# Patient Record
Sex: MALE | Race: WHITE | Employment: UNEMPLOYED | URBAN - METROPOLITAN AREA
[De-identification: names, ages, dates, MRNs, and addresses within clinical notes are randomized per-mention and may not be internally consistent; named-entity substitution may affect disease eponyms.]

---

## 2018-02-08 ENCOUNTER — OFFICE VISIT (OUTPATIENT)
Dept: INTERNAL MEDICINE CLINIC | Age: 83
End: 2018-02-08

## 2018-02-08 VITALS
WEIGHT: 144 LBS | DIASTOLIC BLOOD PRESSURE: 68 MMHG | TEMPERATURE: 97.6 F | BODY MASS INDEX: 21.33 KG/M2 | SYSTOLIC BLOOD PRESSURE: 132 MMHG | HEART RATE: 50 BPM | RESPIRATION RATE: 12 BRPM | HEIGHT: 69 IN

## 2018-02-08 DIAGNOSIS — K21.9 GASTROESOPHAGEAL REFLUX DISEASE, ESOPHAGITIS PRESENCE NOT SPECIFIED: ICD-10-CM

## 2018-02-08 DIAGNOSIS — I10 BENIGN ESSENTIAL HTN: ICD-10-CM

## 2018-02-08 DIAGNOSIS — E78.00 HYPERCHOLESTEROLEMIA: ICD-10-CM

## 2018-02-08 DIAGNOSIS — L71.9 ROSACEA: ICD-10-CM

## 2018-02-08 DIAGNOSIS — C61 PROSTATE CANCER (HCC): ICD-10-CM

## 2018-02-08 DIAGNOSIS — E11.9 CONTROLLED TYPE 2 DIABETES MELLITUS WITHOUT COMPLICATION, WITHOUT LONG-TERM CURRENT USE OF INSULIN (HCC): Primary | ICD-10-CM

## 2018-02-08 RX ORDER — METRONIDAZOLE 7.5 MG/G
CREAM TOPICAL 2 TIMES DAILY
COMMUNITY
End: 2018-02-08 | Stop reason: SDUPTHER

## 2018-02-08 RX ORDER — DORZOLAMIDE HYDROCHLORIDE AND TIMOLOL MALEATE 20; 5 MG/ML; MG/ML
SOLUTION/ DROPS OPHTHALMIC
Refills: 2 | COMMUNITY
Start: 2018-02-01

## 2018-02-08 RX ORDER — METFORMIN HYDROCHLORIDE 500 MG/1
TABLET, EXTENDED RELEASE ORAL
Refills: 1 | COMMUNITY
Start: 2018-01-26 | End: 2018-03-22 | Stop reason: SDUPTHER

## 2018-02-08 RX ORDER — MELATONIN
DAILY
COMMUNITY

## 2018-02-08 RX ORDER — LOSARTAN POTASSIUM 100 MG/1
TABLET ORAL
Refills: 0 | COMMUNITY
Start: 2018-01-19 | End: 2018-03-22 | Stop reason: SDUPTHER

## 2018-02-08 RX ORDER — LANOLIN ALCOHOL/MO/W.PET/CERES
500 CREAM (GRAM) TOPICAL DAILY
COMMUNITY

## 2018-02-08 RX ORDER — RANITIDINE 150 MG/1
150 TABLET, FILM COATED ORAL
COMMUNITY
End: 2020-02-06 | Stop reason: ALTCHOICE

## 2018-02-08 RX ORDER — ATORVASTATIN CALCIUM 40 MG/1
TABLET, FILM COATED ORAL
Refills: 0 | COMMUNITY
Start: 2018-01-10 | End: 2018-03-22 | Stop reason: SDUPTHER

## 2018-02-08 RX ORDER — ASPIRIN 81 MG/1
81 TABLET ORAL DAILY
Qty: 30 TAB | Refills: 11
Start: 2018-02-08

## 2018-02-08 RX ORDER — LATANOPROST 50 UG/ML
SOLUTION/ DROPS OPHTHALMIC
Refills: 1 | COMMUNITY
Start: 2018-01-20

## 2018-02-08 RX ORDER — METRONIDAZOLE 7.5 MG/G
CREAM TOPICAL 2 TIMES DAILY
Qty: 45 G | Refills: 5 | Status: SHIPPED | OUTPATIENT
Start: 2018-02-08 | End: 2021-11-24 | Stop reason: ALTCHOICE

## 2018-02-08 NOTE — MR AVS SNAPSHOT
Sunitha Zavaleta 
 
 
 [address] [facility] 100[address] [facility] 
[phone] Patient: Brain Cifuentes MRN: [id number] Visit Information Date & Time Provider Department Dept. Phone Encounter #  
 2/8/2018  1:45 PM Natan Gallardo MD [facility] [address] [id number] Upcoming Health Maintenance Date Due HEMOGLOBIN A1C Q6M 9/22/1929 LIPID PANEL Q1 9/22/1929 FOOT EXAM Q1 9/22/1939 MICROALBUMIN Q1 9/22/1939 DTaP/Tdap/Td series (1 - Tdap) 9/22/1950 ZOSTER VACCINE AGE 60> 7/22/1989 Pneumococcal 65+ High/Highest Risk (1 of 2 - PCV13) 9/22/1994 MEDICARE YEARLY EXAM 9/22/1994 Influenza Age 5 to Adult 8/1/2017 EYE EXAM RETINAL OR DILATED Q1 12/19/2018 GLAUCOMA SCREENING Q2Y 12/19/2019 Allergies as of 2/8/2018  Review Complete On: 2/8/2018 By: Natan Gallardo MD  
  
 Severity Noted Reaction Type Reactions Lisinopril  02/08/2018    Cough Current Immunizations  Reviewed on 2/8/2018 Name Date Influenza Vaccine 10/1/2017 Pneumococcal Vaccine (Unspecified Type) 1/1/2014 Td 1/1/2014 Reviewed by Fabby Ovalle on 2/8/2018 at  1:40 PM  
 Reviewed by Natan Gallardo MD on 2/8/2018 at  2:18 PM  
You Were Diagnosed With   
  
 Codes Comments Controlled type 2 diabetes mellitus without complication, without long-term current use of insulin ([facility] [address]    -  Primary ICD-10-CM: E11.9 ICD-9-CM: 250.00 Benign essential HTN     ICD-10-CM: I10 
ICD-9-CM: 401.1 Prostate cancer [facility])     ICD-10-CM: C76 ICD-9-CM: 893 Gastroesophageal reflux disease, esophagitis presence not specified     ICD-10-CM: K21.9 ICD-9-CM: 530.81 Hypercholesterolemia     ICD-10-CM: E78.00 ICD-9-CM: 272.0 Rosacea     ICD-10-CM: L71.9 ICD-9-CM: 695.3 Vitals BP Pulse Temp Resp Height(growth percentile) Weight(growth percentile) 132/68 (BP 1 Location: Left arm, BP Patient Position: Sitting) (!) 50 97.6 °F (36.4 °C) 12 5' 9\" (1.753 m) 144 lb (65.3 kg) BMI Smoking Status 21.27 kg/m2 Former Smoker Vitals History BMI and BSA Data Body Mass Index Body Surface Area  
 21.27 kg/m 2 1.78 m 2 Preferred Pharmacy Pharmacy Name Phone [id number]- [address], [address] [phone] Your Updated Medication List  
  
   
This list is accurate as of: 2/8/18  2:31 PM.  Always use your most recent med list.  
  
  
  
  
 aspirin delayed-release 81 mg tablet Commonly known as:  ZDXSD-29 Take 1 Tab by mouth daily. atorvastatin 40 mg tablet Commonly known as:  LIPITOR  
TAKE 1 TABLET BY MOUTH EVERY EVENING  
  
 dorzolamide-timolol 22.3-6.8 mg/mL ophthalmic solution Commonly known as:  COSOPT  
APPLY 1 DROP(S) IN BOTH EYES 2 TIMES A DAY  
  
 latanoprost 0.005 % ophthalmic solution Commonly known as:  XALATAN  
APPLY 1 DROP(S) IN LEFT EYE AT BEDTIME  
  
 losartan 100 mg tablet Commonly known as:  COZAAR  
TAKE 1 TABLET BY MOUTH DAILY  
  
 metFORMIN  mg tablet Commonly known as:  GLUCOPHAGE XR  
TAKE 1 TABLET BY MOUTH EVERY DAY  
  
 metroNIDAZOLE 0.75 % topical cream  
Commonly known as:  METROCREAM  
Apply  to affected area two (2) times a day. Use a thin layer to affected areas after washing  
  
 raNITIdine 150 mg tablet Commonly known as:  ZANTAC Take 150 mg by mouth nightly. VITAMIN B-12 500 mcg tablet Generic drug:  cyanocobalamin Take 500 mcg by mouth daily. VITAMIN D3 1,000 unit tablet Generic drug:  cholecalciferol Take  by mouth daily. Prescriptions Sent to Pharmacy Refills  
 metroNIDAZOLE (METROCREAM) 0.75 % topical cream 5 Sig: Apply  to affected area two (2) times a day. Use a thin layer to affected areas after washing  Class: Normal  
 Pharmacy: [address] [place] [address], [address] [facility] #: [phone] Route: Topical  
  
We Performed the Following CBC W/O DIFF [72342 CPT(R)] HEMOGLOBIN A1C WITH EAG [02030 CPT(R)] LIPID PANEL [20826 CPT(R)] METABOLIC PANEL, COMPREHENSIVE [55980 CPT(R)] MICROALBUMIN, UR, RAND W/ MICROALBUMIN/CREA RATIO [id number] CPT(R)] Introducing [facility]! [employer] introduces [employer] patient portal. Now you can access parts of your medical record, email your doctor's office, and request medication refills online. 1. In your internet browser, go to [email or web address] 2. Click on the First Time User? Click Here link in the Sign In box. You will see the New Member Sign Up page. 3. Enter your [employer] Access Code exactly as it appears below. You will not need to use this code after youve completed the sign-up process. If you do not sign up before the expiration date, you must request a new code. · [employer] Access Code: [email or web address] Expires: 5/9/2018  2:31 PM 
 
4. Enter the last four digits of your Social Security Number (xxxx) and Date of Birth (mm/dd/yyyy) as indicated and click Submit. You will be taken to the next sign-up page. 5. Create a [employer] ID. This will be your [employer] login ID and cannot be changed, so think of one that is secure and easy to remember. 6. Create a [employer] password. You can change your password at any time. 7. Enter your Password Reset Question and Answer. This can be used at a later time if you forget your password. 8. Enter your e-mail address. You will receive e-mail notification when new information is available in [address] 9. Click Sign Up. You can now view and download portions of your medical record. 10. Click the Download Summary menu link to download a portable copy of your medical information.  
 
If you have questions, please visit the Frequently Asked Questions section of the [employer]. Remember, "[employer]hart is NOT to be used for urgent needs. For medical emergencies, dial 911. Now available from your iPhone and Android! Please provide this summary of care documentation to your next provider. Your primary care clinician is listed as Mustapha Simpson. If you have any questions after today's visit, please call [phone].

## 2018-02-08 NOTE — PROGRESS NOTES
Just moved from [facility] in September 2017. He has glaucoma. Has a urologist due to bladder CA.

## 2018-02-08 NOTE — PROGRESS NOTES
HISTORY OF PRESENT ILLNESS    New patient to my practice, referred to me by Brooklynn jhaveri. Prior medical care has been from [facility] Dr. Flo Sims. Moved here to be with son and 5 grandkids here. .  he works as a Retired . served [employer]. Moved to [place] 9/2017. . .  his  past medical history was reviewed, discussed, and summarized in the list below. Diabetes Mellitus  Last hemoglobin a1c   Lab Results   Component Value Date/Time    Hemoglobin A1c 7.5 (H) 02/09/2018 10:41 AM   medication compliance: compliant all of the time. Diabetic diet compliance: compliant most of the time. Home glucose monitoring: fasting values range none. Hypoglycemic episodes:  None. Further diabetic ROS: no polyuria or polydipsia, no chest pain, dyspnea or TIA's, no numbness, tingling or pain in extremities. Hypertension  Hypertension ROS: taking medications as instructed, no medication side effects noted, no TIA's, no chest pain on exertion, no dyspnea on exertion, no swelling of ankles     reports that he quit smoking about 56 years ago. He has never used smokeless tobacco.    reports that he drinks about 3.0 oz of alcohol per week    BP Readings from Last 2 Encounters:   02/08/18 132/68         Review of Systems   All other systems reviewed and are negative, except as noted in HPI      Past Medical and Surgical History  Past Medical History:   Diagnosis Date    Benign essential HTN     Bladder cancer ([address] 2013?    s/p BCGx2, resection. low grade. in [facility]. now Dr. Abelino Smith    Closed left arm fracture     Diabetes type 2, controlled ([address]     GERD (gastroesophageal reflux disease)     with hiatal hernia    Glaucoma     Dr. Gabriela Chávezles    Hemorrhoids     Hypercholesterolemia     Prostate cancer [facility]) 2000    Dr. Abelino Smith. s/p prostatectomy 1/2000 in [facility].   PSA \"0.4\" since then    Rosacea         has a past surgical history that includes hx prostatectomy (01/2000). Current Outpatient Prescriptions   Medication Sig    atorvastatin (LIPITOR) 40 mg tablet TAKE 1 TABLET BY MOUTH EVERY EVENING    dorzolamide-timolol (COSOPT) 22.3-6.8 mg/mL ophthalmic solution APPLY 1 DROP(S) IN BOTH EYES 2 TIMES A DAY    latanoprost (XALATAN) 0.005 % ophthalmic solution APPLY 1 DROP(S) IN LEFT EYE AT BEDTIME    losartan (COZAAR) 100 mg tablet TAKE 1 TABLET BY MOUTH DAILY    metFORMIN ER (GLUCOPHAGE XR) 500 mg tablet TAKE 1 TABLET BY MOUTH EVERY DAY    raNITIdine (ZANTAC) 150 mg tablet Take 150 mg by mouth nightly.  cholecalciferol (VITAMIN D3) 1,000 unit tablet Take  by mouth daily.  cyanocobalamin (VITAMIN B-12) 500 mcg tablet Take 500 mcg by mouth daily.  metroNIDAZOLE (METROCREAM) 0.75 % topical cream Apply  to affected area two (2) times a day. Use a thin layer to affected areas after washing    aspirin delayed-release (ASPIR-81) 81 mg tablet Take 1 Tab by mouth daily. No current facility-administered medications for this visit. reports that he quit smoking about 56 years ago. He has never used smokeless tobacco. He reports that he drinks about 3.0 oz of alcohol per week     family history includes Cancer in his brother, father, mother, and sister. Physical Exam   Nursing note and vitals reviewed. Blood pressure 172/88, pulse (!) 50, temperature 97.6 °F (36.4 °C), resp. rate 12, height 5' 9\" (1.753 m), weight 144 lb (65.3 kg). Constitutional: oriented to person, place, and time. No distress. Eyes: Conjunctivae are normal.   HEENT:  No cervical lymphadenopathy. No thyroid nodules or goiter  Cardiovascular: Normal rate. Regular rhythm, no murmurs, rubs. No edema  Pulmonary/Chest: Effort normal. clear to auscultation  Abdominal: soft, non-tender, non-distended  Musculoskeletal:     Neurological: Alert and oriented to person, place. Cranial nerves grossly intact. Normal gait   Skin: No rash noted.    Psychiatric: Normal mood and affect. Behavior is normal.     ASSESSMENT and PLAN  Diagnoses and all orders for this visit:    1. Controlled type 2 diabetes mellitus without complication, without long-term current use of insulin ([facility])  Borderline controlled  -     aspirin delayed-release (ASPIR-81) 81 mg tablet; Take 1 Tab by mouth daily.  -     LIPID PANEL  -     MICROALBUMIN, UR, RAND W/ MICROALBUMIN/CREA RATIO  -     METABOLIC PANEL, COMPREHENSIVE  -     CBC W/O DIFF  -     HEMOGLOBIN A1C WITH EAG    2. Benign essential HTN - Controlled on current regimen. Continue current medications as written in chart. 3. Prostate cancer ([facility] [address]    4. Gastroesophageal reflux disease, esophagitis presence not specified    5. Hypercholesterolemia - Controlled on current regimen. Continue current medications as written in chart.  -     METABOLIC PANEL, COMPREHENSIVE    6. Rosacea  -     metroNIDAZOLE (METROCREAM) 0.75 % topical cream; Apply  to affected area two (2) times a day.  Use a thin layer to affected areas after washing    lab results and schedule of future lab studies reviewed with patient  reviewed medications and side effects in detail    Follow-up Disposition: Not on File

## 2018-02-10 PROBLEM — E11.21 TYPE 2 DIABETES WITH NEPHROPATHY (HCC): Status: ACTIVE | Noted: 2018-02-10

## 2018-02-10 LAB
ALBUMIN SERPL-MCNC: 4.5 G/DL (ref 3.5–4.7)
ALBUMIN/CREAT UR: 30.6 MG/G CREAT (ref 0–30)
ALBUMIN/GLOB SERPL: 2 {RATIO} (ref 1.2–2.2)
ALP SERPL-CCNC: 68 IU/L (ref 39–117)
ALT SERPL-CCNC: 29 IU/L (ref 0–44)
AST SERPL-CCNC: 30 IU/L (ref 0–40)
BILIRUB SERPL-MCNC: 0.5 MG/DL (ref 0–1.2)
BUN SERPL-MCNC: 31 MG/DL (ref 8–27)
BUN/CREAT SERPL: 27 (ref 10–24)
CALCIUM SERPL-MCNC: 9.4 MG/DL (ref 8.6–10.2)
CHLORIDE SERPL-SCNC: 102 MMOL/L (ref 96–106)
CHOLEST SERPL-MCNC: 135 MG/DL (ref 100–199)
CO2 SERPL-SCNC: 24 MMOL/L (ref 18–29)
CREAT SERPL-MCNC: 1.13 MG/DL (ref 0.76–1.27)
CREAT UR-MCNC: 131.6 MG/DL
ERYTHROCYTE [DISTWIDTH] IN BLOOD BY AUTOMATED COUNT: 13.2 % (ref 12.3–15.4)
EST. AVERAGE GLUCOSE BLD GHB EST-MCNC: 169 MG/DL
GFR SERPLBLD CREATININE-BSD FMLA CKD-EPI: 58 ML/MIN/1.73
GFR SERPLBLD CREATININE-BSD FMLA CKD-EPI: 67 ML/MIN/1.73
GLOBULIN SER CALC-MCNC: 2.3 G/DL (ref 1.5–4.5)
GLUCOSE SERPL-MCNC: 114 MG/DL (ref 65–99)
HBA1C MFR BLD: 7.5 % (ref 4.8–5.6)
HCT VFR BLD AUTO: 39.8 % (ref 37.5–51)
HDLC SERPL-MCNC: 61 MG/DL
HGB BLD-MCNC: 13.4 G/DL (ref 13–17.7)
INTERPRETATION, 910389: NORMAL
INTERPRETATION: NORMAL
LDLC SERPL CALC-MCNC: 59 MG/DL (ref 0–99)
Lab: NORMAL
MCH RBC QN AUTO: 31.9 PG (ref 26.6–33)
MCHC RBC AUTO-ENTMCNC: 33.7 G/DL (ref 31.5–35.7)
MCV RBC AUTO: 95 FL (ref 79–97)
MICROALBUMIN UR-MCNC: 40.3 UG/ML
PDF IMAGE, 910387: NORMAL
PLATELET # BLD AUTO: 174 X10E3/UL (ref 150–379)
POTASSIUM SERPL-SCNC: 4.2 MMOL/L (ref 3.5–5.2)
PROT SERPL-MCNC: 6.8 G/DL (ref 6–8.5)
RBC # BLD AUTO: 4.2 X10E6/UL (ref 4.14–5.8)
SODIUM SERPL-SCNC: 142 MMOL/L (ref 134–144)
TRIGL SERPL-MCNC: 73 MG/DL (ref 0–149)
VLDLC SERPL CALC-MCNC: 15 MG/DL (ref 5–40)
WBC # BLD AUTO: 5.9 X10E3/UL (ref 3.4–10.8)

## 2018-03-22 DIAGNOSIS — I10 BENIGN ESSENTIAL HTN: ICD-10-CM

## 2018-03-22 DIAGNOSIS — E11.9 CONTROLLED TYPE 2 DIABETES MELLITUS WITHOUT COMPLICATION, WITHOUT LONG-TERM CURRENT USE OF INSULIN (HCC): ICD-10-CM

## 2018-03-22 DIAGNOSIS — E78.00 HYPERCHOLESTEROLEMIA: ICD-10-CM

## 2018-03-22 RX ORDER — LOSARTAN POTASSIUM 100 MG/1
TABLET ORAL
Qty: 90 TAB | Refills: 1 | Status: SHIPPED | OUTPATIENT
Start: 2018-03-22 | End: 2018-09-12 | Stop reason: SDUPTHER

## 2018-03-22 RX ORDER — METFORMIN HYDROCHLORIDE 500 MG/1
TABLET, EXTENDED RELEASE ORAL
Qty: 90 TAB | Refills: 1 | Status: SHIPPED | OUTPATIENT
Start: 2018-03-22 | End: 2018-09-12 | Stop reason: SDUPTHER

## 2018-03-22 RX ORDER — ATORVASTATIN CALCIUM 40 MG/1
TABLET, FILM COATED ORAL
Qty: 90 TAB | Refills: 1 | Status: SHIPPED | OUTPATIENT
Start: 2018-03-22 | End: 2018-09-12 | Stop reason: SDUPTHER

## 2018-05-10 ENCOUNTER — OFFICE VISIT (OUTPATIENT)
Dept: INTERNAL MEDICINE CLINIC | Age: 83
End: 2018-05-10

## 2018-05-10 VITALS
OXYGEN SATURATION: 99 % | SYSTOLIC BLOOD PRESSURE: 127 MMHG | TEMPERATURE: 97.8 F | RESPIRATION RATE: 18 BRPM | WEIGHT: 143.8 LBS | BODY MASS INDEX: 21.3 KG/M2 | DIASTOLIC BLOOD PRESSURE: 73 MMHG | HEART RATE: 58 BPM | HEIGHT: 69 IN

## 2018-05-10 DIAGNOSIS — E11.29 TYPE 2 DIABETES MELLITUS WITH MICROALBUMINURIA, WITHOUT LONG-TERM CURRENT USE OF INSULIN (HCC): ICD-10-CM

## 2018-05-10 DIAGNOSIS — R42 VERTIGO: ICD-10-CM

## 2018-05-10 DIAGNOSIS — Z85.46 HISTORY OF PROSTATE CANCER: ICD-10-CM

## 2018-05-10 DIAGNOSIS — Z00.00 MEDICARE ANNUAL WELLNESS VISIT, SUBSEQUENT: Primary | ICD-10-CM

## 2018-05-10 DIAGNOSIS — R80.9 TYPE 2 DIABETES MELLITUS WITH MICROALBUMINURIA, WITHOUT LONG-TERM CURRENT USE OF INSULIN (HCC): ICD-10-CM

## 2018-05-10 DIAGNOSIS — Z85.51 HISTORY OF BLADDER CANCER: ICD-10-CM

## 2018-05-10 DIAGNOSIS — Z71.89 ACP (ADVANCE CARE PLANNING): ICD-10-CM

## 2018-05-10 DIAGNOSIS — E78.00 HYPERCHOLESTEROLEMIA: ICD-10-CM

## 2018-05-10 DIAGNOSIS — I10 BENIGN ESSENTIAL HTN: ICD-10-CM

## 2018-05-10 RX ORDER — MECLIZINE HCL 12.5 MG 12.5 MG/1
12.5 TABLET ORAL
Qty: 30 TAB | Refills: 1 | Status: SHIPPED | OUTPATIENT
Start: 2018-05-10 | End: 2018-05-20

## 2018-05-10 RX ORDER — ZOSTER VACCINE RECOMBINANT, ADJUVANTED 50 MCG/0.5
0.5 KIT INTRAMUSCULAR ONCE
Qty: 0.5 ML | Refills: 0 | Status: SHIPPED | OUTPATIENT
Start: 2018-05-10 | End: 2018-05-10

## 2018-05-10 NOTE — PROGRESS NOTES
This is the Subsequent Medicare Annual Wellness Exam, performed 12 months or more after the Initial AWV or the last Subsequent AWV    I have reviewed the patient's medical history in detail and updated the computerized patient record. Diabetes Mellitus follow-up  Last hemoglobin a1c   Lab Results   Component Value Date/Time    Hemoglobin A1c 7.5 (H) 02/09/2018 10:41 AM     No components found for: POCA1C, HBA1C POC  medication compliance: compliant all of the time. Diabetic diet compliance: compliant most of the time. Home glucose monitoring: fasting values range none. Hypoglycemic episodes:  None. Further diabetic ROS: no polyuria or polydipsia, no chest pain, dyspnea or TIA's, no numbness, tingling or pain in extremities. Hypertension  Hypertension ROS: taking medications as instructed, no medication side effects noted, no TIA's, no chest pain on exertion, no dyspnea on exertion, no swelling of ankles     reports that he quit smoking about 56 years ago. He has never used smokeless tobacco.    reports that he drinks about 3.0 oz of alcohol per week    BP Readings from Last 2 Encounters:   05/10/18 127/73   02/08/18 132/68             History     Past Medical History:   Diagnosis Date    Benign essential HTN     Bladder cancer ([facility] 2013?    s/p BCGx2, resection. low grade. in [facility]. now Dr. Lorraine Adam    Closed left arm fracture     GERD (gastroesophageal reflux disease)     with hiatal hernia    Glaucoma     Dr. Josh Cruz    Hemorrhoids     Hypercholesterolemia     Microalbuminuria due to type 2 diabetes mellitus ([facility]     Prostate cancer ([facility] 2000    Dr. Lorraine Adam. s/p prostatectomy 1/2000 in [facility].   PSA \"0.4\" since then    Rosacea     Type 2 diabetes mellitus with microalbuminuria (HCC)     Vertigo       Past Surgical History:   Procedure Laterality Date    HX PROSTATECTOMY  01/2000     Current Outpatient Prescriptions   Medication Sig Dispense Refill    SHINGRIX, PF, 50 mcg/0.5 mL susr injection 0.5 mL by IntraMUSCular route once for 1 dose. Receive 2nd dose in 6 months. For Shingles (Zoster) prevention 0.5 mL 0    meclizine (ANTIVERT) 12.5 mg tablet Take 1 Tab by mouth three (3) times daily as needed for up to 10 days. 30 Tab 1    metFORMIN ER (GLUCOPHAGE XR) 500 mg tablet TAKE 1 TABLET BY MOUTH EVERY DAY 90 Tab 1    losartan (COZAAR) 100 mg tablet TAKE 1 TABLET BY MOUTH DAILY 90 Tab 1    atorvastatin (LIPITOR) 40 mg tablet TAKE 1 TABLET BY MOUTH EVERY EVENING 90 Tab 1    dorzolamide-timolol (COSOPT) 22.3-6.8 mg/mL ophthalmic solution APPLY 1 DROP(S) IN BOTH EYES 2 TIMES A DAY  2    latanoprost (XALATAN) 0.005 % ophthalmic solution APPLY 1 DROP(S) IN LEFT EYE AT BEDTIME  1    raNITIdine (ZANTAC) 150 mg tablet Take 150 mg by mouth nightly.  cholecalciferol (VITAMIN D3) 1,000 unit tablet Take  by mouth daily.  cyanocobalamin (VITAMIN B-12) 500 mcg tablet Take 500 mcg by mouth daily.  aspirin delayed-release (ASPIR-81) 81 mg tablet Take 1 Tab by mouth daily. 30 Tab 11    metroNIDAZOLE (METROCREAM) 0.75 % topical cream Apply  to affected area two (2) times a day.  Use a thin layer to affected areas after washing 45 g 5     Allergies   Allergen Reactions    Lisinopril Cough     Family History   Problem Relation Age of Onset    Cancer Mother      breast    Cancer Father      prostate    Cancer Sister      breast    Cancer Brother      prostate     Social History   Substance Use Topics    Smoking status: Former Smoker     Quit date: 1/1/1962    Smokeless tobacco: Never Used    Alcohol use 3.0 oz/week     5 Glasses of wine per week     Patient Active Problem List   Diagnosis Code    Glaucoma H40.9    Hypercholesterolemia E78.00    Rosacea L71.9    GERD (gastroesophageal reflux disease) K21.9    Hemorrhoids K64.9    Benign essential HTN I10    Type 2 diabetes with nephropathy ([facility] [address] E11.21    Microalbuminuria due to type 2 diabetes mellitus ([facility] E11.29, R80.9    Type 2 diabetes mellitus with microalbuminuria ([facility] E11.29, R80.9    History of prostate cancer Z85.46    Vertigo R42    History of bladder cancer Z85.51       Depression Risk Factor Screening:     PHQ over the last two weeks 5/10/2018   PHQ Not Done -   Little interest or pleasure in doing things Not at all   Feeling down, depressed or hopeless Not at all   Total Score PHQ 2 0     Alcohol Risk Factor Screening: You do not drink alcohol or very rarely. Functional Ability and Level of Safety:   Hearing Loss  Hearing is good. Activities of Daily Living  The home contains: no safety equipment. Patient does total self care    Fall Risk  Fall Risk Assessment, last 12 mths 5/10/2018   Able to walk? Yes   Fall in past 12 months? No       Abuse Screen  Patient is not abused    Cognitive Screening   Evaluation of Cognitive Function:  Has your family/caregiver stated any concerns about your memory: no  Normal    Patient Care Team   Patient Care Team:  Julian Hdez MD as PCP - General (Internal Medicine)    Assessment/Plan   Education and counseling provided:  Are appropriate based on today's review and evaluation  End-of-Life planning (with patient's consent)    Diagnoses and all orders for this visit:    1. Medicare annual wellness visit, subsequent  -     SHINGRIX, PF, 50 mcg/0.5 mL susr injection; 0.5 mL by IntraMUSCular route once for 1 dose. Receive 2nd dose in 6 months. For Shingles (Zoster) prevention    2. ACP (advance care planning)    3. Type 2 diabetes mellitus with microalbuminuria, without long-term current use of insulin (HCC)  Controlled on current regimen. Continue current medications as written in chart. Repeat labs 3-4 months    4. Hypercholesterolemia  Controlled on current regimen. Continue current medications as written in chart. 5. Benign essential HTN  Controlled on current regimen. Continue current medications as written in chart    6.  Vertigo - intermittent. Prn meclizine  -     meclizine (ANTIVERT) 12.5 mg tablet; Take 1 Tab by mouth three (3) times daily as needed for up to 10 days. 7. History of prostate cancer    8.  History of bladder cancer

## 2018-05-10 NOTE — MR AVS SNAPSHOT
Gloria Streeter 
 
 
 [address] [facility] 
[phone] Patient: Harpreet Del Rio MRN: [id number] Visit Information Date & Time Provider Department Dept. Phone Encounter #  
 5/10/2018  9:00 AM Shyanne Shirley MD [facility] [address] [id number] Upcoming Health Maintenance Date Due ZOSTER VACCINE AGE 60> 8/10/2018* DTaP/Tdap/Td series (1 - Tdap) 1/1/2024* Influenza Age 5 to Adult 8/1/2018 HEMOGLOBIN A1C Q6M 8/9/2018 EYE EXAM RETINAL OR DILATED Q1 12/19/2018 Pneumococcal 65+ High/Highest Risk (2 of 2 - PPSV23) 1/1/2019 MICROALBUMIN Q1 2/9/2019 LIPID PANEL Q1 2/9/2019 FOOT EXAM Q1 5/10/2019 MEDICARE YEARLY EXAM 5/11/2019 GLAUCOMA SCREENING Q2Y 12/19/2019 *Topic was postponed. The date shown is not the original due date. Allergies as of 5/10/2018  Review Complete On: 5/10/2018 By: Shyanne Shirley MD  
  
 Severity Noted Reaction Type Reactions Lisinopril  02/08/2018    Cough Current Immunizations  Reviewed on 5/10/2018 Name Date Influenza Vaccine 10/1/2017 Pneumococcal Vaccine (Unspecified Type) 1/1/2014 Td 1/1/2014 Reviewed by Shyanne Shirley MD on 5/10/2018 at  9:52 AM  
 Reviewed by Shyanne Shirley MD on 5/10/2018 at  9:52 AM  
You Were Diagnosed With   
  
 Codes Comments Medicare annual wellness visit, subsequent    -  Primary ICD-10-CM: Z00.00 ICD-9-CM: V70.0 ACP (advance care planning)     ICD-10-CM: Z71.89 ICD-9-CM: V65.49 Type 2 diabetes mellitus with microalbuminuria, without long-term current use of insulin (HCC)     ICD-10-CM: E11.29, R80.9 ICD-9-CM: 250.40, 791.0 Hypercholesterolemia     ICD-10-CM: E78.00 ICD-9-CM: 272.0 Benign essential HTN     ICD-10-CM: I10 
ICD-9-CM: 401.1 Vertigo     ICD-10-CM: N37 ICD-9-CM: 780.4  History of prostate cancer     ICD-10-CM: Z85.46 
ICD-9-CM: V10.46   
 History of bladder cancer     ICD-10-CM: Z85.51 
ICD-9-CM: V10.51 Vitals BP Pulse Temp Resp Height(growth percentile) Weight(growth percentile) 127/73 (BP 1 Location: Right arm, BP Patient Position: Sitting) (!) 58 97.8 °F (36.6 °C) (Oral) 18 5' 9\" (1.753 m) 143 lb 12.8 oz (65.2 kg) SpO2 BMI Smoking Status 99% 21.24 kg/m2 Former Smoker Vitals History BMI and BSA Data Body Mass Index Body Surface Area  
 21.24 kg/m 2 1.78 m 2 Preferred Pharmacy Pharmacy Name Phone [id number] [address], [address] [phone] Your Updated Medication List  
  
   
This list is accurate as of 5/10/18 10:07 AM.  Always use your most recent med list.  
  
  
  
  
 aspirin delayed-release 81 mg tablet Commonly known as:  NYUMJ-24 Take 1 Tab by mouth daily. atorvastatin 40 mg tablet Commonly known as:  LIPITOR  
TAKE 1 TABLET BY MOUTH EVERY EVENING  
  
 dorzolamide-timolol 22.3-6.8 mg/mL ophthalmic solution Commonly known as:  COSOPT  
APPLY 1 DROP(S) IN BOTH EYES 2 TIMES A DAY  
  
 latanoprost 0.005 % ophthalmic solution Commonly known as:  XALATAN  
APPLY 1 DROP(S) IN LEFT EYE AT BEDTIME  
  
 losartan 100 mg tablet Commonly known as:  COZAAR  
TAKE 1 TABLET BY MOUTH DAILY  
  
 meclizine 12.5 mg tablet Commonly known as:  ANTIVERT Take 1 Tab by mouth three (3) times daily as needed for up to 10 days. metFORMIN  mg tablet Commonly known as:  GLUCOPHAGE XR  
TAKE 1 TABLET BY MOUTH EVERY DAY  
  
 metroNIDAZOLE 0.75 % topical cream  
Commonly known as:  METROCREAM  
Apply  to affected area two (2) times a day. Use a thin layer to affected areas after washing  
  
 raNITIdine 150 mg tablet Commonly known as:  ZANTAC Take 150 mg by mouth nightly. SHINGRIX (PF) 50 mcg/0.5 mL Susr injection Generic drug:  varicella-zoster recombinant (PF)  
 0.5 mL by IntraMUSCular route once for 1 dose. Receive 2nd dose in 6 months. For Shingles (Zoster) prevention VITAMIN B-12 500 mcg tablet Generic drug:  cyanocobalamin Take 500 mcg by mouth daily. VITAMIN D3 1,000 unit tablet Generic drug:  cholecalciferol Take  by mouth daily. Prescriptions Printed Refills SHINGRIX, PF, 50 mcg/0.5 mL susr injection 0 Si.5 mL by IntraMUSCular route once for 1 dose. Receive 2nd dose in 6 months. For Shingles (Zoster) prevention Class: Print Route: IntraMUSCular Prescriptions Sent to Pharmacy Refills  
 meclizine (ANTIVERT) 12.5 mg tablet 1 Sig: Take 1 Tab by mouth three (3) times daily as needed for up to 10 days. Class: Normal  
 Pharmacy: [address] [place] [address] Ph #: [phone] Route: Oral  
  
Patient Instructions Medicare Wellness Visit, Male The best way to live healthy is to have a healthy lifestyle by eating a well-balanced diet, exercising regularly, limiting alcohol and stopping smoking. Regular physical exams and screening tests are another way to keep healthy. Preventive exams provided by your health care provider can find health problems before they become diseases or illnesses. Preventive services including immunizations, screening tests, monitoring and exams can help you take care of your own health. All people over age 72 should have a pneumovax  and and a prevnar shot to prevent pneumonia. These are once in a lifetime unless you and your provider decide differently. All people over 65 should have a yearly flu shot and a tetanus vaccine every 10 years. Screening for diabetes mellitus with a blood sugar test should be done every year.  
 
Glaucoma is a disease of the eye due to increased ocular pressure that can lead to blindness and it should be done every year by an eye professional. 
 
 Cardiovascular screening tests that check for elevated lipids (fatty part of blood) which can lead to heart disease and strokes should be done every 5 years. Colorectal screening that evaluates for blood or polyps in your colon should be done yearly as a stool test or every five years as a flexible sigmoidoscope or every 10 years as a colonoscopy up to age 76. Men up to age 76 may need a screening blood test for prostate cancer at certain intervals, depending on their personal and family history. This decision is between the patient and his provider. If you have been a smoker or had family history of abdominal aortic aneurysms, you and your provider may decide to schedule an ultrasound test of your aorta. Hepatitis C screening is also recommended for anyone born between [birth year] through [address]. A shingles vaccine is also recommended once in a lifetime after age 61. Your Medicare Wellness Exam is recommended annually. Here is a list of your current Health Maintenance items with a due date: There are no preventive care reminders to display for this patient. Introducing [facility]! Dear Evelyn Velasquez: Thank you for requesting a Personalis account. Our records indicate that you already have an active Personalis account. You can access your account anytime at [email or web address] [email or web address] Did you know that you can access your hospital and ER discharge instructions at any time in Personalis? You can also review all of your test results from your hospital stay or ER visit. Additional Information If you have questions, please visit the Frequently Asked Questions section of the Personalis website at [email or web address] Remember, Personalis is NOT to be used for urgent needs. For medical emergencies, dial 911. Now available from your iPhone and Android! Please provide this summary of care documentation to your next provider. Your primary care clinician is listed as Aman Salts. If you have any questions after today's visit, please call [phone].

## 2018-05-10 NOTE — ACP (ADVANCE CARE PLANNING)
Advance Care Planning (ACP) Note    Date of ACP Conversation: 5/10/2018  Persons included in Conversation: patient  Length of ACP Conversation in minutes: <16 minutes (Non-Billable)    Conversation requested by:   Patient    Authorized Decision Maker (if patient is incapable of making informed decisions): This person is:  Healthcare Agent/Medical Power of  under Advance Directive    General ACP for ALL Patients with Decision Making Capacity: yes    Advance Directive Conversation with Patients who have not yet planned:  Importance of advance care planning, including choosing a healthcare agent to communicate patient's healthcare decisions if patient lost the ability to make decisions, such as after a sudden illness or accident  Explored patient's values, goals, and care preferences as related to these situations    Review of Existing Advance Directive: (Select questions covered)  Does this advance directive still reflect your preferences?   Yes    Interventions Provided:  Recommended review of completed ACP document annually or upon change in health status

## 2018-09-06 ENCOUNTER — OFFICE VISIT (OUTPATIENT)
Dept: INTERNAL MEDICINE CLINIC | Age: 83
End: 2018-09-06

## 2018-09-06 VITALS
HEART RATE: 45 BPM | DIASTOLIC BLOOD PRESSURE: 80 MMHG | WEIGHT: 149 LBS | OXYGEN SATURATION: 98 % | BODY MASS INDEX: 22.07 KG/M2 | SYSTOLIC BLOOD PRESSURE: 136 MMHG | HEIGHT: 69 IN | TEMPERATURE: 97.9 F | RESPIRATION RATE: 18 BRPM

## 2018-09-06 DIAGNOSIS — E11.29 MICROALBUMINURIA DUE TO TYPE 2 DIABETES MELLITUS (HCC): ICD-10-CM

## 2018-09-06 DIAGNOSIS — I10 BENIGN ESSENTIAL HTN: ICD-10-CM

## 2018-09-06 DIAGNOSIS — E78.00 HYPERCHOLESTEROLEMIA: ICD-10-CM

## 2018-09-06 DIAGNOSIS — Z23 ENCOUNTER FOR IMMUNIZATION: ICD-10-CM

## 2018-09-06 DIAGNOSIS — R80.9 MICROALBUMINURIA DUE TO TYPE 2 DIABETES MELLITUS (HCC): ICD-10-CM

## 2018-09-06 DIAGNOSIS — E11.21 TYPE 2 DIABETES WITH NEPHROPATHY (HCC): Primary | ICD-10-CM

## 2018-09-06 LAB — HBA1C MFR BLD HPLC: 7.4 %

## 2018-09-06 NOTE — MR AVS SNAPSHOT
[facility] 
 
 
 [address] 
[phone] Patient: King Xavier MRN: [id number] Visit Information Date & Time Provider Department Dept. Phone Encounter #  
 9/6/2018  8:00 AM Zoila Arora MD [facility] [address] [facility] [id number] Follow-up Instructions Return in about 5 months (around 2/6/2019). Upcoming Health Maintenance Date Due ZOSTER VACCINE AGE 60> 7/22/1989 Influenza Age 5 to Adult 8/1/2018 DTaP/Tdap/Td series (1 - Tdap) 1/1/2024* EYE EXAM RETINAL OR DILATED Q1 12/19/2018 Pneumococcal 65+ High/Highest Risk (2 of 2 - PPSV23) 1/1/2019 MICROALBUMIN Q1 2/9/2019 LIPID PANEL Q1 2/9/2019 HEMOGLOBIN A1C Q6M 3/6/2019 FOOT EXAM Q1 5/10/2019 MEDICARE YEARLY EXAM 5/11/2019 GLAUCOMA SCREENING Q2Y 12/19/2019 *Topic was postponed. The date shown is not the original due date. Allergies as of 9/6/2018  Review Complete On: 9/6/2018 By: Zoila Arora MD  
  
 Severity Noted Reaction Type Reactions Lisinopril  02/08/2018    Cough Current Immunizations  Reviewed on 9/6/2018 Name Date Influenza Vaccine 10/1/2017 Influenza Vaccine (Tri) Adjuvanted  Incomplete Pneumococcal Vaccine (Unspecified Type) 1/1/2014 Td 1/1/2014 Zoster Recombinant 5/10/2018 12:00 AM  
  
 Reviewed by Zoila Arora MD on 9/6/2018 at  8:17 AM  
You Were Diagnosed With   
  
 Codes Comments Type 2 diabetes with nephropathy (HCC)    -  Primary ICD-10-CM: E11.21 
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 
ICD-9-CM: 401.1 Hypercholesterolemia     ICD-10-CM: E78.00 ICD-9-CM: 272.0 Encounter for immunization     ICD-10-CM: R85 ICD-9-CM: V03.89 Vitals BP Pulse Temp Resp Height(growth percentile) Weight(growth percentile) 136/80 (BP 1 Location: Left arm, BP Patient Position: Sitting) (!) 45 97.9 °F (36.6 °C) (Oral) 18 5' 9\" (1.753 m) 149 lb (67.6 kg) SpO2 BMI Smoking Status 98% 22 kg/m2 Former Smoker Vitals History BMI and BSA Data Body Mass Index Body Surface Area  
 22 kg/m 2 1.81 m 2 Preferred Pharmacy Pharmacy Name Phone [id number]- [address], [address] [phone] Your Updated Medication List  
  
   
This list is accurate as of 9/6/18  8:27 AM.  Always use your most recent med list.  
  
  
  
  
 aspirin delayed-release 81 mg tablet Commonly known as:  WVHJZ-05 Take 1 Tab by mouth daily. atorvastatin 40 mg tablet Commonly known as:  LIPITOR  
TAKE 1 TABLET BY MOUTH EVERY EVENING  
  
 dorzolamide-timolol 22.3-6.8 mg/mL ophthalmic solution Commonly known as:  COSOPT  
APPLY 1 DROP(S) IN BOTH EYES 2 TIMES A DAY  
  
 latanoprost 0.005 % ophthalmic solution Commonly known as:  XALATAN  
APPLY 1 DROP(S) IN LEFT EYE AT BEDTIME  
  
 losartan 100 mg tablet Commonly known as:  COZAAR  
TAKE 1 TABLET BY MOUTH DAILY  
  
 metFORMIN  mg tablet Commonly known as:  GLUCOPHAGE XR  
TAKE 1 TABLET BY MOUTH EVERY DAY  
  
 metroNIDAZOLE 0.75 % topical cream  
Commonly known as:  METROCREAM  
Apply  to affected area two (2) times a day. Use a thin layer to affected areas after washing  
  
 raNITIdine 150 mg tablet Commonly known as:  ZANTAC Take 150 mg by mouth nightly. VITAMIN B-12 500 mcg tablet Generic drug:  cyanocobalamin Take 500 mcg by mouth daily. VITAMIN D3 1,000 unit tablet Generic drug:  cholecalciferol Take  by mouth daily. We Performed the Following ADMIN INFLUENZA VIRUS VAC [ HCP] AMB POC HEMOGLOBIN A1C [39270 CPT(R)] INFLUENZA VACCINE INACTIVATED (IIV), SUBUNIT, ADJUVANTED, IM [id number] CPT(R)] Follow-up Instructions Return in about 5 months (around 2/6/2019). Introducing hospitals & [facility] SERVICES! Dear Rob Guevara: Thank you for requesting a LookUP account. Our records indicate that you already have an active LookUP account. You can access your account anytime at [email or web address] Did you know that you can access your hospital and ER discharge instructions at any time in LookUP? You can also review all of your test results from your hospital stay or ER visit. Additional Information If you have questions, please visit the Frequently Asked Questions section of the LookUP website at [email or web address] Remember, LookUP is NOT to be used for urgent needs. For medical emergencies, dial 911. Now available from your iPhone and Android! Please provide this summary of care documentation to your next provider. Your primary care clinician is listed as Heath Barnhart. If you have any questions after today's visit, please call [phone].

## 2018-09-06 NOTE — PROGRESS NOTES
HISTORY OF PRESENT ILLNESS    Chief Complaint   Patient presents with    Diabetes    Immunization/Injection     Flu vac        Presents for follow-up    Diabetes Mellitus follow-up  Last hemoglobin a1c   Lab Results   Component Value Date/Time    Hemoglobin A1c 7.5 (H) 02/09/2018 10:41 AM    Hemoglobin A1c (POC) 7.4 09/06/2018 08:07 AM   medication compliance: compliant all of the time. Diabetic diet compliance: compliant most of the time. Home glucose monitoring: fasting values range none. Hypoglycemic episodes:  None. Further diabetic ROS: no polyuria or polydipsia, no chest pain, dyspnea or TIA's, no numbness, tingling or pain in extremities. Hypertension  Hypertension ROS: taking medications as instructed, no medication side effects noted, no TIA's, no chest pain on exertion, no dyspnea on exertion, no swelling of ankles     reports that he quit smoking about 56 years ago. He has never used smokeless tobacco.    reports that he drinks about 3.0 oz of alcohol per week    BP Readings from Last 2 Encounters:   09/06/18 136/80   05/10/18 127/73     Hyperlipidemia  ROS: taking medications as instructed, no medication side effects noted  No new myalgias, no joint pains, no weakness  No TIA's, no chest pain on exertion, no dyspnea on exertion, no swelling of ankles. 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 has a past surgical history that includes hx prostatectomy (01/2000). reports that he quit smoking about 56 years ago. He has never used smokeless tobacco. He reports that he drinks about 3.0 oz of alcohol per week   family history includes Cancer in his brother, father, mother, and sister. Physical Exam   Nursing note and vitals reviewed. Blood pressure 165/74, pulse (!) 45, temperature 97.9 °F (36.6 °C), temperature source Oral, resp. rate 18, height 5' 9\" (1.753 m), weight 149 lb (67.6 kg), SpO2 98 %. Constitutional:  No distress. Eyes: Conjunctivae are normal.   Ears:  Hearing grossly intact  Cardiovascular: Normal rate. regular rhythm, no murmurs or gallops  No edema  Pulmonary/Chest: Effort normal.   CTAB  Musculoskeletal: moves all 4 extremities   Neurological: Alert and oriented to person, place, and time. Skin: No rash noted. Psychiatric: Normal mood and affect. Behavior is normal.     ASSESSMENT and PLAN  Diagnoses and all orders for this visit:    1. Type 2 diabetes with nephropathy (HCC)  Controlled on current regimen. Continue current medications as written in chart. Advanced age. Target 7.5% is very reasonable  -     AMB POC HEMOGLOBIN A1C    2. 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 Benign essential HTN- based on my history, the overall control of this problem borderline controlled. Consider changing. 4. Hypercholesterolemia  Controlled on current regimen. Continue current medications as written in chart.     5. Encounter for immunization  -     Influenza Vaccine Inactivated (IIV)(FLUAD), Subunit, Adjuvanted, IM, (20977)  -     Administration fee () for Medicare insured patients      lab results and schedule of future lab studies reviewed with patient  reviewed medications and side effects in detail    Return to clinic for further evaluation if new symptoms develop    rtc 5 mo labs    Current Outpatient Prescriptions   Medication Sig    metFORMIN ER (GLUCOPHAGE XR) 500 mg tablet TAKE 1 TABLET BY MOUTH EVERY DAY    losartan (COZAAR) 100 mg tablet TAKE 1 TABLET BY MOUTH DAILY    atorvastatin (LIPITOR) 40 mg tablet TAKE 1 TABLET BY MOUTH EVERY EVENING    dorzolamide-timolol (COSOPT) 22.3-6.8 mg/mL ophthalmic solution APPLY 1 DROP(S) IN BOTH EYES 2 TIMES A DAY    latanoprost (XALATAN) 0.005 % ophthalmic solution APPLY 1 DROP(S) IN LEFT EYE AT BEDTIME    raNITIdine (ZANTAC) 150 mg tablet Take 150 mg by mouth nightly.  cholecalciferol (VITAMIN D3) 1,000 unit tablet Take  by mouth daily.  cyanocobalamin (VITAMIN B-12) 500 mcg tablet Take 500 mcg by mouth daily.  aspirin delayed-release (ASPIR-81) 81 mg tablet Take 1 Tab by mouth daily.  metroNIDAZOLE (METROCREAM) 0.75 % topical cream Apply  to affected area two (2) times a day. Use a thin layer to affected areas after washing     No current facility-administered medications for this visit.

## 2018-09-12 DIAGNOSIS — E78.00 HYPERCHOLESTEROLEMIA: ICD-10-CM

## 2018-09-12 DIAGNOSIS — I10 BENIGN ESSENTIAL HTN: ICD-10-CM

## 2018-09-12 DIAGNOSIS — E11.9 CONTROLLED TYPE 2 DIABETES MELLITUS WITHOUT COMPLICATION, WITHOUT LONG-TERM CURRENT USE OF INSULIN (HCC): ICD-10-CM

## 2018-09-12 RX ORDER — ATORVASTATIN CALCIUM 40 MG/1
TABLET, FILM COATED ORAL
Qty: 90 TAB | Refills: 1 | Status: SHIPPED | OUTPATIENT
Start: 2018-09-12 | End: 2019-03-07 | Stop reason: SDUPTHER

## 2018-09-12 RX ORDER — METFORMIN HYDROCHLORIDE 500 MG/1
TABLET, EXTENDED RELEASE ORAL
Qty: 90 TAB | Refills: 1 | Status: SHIPPED | OUTPATIENT
Start: 2018-09-12 | End: 2019-02-06

## 2018-09-12 RX ORDER — LOSARTAN POTASSIUM 100 MG/1
TABLET ORAL
Qty: 90 TAB | Refills: 1 | Status: SHIPPED | OUTPATIENT
Start: 2018-09-12 | End: 2019-02-06 | Stop reason: ALTCHOICE

## 2019-02-06 ENCOUNTER — OFFICE VISIT (OUTPATIENT)
Dept: INTERNAL MEDICINE CLINIC | Age: 84
End: 2019-02-06

## 2019-02-06 VITALS
WEIGHT: 145.38 LBS | RESPIRATION RATE: 18 BRPM | BODY MASS INDEX: 21.53 KG/M2 | HEART RATE: 48 BPM | OXYGEN SATURATION: 100 % | TEMPERATURE: 97.7 F | DIASTOLIC BLOOD PRESSURE: 76 MMHG | HEIGHT: 69 IN | SYSTOLIC BLOOD PRESSURE: 164 MMHG

## 2019-02-06 DIAGNOSIS — E78.00 HYPERCHOLESTEROLEMIA: ICD-10-CM

## 2019-02-06 DIAGNOSIS — I10 BENIGN ESSENTIAL HTN: ICD-10-CM

## 2019-02-06 DIAGNOSIS — K21.9 GASTROESOPHAGEAL REFLUX DISEASE WITHOUT ESOPHAGITIS: ICD-10-CM

## 2019-02-06 DIAGNOSIS — R80.9 TYPE 2 DIABETES MELLITUS WITH MICROALBUMINURIA, WITHOUT LONG-TERM CURRENT USE OF INSULIN (HCC): Primary | ICD-10-CM

## 2019-02-06 DIAGNOSIS — H61.22 CERUMINOSIS, LEFT: ICD-10-CM

## 2019-02-06 DIAGNOSIS — E11.29 TYPE 2 DIABETES MELLITUS WITH MICROALBUMINURIA, WITHOUT LONG-TERM CURRENT USE OF INSULIN (HCC): Primary | ICD-10-CM

## 2019-02-06 DIAGNOSIS — E11.29 MICROALBUMINURIA DUE TO TYPE 2 DIABETES MELLITUS (HCC): ICD-10-CM

## 2019-02-06 DIAGNOSIS — R80.9 MICROALBUMINURIA DUE TO TYPE 2 DIABETES MELLITUS (HCC): ICD-10-CM

## 2019-02-06 LAB — HBA1C MFR BLD HPLC: 7.7 %

## 2019-02-06 RX ORDER — PNEUMOCOCCAL 13-VALENT CONJUGATE VACCINE 2.2; 2.2; 2.2; 2.2; 2.2; 4.4; 2.2; 2.2; 2.2; 2.2; 2.2; 2.2; 2.2 UG/.5ML; UG/.5ML; UG/.5ML; UG/.5ML; UG/.5ML; UG/.5ML; UG/.5ML; UG/.5ML; UG/.5ML; UG/.5ML; UG/.5ML; UG/.5ML; UG/.5ML
0.5 INJECTION, SUSPENSION INTRAMUSCULAR ONCE
Qty: 1 SYRINGE | Refills: 0 | Status: SHIPPED | OUTPATIENT
Start: 2019-02-06 | End: 2019-02-06

## 2019-02-06 RX ORDER — OLMESARTAN MEDOXOMIL 20 MG/1
20 TABLET ORAL DAILY
Qty: 90 TAB | Refills: 1 | Status: SHIPPED | OUTPATIENT
Start: 2019-02-06 | End: 2019-05-07 | Stop reason: SDUPTHER

## 2019-02-06 RX ORDER — METFORMIN HYDROCHLORIDE 500 MG/1
1000 TABLET, EXTENDED RELEASE ORAL
Qty: 180 TAB | Refills: 1 | Status: SHIPPED | OUTPATIENT
Start: 2019-02-06 | End: 2019-05-07 | Stop reason: SDUPTHER

## 2019-02-06 NOTE — PROGRESS NOTES
HISTORY OF PRESENT ILLNESS Chief Complaint Patient presents with  Diabetes  
  with A1c = 7.7 today  Ear Fullness Ringing of ears Presents for follow-up Diabetes Mellitus follow-up Last hemoglobin a1c Lab Results Component Value Date/Time Hemoglobin A1c 7.5 (H) 02/09/2018 10:41 AM  
 Hemoglobin A1c (POC) 7.7 02/06/2019 08:10 AM  
medication compliance: compliant all of the time. Diabetic diet compliance: compliant most of the time. Home glucose monitoring: fasting values range none. Hypoglycemic episodes:  None. Further diabetic ROS: no polyuria or polydipsia, no chest pain, dyspnea or TIA's, no numbness, tingling or pain in extremities. Hypertension Hypertension ROS: taking medications as instructed, no medication side effects noted, no TIA's, no chest pain on exertion, no dyspnea on exertion, no swelling of ankles     reports that he quit smoking about 57 years ago. he has never used smokeless tobacco.    reports that he drinks about 3.0 oz of alcohol per week. BP Readings from Last 2 Encounters:  
02/06/19 164/76  
09/06/18 136/80 Hyperlipidemia ROS: taking medications as instructed, no medication side effects noted No new myalgias, no joint pains, no weakness No TIA's, no chest pain on exertion, no dyspnea on exertion, no swelling of ankles. Lab Results Component Value Date/Time Cholesterol, total 135 02/09/2018 10:41 AM  
 HDL Cholesterol 61 02/09/2018 10:41 AM  
 LDL, calculated 59 02/09/2018 10:41 AM  
 VLDL, calculated 15 02/09/2018 10:41 AM  
 Triglyceride 73 02/09/2018 10:41 AM  
 
GERD - reports some worsening s/s at times. Taking zantac. Review of Systems All other systems reviewed and are negative, except as noted in HPI Past Medical and Surgical History 
 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 has a past surgical history that includes hx prostatectomy (01/2000). reports that he quit smoking about 57 years ago. he has never used smokeless tobacco. He reports that he drinks about 3.0 oz of alcohol per week. family history includes Cancer in his brother, father, mother, and sister. Physical Exam  
Nursing note and vitals reviewed. Blood pressure 164/76, pulse (!) 48, temperature 97.7 °F (36.5 °C), temperature source Oral, resp. rate 18, height 5' 9\" (1.753 m), weight 145 lb 6 oz (65.9 kg), SpO2 100 %. Constitutional:  No distress. Eyes: Conjunctivae are normal.  
Ears:  Hearing grossly intact Cardiovascular: Normal rate. regular rhythm, no murmurs or gallops No edema Pulmonary/Chest: Effort normal.   CTAB Musculoskeletal: moves all 4 extremities Neurological: Alert and oriented to person, place, and time. Skin: No rash noted. Psychiatric: Normal mood and affect. Behavior is normal.  
Foot exam  - skin intact, mild dryness w no fissures, no rashes, no significant ulcers or callous formation. Sensation intact by microfilament or light touch ASSESSMENT and PLAN Diagnoses and all orders for this visit: 
 
1. Type 2 diabetes mellitus with microalbuminuria, without long-term current use of insulin (HCC) Uncontrolled, but target 7.5% a1c 
-     AMB POC HEMOGLOBIN A1C 
-     LIPID PANEL 
-     MICROALBUMIN, UR, RAND W/ MICROALB/CREAT RATIO 
-      DIABETES FOOT EXAM 
-     METABOLIC PANEL, COMPREHENSIVE 
-     metFORMIN ER (GLUCOPHAGE XR) 500 mg tablet; Take 2 Tabs by mouth daily (with dinner). increased dose 2/6/19 
-     PREVNAR 13, PF, 0.5 mL syrg injection; 0.5 mL by IntraMUSCular route once for 1 dose. PLEASE HAVE THIS AT YOUR LOCAL PHARMACY 2. 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 Hypercholesterolemia - Controlled on current regimen. Continue current medications as written in chart. 4. Benign essential HTN- uncontrolled 
-     olmesartan (BENICAR) 20 mg tablet; Take 1 Tab by mouth daily. Replaces losartan 5. Ceruminosis, left- thin cerumen on left TM-     carbamide peroxide (DEBROX) 6.5 % otic solution; Administer 5 Drops in left ear two (2) times a day. 6. Gastroesophageal reflux disease without esophagitis Uncontrolled on zantac. Try nexium 14 days Return to clinic in 3 months to repeat your blood work. There are no Patient Instructions on file for this visit. 
 
lab results and schedule of future lab studies reviewed with patient 
reviewed medications and side effects in detail Return to clinic for further evaluation if new symptoms develop Follow-up Disposition: Not on File Current Outpatient Medications Medication Sig  
 metFORMIN ER (GLUCOPHAGE XR) 500 mg tablet Take 2 Tabs by mouth daily (with dinner). increased dose 2/6/19  PREVNAR 13, PF, 0.5 mL syrg injection 0.5 mL by IntraMUSCular route once for 1 dose. PLEASE HAVE THIS AT YOUR LOCAL PHARMACY  olmesartan (BENICAR) 20 mg tablet Take 1 Tab by mouth daily. Replaces losartan  carbamide peroxide (DEBROX) 6.5 % otic solution Administer 5 Drops in left ear two (2) times a day.  atorvastatin (LIPITOR) 40 mg tablet TAKE 1 TABLET BY MOUTH EVERY EVENING  
 dorzolamide-timolol (COSOPT) 22.3-6.8 mg/mL ophthalmic solution APPLY 1 DROP(S) IN BOTH EYES 2 TIMES A DAY  latanoprost (XALATAN) 0.005 % ophthalmic solution APPLY 1 DROP(S) IN LEFT EYE AT BEDTIME  raNITIdine (ZANTAC) 150 mg tablet Take 150 mg by mouth nightly.  cholecalciferol (VITAMIN D3) 1,000 unit tablet Take  by mouth daily.  cyanocobalamin (VITAMIN B-12) 500 mcg tablet Take 500 mcg by mouth daily.  aspirin delayed-release (ASPIR-81) 81 mg tablet Take 1 Tab by mouth daily.  metroNIDAZOLE (METROCREAM) 0.75 % topical cream Apply  to affected area two (2) times a day. Use a thin layer to affected areas after washing No current facility-administered medications for this visit.

## 2019-02-07 LAB
ALBUMIN SERPL-MCNC: 4.5 G/DL (ref 3.5–4.7)
ALBUMIN/CREAT UR: 52.5 MG/G CREAT (ref 0–30)
ALBUMIN/GLOB SERPL: 2 {RATIO} (ref 1.2–2.2)
ALP SERPL-CCNC: 68 IU/L (ref 39–117)
ALT SERPL-CCNC: 23 IU/L (ref 0–44)
AST SERPL-CCNC: 20 IU/L (ref 0–40)
BILIRUB SERPL-MCNC: 0.6 MG/DL (ref 0–1.2)
BUN SERPL-MCNC: 24 MG/DL (ref 8–27)
BUN/CREAT SERPL: 21 (ref 10–24)
CALCIUM SERPL-MCNC: 9.9 MG/DL (ref 8.6–10.2)
CHLORIDE SERPL-SCNC: 108 MMOL/L (ref 96–106)
CHOLEST SERPL-MCNC: 126 MG/DL (ref 100–199)
CO2 SERPL-SCNC: 23 MMOL/L (ref 20–29)
CREAT SERPL-MCNC: 1.15 MG/DL (ref 0.76–1.27)
CREAT UR-MCNC: 97.5 MG/DL
GLOBULIN SER CALC-MCNC: 2.2 G/DL (ref 1.5–4.5)
GLUCOSE SERPL-MCNC: 158 MG/DL (ref 65–99)
HDLC SERPL-MCNC: 51 MG/DL
INTERPRETATION, 910389: NORMAL
INTERPRETATION: NORMAL
LDLC SERPL CALC-MCNC: 58 MG/DL (ref 0–99)
Lab: NORMAL
MICROALBUMIN UR-MCNC: 51.2 UG/ML
PDF IMAGE, 910387: NORMAL
POTASSIUM SERPL-SCNC: 4.5 MMOL/L (ref 3.5–5.2)
PROT SERPL-MCNC: 6.7 G/DL (ref 6–8.5)
SODIUM SERPL-SCNC: 144 MMOL/L (ref 134–144)
TRIGL SERPL-MCNC: 84 MG/DL (ref 0–149)
VLDLC SERPL CALC-MCNC: 17 MG/DL (ref 5–40)

## 2019-02-21 ENCOUNTER — DOCUMENTATION ONLY (OUTPATIENT)
Dept: FAMILY MEDICINE CLINIC | Age: 84
End: 2019-02-21

## 2019-03-07 DIAGNOSIS — E78.00 HYPERCHOLESTEROLEMIA: ICD-10-CM

## 2019-03-07 DIAGNOSIS — E11.9 CONTROLLED TYPE 2 DIABETES MELLITUS WITHOUT COMPLICATION, WITHOUT LONG-TERM CURRENT USE OF INSULIN (HCC): ICD-10-CM

## 2019-03-07 RX ORDER — ATORVASTATIN CALCIUM 40 MG/1
TABLET, FILM COATED ORAL
Qty: 90 TAB | Refills: 1 | Status: SHIPPED | OUTPATIENT
Start: 2019-03-07 | End: 2019-08-31 | Stop reason: SDUPTHER

## 2019-03-07 RX ORDER — METFORMIN HYDROCHLORIDE 500 MG/1
TABLET, EXTENDED RELEASE ORAL
Qty: 90 TAB | Refills: 1 | Status: SHIPPED | OUTPATIENT
Start: 2019-03-07 | End: 2019-05-07 | Stop reason: ALTCHOICE

## 2019-05-07 ENCOUNTER — OFFICE VISIT (OUTPATIENT)
Dept: INTERNAL MEDICINE CLINIC | Age: 84
End: 2019-05-07

## 2019-05-07 VITALS
BODY MASS INDEX: 21.33 KG/M2 | RESPIRATION RATE: 18 BRPM | TEMPERATURE: 97.7 F | HEIGHT: 69 IN | SYSTOLIC BLOOD PRESSURE: 131 MMHG | HEART RATE: 59 BPM | DIASTOLIC BLOOD PRESSURE: 73 MMHG | OXYGEN SATURATION: 99 % | WEIGHT: 144 LBS

## 2019-05-07 DIAGNOSIS — H60.391 OTHER INFECTIVE ACUTE OTITIS EXTERNA OF RIGHT EAR: ICD-10-CM

## 2019-05-07 DIAGNOSIS — Z00.00 MEDICARE ANNUAL WELLNESS VISIT, SUBSEQUENT: Primary | ICD-10-CM

## 2019-05-07 DIAGNOSIS — R80.9 TYPE 2 DIABETES MELLITUS WITH MICROALBUMINURIA, WITHOUT LONG-TERM CURRENT USE OF INSULIN (HCC): ICD-10-CM

## 2019-05-07 DIAGNOSIS — E11.29 TYPE 2 DIABETES MELLITUS WITH MICROALBUMINURIA, WITHOUT LONG-TERM CURRENT USE OF INSULIN (HCC): ICD-10-CM

## 2019-05-07 DIAGNOSIS — I10 BENIGN ESSENTIAL HTN: ICD-10-CM

## 2019-05-07 LAB — HBA1C MFR BLD HPLC: 7.3 %

## 2019-05-07 RX ORDER — CIPROFLOXACIN AND DEXAMETHASONE 3; 1 MG/ML; MG/ML
4 SUSPENSION/ DROPS AURICULAR (OTIC) 2 TIMES DAILY
Qty: 8 ML | Refills: 0 | Status: SHIPPED | OUTPATIENT
Start: 2019-05-07 | End: 2019-08-06 | Stop reason: ALTCHOICE

## 2019-05-07 RX ORDER — OLMESARTAN MEDOXOMIL 20 MG/1
10 TABLET ORAL DAILY
Qty: 45 TAB | Refills: 1
Start: 2019-05-07 | End: 2019-08-06

## 2019-05-07 RX ORDER — METFORMIN HYDROCHLORIDE 500 MG/1
1000 TABLET, EXTENDED RELEASE ORAL
Qty: 60 TAB | Refills: 5 | Status: SHIPPED | OUTPATIENT
Start: 2019-05-07 | End: 2020-04-07

## 2019-05-07 RX ORDER — NEOMYCIN SULFATE, POLYMYXIN B SULFATE AND HYDROCORTISONE 10; 3.5; 1 MG/ML; MG/ML; [USP'U]/ML
3 SUSPENSION/ DROPS AURICULAR (OTIC) 4 TIMES DAILY
Qty: 1 ML | Refills: 0 | Status: SHIPPED | OUTPATIENT
Start: 2019-05-07 | End: 2019-05-07 | Stop reason: ALTCHOICE

## 2019-05-07 NOTE — PROGRESS NOTES
This is a Subsequent Medicare Annual Wellness Visit providing Personalized Prevention Plan Services (PPPS) (Performed 12 months after initial AWV and PPPS ) I have reviewed the patient's medical history in detail and updated the computerized patient record. Reports right ear fullness and drainage for 2 weeks. It was swollen. No fever, chills, congestion Hypertension- changed to benicar 20 mg 3 month ago Hypertension ROS: taking medications as instructed, no medication side effects noted, home BP monitoring in range of 367-38'D systolic over 69-71'O diastolic, no TIA's, no chest pain on exertion, no dyspnea on exertion, no swelling of ankles     reports that he quit smoking about 57 years ago. He has never used smokeless tobacco.    reports that he drinks about 3.0 oz of alcohol per week. BP Readings from Last 2 Encounters:  
05/07/19 131/73  
02/06/19 164/76 Diabetes Mellitus follow-up Last hemoglobin a1c Lab Results Component Value Date/Time Hemoglobin A1c 7.5 (H) 02/09/2018 10:41 AM  
 Hemoglobin A1c (POC) 7.7 02/06/2019 08:10 AM  
medication compliance: compliant all of the time. Diabetic diet compliance: compliant most of the time. Home glucose monitoring: fasting values range none. Hypoglycemic episodes:  None. Further diabetic ROS: no polyuria or polydipsia, no chest pain, dyspnea or TIA's, no numbness, tingling or pain in extremities. History Past Medical History:  
Diagnosis Date  Benign essential HTN   
 Bl[facility] 2013?  
 s/p BCGx2, resection. low grade. in [facility]. now Dr. Marilin Delgado  Closed left arm fracture  GERD (gastroesophageal reflux disease)   
 with hiatal hernia  Glaucoma Dr. Jamal Shine  Hemorrhoids  Hypercholesterolemia  Microalbuminuria due to type 2 diabetes mellitus ([facility] [address]  Prostate [facility] ([facility] 2000 Dr. Marilin Delgado. s/p prostatectomy 1/2000 in [facility]. PSA \"0.4\" since then  Rosacea  Type 2 diabetes mellitus with microalbuminuria ([facility] [address]  Vertigo Past Surgical History:  
Procedure Laterality Date  HX PROSTATECTOMY  01/2000 Current Outpatient Medications Medication Sig  
 olmesartan (BENICAR) 20 mg tablet Take 0.5 Tabs by mouth daily. Decreased dose 5/7/19  
 metFORMIN ER (GLUCOPHAGE XR) 500 mg tablet Take 2 Tabs by mouth daily (with dinner). increased dose 2/6/19  
 neomycin-polymyxin-hydrocortisone, buffered, (PEDIOTIC) 3.5-10,000-1 mg/mL-unit/mL-% otic suspension Administer 3 Drops in left ear four (4) times daily.  atorvastatin (LIPITOR) 40 mg tablet TAKE 1 TABLET BY MOUTH EVERY DAY IN THE EVENING  
 carbamide peroxide (DEBROX) 6.5 % otic solution Administer 5 Drops in left ear two (2) times a day.  dorzolamide-timolol (COSOPT) 22.3-6.8 mg/mL ophthalmic solution APPLY 1 DROP(S) IN BOTH EYES 2 TIMES A DAY  latanoprost (XALATAN) 0.005 % ophthalmic solution APPLY 1 DROP(S) IN LEFT EYE AT BEDTIME  raNITIdine (ZANTAC) 150 mg tablet Take 150 mg by mouth nightly.  cholecalciferol (VITAMIN D3) 1,000 unit tablet Take  by mouth daily.  cyanocobalamin (VITAMIN B-12) 500 mcg tablet Take 500 mcg by mouth daily.  aspirin delayed-release (ASPIR-81) 81 mg tablet Take 1 Tab by mouth daily.  metroNIDAZOLE (METROCREAM) 0.75 % topical cream Apply  to affected area two (2) times a day. Use a thin layer to affected areas after washing No current facility-administered medications for this visit. Allergies Allergen Reactions  Lisinopril Cough Family History Problem Relation Age of Onset  Cancer Mother   
     breast  
 Cancer Father   
     prostate  Cancer Sister   
     breast  
 Cancer Brother   
     prostate  
 
 
 reports that he quit smoking about 57 years ago. He has never used smokeless tobacco. 
 reports that he drinks about 3.0 oz of alcohol per week. Depression Risk Factor Screening: Alcohol Risk Factor Screening: On any occasion during the past 3 months, have you had more than 3 drinks containing alcohol? No 
 
Do you average more than 14 drinks per week? No 
 
 
Functional Ability and Level of Safety:  
 
Hearing Loss  
mild Activities of Daily Living Self-care. Requires assistance with: no ADLs Fall Risk Fall Risk Assessment, last 12 mths 5/7/2019 Able to walk? Yes Fall in past 12 months? No  
 
 
 
Abuse Screen Patient is not abused Review of Systems A comprehensive review of systems was negative except for that written in the HPI. Physical Examination Evaluation of Cognitive Function: 
Mood/affect:  neutral, happy Appearance: age appropriate Family member/caregiver input: none Blood pressure 131/73, pulse (!) 59, temperature 97.7 °F (36.5 °C), temperature source Oral, resp. rate 18, height 5' 9\" (1.753 m), weight 144 lb (65.3 kg), SpO2 99 %. General appearance: alert, cooperative, no distress, appears stated age Neck: supple, symmetrical, trachea midline, no adenopathy, thyroid: not enlarged, symmetric, no tenderness/mass/nodules, no carotid bruit and no JVD Right ear canal exudates with mild erythema of the tympanic membrane. Lungs: clear to auscultation bilaterally Heart: regular rate and rhythm, S1, S2 normal, no murmur, click, rub or gallop Extremities: extremities normal, atraumatic, no cyanosis or edema Patient Care Team: 
Andria Billings MD as PCP - General (Internal Medicine) Advice/Referrals/Counseling Education and counseling provided. See below for specific orders Assessment/Plan Diagnoses and all orders for this visit: 
 
1. Medicare annual wellness visit, subsequent 2. Benign essential HTN- he is dizzy. Over-controlled 
-     olmesartan (BENICAR) 20 mg tablet; Take 0.5 Tabs by mouth daily. Decreased dose 5/7/19 
 
3. Type 2 diabetes mellitus with microalbuminuria, without long-term current use of insulin (HCC) Controlled on current regimen. Continue current medications as written in chart 
-     metFORMIN ER (GLUCOPHAGE XR) 500 mg tablet; Take 2 Tabs by mouth daily (with dinner). increased dose 2/6/19 
-     AMB POC HEMOGLOBIN A1C 
 
4. Other infective acute otitis externa of right ear Otitis externa. Cannot fully visualize tympanic membrane 
-     ciprofloxacin-dexamethasone (CIPRODEX) 0.3-0.1 % otic suspension; Administer 4 Drops in right ear two (2) times a day. Alice Dee Potential medication side effects were discussed with the patient; let me know if any occur. Return for yearly Annual Wellness

## 2019-08-06 ENCOUNTER — OFFICE VISIT (OUTPATIENT)
Dept: INTERNAL MEDICINE CLINIC | Age: 84
End: 2019-08-06

## 2019-08-06 VITALS
HEIGHT: 69 IN | WEIGHT: 147 LBS | TEMPERATURE: 98 F | OXYGEN SATURATION: 97 % | SYSTOLIC BLOOD PRESSURE: 138 MMHG | BODY MASS INDEX: 21.77 KG/M2 | HEART RATE: 54 BPM | RESPIRATION RATE: 18 BRPM | DIASTOLIC BLOOD PRESSURE: 78 MMHG

## 2019-08-06 DIAGNOSIS — I10 BENIGN ESSENTIAL HTN: Primary | ICD-10-CM

## 2019-08-06 DIAGNOSIS — E11.21 TYPE 2 DIABETES WITH NEPHROPATHY (HCC): ICD-10-CM

## 2019-08-06 LAB — HBA1C MFR BLD HPLC: 6.9 %

## 2019-08-06 RX ORDER — OLMESARTAN MEDOXOMIL 20 MG/1
10 TABLET ORAL DAILY
Qty: 45 TAB | Refills: 1
Start: 2019-08-06 | End: 2020-11-06 | Stop reason: SDUPTHER

## 2019-08-06 NOTE — ACP (ADVANCE CARE PLANNING)
Advance Care Planning (ACP) Note    Date of ACP Conversation: 8/6/2019  Persons included in Conversation: patient  Length of ACP Conversation in minutes: <16 minutes (Non-Billable)    Conversation requested by:   Patient    Authorized Decision Maker (if patient is incapable of making informed decisions): This person is:  Healthcare Agent/Medical Power of  under Advance Directive    General ACP for ALL Patients with Decision Making Capacity: yes    Advance Directive Conversation with Patients who have not yet planned:  Importance of advance care planning, including choosing a healthcare agent to communicate patient's healthcare decisions if patient lost the ability to make decisions, such as after a sudden illness or accident  Explored patient's values, goals, and care preferences as related to these situations    Review of Existing Advance Directive: (Select questions covered)  Does this advance directive still reflect your preferences?   Yes    Interventions Provided:  Recommended review of completed ACP document annually or upon change in health status

## 2019-08-06 NOTE — PROGRESS NOTES
HISTORY OF PRESENT ILLNESS    Chief Complaint   Patient presents with    Diabetes     A1c     Hypertension     working on medication for bp/ normal blood pressures 110/75       Presents for follow-up    Hypertension- on lower dose olmesartan. Home -131/60-70s. Dizziness improved. Hypertension ROS: taking medications as instructed, no medication side effects noted, no TIA's, no chest pain on exertion, no dyspnea on exertion, no swelling of ankles     reports that he quit smoking about 57 years ago. He has never used smokeless tobacco.    reports that he drinks about 5.0 standard drinks of alcohol per week. BP Readings from Last 2 Encounters:   08/06/19 138/78   05/07/19 131/73       Diabetes Mellitus follow-up  Last hemoglobin a1c   Lab Results   Component Value Date/Time    Hemoglobin A1c 7.5 (H) 02/09/2018 10:41 AM    Hemoglobin A1c (POC) 6.9 08/06/2019 08:20 AM     No components found for: POCA1C, HBA1C POC  medication compliance: compliant all of the time. Diabetic diet compliance: compliant most of the time. Home glucose monitoring: fasting values range none. Hypoglycemic episodes:  None. Further diabetic ROS: no polyuria or polydipsia, no chest pain, dyspnea or TIA's, no numbness, tingling or pain in extremities. Review of Systems   All other systems reviewed and are negative, except as noted in HPI    Past Medical and Surgical History   has a past medical history of Benign essential HTN, Bladder cancer ([address] (2013?), Closed left arm fracture, GERD (gastroesophageal reflux disease), Glaucoma, Hemorrhoids, Hypercholesterolemia, Microalbuminuria due to type 2 diabetes mellitus ([address] Prostate cancer ([address] (2000), Rosacea, Type 2 diabetes mellitus with microalbuminuria ([address] and Vertigo. has a past surgical history that includes hx prostatectomy (01/2000). reports that he quit smoking about 57 years ago.  He has never used smokeless tobacco. He reports that he drinks about 5.0 standard drinks of alcohol per week. He reports that he does not use drugs. family history includes Cancer in his brother, father, mother, and sister. Physical Exam   Nursing note and vitals reviewed. Blood pressure 138/78, pulse (!) 54, temperature 98 °F (36.7 °C), temperature source Oral, resp. rate 18, height 5' 9\" (1.753 m), weight 147 lb (66.7 kg), SpO2 97 %. Constitutional:  No distress. Eyes: Conjunctivae are normal.   Ears:  Hearing grossly intact  Cardiovascular: Normal rate. regular rhythm, no murmurs or gallops  No edema  Pulmonary/Chest: Effort normal.   CTAB  Musculoskeletal: moves all 4 extremities   Neurological: Alert and oriented to person, place, and time. Skin: No rash noted. Psychiatric: Normal mood and affect. Behavior is normal.     ASSESSMENT and PLAN  Diagnoses and all orders for this visit:    1. Benign essential HTN- controlled w some white coat effects  -     METABOLIC PANEL, BASIC  -     olmesartan (BENICAR) 20 mg tablet; Take 0.5 Tabs by mouth daily. Decreased dose 5/7/19    2. Type 2 diabetes with nephropathy ([facility] [address]- Controlled on current regimen. Continue current medications as written in chart. -     AMB POC HEMOGLOBIN A1C      lab results and schedule of future lab studies reviewed with patient  reviewed medications and side effects in detail    Return to clinic for further evaluation if new symptoms develop     Return to clinic in 6 months to repeat your blood work. Current Outpatient Medications   Medication Sig    olmesartan (BENICAR) 20 mg tablet Take 0.5 Tabs by mouth daily. Decreased dose 5/7/19    metFORMIN ER (GLUCOPHAGE XR) 500 mg tablet Take 2 Tabs by mouth daily (with dinner).  increased dose 2/6/19    atorvastatin (LIPITOR) 40 mg tablet TAKE 1 TABLET BY MOUTH EVERY DAY IN THE EVENING    dorzolamide-timolol (COSOPT) 22.3-6.8 mg/mL ophthalmic solution APPLY 1 DROP(S) IN BOTH EYES 2 TIMES A DAY    latanoprost (XALATAN) 0.005 % ophthalmic solution APPLY 1 DROP(S) IN LEFT EYE AT BEDTIME    raNITIdine (ZANTAC) 150 mg tablet Take 150 mg by mouth nightly.  cholecalciferol (VITAMIN D3) 1,000 unit tablet Take  by mouth daily.  cyanocobalamin (VITAMIN B-12) 500 mcg tablet Take 500 mcg by mouth daily.  aspirin delayed-release (ASPIR-81) 81 mg tablet Take 1 Tab by mouth daily.  metroNIDAZOLE (METROCREAM) 0.75 % topical cream Apply  to affected area two (2) times a day. Use a thin layer to affected areas after washing     No current facility-administered medications for this visit.

## 2019-08-07 LAB
BUN SERPL-MCNC: 21 MG/DL (ref 8–27)
BUN/CREAT SERPL: 21 (ref 10–24)
CALCIUM SERPL-MCNC: 9.7 MG/DL (ref 8.6–10.2)
CHLORIDE SERPL-SCNC: 106 MMOL/L (ref 96–106)
CO2 SERPL-SCNC: 23 MMOL/L (ref 20–29)
CREAT SERPL-MCNC: 0.99 MG/DL (ref 0.76–1.27)
GLUCOSE SERPL-MCNC: 125 MG/DL (ref 65–99)
POTASSIUM SERPL-SCNC: 4.4 MMOL/L (ref 3.5–5.2)
SODIUM SERPL-SCNC: 144 MMOL/L (ref 134–144)

## 2019-08-31 DIAGNOSIS — E78.00 HYPERCHOLESTEROLEMIA: ICD-10-CM

## 2019-08-31 RX ORDER — ATORVASTATIN CALCIUM 40 MG/1
TABLET, FILM COATED ORAL
Qty: 90 TAB | Refills: 1 | Status: SHIPPED | OUTPATIENT
Start: 2019-08-31 | End: 2020-02-23

## 2019-10-11 ENCOUNTER — OFFICE VISIT (OUTPATIENT)
Dept: INTERNAL MEDICINE CLINIC | Age: 84
End: 2019-10-11

## 2019-10-11 DIAGNOSIS — E11.21 TYPE 2 DIABETES WITH NEPHROPATHY (HCC): ICD-10-CM

## 2019-10-11 DIAGNOSIS — I10 BENIGN ESSENTIAL HTN: ICD-10-CM

## 2019-10-11 DIAGNOSIS — R51.9 RIGHT SIDED FACIAL PAIN: Primary | ICD-10-CM

## 2019-10-11 RX ORDER — PREDNISONE 10 MG/1
10 TABLET ORAL SEE ADMIN INSTRUCTIONS
Qty: 21 TAB | Refills: 0 | Status: SHIPPED | OUTPATIENT
Start: 2019-10-11 | End: 2020-02-06 | Stop reason: ALTCHOICE

## 2019-10-11 RX ORDER — GABAPENTIN 100 MG/1
CAPSULE ORAL
Qty: 90 CAP | Refills: 0 | Status: SHIPPED | OUTPATIENT
Start: 2019-10-11 | End: 2019-12-06 | Stop reason: SDUPTHER

## 2019-10-11 RX ORDER — HYDROCODONE BITARTRATE AND ACETAMINOPHEN 5; 325 MG/1; MG/1
1 TABLET ORAL
Qty: 25 TAB | Refills: 0 | Status: SHIPPED | OUTPATIENT
Start: 2019-10-11 | End: 2019-10-18

## 2019-10-11 NOTE — PATIENT INSTRUCTIONS
Trigeminal Neuralgia: Care Instructions Your Care Instructions Trigeminal neuralgia is a problem with the large nerve that brings feeling to your face. It causes a sudden, sharp pain on one side of your face. Just touching your cheek or talking can set off shooting pain toward the ear, eye, or nostril. Living with this pain can be very hard. Some people have long periods when they do not have pain, and then it comes back. Some people have periods of pain often. But medicine or other treatment often can make the pain go away. If you keep having pain, surgery may help. This problem is also called tic dou[place]oure[place] (say \"tik doo[place]margot[place]\"). Follow-up care is a key part of your treatment and safety. Be sure to make and go to all appointments, and call your doctor if you are having problems. It's also a good idea to know your test results and keep a list of the medicines you take. How can you care for yourself at home? · Write down when you have pain and what you were doing when it started. Try to find what causes the pain. Being in a cold wind, yawning, or shaving are examples. Avoid or limit these triggers if you can. · Be safe with medicines. Take your medicines exactly as prescribed. ? Your doctor may have prescribed medicines used to treat depression and seizures. They can reduce your pain, help you sleep better, and improve your mood. ? Call your doctor if you think you are having a problem with your medicine. You will get more details on the specific medicines your doctor prescribes. ? If you are not taking a prescription pain medicine, take an over-the-counter medicine such as acetaminophen (Tylenol), ibuprofen (Advil, Motrin), or naproxen (Aleve). Read and follow all instructions on the label. ? Do not take two or more pain medicines at the same time unless the doctor told you to. Many pain medicines have acetaminophen, which is Tylenol. Too much acetaminophen (Tylenol) can be harmful. · Reduce stress in your life. Ask your doctor about ways to relax. These may include breathing exercises and massage. · Think about joining a support group with other people who have this problem. These groups can give comfort and information about what to do to feel better. Your doctor can tell you how to find a support group. When should you call for help? Call your doctor now or seek immediate medical care if: 
  · You have severe pain that you can't control.  
 Watch closely for changes in your health, and be sure to contact your doctor if: 
  · You are not able to sleep because of the pain.  
  · You do not get better as expected. Where can you learn more? Go to http://cortez-croey.info/. Enter R378 in the search box to learn more about \"Trigeminal Neuralgia: Care Instructions. \" Current as of: June 26, 2019 Content Version: 12.2 © 8728-6703 [employer]. Care instructions adapted under license by [employer] (which disclaims liability or warranty for this information). If you have questions about a medical condition or this instruction, always ask your healthcare professional. [address] any warranty or liability for your use of this information.

## 2019-10-13 VITALS
TEMPERATURE: 97.5 F | RESPIRATION RATE: 12 BRPM | DIASTOLIC BLOOD PRESSURE: 86 MMHG | OXYGEN SATURATION: 100 % | HEIGHT: 69 IN | BODY MASS INDEX: 21.56 KG/M2 | SYSTOLIC BLOOD PRESSURE: 142 MMHG | HEART RATE: 67 BPM | WEIGHT: 145.6 LBS

## 2019-10-13 NOTE — PROGRESS NOTES
HISTORY OF PRESENT ILLNESS  Aleyda Agarwal is a 80 y.o. male. HPI  Presents with complaints of right sided facial pain that began 2 weeks ago. Primarily having pain to right cheek lateral to right nasal fold. Has been a dull ache that has become constant;  has started having sharp lightning-bolt sensation in same area especially over the past few days. Thought he was having tooth pain so he went to see his dentist who referred him to an endodontist.  Reports endodontist did xrays and there were no signs of tooth involvement or sinus disease. He was advised to see Dr Thaddeus Oshea for possible trigeminal neuralgia but has not called to make appointment. Reports pain has become more severe over the past 24 hours and he had difficulty sleeping last night. Denies fever, chills. Denies scalp or facial rash. No increase in pain with chewing. Has noted some milder discomfort into right temple and right jaw area but this has been transient.      Diabetes has been controlled on Metformin 2 tabs daily with HgbA1c 6.9 19      Patient Active Problem List   Diagnosis Code    Glaucoma H40.9    Hypercholesterolemia E78.00    Rosacea L71.9    GERD (gastroesophageal reflux disease) K21.9    Hemorrhoids K64.9    Benign essential HTN I10    Type 2 diabetes with nephropathy ([facility] [address] E11.21    Microalbuminuria due to type 2 diabetes mellitus ([facility] [address] E11.29, R80.9    History of prostate cancer Z85.46    Vertigo R42    History of bladder cancer Z85.51     Past Surgical History:   Procedure Laterality Date    HX PROSTATECTOMY  2000     Social History     Socioeconomic History    Marital status: UNKNOWN     Spouse name:     Number of children: 1    Years of education: Not on file    Highest education level: Not on file   Occupational History    Occupation: Moved to    Social Needs    Financial resource strain: Not on file    Food insecurity:     Worry: Not on file     Inability: Not on file   [facility] Transportation needs:     Medical: Not on file     Non-medical: Not on file   Tobacco Use    Smoking status: Former Smoker     Last attempt to quit: 1962     Years since quittin.8    Smokeless tobacco: Never Used   Substance and Sexual Activity    Alcohol use: Yes     Alcohol/week: 5.0 standard drinks     Types: 5 Glasses of wine per week    Drug use: Never    Sexual activity: Not on file   Lifestyle    Physical activity:     Days per week: Not on file     Minutes per session: Not on file    Stress: Not on file   Relationships    Social connections:     Talks on phone: Not on file     Gets together: Not on file     Attends Taoist service: Not on file     Active member of club or organization: Not on file     Attends meetings of clubs or organizations: Not on file     Relationship status: Not on file    Intimate partner violence:     Fear of current or ex partner: Not on file     Emotionally abused: Not on file     Physically abused: Not on file     Forced sexual activity: Not on file   Other Topics Concern    Not on file   Social History Narrative    Has 3 children, 6 grandkids     Family History   Problem Relation Age of Onset    Cancer Mother         breast    Cancer Father         prostate    Cancer Sister         breast    Cancer Brother         prostate     Current Outpatient Medications   Medication Sig    predniSONE (STERAPRED DS) 10 mg dose pack Take 1 Tab by mouth See Admin Instructions. See administration instruction per 10mg dose pack    gabapentin (NEURONTIN) 100 mg capsule Take 1-3 caps at bedtime    HYDROcodone-acetaminophen (NORCO) 5-325 mg per tablet Take 1 Tab by mouth every six (6) hours as needed for Pain for up to 7 days. Max Daily Amount: 4 Tabs.  atorvastatin (LIPITOR) 40 mg tablet TAKE 1 TABLET BY MOUTH EVERY DAY IN THE EVENING    olmesartan (BENICAR) 20 mg tablet Take 0.5 Tabs by mouth daily.  Decreased dose 19    metFORMIN ER (GLUCOPHAGE XR) 500 mg tablet Take 2 Tabs by mouth daily (with dinner). increased dose 2/6/19    dorzolamide-timolol (COSOPT) 22.3-6.8 mg/mL ophthalmic solution APPLY 1 DROP(S) IN BOTH EYES 2 TIMES A DAY    latanoprost (XALATAN) 0.005 % ophthalmic solution APPLY 1 DROP(S) IN LEFT EYE AT BEDTIME    raNITIdine (ZANTAC) 150 mg tablet Take 150 mg by mouth nightly.  cholecalciferol (VITAMIN D3) 1,000 unit tablet Take  by mouth daily.  cyanocobalamin (VITAMIN B-12) 500 mcg tablet Take 500 mcg by mouth daily.  aspirin delayed-release (ASPIR-81) 81 mg tablet Take 1 Tab by mouth daily.  metroNIDAZOLE (METROCREAM) 0.75 % topical cream Apply  to affected area two (2) times a day. Use a thin layer to affected areas after washing     No current facility-administered medications for this visit. Allergies   Allergen Reactions    Lisinopril Cough     Immunization History   Administered Date(s) Administered    Influenza Vaccine 10/01/2017    Influenza Vaccine (Tri) Adjuvanted 09/06/2018    Pneumococcal Conjugate (PCV-13) 03/07/2019    Pneumococcal Polysaccharide (PPSV-23) 01/01/2014    Td 01/01/2014    Zoster Recombinant 05/10/2018, 11/27/2018       Review of Systems   Constitutional: Negative for chills, fever and malaise/fatigue. HENT: Positive for sinus pain. Negative for congestion, ear pain and sore throat. Respiratory: Negative for cough and shortness of breath. Cardiovascular: Negative for chest pain and palpitations. Gastrointestinal: Negative for nausea and vomiting. Genitourinary: Negative for dysuria and frequency. Musculoskeletal: Negative for joint pain and myalgias. Skin: Negative for itching and rash. Neurological: Positive for tingling and sensory change. Negative for dizziness, tremors, weakness and headaches.        BP (!) 173/92 (BP 1 Location: Right arm, BP Patient Position: Sitting)   Pulse 67   Temp 97.5 °F (36.4 °C) (Oral)   Resp 12   Ht 5' 9\" (1.753 m)   Wt 145 lb 9.6 oz (66 kg) SpO2 100%   BMI 21.50 kg/m²    BP rechecked 142/86 right arm manual cuff  Physical Exam   Constitutional: He is oriented to person, place, and time. He appears well-developed and well-nourished. HENT:   Head: Normocephalic and atraumatic. Right Ear: External ear normal.   Left Ear: External ear normal.   Nose: No mucosal edema, rhinorrhea or sinus tenderness. Right sinus exhibits maxillary sinus tenderness. Right sinus exhibits no frontal sinus tenderness. Left sinus exhibits no maxillary sinus tenderness and no frontal sinus tenderness. Mouth/Throat: Oropharynx is clear and moist. No posterior oropharyngeal erythema. Tenderness right cheek; facial/scalp skin clear of blisters, rash. Eyes: Pupils are equal, round, and reactive to light. Conjunctivae and EOM are normal.   Neck: Normal range of motion. Neck supple. No thyromegaly present. Cardiovascular: Normal rate and regular rhythm. Pulmonary/Chest: Effort normal and breath sounds normal.   Lymphadenopathy:     He has no cervical adenopathy. Neurological: He is alert and oriented to person, place, and time. Skin: Skin is warm and dry. No rash noted. No erythema. Psychiatric: He has a normal mood and affect. His behavior is normal.   Nursing note and vitals reviewed. ASSESSMENT and PLAN  Diagnoses and all orders for this visit:    1. Right sided facial pain -- consulted with Dr Davis Hayden; no signs of Herpes zoster; have to consider Trigeminal Neuralgia in differential diagnosis. Trial of steroids to decrease possible nerve inflammation. Cautioned regarding possible sedation from Gabapentin and Hydrocodone. Appointment made with Dr Chris Terry to evaluate for possible TN if symptoms persist.    -     predniSONE (STERAPRED DS) 10 mg dose pack; Take 1 Tab by mouth See Admin Instructions. See administration instruction per 10mg dose pack  -     gabapentin (NEURONTIN) 100 mg capsule;  Take 1-3 caps at bedtime  -     HYDROcodone-acetaminophen (NORCO) 5-325 mg per tablet; Take 1 Tab by mouth every six (6) hours as needed for Pain for up to 7 days. Max Daily Amount: 4 Tabs. -     REFERRAL TO NEUROSURGERY    2. Benign essential HTN -- stable    3. Type 2 diabetes with nephropathy (HCC) -- stable on current regimen      lab results and schedule of future lab studies reviewed with patient  reviewed diet, exercise and weight control  reviewed medications and side effects in detail  This note will not be viewable in MyChart.

## 2019-12-06 DIAGNOSIS — R51.9 RIGHT SIDED FACIAL PAIN: ICD-10-CM

## 2019-12-06 RX ORDER — GABAPENTIN 100 MG/1
CAPSULE ORAL
Qty: 90 CAP | Refills: 1 | Status: SHIPPED | OUTPATIENT
Start: 2019-12-06 | End: 2020-02-06

## 2020-02-06 ENCOUNTER — HOSPITAL ENCOUNTER (OUTPATIENT)
Dept: LAB | Age: 85
Discharge: HOME | End: 2020-02-06

## 2020-02-06 ENCOUNTER — OFFICE VISIT (OUTPATIENT)
Dept: INTERNAL MEDICINE CLINIC | Age: 85
End: 2020-02-06

## 2020-02-06 VITALS
SYSTOLIC BLOOD PRESSURE: 142 MMHG | HEART RATE: 56 BPM | DIASTOLIC BLOOD PRESSURE: 75 MMHG | BODY MASS INDEX: 22.1 KG/M2 | HEIGHT: 69 IN | WEIGHT: 149.2 LBS | OXYGEN SATURATION: 98 % | RESPIRATION RATE: 12 BRPM | TEMPERATURE: 97.8 F

## 2020-02-06 DIAGNOSIS — G50.0 TRIGEMINAL NEURALGIA OF RIGHT SIDE OF FACE: ICD-10-CM

## 2020-02-06 DIAGNOSIS — K21.9 GASTROESOPHAGEAL REFLUX DISEASE WITHOUT ESOPHAGITIS: ICD-10-CM

## 2020-02-06 DIAGNOSIS — E11.21 TYPE 2 DIABETES WITH NEPHROPATHY (HCC): ICD-10-CM

## 2020-02-06 DIAGNOSIS — I10 BENIGN ESSENTIAL HTN: ICD-10-CM

## 2020-02-06 DIAGNOSIS — E78.00 HYPERCHOLESTEROLEMIA: ICD-10-CM

## 2020-02-06 DIAGNOSIS — R80.9 MICROALBUMINURIA DUE TO TYPE 2 DIABETES MELLITUS (HCC): ICD-10-CM

## 2020-02-06 DIAGNOSIS — E11.29 MICROALBUMINURIA DUE TO TYPE 2 DIABETES MELLITUS (HCC): ICD-10-CM

## 2020-02-06 DIAGNOSIS — E11.40 TYPE 2 DIABETES MELLITUS WITH DIABETIC NEUROPATHY, WITHOUT LONG-TERM CURRENT USE OF INSULIN (HCC): Primary | ICD-10-CM

## 2020-02-06 DIAGNOSIS — Z85.51 HISTORY OF BLADDER CANCER: ICD-10-CM

## 2020-02-06 DIAGNOSIS — R51.9 RIGHT SIDED FACIAL PAIN: ICD-10-CM

## 2020-02-06 LAB
ALBUMIN SERPL-MCNC: 4 G/DL (ref 3.5–5)
ALBUMIN/GLOB SERPL: 1.4 {RATIO} (ref 1.1–2.2)
ALP SERPL-CCNC: 74 U/L (ref 45–117)
ALT SERPL-CCNC: 26 U/L (ref 12–78)
ANION GAP SERPL CALC-SCNC: 4 MMOL/L (ref 5–15)
AST SERPL-CCNC: 17 U/L (ref 15–37)
BILIRUB SERPL-MCNC: 0.5 MG/DL (ref 0.2–1)
BUN SERPL-MCNC: 22 MG/DL (ref 6–20)
BUN/CREAT SERPL: 19 (ref 12–20)
CALCIUM SERPL-MCNC: 9.6 MG/DL (ref 8.5–10.1)
CHLORIDE SERPL-SCNC: 110 MMOL/L (ref 97–108)
CHOLEST SERPL-MCNC: 138 MG/DL
CO2 SERPL-SCNC: 28 MMOL/L (ref 21–32)
CREAT SERPL-MCNC: 1.16 MG/DL (ref 0.7–1.3)
ERYTHROCYTE [DISTWIDTH] IN BLOOD BY AUTOMATED COUNT: 11.9 % (ref 11.5–14.5)
EST. AVERAGE GLUCOSE BLD GHB EST-MCNC: 166 MG/DL
GLOBULIN SER CALC-MCNC: 2.8 G/DL (ref 2–4)
GLUCOSE SERPL-MCNC: 172 MG/DL (ref 65–100)
HBA1C MFR BLD: 7.4 % (ref 4–5.6)
HCT VFR BLD AUTO: 36.1 % (ref 36.6–50.3)
HDLC SERPL-MCNC: 72 MG/DL
HDLC SERPL: 1.9 {RATIO} (ref 0–5)
HGB BLD-MCNC: 11.8 G/DL (ref 12.1–17)
LDLC SERPL CALC-MCNC: 52.4 MG/DL (ref 0–100)
LIPID PROFILE,FLP: NORMAL
MCH RBC QN AUTO: 31.9 PG (ref 26–34)
MCHC RBC AUTO-ENTMCNC: 32.7 G/DL (ref 30–36.5)
MCV RBC AUTO: 97.6 FL (ref 80–99)
NRBC # BLD: 0 K/UL (ref 0–0.01)
NRBC BLD-RTO: 0 PER 100 WBC
PLATELET # BLD AUTO: 153 K/UL (ref 150–400)
PMV BLD AUTO: 10.2 FL (ref 8.9–12.9)
POTASSIUM SERPL-SCNC: 4 MMOL/L (ref 3.5–5.1)
PROT SERPL-MCNC: 6.8 G/DL (ref 6.4–8.2)
RBC # BLD AUTO: 3.7 M/UL (ref 4.1–5.7)
SODIUM SERPL-SCNC: 142 MMOL/L (ref 136–145)
TRIGL SERPL-MCNC: 68 MG/DL (ref ?–150)
VLDLC SERPL CALC-MCNC: 13.6 MG/DL
WBC # BLD AUTO: 3.9 K/UL (ref 4.1–11.1)

## 2020-02-06 RX ORDER — OMEPRAZOLE 20 MG/1
20 TABLET, DELAYED RELEASE ORAL DAILY
Qty: 90 TAB | Refills: 1 | Status: SHIPPED | OUTPATIENT
Start: 2020-02-06 | End: 2020-07-15

## 2020-02-06 RX ORDER — GABAPENTIN 300 MG/1
300 CAPSULE ORAL
Qty: 90 CAP | Refills: 1 | Status: SHIPPED | OUTPATIENT
Start: 2020-02-06 | End: 2020-08-05 | Stop reason: SDUPTHER

## 2020-02-06 NOTE — PROGRESS NOTES
HISTORY OF PRESENT ILLNESS    Chief Complaint   Patient presents with    Hypertension    Diabetes       Presents for follow-up    Hx bladder cancer. Nodule seen on cystoscopy 2 weeks ago, biopsy and excision done per Dr. Valencia Lee. tolerated well. Path pending. Has some blood in urine since bx. C/o polyuria. Trigeminal neuralgia on right. Saw Dr. Jose Sanchez. Pain is controlled w gabapentin 100 mg 3 pills hs. Took 2 pills for past 2 nights since ran out. Pain w brushing teeth    Hypertension - home BP is controlled at home 110-120s/70-80s  Hypertension ROS: taking medications as instructed, no medication side effects noted, no TIA's, no chest pain on exertion, no dyspnea on exertion, no swelling of ankles     reports that he quit smoking about 58 years ago. He has never used smokeless tobacco.    reports current alcohol use of about 5.0 standard drinks of alcohol per week. BP Readings from Last 2 Encounters:   02/06/20 169/79   10/11/19 142/86       Diabetes Mellitus follow-up  Last hemoglobin a1c   Lab Results   Component Value Date/Time    Hemoglobin A1c 7.5 (H) 02/09/2018 10:41 AM    Hemoglobin A1c (POC) 6.9 08/06/2019 08:20 AM   medication compliance: compliant all of the time. Diabetic diet compliance: compliant most of the time. Home glucose monitoring: fasting values range none. Hypoglycemic episodes:  None. Further diabetic ROS: no polyuria or polydipsia, no chest pain, dyspnea or TIA's, no numbness, tingling or pain in extremities. Hyperlipidemia  ROS: taking medications as instructed, no medication side effects noted  No new myalgias, no joint pains, no weakness  No TIA's, no chest pain on exertion, no dyspnea on exertion, no swelling of ankles.    Lab Results   Component Value Date/Time    Cholesterol, total 126 02/06/2019 09:02 AM    HDL Cholesterol 51 02/06/2019 09:02 AM    LDL, calculated 58 02/06/2019 09:02 AM    VLDL, calculated 17 02/06/2019 09:02 AM    Triglyceride 84 02/06/2019 09:02 AM             Review of Systems   All other systems reviewed and are negative, except as noted in HPI    Past Medical and Surgical History   has a past medical history of Benign essential HTN, Bladder cancer ([address] (2013?), Closed left arm fracture, GERD (gastroesophageal reflux disease), Glaucoma, Hemorrhoids, Hypercholesterolemia, Microalbuminuria due to type 2 diabetes mellitus ([address] Prostate cancer ([address] (2000), Rosacea, Type 2 diabetes mellitus with microalbuminuria ([address] and Vertigo. has a past surgical history that includes hx prostatectomy (01/2000) and hx other surgical (01/2020). reports that he quit smoking about 58 years ago. He has never used smokeless tobacco. He reports current alcohol use of about 5.0 standard drinks of alcohol per week. He reports that he does not use drugs. family history includes Cancer in his brother, father, mother, and sister. Physical Exam   Nursing note and vitals reviewed. Blood pressure 169/79, pulse (!) 56, temperature 97.8 °F (36.6 °C), temperature source Oral, resp. rate 12, height 5' 9\" (1.753 m), weight 149 lb 3.2 oz (67.7 kg), SpO2 98 %. Constitutional:  No distress. Eyes: Conjunctivae are normal.   Ears:  Hearing grossly intact  Cardiovascular: Normal rate. regular rhythm, no murmurs or gallops  No edema  Pulmonary/Chest: Effort normal.   CTAB  Musculoskeletal: moves all 4 extremities   Neurological: Alert and oriented to person, place, and time. Skin: No rash noted. Psychiatric: Normal mood and affect. Behavior is normal.   Foot exam  - skin intact, mild dryness w no fissures, no rashes, no significant ulcers or callous formation. Sensation intact by microfilament or light touch      ASSESSMENT and PLAN  Diagnoses and all orders for this visit:    1. Type 2 diabetes mellitus with diabetic neuropathy, without long-term current use of insulin ([address]  2. Type 2 diabetes with nephropathy (HCC)  -     CBC W/O DIFF;  Future  - METABOLIC PANEL, COMPREHENSIVE; Future  -     MICROALBUMIN, UR, RAND W/ MICROALB/CREAT RATIO; Future  -     HEMOGLOBIN A1C WITH EAG; Future    3. Microalbuminuria due to type 2 diabetes mellitus (HCC)  -     MICROALBUMIN, UR, RAND W/ MICROALB/CREAT RATIO; Future    4. Benign essential HTN  Controlled on current regimen. Continue current medications as written in chart.  -     CBC W/O DIFF; Future    5. Hypercholesterolemia  Controlled on current regimen. Continue current medications as written in chart  -     LIPID PANEL; Future    6. History of bladder cancer  Recent bx - results pending. +hematuria    7. Right sided facial pain  Trigeminal neuralgia  Controlled w gabapentin    8. GERD / Hiatal hernia   Taking OTC nexium w good results  Wants Rx. Try omeperzole    lab results and schedule of future lab studies reviewed with patient  reviewed medications and side effects in detail    Return to clinic for further evaluation if new symptoms develop        Current Outpatient Medications   Medication Sig    gabapentin (NEURONTIN) 300 mg capsule Take 1 Cap by mouth nightly. Max Daily Amount: 300 mg. Take 1-3 caps at bedtime  Indications: trigeminal neuralgia    omeprazole (PRILOSEC OTC) 20 mg tablet Take 1 Tab by mouth daily for 360 days.  atorvastatin (LIPITOR) 40 mg tablet TAKE 1 TABLET BY MOUTH EVERY DAY IN THE EVENING    olmesartan (BENICAR) 20 mg tablet Take 0.5 Tabs by mouth daily. Decreased dose 5/7/19    metFORMIN ER (GLUCOPHAGE XR) 500 mg tablet Take 2 Tabs by mouth daily (with dinner). increased dose 2/6/19    dorzolamide-timolol (COSOPT) 22.3-6.8 mg/mL ophthalmic solution APPLY 1 DROP(S) IN BOTH EYES 2 TIMES A DAY    latanoprost (XALATAN) 0.005 % ophthalmic solution APPLY 1 DROP(S) IN LEFT EYE AT BEDTIME    cholecalciferol (VITAMIN D3) 1,000 unit tablet Take  by mouth daily.  cyanocobalamin (VITAMIN B-12) 500 mcg tablet Take 500 mcg by mouth daily.     aspirin delayed-release (ASPIR-81) 81 mg tablet Take 1 Tab by mouth daily.  metroNIDAZOLE (METROCREAM) 0.75 % topical cream Apply  to affected area two (2) times a day. Use a thin layer to affected areas after washing    olmesartan (BENICAR) 20 mg tablet TAKE 1 TABLET BY MOUTH EVERY DAY. REPLACES LOSARTAN (Patient taking differently: Indications: not taking)     No current facility-administered medications for this visit.

## 2020-02-23 DIAGNOSIS — E78.00 HYPERCHOLESTEROLEMIA: ICD-10-CM

## 2020-02-23 RX ORDER — ATORVASTATIN CALCIUM 40 MG/1
TABLET, FILM COATED ORAL
Qty: 90 TAB | Refills: 1 | Status: SHIPPED | OUTPATIENT
Start: 2020-02-23 | End: 2020-08-20

## 2020-04-07 DIAGNOSIS — E11.29 TYPE 2 DIABETES MELLITUS WITH MICROALBUMINURIA, WITHOUT LONG-TERM CURRENT USE OF INSULIN (HCC): ICD-10-CM

## 2020-04-07 DIAGNOSIS — R80.9 TYPE 2 DIABETES MELLITUS WITH MICROALBUMINURIA, WITHOUT LONG-TERM CURRENT USE OF INSULIN (HCC): ICD-10-CM

## 2020-04-07 RX ORDER — METFORMIN HYDROCHLORIDE 500 MG/1
TABLET, EXTENDED RELEASE ORAL
Qty: 180 TAB | Refills: 1 | Status: SHIPPED | OUTPATIENT
Start: 2020-04-07 | End: 2020-09-27

## 2020-07-15 RX ORDER — PHENOL/SODIUM PHENOLATE
AEROSOL, SPRAY (ML) MUCOUS MEMBRANE
Qty: 84 TAB | Refills: 1 | Status: SHIPPED | OUTPATIENT
Start: 2020-07-15 | End: 2021-05-11

## 2020-07-16 ENCOUNTER — TELEPHONE (OUTPATIENT)
Dept: INTERNAL MEDICINE CLINIC | Age: 85
End: 2020-07-16

## 2020-07-16 NOTE — TELEPHONE ENCOUNTER
----- Message from Rubin Degroot sent at 7/15/2020  3:49 PM EDT -----  Regarding: Port/MyChart  Contact: [phone]  Appointment Request From: Lola Tran     With Provider: Shruthi Yepez MD [-Primary Care Physician-]     Preferred Date Range: From 7/15/2020 To 7/31/2020     Preferred Times: Any     Reason: To address the following health maintenance concerns.    Microalbumin Q1   Medicare Yearly Exam     Comments:   early morning

## 2020-07-16 NOTE — TELEPHONE ENCOUNTER
The following appointments have been successfully scheduled:   Date/time Monday, August 24, 2020 02:40 PM   Patient Jonathan Music 09/22/1929 ([birth year]XB M) #[id number]   Department [facility] OFFICE-[place]   Appointment type Complete Physical   Provider [facility]     Notified pt through Ethan

## 2020-08-05 DIAGNOSIS — R51.9 RIGHT SIDED FACIAL PAIN: ICD-10-CM

## 2020-08-05 RX ORDER — GABAPENTIN 300 MG/1
300 CAPSULE ORAL
Qty: 90 CAP | Refills: 2 | Status: SHIPPED | OUTPATIENT
Start: 2020-08-05 | End: 2020-08-24 | Stop reason: ALTCHOICE

## 2020-08-20 DIAGNOSIS — E78.00 HYPERCHOLESTEROLEMIA: ICD-10-CM

## 2020-08-20 RX ORDER — ATORVASTATIN CALCIUM 40 MG/1
TABLET, FILM COATED ORAL
Qty: 90 TAB | Refills: 1 | Status: SHIPPED | OUTPATIENT
Start: 2020-08-20 | End: 2021-03-09 | Stop reason: SDUPTHER

## 2020-08-24 ENCOUNTER — OFFICE VISIT (OUTPATIENT)
Dept: INTERNAL MEDICINE CLINIC | Age: 85
End: 2020-08-24
Payer: MEDICARE

## 2020-08-24 ENCOUNTER — HOSPITAL ENCOUNTER (OUTPATIENT)
Dept: GENERAL RADIOLOGY | Age: 85
Discharge: HOME | End: 2020-08-24
Payer: MEDICARE

## 2020-08-24 VITALS
TEMPERATURE: 98.7 F | HEIGHT: 69 IN | RESPIRATION RATE: 12 BRPM | DIASTOLIC BLOOD PRESSURE: 78 MMHG | OXYGEN SATURATION: 97 % | BODY MASS INDEX: 21.89 KG/M2 | HEART RATE: 69 BPM | WEIGHT: 147.8 LBS | SYSTOLIC BLOOD PRESSURE: 140 MMHG

## 2020-08-24 DIAGNOSIS — R80.9 MICROALBUMINURIA DUE TO TYPE 2 DIABETES MELLITUS (HCC): ICD-10-CM

## 2020-08-24 DIAGNOSIS — R07.89 CHEST PRESSURE: ICD-10-CM

## 2020-08-24 DIAGNOSIS — E11.21 TYPE 2 DIABETES WITH NEPHROPATHY (HCC): ICD-10-CM

## 2020-08-24 DIAGNOSIS — I10 BENIGN ESSENTIAL HTN: ICD-10-CM

## 2020-08-24 DIAGNOSIS — R42 DIZZINESS: ICD-10-CM

## 2020-08-24 DIAGNOSIS — E78.00 HYPERCHOLESTEROLEMIA: ICD-10-CM

## 2020-08-24 DIAGNOSIS — Z00.00 MEDICARE ANNUAL WELLNESS VISIT, SUBSEQUENT: Primary | ICD-10-CM

## 2020-08-24 DIAGNOSIS — E11.40 TYPE 2 DIABETES MELLITUS WITH DIABETIC NEUROPATHY, WITHOUT LONG-TERM CURRENT USE OF INSULIN (HCC): ICD-10-CM

## 2020-08-24 DIAGNOSIS — G50.0 TRIGEMINAL NEURALGIA OF RIGHT SIDE OF FACE: ICD-10-CM

## 2020-08-24 DIAGNOSIS — E11.29 MICROALBUMINURIA DUE TO TYPE 2 DIABETES MELLITUS (HCC): ICD-10-CM

## 2020-08-24 LAB
ALBUMIN SERPL-MCNC: 4.1 G/DL (ref 3.5–5)
ALBUMIN/GLOB SERPL: 1.5 {RATIO} (ref 1.1–2.2)
ALP SERPL-CCNC: 68 U/L (ref 45–117)
ALT SERPL-CCNC: 22 U/L (ref 12–78)
ANION GAP SERPL CALC-SCNC: 6 MMOL/L (ref 5–15)
AST SERPL-CCNC: 13 U/L (ref 15–37)
BILIRUB SERPL-MCNC: 0.5 MG/DL (ref 0.2–1)
BUN SERPL-MCNC: 18 MG/DL (ref 6–20)
BUN/CREAT SERPL: 15 (ref 12–20)
CALCIUM SERPL-MCNC: 9.6 MG/DL (ref 8.5–10.1)
CHLORIDE SERPL-SCNC: 109 MMOL/L (ref 97–108)
CHOLEST SERPL-MCNC: 143 MG/DL
CO2 SERPL-SCNC: 27 MMOL/L (ref 21–32)
CREAT SERPL-MCNC: 1.19 MG/DL (ref 0.7–1.3)
ERYTHROCYTE [DISTWIDTH] IN BLOOD BY AUTOMATED COUNT: 11.9 % (ref 11.5–14.5)
EST. AVERAGE GLUCOSE BLD GHB EST-MCNC: 166 MG/DL
GLOBULIN SER CALC-MCNC: 2.7 G/DL (ref 2–4)
GLUCOSE SERPL-MCNC: 139 MG/DL (ref 65–100)
HBA1C MFR BLD: 7.4 % (ref 4–5.6)
HCT VFR BLD AUTO: 38.1 % (ref 36.6–50.3)
HDLC SERPL-MCNC: 71 MG/DL
HDLC SERPL: 2 {RATIO} (ref 0–5)
HGB BLD-MCNC: 12.4 G/DL (ref 12.1–17)
LDLC SERPL CALC-MCNC: 54.8 MG/DL (ref 0–100)
LIPID PROFILE,FLP: NORMAL
MCH RBC QN AUTO: 31.2 PG (ref 26–34)
MCHC RBC AUTO-ENTMCNC: 32.5 G/DL (ref 30–36.5)
MCV RBC AUTO: 96 FL (ref 80–99)
NRBC # BLD: 0 K/UL (ref 0–0.01)
NRBC BLD-RTO: 0 PER 100 WBC
PLATELET # BLD AUTO: 194 K/UL (ref 150–400)
PMV BLD AUTO: 9.7 FL (ref 8.9–12.9)
POTASSIUM SERPL-SCNC: 4.3 MMOL/L (ref 3.5–5.1)
PROT SERPL-MCNC: 6.8 G/DL (ref 6.4–8.2)
RBC # BLD AUTO: 3.97 M/UL (ref 4.1–5.7)
SODIUM SERPL-SCNC: 142 MMOL/L (ref 136–145)
TRIGL SERPL-MCNC: 86 MG/DL (ref ?–150)
VLDLC SERPL CALC-MCNC: 17.2 MG/DL
WBC # BLD AUTO: 6.4 K/UL (ref 4.1–11.1)

## 2020-08-24 PROCEDURE — G8536 NO DOC ELDER MAL SCRN: HCPCS | Performed by: INTERNAL MEDICINE

## 2020-08-24 PROCEDURE — G8510 SCR DEP NEG, NO PLAN REQD: HCPCS | Performed by: INTERNAL MEDICINE

## 2020-08-24 PROCEDURE — 1101F PT FALLS ASSESS-DOCD LE1/YR: CPT | Performed by: INTERNAL MEDICINE

## 2020-08-24 PROCEDURE — G8427 DOCREV CUR MEDS BY ELIG CLIN: HCPCS | Performed by: INTERNAL MEDICINE

## 2020-08-24 PROCEDURE — 71046 X-RAY EXAM CHEST 2 VIEWS: CPT

## 2020-08-24 PROCEDURE — 99214 OFFICE O/P EST MOD 30 MIN: CPT | Performed by: INTERNAL MEDICINE

## 2020-08-24 PROCEDURE — G0439 PPPS, SUBSEQ VISIT: HCPCS | Performed by: INTERNAL MEDICINE

## 2020-08-24 PROCEDURE — G8420 CALC BMI NORM PARAMETERS: HCPCS | Performed by: INTERNAL MEDICINE

## 2020-08-24 RX ORDER — GABAPENTIN 100 MG/1
200 CAPSULE ORAL
Qty: 180 CAP | Refills: 0 | Status: SHIPPED | OUTPATIENT
Start: 2020-08-24 | End: 2021-11-24 | Stop reason: ALTCHOICE

## 2020-08-25 LAB
CREAT UR-MCNC: 76.8 MG/DL
MICROALBUMIN UR-MCNC: 5.61 MG/DL
MICROALBUMIN/CREAT UR-RTO: 73 MG/G (ref 0–30)

## 2020-08-25 RX ORDER — LEVOFLOXACIN 500 MG/1
500 TABLET, FILM COATED ORAL DAILY
Qty: 10 TAB | Refills: 0 | Status: SHIPPED | OUTPATIENT
Start: 2020-08-25 | End: 2020-09-04

## 2020-08-25 NOTE — PROGRESS NOTES
This is a Subsequent Medicare Annual Wellness Visit providing Personalized Prevention Plan Services (PPPS) (Performed 12 months after initial AWV and PPPS )    I have reviewed the patient's medical history in detail and updated the computerized patient record. Reports chest pressure with exertion. This has been occurring for the past month or 2. Symptoms occur when he starts walking and he feels a burning and pressure sensation in his mid chest extending across the entire chest.  Denies radiation to his jaw or his arms or to his back. Denies any heartburn or belching. Says feels like the burn to get when you are starting to run. He is a former runner. He has been limiting activity because of this sensation. On occasion, he has continue to walk quickly with an sensation and it sometimes will improve. He does have history of GERD and hiatal hernia. No known history of cardiovascular disease but has hypertension, diabetes, hyperlipidemia. Reports a distant stress test long time ago. Hypertension  Hypertension ROS: taking medications as instructed, no medication side effects noted, no TIA's, noting some chest pains described as above, no swelling of ankles     reports that he quit smoking about 58 years ago. He has never used smokeless tobacco.    reports current alcohol use of about 5.0 standard drinks of alcohol per week. BP Readings from Last 2 Encounters:   08/24/20 140/78   02/06/20 142/75     Diabetes Mellitus follow-up  Last hemoglobin a1c   Lab Results   Component Value Date/Time    Hemoglobin A1c 7.4 (H) 02/06/2020 10:52 AM    Hemoglobin A1c (POC) 6.9 08/06/2019 08:20 AM   medication compliance: compliant all of the time. Diabetic diet compliance: compliant most of the time. Home glucose monitoring: fasting values range none. Hypoglycemic episodes:  None. Further diabetic ROS: no polyuria or polydipsia, no chest pain, dyspnea or TIA's, no numbness, tingling or pain in extremities. Hyperlipidemia  ROS: taking medications as instructed, no medication side effects noted  No new myalgias, no joint pains, no weakness  No TIA's,no dyspnea on exertion, no swelling of ankles. Lab Results   Component Value Date/Time    Cholesterol, total 138 02/06/2020 10:52 AM    HDL Cholesterol 72 02/06/2020 10:52 AM    LDL, calculated 52.4 02/06/2020 10:52 AM    VLDL, calculated 13.6 02/06/2020 10:52 AM    Triglyceride 68 02/06/2020 10:52 AM    CHOL/HDL Ratio 1.9 02/06/2020 10:52 AM     He is to take gabapentin 300 mg nightly for trigeminal neuralgia. Pain symptoms are completely resolved. He does report some dizziness if he takes the medication, especially taking the morning. Denies any other obvious side effects. History     Past Medical History:   Diagnosis Date    Benign essential HTN     [facility]?    s/p BCGx2, resection. low grade. in [facility]. now Dr. Priscilla Swan. cystoscopy 6/2020 clear    Closed left arm fracture     GERD (gastroesophageal reflux disease)     with hiatal hernia    Glaucoma     Dr. Crystal Pcihardo    Hemorrhoids     Hypercholesterolemia     Microalbuminuria due to type 2 diabetes mellitus ([facility]     [facility]    Dr. Priscilla Swan. s/p prostatectomy 1/2000 in [facility]. PSA \"0.4\" since then    Rosacea     Trigeminal neuralgia of right side of face     Type 2 diabetes mellitus with microalbuminuria (HCC)     Vertigo        Past Surgical History:   Procedure Laterality Date    HX COLONOSCOPY  2005? reports 2 colonoscopies    HX OTHER SURGICAL  01/2020    bladder biopsy    HX PROSTATECTOMY  01/2000       Current Outpatient Medications   Medication Sig    gabapentin (NEURONTIN) 100 mg capsule Take 2 Caps by mouth nightly. Max Daily Amount: 200 mg.  Decreased dose 8/24/20    atorvastatin (LIPITOR) 40 mg tablet TAKE 1 TABLET BY MOUTH EVERY DAY IN THE EVENING    Omeprazole delayed release (PRILOSEC D/R) 20 mg tablet TAKE 1 TABLET BY MOUTH EVERY DAY    metFORMIN ER (GLUCOPHAGE XR) 500 mg tablet TAKE 2 TABLETS BY MOUTH EVERY DAY WITH DINNER    olmesartan (BENICAR) 20 mg tablet Take 0.5 Tabs by mouth daily. Decreased dose 5/7/19    dorzolamide-timolol (COSOPT) 22.3-6.8 mg/mL ophthalmic solution APPLY 1 DROP(S) IN BOTH EYES 2 TIMES A DAY    latanoprost (XALATAN) 0.005 % ophthalmic solution APPLY 1 DROP(S) IN LEFT EYE AT BEDTIME    cholecalciferol (VITAMIN D3) 1,000 unit tablet Take  by mouth daily.  cyanocobalamin (VITAMIN B-12) 500 mcg tablet Take 500 mcg by mouth daily.  aspirin delayed-release (ASPIR-81) 81 mg tablet Take 1 Tab by mouth daily.  metroNIDAZOLE (METROCREAM) 0.75 % topical cream Apply  to affected area two (2) times a day. Use a thin layer to affected areas after washing     No current facility-administered medications for this visit. Allergies   Allergen Reactions    Lisinopril Cough       Family History   Problem Relation Age of Onset    Cancer Mother         breast    Cancer Father         prostate    Cancer Sister         breast    Cancer Brother         prostate        reports that he quit smoking about 58 years ago. He has never used smokeless tobacco.   reports current alcohol use of about 5.0 standard drinks of alcohol per week. Depression Risk Factor Screening:       Alcohol Risk Factor Screening: On any occasion during the past 3 months, have you had more than 3 drinks containing alcohol? No    Do you average more than 14 drinks per week? No      Functional Ability and Level of Safety:     Hearing Loss   mild    Activities of Daily Living   Self-care. Requires assistance with: no ADLs    Fall Risk     Fall Risk Assessment, last 12 mths 8/24/2020   Able to walk? Yes   Fall in past 12 months? No         Abuse Screen   Patient is not abused    Review of Systems   A comprehensive review of systems was negative except for that written in the HPI.     Physical Examination Evaluation of Cognitive Function:  Mood/affect:  neutral, happy  Appearance: age appropriate  Family member/caregiver input: none    Blood pressure 140/78, pulse 69, temperature 98.7 °F (37.1 °C), temperature source Oral, resp. rate 12, height 5' 9\" (1.753 m), weight 147 lb 12.8 oz (67 kg), SpO2 97 %. General appearance: alert, cooperative, no distress, appears stated age  Neck: supple, symmetrical, trachea midline, no adenopathy, thyroid: not enlarged, symmetric, no tenderness/mass/nodules, no carotid bruit and no JVD  Lungs: clear to auscultation bilaterally  Heart: regular rate and rhythm, S1, S2 normal, no murmur, click, rub or gallop  Extremities: extremities normal, atraumatic, no cyanosis or edema    Patient Care Team:  Giselle Flores MD as PCP - General (Internal Medicine)  Giselle Flores MD as PCP - [facility] Empaneled Provider      Advice/Referrals/Counseling   Education and counseling provided. See below for specific orders    Assessment/Plan   Diagnoses and all orders for this visit:    1. Medicare annual wellness visit, subsequent  Completely up-to-date on health maintenance. 2. Chest pressure  Vague but concerning symptoms for angina. Multiple risk factors and diabetes may blunt his symptomatology. Limits ability to exercise. Cardiac stress test ordered. Report immediately if symptoms are worsening.  -     XR CHEST PA LAT; Future  -     EXERCISE CARDIAC STRESS TEST; Future    3. Benign essential HTN  based on my history, the overall control of this problem borderline controlled. Consider adding beta-blocker. On olmesartan  -     CBC W/O DIFF; Future  -     EXERCISE CARDIAC STRESS TEST; Future    4. Type 2 diabetes mellitus with diabetic neuropathy, without long-term current use of insulin (HCC)  -     MICROALBUMIN, UR, RAND W/ MICROALB/CREAT RATIO; Future  -     METABOLIC PANEL, COMPREHENSIVE; Future  -     HEMOGLOBIN A1C WITH EAG; Future  -     LIPID PANEL; Future  5.  Microalbuminuria due to type 2 diabetes mellitus ([facility]  6. Type 2 diabetes with nephropathy (HCC)  Controlled on current regimen. Continue current medications as written in chart. \    7. Hypercholesterolemia  Likely well controlled. Continue statin. -     LIPID PANEL; Future  -     EXERCISE CARDIAC STRESS TEST; Future    8. Trigeminal neuralgia of right side of face  Symptoms are improved are completely resolved. Will decrease gabapentin to 200 mg nightly, then 100 mg as tolerable. Suspect this is the cause of his dizziness.  -     gabapentin (NEURONTIN) 100 mg capsule; Take 2 Caps by mouth nightly. Max Daily Amount: 200 mg. Decreased dose 8/24/20    9. Dizziness  Likely secondary to gabapentin. Will wean off as above. No obvious cardiovascular symptoms. Vertigo? Monitor closely. Moses Lopez Potential medication side effects were discussed with the patient; let me know if any occur.   Return for yearly Annual Wellness Visits

## 2020-08-27 ENCOUNTER — HOSPITAL ENCOUNTER (OUTPATIENT)
Dept: LAB | Age: 85
Discharge: HOME | End: 2020-08-27
Payer: MEDICARE

## 2020-08-27 DIAGNOSIS — Z01.812 PRE-PROCEDURAL LABORATORY EXAMINATIONS: ICD-10-CM

## 2020-08-27 PROCEDURE — 87635 SARS-COV-2 COVID-19 AMP PRB: CPT

## 2020-08-28 LAB — SARS-COV-2, COV2NT: NOT DETECTED

## 2020-08-31 ENCOUNTER — TELEPHONE (OUTPATIENT)
Dept: CARDIOLOGY CLINIC | Age: 85
End: 2020-08-31

## 2020-08-31 ENCOUNTER — HOSPITAL ENCOUNTER (OUTPATIENT)
Dept: NON INVASIVE DIAGNOSTICS | Age: 85
Discharge: HOME | End: 2020-08-31
Attending: INTERNAL MEDICINE
Payer: MEDICARE

## 2020-08-31 VITALS
WEIGHT: 147.8 LBS | SYSTOLIC BLOOD PRESSURE: 160 MMHG | BODY MASS INDEX: 21.89 KG/M2 | HEIGHT: 69 IN | DIASTOLIC BLOOD PRESSURE: 78 MMHG

## 2020-08-31 DIAGNOSIS — I48.91 ATRIAL FIBRILLATION, UNSPECIFIED TYPE (HCC): ICD-10-CM

## 2020-08-31 DIAGNOSIS — R07.89 CHEST PRESSURE: ICD-10-CM

## 2020-08-31 DIAGNOSIS — R94.39 ABNORMAL STRESS TEST: ICD-10-CM

## 2020-08-31 DIAGNOSIS — R07.9 CHEST PAIN, UNSPECIFIED TYPE: Primary | ICD-10-CM

## 2020-08-31 DIAGNOSIS — E11.40 TYPE 2 DIABETES MELLITUS WITH DIABETIC NEUROPATHY, WITHOUT LONG-TERM CURRENT USE OF INSULIN (HCC): ICD-10-CM

## 2020-08-31 DIAGNOSIS — E11.21 TYPE 2 DIABETES WITH NEPHROPATHY (HCC): ICD-10-CM

## 2020-08-31 DIAGNOSIS — I10 BENIGN ESSENTIAL HTN: ICD-10-CM

## 2020-08-31 DIAGNOSIS — E78.00 HYPERCHOLESTEROLEMIA: ICD-10-CM

## 2020-08-31 PROCEDURE — 93017 CV STRESS TEST TRACING ONLY: CPT

## 2020-08-31 PROCEDURE — 99204 OFFICE O/P NEW MOD 45 MIN: CPT | Performed by: SPECIALIST

## 2020-08-31 NOTE — CONSULTS
Héctor Beard MD [facility]  Office [phone]  Mobile [phone]    Date of  Admission: 8/31/2020  8:05 AM  PCP- Cathi Khan MD    Aleyda Agarwal is a 80 y.o. male referred for abnormal stress test. Consult requested by Cathi Khan MD    Assessment  · Chronic exertional anginal sx, abnormal ETT  · Exercise induced AF  · HTN  · T2DM        Discussion/Recommendations:  Despite advanced age, his general health and functional status is quite good. I favor an aggressive approach to diagnostic/therapeutics. He agrees. Will arrange for echo and cath near future  Continue aspirin  Right radial approach  ASA 2  Airway 2  No previous dye allergy    Subjective:  No prior cardiac hx. 2-3 month hx of exertional chest tightness usually with his 2 mile walks. Sx relieved with rest. No radiation. No sx with ADLs. Some progression in recent weeks. ETT today - 2 1/2 min, + cp, + GONZALEZ, exercise induced AF, 1 mm lateral ST depression    RFs - HTN, DM, dyslipidemia - all Rx  Lab Results   Component Value Date/Time    Hemoglobin A1c 7.4 (H) 08/24/2020 10:21 PM    Hemoglobin A1c (POC) 6.9 08/06/2019 08:20 AM     Lab Results   Component Value Date/Time    Cholesterol, total 143 08/24/2020 10:21 PM    HDL Cholesterol 71 08/24/2020 10:21 PM    LDL, calculated 54.8 08/24/2020 10:21 PM    VLDL, calculated 17.2 08/24/2020 10:21 PM    Triglyceride 86 08/24/2020 10:21 PM    CHOL/HDL Ratio 2.0 08/24/2020 10:21 PM     Lives alone  nonsmoker      Past Medical History:   Diagnosis Date    Benign essential HTN     Bladder [facility] 2013?    s/p BCGx2, resection. low grade. in [facility]. now Dr. Marvin Gray.    cystoscopy 6/2020 clear    Closed left arm fracture     GERD (gastroesophageal reflux disease)     with hiatal hernia    Glaucoma     Dr. Nichol Wood    Hemorrhoids     Hypercholesterolemia     Microalbuminuria due to type 2 diabetes mellitus [facility])     Prostate cancer ([facility] 2000    Dr. Juan Carlos Raza. s/p prostatectomy 1/2000 in [facility]. PSA \"0.4\" since then    Rosacea     Trigeminal neuralgia of right side of face     Type 2 diabetes mellitus with microalbuminuria (HCC)     Vertigo       Past Surgical History:   Procedure Laterality Date    HX COLONOSCOPY  2005? reports 2 colonoscopies    HX OTHER SURGICAL  01/2020    bladder biopsy    HX PROSTATECTOMY  01/2000     Current Outpatient Medications on File Prior to Encounter   Medication Sig Dispense Refill    levoFLOXacin (LEVAQUIN) 500 mg tablet Take 1 Tab by mouth daily for 10 days. 10 Tab 0    gabapentin (NEURONTIN) 100 mg capsule Take 2 Caps by mouth nightly. Max Daily Amount: 200 mg. Decreased dose 8/24/20 180 Cap 0    atorvastatin (LIPITOR) 40 mg tablet TAKE 1 TABLET BY MOUTH EVERY DAY IN THE EVENING 90 Tab 1    Omeprazole delayed release (PRILOSEC D/R) 20 mg tablet TAKE 1 TABLET BY MOUTH EVERY DAY 84 Tab 1    metFORMIN ER (GLUCOPHAGE XR) 500 mg tablet TAKE 2 TABLETS BY MOUTH EVERY DAY WITH DINNER 180 Tab 1    olmesartan (BENICAR) 20 mg tablet Take 0.5 Tabs by mouth daily. Decreased dose 5/7/19 45 Tab 1    dorzolamide-timolol (COSOPT) 22.3-6.8 mg/mL ophthalmic solution APPLY 1 DROP(S) IN BOTH EYES 2 TIMES A DAY  2    latanoprost (XALATAN) 0.005 % ophthalmic solution APPLY 1 DROP(S) IN LEFT EYE AT BEDTIME  1    cholecalciferol (VITAMIN D3) 1,000 unit tablet Take  by mouth daily.  cyanocobalamin (VITAMIN B-12) 500 mcg tablet Take 500 mcg by mouth daily.  aspirin delayed-release (ASPIR-81) 81 mg tablet Take 1 Tab by mouth daily. 30 Tab 11    metroNIDAZOLE (METROCREAM) 0.75 % topical cream Apply  to affected area two (2) times a day. Use a thin layer to affected areas after washing 45 g 5     No current facility-administered medications on file prior to encounter.       Allergies   Allergen Reactions    Lisinopril Cough             Review of Symptoms:  Constitutional: negative for fevers, chills, anorexia and weight loss  Respiratory: negative for cough, sputum or hemoptysis  Gastrointestinal: negative for dysphagia, odynophagia, melena and abdominal pain  Musculoskeletal:negative  Neurological: negative  Other systems reviewed and negative except as above. Physical Exam    Visit Vitals  /78   Ht 5' 9\" (1.753 m)   Wt 147 lb 12.8 oz (67 kg)   BMI 21.83 kg/m²     Visit Vitals  /78   Ht 5' 9\" (1.753 m)   Wt 147 lb 12.8 oz (67 kg)   BMI 21.83 kg/m²     General Appearance:  Well developed, well nourished,alert and oriented x 3, and individual in no acute distress. Ears/Nose/Mouth/Throat:   Hearing grossly normal.         Neck: Supple. JVP nl. Carotids nl   Chest:   Lungs clear to auscultation bilaterally. Cardiovascular:  Regular rate and rhythm, S1, S2 normal, no murmur. Abdomen:   Soft, non-tender, bowel sounds are active. Extremities: No edema bilaterally. Skin: Warm and dry.    Vascular - radial pulses 2+ L=R, pedal pulses 2+               Cardiographics    Telemetry: normal sinus rhythm  ECG: normal EKG, normal sinus rhythm    Echocardiogram: Not done    Recent Results (from the past 12 hour(s))   EXERCISE CARDIAC STRESS TEST    Collection Time: 08/31/20  8:27 AM   Result Value Ref Range    Target  bpm

## 2020-08-31 NOTE — TELEPHONE ENCOUNTER
----- Message from Christophe Gaxiola MD sent at 8/31/2020  9:43 AM EDT -----  Regarding: echo and cath  Carlos best    Saw this young man (80) in the stress lab in hospital - abnormal stress test and chest pain. Zina    Please schedule echo asap - either in hospital or office    Iveth    Please schedule cath with me next week - LHC, right radial      Thanks!

## 2020-09-01 ENCOUNTER — ANCILLARY PROCEDURE (OUTPATIENT)
Dept: CARDIOLOGY CLINIC | Age: 85
End: 2020-09-01
Payer: MEDICARE

## 2020-09-01 ENCOUNTER — TELEPHONE (OUTPATIENT)
Dept: CARDIOLOGY CLINIC | Age: 85
End: 2020-09-01

## 2020-09-01 VITALS
HEIGHT: 69 IN | DIASTOLIC BLOOD PRESSURE: 70 MMHG | SYSTOLIC BLOOD PRESSURE: 146 MMHG | BODY MASS INDEX: 21.77 KG/M2 | WEIGHT: 147 LBS

## 2020-09-01 DIAGNOSIS — R94.39 ABNORMAL STRESS TEST: ICD-10-CM

## 2020-09-01 DIAGNOSIS — R07.9 CHEST PAIN, UNSPECIFIED TYPE: ICD-10-CM

## 2020-09-01 LAB
ECHO AO ASC DIAM: 3.21 CM
ECHO AO ROOT DIAM: 3.5 CM
ECHO AV AREA PEAK VELOCITY: 2.42 CM2
ECHO AV AREA VTI: 2.41 CM2
ECHO AV AREA/BSA PEAK VELOCITY: 1.3 CM2/M2
ECHO AV AREA/BSA VTI: 1.3 CM2/M2
ECHO AV MEAN GRADIENT: 2.87 MMHG
ECHO AV PEAK GRADIENT: 6 MMHG
ECHO AV PEAK VELOCITY: 122.49 CM/S
ECHO AV VTI: 26.66 CM
ECHO LA AREA 4C: 21.25 CM2
ECHO LA MAJOR AXIS: 4.24 CM
ECHO LA MINOR AXIS: 2.34 CM
ECHO LA VOL 2C: 86.24 ML (ref 18–58)
ECHO LA VOL 4C: 69.66 ML (ref 18–58)
ECHO LA VOL BP: 87.26 ML (ref 18–58)
ECHO LA VOL/BSA BIPLANE: 48.15 ML/M2 (ref 16–28)
ECHO LA VOLUME INDEX A2C: 47.59 ML/M2 (ref 16–28)
ECHO LA VOLUME INDEX A4C: 38.44 ML/M2 (ref 16–28)
ECHO LV E' LATERAL VELOCITY: 7.59 CM/S
ECHO LV E' SEPTAL VELOCITY: 4.49 CM/S
ECHO LV EDV A2C: 132.87 ML
ECHO LV EDV A4C: 101.44 ML
ECHO LV EDV BP: 116.33 ML (ref 67–155)
ECHO LV EDV INDEX A4C: 56 ML/M2
ECHO LV EDV INDEX BP: 64.2 ML/M2
ECHO LV EDV NDEX A2C: 73.3 ML/M2
ECHO LV EJECTION FRACTION A2C: 52 PERCENT
ECHO LV EJECTION FRACTION A4C: 46 PERCENT
ECHO LV EJECTION FRACTION BIPLANE: 48.6 PERCENT (ref 55–100)
ECHO LV ESV A2C: 64.39 ML
ECHO LV ESV A4C: 54.59 ML
ECHO LV ESV BP: 59.82 ML (ref 22–58)
ECHO LV ESV INDEX A2C: 35.5 ML/M2
ECHO LV ESV INDEX A4C: 30.1 ML/M2
ECHO LV ESV INDEX BP: 33 ML/M2
ECHO LV INTERNAL DIMENSION DIASTOLIC: 4.86 CM (ref 4.2–5.9)
ECHO LV INTERNAL DIMENSION SYSTOLIC: 3.62 CM
ECHO LV IVSD: 0.81 CM (ref 0.6–1)
ECHO LV MASS 2D: 129.4 G (ref 88–224)
ECHO LV MASS INDEX 2D: 71.4 G/M2 (ref 49–115)
ECHO LV POSTERIOR WALL DIASTOLIC: 0.79 CM (ref 0.6–1)
ECHO LVOT DIAM: 1.99 CM
ECHO LVOT PEAK GRADIENT: 3.66 MMHG
ECHO LVOT PEAK VELOCITY: 95.66 CM/S
ECHO LVOT SV: 64.2 ML
ECHO LVOT VTI: 20.71 CM
ECHO MV A VELOCITY: 68.51 CM/S
ECHO MV E DECELERATION TIME (DT): 0.29 S
ECHO MV E VELOCITY: 62.69 CM/S
ECHO MV E/A RATIO: 0.92
ECHO MV E/E' LATERAL: 8.26
ECHO MV E/E' RATIO (AVERAGED): 11.11
ECHO MV E/E' SEPTAL: 13.96
ECHO MV MAX VELOCITY: 65.13 CM/S
ECHO MV MEAN GRADIENT: 0.64 MMHG
ECHO MV PEAK GRADIENT: 1.7 MMHG
ECHO MV PRESSURE HALF TIME (PHT): 90 S
ECHO MV VTI: 23.74 CM
ECHO RA AREA 4C: 14.68 CM2
ECHO RV INTERNAL DIMENSION: 3.92 CM
ECHO RV TAPSE: 2.24 CM (ref 1.5–2)
ECHO TV REGURGITANT MAX VELOCITY: 279.32 CM/S
ECHO TV REGURGITANT PEAK GRADIENT: 31.21 MMHG
STRESS ANGINA INDEX: 1
STRESS BASELINE DIAS BP: 78 MMHG
STRESS BASELINE HR: 64 BPM
STRESS BASELINE SYS BP: 160 MMHG
STRESS ESTIMATED WORKLOAD: 4.6 METS
STRESS EXERCISE DUR MIN: NORMAL
STRESS PEAK DIAS BP: 80 MMHG
STRESS PEAK SYS BP: 200 MMHG
STRESS PERCENT HR ACHIEVED: 109 %
STRESS POST PEAK HR: 142 BPM
STRESS RATE PRESSURE PRODUCT: NORMAL BPM*MMHG
STRESS SR DUKE TREADMILL SCORE: -9
STRESS ST DEPRESSION: 1 MM
STRESS TARGET HR: 130 BPM

## 2020-09-01 PROCEDURE — 93306 TTE W/DOPPLER COMPLETE: CPT | Performed by: SPECIALIST

## 2020-09-01 NOTE — TELEPHONE ENCOUNTER
[facility] scheduled for 9/8 at [address] with pt concerning cardiac cath procedure. (Pt IDx2). Instructions given to pt per Tasha. Pt NPO after midnight; hold meformin and take all other meds with sip of water in AM; have someone available to drive pt to and from procedure; pack a bag in case an overnight stay is warranted. cath scheduled for 9:15am on 9/8/20. Pt advised to arrive 2hrs prior to procedure for prep. Labs in [address] [place] [address]. Pt expressed understanding. Opportunities for questions, clarifications, and concerns provided.     ECHO completed

## 2020-09-03 DIAGNOSIS — R94.39 ABNORMAL STRESS TEST: Primary | ICD-10-CM

## 2020-09-03 DIAGNOSIS — R07.9 CHEST PAIN, UNSPECIFIED TYPE: ICD-10-CM

## 2020-09-03 RX ORDER — SODIUM CHLORIDE 0.9 % (FLUSH) 0.9 %
5-40 SYRINGE (ML) INJECTION AS NEEDED
Status: CANCELLED | OUTPATIENT
Start: 2020-09-08

## 2020-09-03 RX ORDER — SODIUM CHLORIDE 9 MG/ML
50 INJECTION, SOLUTION INTRAVENOUS CONTINUOUS
Status: CANCELLED | OUTPATIENT
Start: 2020-09-08

## 2020-09-03 RX ORDER — SODIUM CHLORIDE 0.9 % (FLUSH) 0.9 %
5-40 SYRINGE (ML) INJECTION EVERY 8 HOURS
Status: CANCELLED | OUTPATIENT
Start: 2020-09-08

## 2020-09-03 RX ORDER — DIPHENHYDRAMINE HYDROCHLORIDE 50 MG/ML
25 INJECTION, SOLUTION INTRAMUSCULAR; INTRAVENOUS
Status: CANCELLED | OUTPATIENT
Start: 2020-09-08 | End: 2020-09-09

## 2020-09-04 ENCOUNTER — TELEPHONE (OUTPATIENT)
Dept: CARDIOLOGY CLINIC | Age: 85
End: 2020-09-04

## 2020-09-04 NOTE — PROGRESS NOTES
3:02 PM    Spoke with patient to verify arrival time, to remain NPO after midnight, and  for transportation home. Patient verbalized understanding.

## 2020-09-08 ENCOUNTER — TELEPHONE (OUTPATIENT)
Dept: CARDIOLOGY CLINIC | Age: 85
End: 2020-09-08

## 2020-09-08 ENCOUNTER — HOSPITAL ENCOUNTER (OUTPATIENT)
Age: 85
Setting detail: OUTPATIENT SURGERY
Discharge: HOME | End: 2020-09-08
Attending: SPECIALIST | Admitting: SPECIALIST
Payer: MEDICARE

## 2020-09-08 VITALS
WEIGHT: 145 LBS | SYSTOLIC BLOOD PRESSURE: 139 MMHG | RESPIRATION RATE: 12 BRPM | HEART RATE: 70 BPM | HEIGHT: 69 IN | OXYGEN SATURATION: 98 % | BODY MASS INDEX: 21.48 KG/M2 | DIASTOLIC BLOOD PRESSURE: 57 MMHG

## 2020-09-08 DIAGNOSIS — R94.39 ABNORMAL STRESS ECG: ICD-10-CM

## 2020-09-08 DIAGNOSIS — R94.39 ABNORMAL STRESS TEST: ICD-10-CM

## 2020-09-08 DIAGNOSIS — R07.9 CHEST PAIN, UNSPECIFIED TYPE: ICD-10-CM

## 2020-09-08 LAB
ACT BLD: 208 SECS (ref 79–138)
GLUCOSE BLD STRIP.AUTO-MCNC: 142 MG/DL (ref 65–100)
GLUCOSE BLD STRIP.AUTO-MCNC: 166 MG/DL (ref 65–100)
SERVICE CMNT-IMP: ABNORMAL
SERVICE CMNT-IMP: ABNORMAL

## 2020-09-08 PROCEDURE — C1894 INTRO/SHEATH, NON-LASER: HCPCS | Performed by: SPECIALIST

## 2020-09-08 PROCEDURE — 77030018729 HC ELECTRD DEFIB PAD CARD -B: Performed by: SPECIALIST

## 2020-09-08 PROCEDURE — 74011000250 HC RX REV CODE- 250: Performed by: SPECIALIST

## 2020-09-08 PROCEDURE — 77030008543 HC TBNG MON PRSS MRTM -A: Performed by: SPECIALIST

## 2020-09-08 PROCEDURE — C1769 GUIDE WIRE: HCPCS | Performed by: SPECIALIST

## 2020-09-08 PROCEDURE — 74011250636 HC RX REV CODE- 250/636: Performed by: SPECIALIST

## 2020-09-08 PROCEDURE — C1725 CATH, TRANSLUMIN NON-LASER: HCPCS | Performed by: SPECIALIST

## 2020-09-08 PROCEDURE — 74011250636 HC RX REV CODE- 250/636

## 2020-09-08 PROCEDURE — 77030029065 HC DRSG HEMO QCLOT ZMED -B

## 2020-09-08 PROCEDURE — 85347 COAGULATION TIME ACTIVATED: CPT

## 2020-09-08 PROCEDURE — 92928 PRQ TCAT PLMT NTRAC ST 1 LES: CPT | Performed by: STUDENT IN AN ORGANIZED HEALTH CARE EDUCATION/TRAINING PROGRAM

## 2020-09-08 PROCEDURE — 93571 IV DOP VEL&/PRESS C FLO 1ST: CPT | Performed by: STUDENT IN AN ORGANIZED HEALTH CARE EDUCATION/TRAINING PROGRAM

## 2020-09-08 PROCEDURE — 92920 PRQ TRLUML C ANGIOP 1ART&/BR: CPT | Performed by: STUDENT IN AN ORGANIZED HEALTH CARE EDUCATION/TRAINING PROGRAM

## 2020-09-08 PROCEDURE — 93458 L HRT ARTERY/VENTRICLE ANGIO: CPT | Performed by: SPECIALIST

## 2020-09-08 PROCEDURE — C1887 CATHETER, GUIDING: HCPCS | Performed by: SPECIALIST

## 2020-09-08 PROCEDURE — 99152 MOD SED SAME PHYS/QHP 5/>YRS: CPT | Performed by: SPECIALIST

## 2020-09-08 PROCEDURE — 77030013715 HC INFL SYS MRTM -B: Performed by: SPECIALIST

## 2020-09-08 PROCEDURE — 77030004532 HC CATH ANGI DX IMP BSC -A: Performed by: SPECIALIST

## 2020-09-08 PROCEDURE — 93458 L HRT ARTERY/VENTRICLE ANGIO: CPT | Performed by: STUDENT IN AN ORGANIZED HEALTH CARE EDUCATION/TRAINING PROGRAM

## 2020-09-08 PROCEDURE — 99153 MOD SED SAME PHYS/QHP EA: CPT | Performed by: SPECIALIST

## 2020-09-08 PROCEDURE — 77030012468 HC VLV BLEEDBK CNTRL ABBT -B: Performed by: SPECIALIST

## 2020-09-08 PROCEDURE — 82962 GLUCOSE BLOOD TEST: CPT

## 2020-09-08 PROCEDURE — 74011000636 HC RX REV CODE- 636: Performed by: SPECIALIST

## 2020-09-08 PROCEDURE — 77030019569 HC BND COMPR RAD TERU -B: Performed by: SPECIALIST

## 2020-09-08 RX ORDER — SODIUM CHLORIDE 0.9 % (FLUSH) 0.9 %
5-40 SYRINGE (ML) INJECTION AS NEEDED
Status: DISCONTINUED | OUTPATIENT
Start: 2020-09-08 | End: 2020-09-08 | Stop reason: HOSPADM

## 2020-09-08 RX ORDER — SODIUM CHLORIDE 0.9 % (FLUSH) 0.9 %
5-40 SYRINGE (ML) INJECTION EVERY 8 HOURS
Status: DISCONTINUED | OUTPATIENT
Start: 2020-09-08 | End: 2020-09-08 | Stop reason: HOSPADM

## 2020-09-08 RX ORDER — HEPARIN SODIUM 200 [USP'U]/100ML
INJECTION, SOLUTION INTRAVENOUS
Status: COMPLETED | OUTPATIENT
Start: 2020-09-08 | End: 2020-09-08

## 2020-09-08 RX ORDER — FENTANYL CITRATE 50 UG/ML
INJECTION, SOLUTION INTRAMUSCULAR; INTRAVENOUS AS NEEDED
Status: DISCONTINUED | OUTPATIENT
Start: 2020-09-08 | End: 2020-09-08 | Stop reason: HOSPADM

## 2020-09-08 RX ORDER — DIPHENHYDRAMINE HYDROCHLORIDE 50 MG/ML
INJECTION, SOLUTION INTRAMUSCULAR; INTRAVENOUS
Status: COMPLETED
Start: 2020-09-08 | End: 2020-09-08

## 2020-09-08 RX ORDER — SODIUM CHLORIDE 9 MG/ML
50 INJECTION, SOLUTION INTRAVENOUS CONTINUOUS
Status: DISCONTINUED | OUTPATIENT
Start: 2020-09-08 | End: 2020-09-08 | Stop reason: HOSPADM

## 2020-09-08 RX ORDER — MIDAZOLAM HYDROCHLORIDE 1 MG/ML
INJECTION, SOLUTION INTRAMUSCULAR; INTRAVENOUS AS NEEDED
Status: DISCONTINUED | OUTPATIENT
Start: 2020-09-08 | End: 2020-09-08 | Stop reason: HOSPADM

## 2020-09-08 RX ORDER — HEPARIN SODIUM 1000 [USP'U]/ML
INJECTION, SOLUTION INTRAVENOUS; SUBCUTANEOUS AS NEEDED
Status: DISCONTINUED | OUTPATIENT
Start: 2020-09-08 | End: 2020-09-08 | Stop reason: HOSPADM

## 2020-09-08 RX ORDER — ISOSORBIDE MONONITRATE 30 MG/1
30 TABLET, EXTENDED RELEASE ORAL DAILY
Qty: 30 TAB | Refills: 3 | Status: SHIPPED | OUTPATIENT
Start: 2020-09-08 | End: 2020-09-25

## 2020-09-08 RX ORDER — DIPHENHYDRAMINE HYDROCHLORIDE 50 MG/ML
25 INJECTION, SOLUTION INTRAMUSCULAR; INTRAVENOUS
Status: COMPLETED | OUTPATIENT
Start: 2020-09-08 | End: 2020-09-08

## 2020-09-08 RX ORDER — CLOPIDOGREL BISULFATE 75 MG/1
75 TABLET ORAL DAILY
Qty: 30 TAB | Refills: 3 | Status: SHIPPED | OUTPATIENT
Start: 2020-09-08 | End: 2020-12-08 | Stop reason: SDUPTHER

## 2020-09-08 RX ORDER — LIDOCAINE HYDROCHLORIDE 10 MG/ML
INJECTION INFILTRATION; PERINEURAL AS NEEDED
Status: DISCONTINUED | OUTPATIENT
Start: 2020-09-08 | End: 2020-09-08 | Stop reason: HOSPADM

## 2020-09-08 RX ADMIN — DIPHENHYDRAMINE HYDROCHLORIDE 25 MG: 50 INJECTION, SOLUTION INTRAMUSCULAR; INTRAVENOUS at 08:42

## 2020-09-08 RX ADMIN — SODIUM CHLORIDE 50 ML/HR: 900 INJECTION, SOLUTION INTRAVENOUS at 08:40

## 2020-09-08 NOTE — PROGRESS NOTES
Patient arrived. ID and allergies verified verbally with patient. Pt voices understanding of procedure to be performed. Consent obtained. Pt prepped for procedure. 9:23 AM  TRANSFER - OUT REPORT:    Verbal report given to TOM REDDY(name) on Jaclyn Escalanteal  being transferred to Cath lab(unit) for ordered procedure       Report consisted of patients Situation, Background, Assessment and   Recommendations(SBAR). Information from the following report(s) SBAR was reviewed with the receiving nurse. Lines:   Peripheral IV 09/08/20 Left Forearm (Active)   Site Assessment Clean, dry, & intact 09/08/20 0837   Phlebitis Assessment 0 09/08/20 0837   Infiltration Assessment 0 09/08/20 0837   Dressing Status Clean, dry, & intact 09/08/20 0837   Dressing Type Transparent 09/08/20 0837        Opportunity for questions and clarification was provided. Patient transported with:   Registered Nurse    10:59 AM  TRANSFER - IN REPORT:    Verbal report received from [facility]) on [facility]  being received from CATH LAB(unit) for routine post - op      Report consisted of patients Situation, Background, Assessment and   Recommendations(SBAR). Information from the following report(s) Procedure Summary was reviewed with the receiving nurse. Opportunity for questions and clarification was provided. Assessment completed upon patients arrival to unit and care assumed. 12:42 PM  Patient resting without any complaints. 12:43 PM  2 ml air released from TR Band. No bleeding or hematoma noted. Radial and Ulnar pulse on R wrist palpable. Pt tolerated well. Will continue to monitor. 12:51 PM  3 ml air released from TR Band. No bleeding or hematoma noted. Radial and Ulnar pulse on RT wrist palpable. Pt tolerated well. Will continue to monitor. 1:00 PM  3 ml air released from TR Band. No bleeding or hematoma noted. Radial and Ulnar pulse on RT wrist palpable. Pt tolerated well.  Will continue to monitor. 1:05 PM  3 ml air released from TR Band. No bleeding or hematoma noted. Radial and Ulnar pulse on RT wrist palpable. Pt tolerated well. Will continue to monitor. 1:12 PM  Air release completed. TR Band removed from RT wrist. No bleeding or  Hematoma. Dressing applied. Wrist immobilizer in place. Radial and ulnar pulse remain palpable on affected extremity. Pt tolerated well. Instructions given to pt regarding movement and activity restrictions. Pt voiced understanding.        1:21 PM  Discharge instructions reviewed with patient and family. Voiced understanding. Patient given copy of discharge instructions to take home. [facility] MEDICATION AND   SIDE EFFECT GUIDE    [facility] Insurance MEDICATION AND SIDE EFFECT GUIDE was provided to the PATIENT AND CARE PROVIDER. Information provided includes instruction about drug purpose and common side effects for the following medications:   · Plavix  · Isosorbide      Patient eating lunch. Tolerating well. 2:35 PM  Pt discharged via wheelchair with family. Personal belongings with patient upon discharge.

## 2020-09-08 NOTE — Clinical Note
TRANSFER - OUT REPORT:     Verbal report given to: BARRY Wang. Report consisted of patient's Situation, Background, Assessment and   Recommendations(SBAR). Opportunity for questions and clarification was provided. Patient transported with a Registered Nurse and [address] [place] [address] / [facility]. Patient transported to: Tita Lamar.

## 2020-09-08 NOTE — PROCEDURES
Cardiac Catheterization    Date of Procedure: 9/8/2020   Preoperative Diagnosis: exertional chest pain, abnormal ETT  Postoperative Diagnosis: see below    Procedure: Left heart cath, coronary angiography via right radial artery  Cardiologist: Gregor Kline MD  Anesthesia: local + IV sedation  Estimated Blood Loss: Minimal  Specimens removed: None  Findings: EDP 9, no AV gradient, LM nl, LAD ostial/proximal 50%, LCX mid bifurcation lesion involving OM1 90%, RCA mid 60%. See full cath note.   Complications: none    Severe 1v CAD  Normal LVEDP  EF 49% by echo    PCI LCX/OM1 to follow by Dr Khloe Mccauley

## 2020-09-08 NOTE — TELEPHONE ENCOUNTER
[facility] pharmacy would like to speak with Dr. Jesús Godinez or Noe Faulkner regarding a possible drug interaction regarding his plavix. Raj marie.     Phone: [phone]

## 2020-09-08 NOTE — Clinical Note
TRANSFER - IN REPORT:     Verbal report received from: BARRY Silva. Report consisted of patient's Situation, Background, Assessment and   Recommendations(SBAR). Opportunity for questions and clarification was provided. Assessment completed upon patient's arrival to unit and care assumed. Patient transported with a Registered Nurse.

## 2020-09-08 NOTE — DISCHARGE INSTRUCTIONS
Transradial Cardiac Catheterization/Angiography Discharge Instructions    It is normal to feel tired the first couple days. Take it easy and follow the physicians instructions. Wear wrist splint for first 24 hours to keep the wrist stable. No repetitive flexing of wrist.       CHECK THE CATHETER INSERTION SITE DAILY:  Remove the wrist dressing 24 hours after the procedure. You may shower 24 hours after the procedure. Wash with soap and water and pat dry. Gentle cleaning of the site with soap and water is sufficient, cover with a dry clean dressing or bandage. Do not apply creams or powders to the area. No soaking the wrist for 3 days. Leave the puncture site open to air after 24 hours post-procedure. CALL THE PHYSICIANS:   If you have signs of infection, such as:            - A fever  If the site becomes red, swollen or feels warm to the touch  If there is drainage or if there is unusual pain at the radial site. MONITOR FOR BLEEDING:    If there is any minor oozing, you may apply a band-aid and remove after 12 hours. If the bleeding continues or if there is a lot of bleeding hold pressure with the middle finger against the puncture site and the thumb against the back of the wrist,call 911 to be transported to the hospital.  DO NOT DRIVE YOURSELF, [employer]. ACTIVITY:   For the first 24 hours do not manipulate the wrist.  No lifting, pushing or pulling over 3-5 pounds with the affected wrist for 7 daysand no straining the insertion site. Do not life grocery bags or the garbage can, do not run the vacuum  or  for 7 days. Start with short walks as in the hospital and gradually increase as tolerated each day. It is recommended to walk 30 minutes 5-7 days per week. Follow your physicians instructions on activity. Avoid walking outside in extremes of heat or cold. Walk inside when it is cold and windy or hot and humid.      Things to keep in mind:  No driving for at least 24 hours, or as designated by your physician. Limit the number of times you go up and down the stairs  Take rests and pace yourself with activity. Be careful and do not strain with bowel movements. Diet:  Drink plenty of fluids for the next 24 - 48 hours to help your body flush out the dye. If you have kidney, heart, or liver disease and have to limit fluids, talk with your doctor before you increase the amount of fluids you drink. Keep eating a heart-healthy, low-fat diet that has lots of fruits, vegetables, and whole grains.

## 2020-09-08 NOTE — H&P
Date of Surgery Update:  Janelle Lennox was seen and examined. History and physical has been reviewed. The patient has been examined. There have been no significant clinical changes since the completion of the originally dated History and Physical.    Signed By: Diego Ross MD     September 8, 2020 8:02 AM           09/01/20   ECHO ADULT COMPLETE 09/01/2020 9/1/2020    Narrative · LV: Calculated LVEF is 49%. Biplane method used to measure ejection   fraction. Normal cavity size and wall thickness. Low normal systolic   function. Mild (grade 1) left ventricular diastolic dysfunction. · LA: Moderately dilated left atrium. · MV: Mitral valve thickening. Mild mitral valve regurgitation is present. · TV: Mild tricuspid valve regurgitation is present. · PA: Pulmonary arterial systolic pressure is 34 mmHg.         Signed by: Delta Harris MD       Lab Results   Component Value Date/Time    WBC 6.4 08/24/2020 10:21 PM    HGB 12.4 08/24/2020 10:21 PM    HCT 38.1 08/24/2020 10:21 PM    PLATELET 395 03/84/1892 10:21 PM    MCV 96.0 08/24/2020 10:21 PM     Lab Results   Component Value Date/Time    Sodium 142 08/24/2020 10:21 PM    Potassium 4.3 08/24/2020 10:21 PM    Chloride 109 (H) 08/24/2020 10:21 PM    CO2 27 08/24/2020 10:21 PM    Anion gap 6 08/24/2020 10:21 PM    Glucose 139 (H) 08/24/2020 10:21 PM    BUN 18 08/24/2020 10:21 PM    Creatinine 1.19 08/24/2020 10:21 PM    BUN/Creatinine ratio 15 08/24/2020 10:21 PM    GFR est AA >60 08/24/2020 10:21 PM    GFR est non-AA 57 (L) 08/24/2020 10:21 PM    Calcium 9.6 08/24/2020 10:21 PM       Expand All Collapse All      []Hide copied text    []Hover for details                                                   Christine Henderson MD [facility] [address]  Office [phone]  Mobile [phone]     Date of  Admission: 8/31/2020  8:05 AM  PCP- Franca Silva MD     Janelle Lennox is a 80 y.o. male referred for abnormal stress test. Consult requested by [facility], Emmie Du MD     Assessment  · Chronic exertional anginal sx, abnormal ETT  · Exercise induced AF  · HTN  · T2DM        Discussion/Recommendations:  Despite advanced age, his general health and functional status is quite good. I favor an aggressive approach to diagnostic/therapeutics. He agrees.     Will arrange for echo and cath near future  Continue aspirin  Right radial approach  ASA 2  Airway 2  No previous dye allergy     Subjective:  No prior cardiac hx. 2-3 month hx of exertional chest tightness usually with his 2 mile walks. Sx relieved with rest. No radiation. No sx with ADLs. Some progression in recent weeks.      ETT today - 2 1/2 min, + cp, + GONZALEZ, exercise induced AF, 1 mm lateral ST depression     RFs - HTN, DM, dyslipidemia - all Rx        Lab Results   Component Value Date/Time     Hemoglobin A1c 7.4 (H) 08/24/2020 10:21 PM     Hemoglobin A1c (POC) 6.9 08/06/2019 08:20 AM     Lab Results   Component Value Date/Time     Cholesterol, total 143 08/24/2020 10:21 PM     HDL Cholesterol 71 08/24/2020 10:21 PM     LDL, calculated 54.8 08/24/2020 10:21 PM     VLDL, calculated 17.2 08/24/2020 10:21 PM     Triglyceride 86 08/24/2020 10:21 PM     CHOL/HDL Ratio 2.0 08/24/2020 10:21 PM     Lives alone  nonsmoker             Past Medical History:   Diagnosis Date    Benign essential HTN      Bladder cancer ([facility] 2013?     s/p BCGx2, resection. low grade. in [facility]. now Dr. Jocelyn Rubi. cystoscopy 6/2020 clear    Closed left arm fracture      GERD (gastroesophageal reflux disease)       with hiatal hernia    Glaucoma       Dr. Casey Current    Hemorrhoids      Hypercholesterolemia      [facility] due to type 2 [facility] mellitus ([facility]      Prostate cancer ([facility] 2000     Dr. Banks Master. s/p prostatectomy 1/2000 in [facility].   PSA \"0.4\" since then    Rosacea      Trigeminal neuralgia of right side of face      Type 2 diabetes mellitus with microalbuminuria (HCC)      Vertigo   Past Surgical History:   Procedure Laterality Date    HX COLONOSCOPY   2005?     reports 2 colonoscopies    HX OTHER SURGICAL   01/2020     bladder biopsy    HX PROSTATECTOMY   01/2000            Current Outpatient Medications on File Prior to Encounter   Medication Sig Dispense Refill    levoFLOXacin (LEVAQUIN) 500 mg tablet Take 1 Tab by mouth daily for 10 days. 10 Tab 0    gabapentin (NEURONTIN) 100 mg capsule Take 2 Caps by mouth nightly. Max Daily Amount: 200 mg. Decreased dose 8/24/20 180 Cap 0    atorvastatin (LIPITOR) 40 mg tablet TAKE 1 TABLET BY MOUTH EVERY DAY IN THE EVENING 90 Tab 1    Omeprazole delayed release (PRILOSEC D/R) 20 mg tablet TAKE 1 TABLET BY MOUTH EVERY DAY 84 Tab 1    metFORMIN ER (GLUCOPHAGE XR) 500 mg tablet TAKE 2 TABLETS BY MOUTH EVERY DAY WITH DINNER 180 Tab 1    olmesartan (BENICAR) 20 mg tablet Take 0.5 Tabs by mouth daily. Decreased dose 5/7/19 45 Tab 1    dorzolamide-timolol (COSOPT) 22.3-6.8 mg/mL ophthalmic solution APPLY 1 DROP(S) IN BOTH EYES 2 TIMES A DAY   2    latanoprost (XALATAN) 0.005 % ophthalmic solution APPLY 1 DROP(S) IN LEFT EYE AT BEDTIME   1    cholecalciferol (VITAMIN D3) 1,000 unit tablet Take  by mouth daily.        cyanocobalamin (VITAMIN B-12) 500 mcg tablet Take 500 mcg by mouth daily.        aspirin delayed-release (ASPIR-81) 81 mg tablet Take 1 Tab by mouth daily. 30 Tab 11    metroNIDAZOLE (METROCREAM) 0.75 % topical cream Apply  to affected area two (2) times a day. Use a thin layer to affected areas after washing 45 g 5      No current facility-administered medications on file prior to encounter.            Allergies   Allergen Reactions    Lisinopril Cough                Review of Symptoms:  Constitutional: negative for fevers, chills, anorexia and weight loss  Respiratory: negative for cough, sputum or hemoptysis  Gastrointestinal: negative for dysphagia, odynophagia, melena and abdominal pain  Musculoskeletal:negative  Neurological: negative  Other systems reviewed and negative except as above.     Physical Exam     Visit Vitals  /78   Ht 5' 9\" (1.753 m)   Wt 147 lb 12.8 oz (67 kg)   BMI 21.83 kg/m²     Visit Vitals  /78   Ht 5' 9\" (1.753 m)   Wt 147 lb 12.8 oz (67 kg)   BMI 21.83 kg/m²     General Appearance:  Well developed, well nourished,alert and oriented x 3, and individual in no acute distress. Ears/Nose/Mouth/Throat:   Hearing grossly normal.          Neck: Supple. JVP nl. Carotids nl   Chest:   Lungs clear to auscultation bilaterally. Cardiovascular:  Regular rate and rhythm, S1, S2 normal, no murmur. Abdomen:   Soft, non-tender, bowel sounds are active. Extremities: No edema bilaterally. Skin: Warm and dry.    Vascular - radial pulses 2+ L=R, pedal pulses 2+                     Cardiographics     Telemetry: normal sinus rhythm  ECG: normal EKG, normal sinus rhythm     Echocardiogram: Not done     Recent Results         Recent Results (from the past 12 hour(s))   EXERCISE CARDIAC STRESS TEST     Collection Time: 08/31/20  8:27 AM   Result Value Ref Range     Target  bpm                                       Electronically signed by Isabella Naidu MD at 08/31/20 0924   Note Details     Author  Isabella Naidu MD  File Time  08/31/20 0926    Author Type  Physician  Status  Signed    Last   Isabella Naidu, [address] [facility] #  [[id number]]  Admit Date  8/31/2020        STRESS TEST on 8/31/2020          Routing History          Detailed Report

## 2020-09-10 ENCOUNTER — TELEPHONE (OUTPATIENT)
Dept: CARDIOLOGY CLINIC | Age: 85
End: 2020-09-10

## 2020-09-10 NOTE — TELEPHONE ENCOUNTER
Spoke with patient concerning procedure - Rotational Atherectomy w/PCI of OM/Lcx at [facility] on Thursday, September 24th at 1:00 pm.     Instructions given to pt per VO Dr. Jesus Vo. Patient is to remain NPO after 0500. Instructed to hold the Metformin the day before and the day of the procedure, take the rest of your medications with a sip of water. Have someone available to drive pt to and from procedure; pack a bag in case an overnight stay is warranted. Procedure scheduled for 1:00 pm on 9/24/20. Patient advised to arrive 2 hrs prior to procedure for prep. Patient verbalized understanding. Opportunities for questions, clarifications, and concerns provided.

## 2020-09-11 NOTE — H&P (VIEW-ONLY)
Ramírez Mitchell MD [facility] [address] [facility], [address] [facility] Office [phone] Fax [phone] Cell [phone] Johnny Caballero is a 80 y.o. male follow-up from cath 9/8/20. Diagnoses Encounter Diagnoses ICD-10-CM ICD-9-CM 1. Coronary artery disease of native artery of native heart with stable angina pectoris ([address]  I25.118 414.01  
  413.9 2. Benign essential HTN  I10 401.1 3. Type 2 diabetes mellitus with diabetic neuropathy, without long-term current use of insulin (HCC)  E11.40 250.60  
  357.2 4. Hypercholesterolemia  E78.00 272.0 5. Paroxysmal atrial fibrillation (HCC)  I48.0 427.31 Assessment/Recommendations: CAD w/ severe 1 vessel disease involving LCX/OM1 bifurcation. Sxs include exertional anginal chest pain. Marginal improvement w/ addition of Imdur but has not been as active post-procedure. Scheduled for rotational atherectomy w/ Dr. Toshia Ridley 9/24. We discussed this at length. - Continue DAPT 
- Continue Imdur 30 mg/d short term - Encouraged him to walk regularly Exercise induced AF. No sxs recurrence. Moderate LAE by recent echo. Consider long-term anticoagulation but wonder if the  of this is CAD. - Consider event monitor post-PCI and/or repeat stress testing HTN, normotensive on combination therapy T2DM on metformin. Dyslipidemia. Updated lipids last month w/ LDL 55, TG 86  
- Continue Atorva 40 mg/d Low-grade bladder cancer followed by Dr. Merari Pina. Will need to discuss timing of cysto next year w/ respect to temporary interruption of Plavix. Follow-up and Dispositions · Return in about 4 weeks (around 10/12/2020). Subjective: 
 
Cath demonstrated severe single vessel CAD involving the LCX and OM1. The lesion was fairly calcified and attempts at PCI by Dr. Toshia Ridley were not successful.  Plan is to pursue rotational atherectomy at [facility] in the near future. He was started on Imdur. Caitlin Ro reports a reduced amount of dizziness since his cath last week. Patient denies any exertional chest pain, dyspnea, palpitations, syncope, orthopnea, edema or paroxysmal nocturnal dyspnea. He reports a reduced activity level since his cath, \"strolling\" rather than fast walking regularly. No bleeding issues on Plavix. Dx w/ low-grade bladder cancer. Followed by Dr. Gilma Zheng for regular check-ups every year. Lipids followed by Giselle Flores MD.  
 
Of note, his daughter lives close to him and likes to be included in his care. Cardiac Risk Factors HTN yes DM yes Smoking former, quit  Family Hx of CAD no 
 
Cardiac testing Echo 20 - EF 49%. Normal LV size and wall thickness. G1DD. Mod LAE. Mild MR. Mild TR. PASP 34 mmHg. Cath 20 -  Multivessel CAD. Culprit vessel = LCX/OM1 lesions, severe with significant calcification. Normal LVEDP. Unsuccessful PCI attempt with POBA due to calcification. Elective rotational atherectomy LCX/OM1 Past Medical History:  
Diagnosis Date  Benign essential HTN   
 Bladder cancer ([address] ?  
 s/p BCGx2, resection. low grade. in [facility]. now Dr. Ministerio Parker. cystoscopy 2020 clear  Closed left arm fracture  GERD (gastroesophageal reflux disease)   
 with hiatal hernia  Glaucoma Dr. Brooklyn Petit  Hemorrhoids  Hypercholesterolemia  Microalbuminuria due to type 2 diabetes mellitus ([address]  Prostate cancer ([address]  Dr. Ministerio Parker. s/p prostatectomy 2000 in [facility]. PSA \"0.4\" since then  Rosacea  Trigeminal neuralgia of right side of face  Type 2 diabetes mellitus with microalbuminuria ([address]  Vertigo Social History Socioeconomic History  Marital status: SINGLE Spouse name:   Number of children: 3  
 Years of education: Not on file  Highest education level: Not on file Occupational History  Occupation: Moved to Tobacco Use  Smoking status: Former Smoker Last attempt to quit: 1962 Years since quittin.7  Smokeless tobacco: Never Used Substance and Sexual Activity  Alcohol use: Yes Alcohol/week: 5.0 standard drinks Types: 5 Glasses of wine per week  Drug use: Never Social History Narrative Has 3 children, 11 grandkids Current Outpatient Medications Medication Sig Dispense Refill  isosorbide mononitrate ER (IMDUR) 30 mg tablet Take 1 Tab by mouth daily. 30 Tab 3  clopidogreL (Plavix) 75 mg tab Take 1 Tab by mouth daily. 30 Tab 3  
 gabapentin (NEURONTIN) 100 mg capsule Take 2 Caps by mouth nightly. Max Daily Amount: 200 mg. Decreased dose 20 180 Cap 0  
 atorvastatin (LIPITOR) 40 mg tablet TAKE 1 TABLET BY MOUTH EVERY DAY IN THE EVENING 90 Tab 1  
 Omeprazole delayed release (PRILOSEC D/R) 20 mg tablet TAKE 1 TABLET BY MOUTH EVERY DAY 84 Tab 1  
 metFORMIN ER (GLUCOPHAGE XR) 500 mg tablet TAKE 2 TABLETS BY MOUTH EVERY DAY WITH DINNER 180 Tab 1  
 olmesartan (BENICAR) 20 mg tablet Take 0.5 Tabs by mouth daily. Decreased dose 19 45 Tab 1  
 dorzolamide-timolol (COSOPT) 22.3-6.8 mg/mL ophthalmic solution APPLY 1 DROP(S) IN BOTH EYES 2 TIMES A DAY  2  
 latanoprost (XALATAN) 0.005 % ophthalmic solution APPLY 1 DROP(S) IN LEFT EYE AT BEDTIME  1  
 cholecalciferol (VITAMIN D3) 1,000 unit tablet Take  by mouth daily.  cyanocobalamin (VITAMIN B-12) 500 mcg tablet Take 500 mcg by mouth daily.  aspirin delayed-release (ASPIR-81) 81 mg tablet Take 1 Tab by mouth daily. 30 Tab 11  
 metroNIDAZOLE (METROCREAM) 0.75 % topical cream Apply  to affected area two (2) times a day. Use a thin layer to affected areas after washing 45 g 5 Allergies Allergen Reactions  Lisinopril Cough Review of Symptoms: 
Constitutional: Negative for fever, chills, malaise/fatigue and diaphoresis. Respiratory: Negative for cough, hemoptysis, sputum production, and wheezing. Cardiovascular: Negative for chest pain, palpitations, orthopnea, claudication, leg swelling and PND. Gastrointestinal: Negative for heartburn, nausea, vomiting, blood in stool and melena. Genitourinary: Negative for dysuria and flank pain. Musculoskeletal: Negative for joint pain and back pain. Skin: Negative for rash. Neurological: Negative for focal weakness, seizures, loss of consciousness, weakness and headaches. Endo/Heme/Allergies: Does not bruise/bleed easily. Psychiatric/Behavioral: Negative for memory loss. The patient does not have insomnia. Physical Exam 
Visit Vitals /74 (BP 1 Location: Left arm, BP Patient Position: Sitting) Pulse 60 Resp 16 Ht 5' 9\" (1.753 m) Wt 149 lb (67.6 kg) SpO2 98% BMI 22.00 kg/m² Wt Readings from Last 3 Encounters:  
09/14/20 149 lb (67.6 kg) 09/08/20 145 lb (65.8 kg) 09/01/20 147 lb (66.7 kg) General - WDWN 
HEENT: Eyes without jaundice, Hearing intact Neck - JVP normal, thyroid nl Cardiac - normal S1,S2, no murmurs, rubs or gallops. No clicks Vascular - carotids without bruits, radials, femorals and pedal pulses equal bilateral 
Lungs - clear to auscultation bilaterals, no rales ,wheezing or rhonchi Abd - soft nontender, no HSM, no abd bruits Extremities - no edema Neuro - nonfocal, normal gait Psych - mood and affect normal 
 
Labs: 
 
 
Cardiographics Written by Romy Cota, as dictated by Lina Jeffries M.D.   
 
Lina Jeffries MD

## 2020-09-11 NOTE — PROGRESS NOTES
[address]  [address]  [place], [address] [facility]    Office [phone]  Fax [phone]  Cell [phone]       Romina García is a 80 y.o. male follow-up from cath 9/8/20. Diagnoses    Encounter Diagnoses     ICD-10-CM ICD-9-CM   1. Coronary artery disease of native artery of native heart with stable angina pectoris ([address]  I25.118 414.01     413.9   2. Benign essential HTN  I10 401.1   3. Type 2 diabetes mellitus with diabetic neuropathy, without long-term current use of insulin (HCC)  E11.40 250.60     357.2   4. Hypercholesterolemia  E78.00 272.0   5. Paroxysmal atrial fibrillation (HCC)  I48.0 427.31       Assessment/Recommendations:    CAD w/ severe 1 vessel disease involving LCX/OM1 bifurcation. Sxs include exertional anginal chest pain. Marginal improvement w/ addition of Imdur but has not been as active post-procedure. Scheduled for rotational atherectomy w/ Dr. Khloe Mccauley 9/24. We discussed this at length. - Continue DAPT  - Continue Imdur 30 mg/d short term   - Encouraged him to walk regularly    Exercise induced AF. No sxs recurrence. Moderate LAE by recent echo. Consider long-term anticoagulation but wonder if the  of this is CAD. - Consider event monitor post-PCI and/or repeat stress testing    HTN, normotensive on combination therapy    T2DM on metformin. Dyslipidemia. Updated lipids last month w/ LDL 55, TG 86   - Continue Atorva 40 mg/d    Low-grade bladder cancer followed by Dr. Zara Aponte. Will need to discuss timing of cysto next year w/ respect to temporary interruption of Plavix. Follow-up and Dispositions    · Return in about 4 weeks (around 10/12/2020). Subjective:    Cath demonstrated severe single vessel CAD involving the LCX and OM1. The lesion was fairly calcified and attempts at PCI by Dr. Khloe Mccauley were not successful. Plan is to pursue rotational atherectomy at [facility] in the near future. He was started on Imdur. Kathyrn Phoenix reports a reduced amount of dizziness since his cath last week. Patient denies any exertional chest pain, dyspnea, palpitations, syncope, orthopnea, edema or paroxysmal nocturnal dyspnea. He reports a reduced activity level since his cath, \"strolling\" rather than fast walking regularly. No bleeding issues on Plavix. Dx w/ low-grade bladder cancer. Followed by Dr. Chito Glez for regular check-ups every year. Lipids followed by Faraz Foreman MD.     Of note, his daughter lives close to him and likes to be included in his care. Cardiac Risk Factors  HTN yes  DM yes  Smoking former, quit   Family Hx of CAD no    Cardiac testing  Echo 20 - EF 49%. Normal LV size and wall thickness. G1DD. Mod LAE. Mild MR. Mild TR. PASP 34 mmHg. Cath 20 -  Multivessel CAD. Culprit vessel = LCX/OM1 lesions, severe with significant calcification. Normal LVEDP. Unsuccessful PCI attempt with POBA due to calcification. Elective rotational atherectomy LCX/OM1      Past Medical History:   Diagnosis Date    Benign essential HTN     [facility] ([facility] ?    s/p BCGx2, resection. low grade. in [facility]. now Dr. Sarita Tsang. cystoscopy 2020 clear    Closed left arm fracture     GERD (gastroesophageal reflux disease)     with hiatal hernia    Glaucoma     Dr. Melissa Vega    Hemorrhoids     Hypercholesterolemia     [facility]albumin[facility] due to type 2 diabetes mellitus [facility]     Prostate cancer ([facility]     Dr. Sarita Tsang. s[facility]ctomy 2000 in [facility].   PSA \"0.4\" since then    Rosacea     Trigeminal neuralgia of right side of face     Type 2 diabetes mellitus with microalbuminuria (HCC)     Vertigo         Social History     Socioeconomic History    Marital status: SINGLE     Spouse name:     Number of children: 3    Years of education: Not on file    Highest education level: Not on file   Occupational History    Occupation: Moved to    Tobacco Use    Smoking status: Former Smoker     Last attempt to quit: 1962     Years since quittin.7    Smokeless tobacco: Never Used   Substance and Sexual Activity    Alcohol use: Yes     Alcohol/week: 5.0 standard drinks     Types: 5 Glasses of wine per week    Drug use: Never   Social History Narrative    Has 3 children, 11 grandkids       Current Outpatient Medications   Medication Sig Dispense Refill    isosorbide mononitrate ER (IMDUR) 30 mg tablet Take 1 Tab by mouth daily. 30 Tab 3    clopidogreL (Plavix) 75 mg tab Take 1 Tab by mouth daily. 30 Tab 3    gabapentin (NEURONTIN) 100 mg capsule Take 2 Caps by mouth nightly. Max Daily Amount: 200 mg. Decreased dose 20 180 Cap 0    atorvastatin (LIPITOR) 40 mg tablet TAKE 1 TABLET BY MOUTH EVERY DAY IN THE EVENING 90 Tab 1    Omeprazole delayed release (PRILOSEC D/R) 20 mg tablet TAKE 1 TABLET BY MOUTH EVERY DAY 84 Tab 1    metFORMIN ER (GLUCOPHAGE XR) 500 mg tablet TAKE 2 TABLETS BY MOUTH EVERY DAY WITH DINNER 180 Tab 1    olmesartan (BENICAR) 20 mg tablet Take 0.5 Tabs by mouth daily. Decreased dose 19 45 Tab 1    dorzolamide-timolol (COSOPT) 22.3-6.8 mg/mL ophthalmic solution APPLY 1 DROP(S) IN BOTH EYES 2 TIMES A DAY  2    latanoprost (XALATAN) 0.005 % ophthalmic solution APPLY 1 DROP(S) IN LEFT EYE AT BEDTIME  1    cholecalciferol (VITAMIN D3) 1,000 unit tablet Take  by mouth daily.  cyanocobalamin (VITAMIN B-12) 500 mcg tablet Take 500 mcg by mouth daily.  aspirin delayed-release (ASPIR-81) 81 mg tablet Take 1 Tab by mouth daily. 30 Tab 11    metroNIDAZOLE (METROCREAM) 0.75 % topical cream Apply  to affected area two (2) times a day. Use a thin layer to affected areas after washing 45 g 5       Allergies   Allergen Reactions    Lisinopril Cough          Review of Symptoms:  Constitutional: Negative for fever, chills, malaise/fatigue and diaphoresis.    Respiratory: Negative for cough, hemoptysis, sputum production, and wheezing. Cardiovascular: Negative for chest pain, palpitations, orthopnea, claudication, leg swelling and PND. Gastrointestinal: Negative for heartburn, nausea, vomiting, blood in stool and melena. Genitourinary: Negative for dysuria and flank pain. Musculoskeletal: Negative for joint pain and back pain. Skin: Negative for rash. Neurological: Negative for focal weakness, seizures, loss of consciousness, weakness and headaches. Endo/Heme/Allergies: Does not bruise/bleed easily. Psychiatric/Behavioral: Negative for memory loss. The patient does not have insomnia. Physical Exam  Visit Vitals  /74 (BP 1 Location: Left arm, BP Patient Position: Sitting)   Pulse 60   Resp 16   Ht 5' 9\" (1.753 m)   Wt 149 lb (67.6 kg)   SpO2 98%   BMI 22.00 kg/m²     Wt Readings from Last 3 Encounters:   09/14/20 149 lb (67.6 kg)   09/08/20 145 lb (65.8 kg)   09/01/20 147 lb (66.7 kg)     General - WDWN  HEENT: Eyes without jaundice, Hearing intact  Neck - JVP normal, thyroid nl  Cardiac - normal S1,S2, no murmurs, rubs or gallops. No clicks  Vascular - carotids without bruits, radials, femorals and pedal pulses equal bilateral  Lungs - clear to auscultation bilaterals, no rales ,wheezing or rhonchi  Abd - soft nontender, no HSM, no abd bruits  Extremities - no edema  Neuro - nonfocal, normal gait  Psych - mood and affect normal    Labs:      Cardiographics          Written by Derik Redmond, as dictated by Edgar Yepez M.D.      Edgar Yepez MD

## 2020-09-14 ENCOUNTER — OFFICE VISIT (OUTPATIENT)
Dept: CARDIOLOGY CLINIC | Age: 85
End: 2020-09-14
Payer: MEDICARE

## 2020-09-14 VITALS
HEART RATE: 60 BPM | BODY MASS INDEX: 22.07 KG/M2 | SYSTOLIC BLOOD PRESSURE: 130 MMHG | OXYGEN SATURATION: 98 % | WEIGHT: 149 LBS | DIASTOLIC BLOOD PRESSURE: 74 MMHG | RESPIRATION RATE: 16 BRPM | HEIGHT: 69 IN

## 2020-09-14 DIAGNOSIS — E78.00 HYPERCHOLESTEROLEMIA: ICD-10-CM

## 2020-09-14 DIAGNOSIS — I48.0 PAROXYSMAL ATRIAL FIBRILLATION (HCC): ICD-10-CM

## 2020-09-14 DIAGNOSIS — I25.118 CORONARY ARTERY DISEASE OF NATIVE ARTERY OF NATIVE HEART WITH STABLE ANGINA PECTORIS (HCC): Primary | ICD-10-CM

## 2020-09-14 DIAGNOSIS — I10 BENIGN ESSENTIAL HTN: ICD-10-CM

## 2020-09-14 DIAGNOSIS — E11.40 TYPE 2 DIABETES MELLITUS WITH DIABETIC NEUROPATHY, WITHOUT LONG-TERM CURRENT USE OF INSULIN (HCC): ICD-10-CM

## 2020-09-14 PROCEDURE — G8427 DOCREV CUR MEDS BY ELIG CLIN: HCPCS | Performed by: SPECIALIST

## 2020-09-14 PROCEDURE — 1101F PT FALLS ASSESS-DOCD LE1/YR: CPT | Performed by: SPECIALIST

## 2020-09-14 PROCEDURE — G8510 SCR DEP NEG, NO PLAN REQD: HCPCS | Performed by: SPECIALIST

## 2020-09-14 PROCEDURE — 93000 ELECTROCARDIOGRAM COMPLETE: CPT | Performed by: SPECIALIST

## 2020-09-14 PROCEDURE — G8536 NO DOC ELDER MAL SCRN: HCPCS | Performed by: SPECIALIST

## 2020-09-14 PROCEDURE — G8420 CALC BMI NORM PARAMETERS: HCPCS | Performed by: SPECIALIST

## 2020-09-14 PROCEDURE — 3051F HG A1C>EQUAL 7.0%<8.0%: CPT | Performed by: SPECIALIST

## 2020-09-14 PROCEDURE — 99214 OFFICE O/P EST MOD 30 MIN: CPT | Performed by: SPECIALIST

## 2020-09-14 NOTE — PROGRESS NOTES
Ty Quan is a 80 y.o. male    Chief Complaint   Patient presents with    Coronary Artery Disease    Other     S/P Cath       Chest pain : NO  SOB : NO  Dizziness : NO  Edema : NO  Refills : NO    Visit Vitals  /74 (BP 1 Location: Left arm, BP Patient Position: Sitting)   Pulse 60   Resp 16   Ht 5' 9\" (1.753 m)   Wt 149 lb (67.6 kg)   SpO2 98%   BMI 22.00 kg/m²       1. Have you been to the ER, urgent care clinic since your last visit? Hospitalized since your last visit? No    2. Have you seen or consulted any other health care providers outside of the [address] [place] [address] since your last visit? Include any pap smears or colon screening.  No

## 2020-09-17 ENCOUNTER — TELEPHONE (OUTPATIENT)
Dept: CARDIOLOGY CLINIC | Age: 85
End: 2020-09-17

## 2020-09-17 NOTE — TELEPHONE ENCOUNTER
Bessie from [address] [place] [address] stated that the patient had a heart cath on the 8th of September and his insurance did not approve it. She stated that Dr. Andre Willis can call [phone] opt #3 for a peer to peer. Reference #: [id number].   Thanks

## 2020-09-18 ENCOUNTER — TELEPHONE (OUTPATIENT)
Dept: CARDIOLOGY CLINIC | Age: 85
End: 2020-09-18

## 2020-09-18 NOTE — TELEPHONE ENCOUNTER
Priscilla from [address] [facility] would like to know if Dr. Wilber Bauman or Lata Mahmood would be able to do a peer to peer for this patient.       Phone: [phone] [address] Case reference number [id number]

## 2020-09-22 DIAGNOSIS — R94.39 ABNORMAL STRESS TEST: Primary | ICD-10-CM

## 2020-09-22 RX ORDER — SODIUM CHLORIDE 0.9 % (FLUSH) 0.9 %
5-40 SYRINGE (ML) INJECTION EVERY 8 HOURS
Status: CANCELLED | OUTPATIENT
Start: 2020-09-24

## 2020-09-22 RX ORDER — SODIUM CHLORIDE 9 MG/ML
100 INJECTION, SOLUTION INTRAVENOUS CONTINUOUS
Status: CANCELLED | OUTPATIENT
Start: 2020-09-24

## 2020-09-22 RX ORDER — SODIUM CHLORIDE 0.9 % (FLUSH) 0.9 %
5-40 SYRINGE (ML) INJECTION AS NEEDED
Status: CANCELLED | OUTPATIENT
Start: 2020-09-24

## 2020-09-24 ENCOUNTER — HOSPITAL ENCOUNTER (OUTPATIENT)
Age: 85
Discharge: HOME | End: 2020-09-25
Attending: STUDENT IN AN ORGANIZED HEALTH CARE EDUCATION/TRAINING PROGRAM | Admitting: STUDENT IN AN ORGANIZED HEALTH CARE EDUCATION/TRAINING PROGRAM
Payer: MEDICARE

## 2020-09-24 DIAGNOSIS — R94.39 ABNORMAL STRESS TEST: ICD-10-CM

## 2020-09-24 DIAGNOSIS — I25.118 CORONARY ARTERY DISEASE OF NATIVE ARTERY OF NATIVE HEART WITH STABLE ANGINA PECTORIS (HCC): ICD-10-CM

## 2020-09-24 DIAGNOSIS — Z98.890 S/P CARDIAC CATH: ICD-10-CM

## 2020-09-24 LAB
ACT BLD: 230 SECS (ref 79–138)
ACT BLD: 268 SECS (ref 79–138)
ACT BLD: 285 SECS (ref 79–138)
ACT BLD: 345 SECS (ref 79–138)
ACT BLD: 422 SECS (ref 79–138)
GLUCOSE BLD STRIP.AUTO-MCNC: 136 MG/DL (ref 65–100)
GLUCOSE BLD STRIP.AUTO-MCNC: 193 MG/DL (ref 65–100)
GLUCOSE BLD STRIP.AUTO-MCNC: 211 MG/DL (ref 65–100)
SERVICE CMNT-IMP: ABNORMAL

## 2020-09-24 PROCEDURE — 77030012468 HC VLV BLEEDBK CNTRL ABBT -B: Performed by: STUDENT IN AN ORGANIZED HEALTH CARE EDUCATION/TRAINING PROGRAM

## 2020-09-24 PROCEDURE — 92928 PRQ TCAT PLMT NTRAC ST 1 LES: CPT | Performed by: STUDENT IN AN ORGANIZED HEALTH CARE EDUCATION/TRAINING PROGRAM

## 2020-09-24 PROCEDURE — 77030013715 HC INFL SYS MRTM -B: Performed by: STUDENT IN AN ORGANIZED HEALTH CARE EDUCATION/TRAINING PROGRAM

## 2020-09-24 PROCEDURE — 99153 MOD SED SAME PHYS/QHP EA: CPT | Performed by: STUDENT IN AN ORGANIZED HEALTH CARE EDUCATION/TRAINING PROGRAM

## 2020-09-24 PROCEDURE — C1769 GUIDE WIRE: HCPCS | Performed by: STUDENT IN AN ORGANIZED HEALTH CARE EDUCATION/TRAINING PROGRAM

## 2020-09-24 PROCEDURE — 77030039046 HC PAD DEFIB RADIOTRNSPNT CNMD -B: Performed by: STUDENT IN AN ORGANIZED HEALTH CARE EDUCATION/TRAINING PROGRAM

## 2020-09-24 PROCEDURE — 93458 L HRT ARTERY/VENTRICLE ANGIO: CPT | Performed by: STUDENT IN AN ORGANIZED HEALTH CARE EDUCATION/TRAINING PROGRAM

## 2020-09-24 PROCEDURE — 99218 HC RM OBSERVATION: CPT

## 2020-09-24 PROCEDURE — 74011000636 HC RX REV CODE- 636: Performed by: STUDENT IN AN ORGANIZED HEALTH CARE EDUCATION/TRAINING PROGRAM

## 2020-09-24 PROCEDURE — C1874 STENT, COATED/COV W/DEL SYS: HCPCS | Performed by: STUDENT IN AN ORGANIZED HEALTH CARE EDUCATION/TRAINING PROGRAM

## 2020-09-24 PROCEDURE — 85347 COAGULATION TIME ACTIVATED: CPT

## 2020-09-24 PROCEDURE — 93454 CORONARY ARTERY ANGIO S&I: CPT | Performed by: STUDENT IN AN ORGANIZED HEALTH CARE EDUCATION/TRAINING PROGRAM

## 2020-09-24 PROCEDURE — 92929 PR PRQ TRLUML CORONARY STENT W/ANGIO ADDL ART/BRNCH: CPT | Performed by: STUDENT IN AN ORGANIZED HEALTH CARE EDUCATION/TRAINING PROGRAM

## 2020-09-24 PROCEDURE — 74011000250 HC RX REV CODE- 250: Performed by: STUDENT IN AN ORGANIZED HEALTH CARE EDUCATION/TRAINING PROGRAM

## 2020-09-24 PROCEDURE — 82962 GLUCOSE BLOOD TEST: CPT

## 2020-09-24 PROCEDURE — 92978 ENDOLUMINL IVUS OCT C 1ST: CPT | Performed by: STUDENT IN AN ORGANIZED HEALTH CARE EDUCATION/TRAINING PROGRAM

## 2020-09-24 PROCEDURE — 77030019569 HC BND COMPR RAD TERU -B: Performed by: STUDENT IN AN ORGANIZED HEALTH CARE EDUCATION/TRAINING PROGRAM

## 2020-09-24 PROCEDURE — 93005 ELECTROCARDIOGRAM TRACING: CPT

## 2020-09-24 PROCEDURE — C1753 CATH, INTRAVAS ULTRASOUND: HCPCS | Performed by: STUDENT IN AN ORGANIZED HEALTH CARE EDUCATION/TRAINING PROGRAM

## 2020-09-24 PROCEDURE — 92979 ENDOLUMINL IVUS OCT C EA: CPT | Performed by: STUDENT IN AN ORGANIZED HEALTH CARE EDUCATION/TRAINING PROGRAM

## 2020-09-24 PROCEDURE — C1725 CATH, TRANSLUMIN NON-LASER: HCPCS | Performed by: STUDENT IN AN ORGANIZED HEALTH CARE EDUCATION/TRAINING PROGRAM

## 2020-09-24 PROCEDURE — 77030010221 HC SPLNT WR POS TELE -B: Performed by: STUDENT IN AN ORGANIZED HEALTH CARE EDUCATION/TRAINING PROGRAM

## 2020-09-24 PROCEDURE — 92933 PRQ TRLML C ATHRC ST ANGIOP1: CPT | Performed by: STUDENT IN AN ORGANIZED HEALTH CARE EDUCATION/TRAINING PROGRAM

## 2020-09-24 PROCEDURE — 92934: CPT | Performed by: STUDENT IN AN ORGANIZED HEALTH CARE EDUCATION/TRAINING PROGRAM

## 2020-09-24 PROCEDURE — C1724 CATH, TRANS ATHEREC,ROTATION: HCPCS | Performed by: STUDENT IN AN ORGANIZED HEALTH CARE EDUCATION/TRAINING PROGRAM

## 2020-09-24 PROCEDURE — 99152 MOD SED SAME PHYS/QHP 5/>YRS: CPT | Performed by: STUDENT IN AN ORGANIZED HEALTH CARE EDUCATION/TRAINING PROGRAM

## 2020-09-24 PROCEDURE — C1887 CATHETER, GUIDING: HCPCS | Performed by: STUDENT IN AN ORGANIZED HEALTH CARE EDUCATION/TRAINING PROGRAM

## 2020-09-24 PROCEDURE — 77030013744: Performed by: STUDENT IN AN ORGANIZED HEALTH CARE EDUCATION/TRAINING PROGRAM

## 2020-09-24 PROCEDURE — C1894 INTRO/SHEATH, NON-LASER: HCPCS | Performed by: STUDENT IN AN ORGANIZED HEALTH CARE EDUCATION/TRAINING PROGRAM

## 2020-09-24 PROCEDURE — 74011250636 HC RX REV CODE- 250/636: Performed by: STUDENT IN AN ORGANIZED HEALTH CARE EDUCATION/TRAINING PROGRAM

## 2020-09-24 PROCEDURE — 74011250637 HC RX REV CODE- 250/637: Performed by: STUDENT IN AN ORGANIZED HEALTH CARE EDUCATION/TRAINING PROGRAM

## 2020-09-24 DEVICE — XIENCE SIERRA™ EVEROLIMUS ELUTING CORONARY STENT SYSTEM 2.50 MM X 18 MM / RAPID-EXCHANGE
Type: IMPLANTABLE DEVICE | Status: FUNCTIONAL
Brand: XIENCE SIERRA™

## 2020-09-24 DEVICE — XIENCE SIERRA™ EVEROLIMUS ELUTING CORONARY STENT SYSTEM 2.50 MM X 23 MM / RAPID-EXCHANGE
Type: IMPLANTABLE DEVICE | Status: FUNCTIONAL
Brand: XIENCE SIERRA™

## 2020-09-24 RX ORDER — ASPIRIN 81 MG/1
81 TABLET ORAL DAILY
Status: DISCONTINUED | OUTPATIENT
Start: 2020-09-25 | End: 2020-09-25 | Stop reason: HOSPADM

## 2020-09-24 RX ORDER — PANTOPRAZOLE SODIUM 40 MG/1
40 TABLET, DELAYED RELEASE ORAL
Status: DISCONTINUED | OUTPATIENT
Start: 2020-09-25 | End: 2020-09-25 | Stop reason: HOSPADM

## 2020-09-24 RX ORDER — SODIUM CHLORIDE 0.9 % (FLUSH) 0.9 %
5-40 SYRINGE (ML) INJECTION EVERY 8 HOURS
Status: DISCONTINUED | OUTPATIENT
Start: 2020-09-24 | End: 2020-09-24 | Stop reason: HOSPADM

## 2020-09-24 RX ORDER — DEXTROSE MONOHYDRATE 100 MG/ML
0-250 INJECTION, SOLUTION INTRAVENOUS AS NEEDED
Status: DISCONTINUED | OUTPATIENT
Start: 2020-09-24 | End: 2020-09-25 | Stop reason: HOSPADM

## 2020-09-24 RX ORDER — LIDOCAINE HYDROCHLORIDE 10 MG/ML
INJECTION INFILTRATION; PERINEURAL AS NEEDED
Status: DISCONTINUED | OUTPATIENT
Start: 2020-09-24 | End: 2020-09-24 | Stop reason: HOSPADM

## 2020-09-24 RX ORDER — GABAPENTIN 100 MG/1
200 CAPSULE ORAL
Status: DISCONTINUED | OUTPATIENT
Start: 2020-09-24 | End: 2020-09-25 | Stop reason: HOSPADM

## 2020-09-24 RX ORDER — ISOSORBIDE MONONITRATE 30 MG/1
30 TABLET, EXTENDED RELEASE ORAL DAILY
Status: DISCONTINUED | OUTPATIENT
Start: 2020-09-24 | End: 2020-09-25

## 2020-09-24 RX ORDER — LABETALOL HYDROCHLORIDE 5 MG/ML
20 INJECTION, SOLUTION INTRAVENOUS ONCE
Status: COMPLETED | OUTPATIENT
Start: 2020-09-24 | End: 2020-09-24

## 2020-09-24 RX ORDER — CLOPIDOGREL 300 MG/1
TABLET, FILM COATED ORAL AS NEEDED
Status: DISCONTINUED | OUTPATIENT
Start: 2020-09-24 | End: 2020-09-24 | Stop reason: HOSPADM

## 2020-09-24 RX ORDER — CLOPIDOGREL BISULFATE 75 MG/1
75 TABLET ORAL DAILY
Status: DISCONTINUED | OUTPATIENT
Start: 2020-09-25 | End: 2020-09-25 | Stop reason: HOSPADM

## 2020-09-24 RX ORDER — INSULIN LISPRO 100 [IU]/ML
INJECTION, SOLUTION INTRAVENOUS; SUBCUTANEOUS
Status: DISCONTINUED | OUTPATIENT
Start: 2020-09-24 | End: 2020-09-25 | Stop reason: HOSPADM

## 2020-09-24 RX ORDER — ATORVASTATIN CALCIUM 40 MG/1
40 TABLET, FILM COATED ORAL DAILY
Status: DISCONTINUED | OUTPATIENT
Start: 2020-09-25 | End: 2020-09-25 | Stop reason: HOSPADM

## 2020-09-24 RX ORDER — LOSARTAN POTASSIUM 25 MG/1
25 TABLET ORAL DAILY
Status: DISCONTINUED | OUTPATIENT
Start: 2020-09-24 | End: 2020-09-25 | Stop reason: HOSPADM

## 2020-09-24 RX ORDER — ISOSORBIDE MONONITRATE 30 MG/1
30 TABLET, EXTENDED RELEASE ORAL DAILY
Status: DISCONTINUED | OUTPATIENT
Start: 2020-09-25 | End: 2020-09-24

## 2020-09-24 RX ORDER — SODIUM CHLORIDE 0.9 % (FLUSH) 0.9 %
5-40 SYRINGE (ML) INJECTION EVERY 8 HOURS
Status: DISCONTINUED | OUTPATIENT
Start: 2020-09-24 | End: 2020-09-25 | Stop reason: HOSPADM

## 2020-09-24 RX ORDER — MIDAZOLAM HYDROCHLORIDE 1 MG/ML
INJECTION, SOLUTION INTRAMUSCULAR; INTRAVENOUS AS NEEDED
Status: DISCONTINUED | OUTPATIENT
Start: 2020-09-24 | End: 2020-09-24 | Stop reason: HOSPADM

## 2020-09-24 RX ORDER — FENTANYL CITRATE 50 UG/ML
INJECTION, SOLUTION INTRAMUSCULAR; INTRAVENOUS AS NEEDED
Status: DISCONTINUED | OUTPATIENT
Start: 2020-09-24 | End: 2020-09-24 | Stop reason: HOSPADM

## 2020-09-24 RX ORDER — LOSARTAN POTASSIUM 25 MG/1
25 TABLET ORAL DAILY
Status: DISCONTINUED | OUTPATIENT
Start: 2020-09-25 | End: 2020-09-24

## 2020-09-24 RX ORDER — HEPARIN SODIUM 1000 [USP'U]/ML
INJECTION, SOLUTION INTRAVENOUS; SUBCUTANEOUS AS NEEDED
Status: DISCONTINUED | OUTPATIENT
Start: 2020-09-24 | End: 2020-09-24 | Stop reason: HOSPADM

## 2020-09-24 RX ORDER — HEPARIN SODIUM 200 [USP'U]/100ML
INJECTION, SOLUTION INTRAVENOUS
Status: COMPLETED | OUTPATIENT
Start: 2020-09-24 | End: 2020-09-24

## 2020-09-24 RX ORDER — SODIUM CHLORIDE 0.9 % (FLUSH) 0.9 %
5-40 SYRINGE (ML) INJECTION AS NEEDED
Status: DISCONTINUED | OUTPATIENT
Start: 2020-09-24 | End: 2020-09-24 | Stop reason: HOSPADM

## 2020-09-24 RX ORDER — MAGNESIUM SULFATE 100 %
4 CRYSTALS MISCELLANEOUS AS NEEDED
Status: DISCONTINUED | OUTPATIENT
Start: 2020-09-24 | End: 2020-09-25 | Stop reason: HOSPADM

## 2020-09-24 RX ORDER — VERAPAMIL HYDROCHLORIDE 2.5 MG/ML
INJECTION, SOLUTION INTRAVENOUS AS NEEDED
Status: DISCONTINUED | OUTPATIENT
Start: 2020-09-24 | End: 2020-09-24 | Stop reason: HOSPADM

## 2020-09-24 RX ORDER — SODIUM CHLORIDE 0.9 % (FLUSH) 0.9 %
5-40 SYRINGE (ML) INJECTION AS NEEDED
Status: DISCONTINUED | OUTPATIENT
Start: 2020-09-24 | End: 2020-09-25 | Stop reason: HOSPADM

## 2020-09-24 RX ORDER — SODIUM CHLORIDE 9 MG/ML
100 INJECTION, SOLUTION INTRAVENOUS CONTINUOUS
Status: DISCONTINUED | OUTPATIENT
Start: 2020-09-24 | End: 2020-09-24 | Stop reason: HOSPADM

## 2020-09-24 RX ADMIN — SODIUM CHLORIDE 100 ML/HR: 900 INJECTION, SOLUTION INTRAVENOUS at 14:08

## 2020-09-24 RX ADMIN — LOSARTAN POTASSIUM 25 MG: 25 TABLET, FILM COATED ORAL at 18:25

## 2020-09-24 RX ADMIN — GABAPENTIN 200 MG: 100 CAPSULE ORAL at 22:05

## 2020-09-24 RX ADMIN — LABETALOL HYDROCHLORIDE: 5 INJECTION INTRAVENOUS at 18:00

## 2020-09-24 RX ADMIN — Medication 10 ML: at 22:05

## 2020-09-24 RX ADMIN — ISOSORBIDE MONONITRATE 30 MG: 30 TABLET, EXTENDED RELEASE ORAL at 18:25

## 2020-09-24 NOTE — Clinical Note
Lesion located in the Proximal OM 1. Balloon inflated using single inflation technique. Lesion #1: Pressure = 16 oriana; Duration = 20 sec.

## 2020-09-24 NOTE — Clinical Note
Lesion: Located in the Mid Cx. Stent inserted. Stent deployed. First inflation pressure = 16 oriana; inflation time: 22 sec.  2.5 x 23 xience TONO

## 2020-09-24 NOTE — Clinical Note
Lesion: Located in the Mid Cx. Stent inserted. Stent deployed. Single technique used. First inflation pressure = 16 oriana; inflation time: 26 sec.

## 2020-09-24 NOTE — Clinical Note
Lesion located in the Mid Cx Proximal OM 1. Balloon inflated using kissing and multiple inflations inflation technique. A CATH ROSELINE BLLN DIL 2.5X12MM -- NC EUPHORA was also used. Lesion #1: Pressure = 12 oriana; Duration = 20 sec. Lesion #2: Pressure = 12 oriana; Duration = 20 sec. Inflation 2: Pressure = 12 oriana; Duration = 18 sec.

## 2020-09-24 NOTE — PROGRESS NOTES
Cardiac Cath Lab Recovery Arrival Note:      Ike West arrived to Cardiac Cath Lab, Recovery Area. Staff introduced to patient. Patient identifiers verified with NAME and DATE OF BIRTH. Procedure verified with patient. Consent forms reviewed and signed by patient or authorized representative and verified. Allergies verified. Patient and family oriented to department. Patient and family informed of procedure and plan of care. Questions answered with review. Patient prepped for procedure, per orders from physician, prior to arrival.    Patient on cardiac monitor, non-invasive blood pressure, SPO2 monitor. On Room air. Patient is A&Ox 4. Patient reports No Pain. Patient in stretcher, in low position, with side rails up, call bell within reach, patient instructed to call if assistance as needed. Patient prep in: [address], [address] [facility]  Family in: Waiting Room.    Prep by: Shala Nicholson RN

## 2020-09-24 NOTE — Clinical Note
Balloon inserted. Balloon inflated using multiple inflations inflation technique. Lesion #1: Pressure = 20 oriana; Duration = 12 sec. Inflation 2: Pressure = 20 oriana; Duration = 8 sec.

## 2020-09-24 NOTE — Clinical Note
Lesion located in the Mid Cx. Balloon inserted. Balloon inflated using single inflation technique. Lesion #1: Pressure = 16 oriana; Duration = 24 sec.

## 2020-09-24 NOTE — Clinical Note
Balloon inflated using single inflation technique. Lesion #1: Pressure = 16 oriana; Duration = 10 sec.

## 2020-09-24 NOTE — PROGRESS NOTES
TRANSFER - OUT REPORT:    Verbal report given to Moni Pitt RN (name) on Onelia Persons  being transferred to cath lab recovery room (unit) for routine progression of care       Report consisted of patients Situation, Background, Assessment and   Recommendations(SBAR). Information from the following report(s) Procedure Summary was reviewed with the receiving nurse. Lines:   Peripheral IV 09/24/20 Right Antecubital (Active)        Opportunity for questions and clarification was provided.       Patient transported with:   Registered Nurse

## 2020-09-24 NOTE — Clinical Note
Lesion located in the Proximal Cx. Balloon inserted. Balloon inflated using multiple inflations inflation technique. Lesion #1: Pressure = 16 oriana; Duration = 10 sec. Inflation 2: Pressure = 20 oriana; Duration = 15 sec.

## 2020-09-24 NOTE — Clinical Note
Balloon inflated using kissing and multiple inflations inflation technique. A CATH ROSELINE BLLN DIL 2.5X12MM -- NC EUPHORA was also used. Lesion #1: Pressure = 18 oriana; Duration = 12 sec. Lesion #2: Pressure = 20 oriana; Duration = 10 sec.

## 2020-09-24 NOTE — Clinical Note
Lesion located in the Proximal OM 1. Balloon inserted. Balloon inflated using single inflation technique. Lesion #1: Pressure = 20 oriana; Duration = 18 sec.

## 2020-09-24 NOTE — Clinical Note
Balloon inflated using multiple inflations inflation technique. Lesion #1: Pressure = 16 oriana; Duration = 12 sec. Inflation 2: Pressure = 22 oriana; Duration = 14 sec.

## 2020-09-24 NOTE — TELEPHONE ENCOUNTER
Spoke with Bessie. Notified tht Florinad Delgado attempted a Peer to peer 9/4/20 at 5;00pm and was denied by [employer]. Stating that he needed to wait for denial at end of business day. 5pm is the end of business day and it was a holiday weekend. Procedure on Tuesday morning.  No one to notify that cath was not approved    Florinda Delgado attempted to explain to representative but was denied the ability to do a peer to peer

## 2020-09-24 NOTE — PROGRESS NOTES
TRANSFER - IN REPORT:    Verbal report received from MAXX FONG [facility] on Migue Dennison  being received from procedure area for routine progression of care. Report consisted of patients Situation, Background, Assessment and Recommendations(SBAR). Information from the following report(s) SBAR, Procedure Summary, MAR, Recent Results and Cardiac Rhythm Sinus Bradycardia was reviewed with the receiving clinician. Opportunity for questions and clarification was provided. Assessment completed upon patients arrival to [address] [place] [address] and care assumed. Cardiac Cath Lab Recovery Arrival Note:    Manual Jesi arrived to [facility] recovery area. Patient procedure= PCI. Patient on cardiac monitor, non-invasive blood pressure, SPO2 monitor. On Room Air. IV  of NS on pump at 200 ml/hr. Patient status doing well without problems. Patient is A&Ox 4. Patient reports No PAin. PROCEDURE SITE CHECK:    Procedure site:without any bleeding and Soft Hematoma, No pain/discomfort reported at procedure site. No change in patient status. Continue to monitor patient and status. Dr Bela Jaimes in to evaluate. Right hand dusky and cool with TR Band intact. Will monitor for changes.

## 2020-09-24 NOTE — Clinical Note
Balloon inflated using kissing and multiple inflations inflation technique. A CATH ROSELINE BLLN DIL 2.5X12MM -- NC EUPHORA was also used. Lesion #1: Pressure = 12 oriana; Duration = 20 sec. Lesion #2: Pressure = 12 oriana; Duration = 20 sec.

## 2020-09-24 NOTE — Clinical Note
Balloon inserted. Balloon inflated using multiple inflations inflation technique. Lesion #1: Pressure = 16 oriana; Duration = 10 sec. Inflation 2: Pressure = 16 oriana; Duration = 10 sec.

## 2020-09-24 NOTE — Clinical Note
[facility]  Obstetrics and Gynecology     Initial Prenatal Visit    HPI  Tracy Elder is a 30 month     Comment: none in pregnancy   • pregnancy weight: 132 lbs, pre-pregnancy June Leigh MD

## 2020-09-24 NOTE — Clinical Note
Lesion located in the Mid Cx Ostium OM 1. Balloon inflated using kissing inflation technique. A CATHETER PTCA L15MM DIA2.5MM BLLN NYL BLEND NONCOMPLIANT was also used. Lesion #1: Pressure = 16 oriana; Duration = 15 sec. Lesion #2: Pressure = 16 oriana; Duration = 15 sec.

## 2020-09-24 NOTE — Clinical Note
Lesion located in the Mid Cx Ostium OM 1. Balloon inserted. Balloon inflated using kissing and multiple inflations inflation technique. A CATHETER PTCA L15MM DIA2.5MM BLLN NYL BLEND NONCOMPLIANT was also used. Lesion #1: Pressure = 16 oriana; Duration = 10 sec.

## 2020-09-24 NOTE — Clinical Note
Lesion located in the Mid Cx. Balloon inserted. Balloon inflated using multiple inflations inflation technique. Lesion #1: Pressure = 16 oriana; Duration = 13 sec. Inflation 2: Pressure = 20 oriana; Duration = 8 sec.

## 2020-09-24 NOTE — PROCEDURES
BRIEF PROCEDURE NOTE    Date of Procedure: 9/24/2020   Preoperative Diagnosis: stable cad  Postoperative Diagnosis: stable cad    Procedure: Left heart cath, coronary angiography  Interventional Cardiologist: Maia Castillo DO  Assistant: None  Anesthesia: local + IV moderate sedation   I administered moderate sedation throughout this procedure. An independent trained observer pushed medications at my direction, and monitored the patients level of consciousness and physiological status throughout. Estimated Blood Loss: Minimal    Access: right radial artery, 7F  Catheters:  Left coronary: EBU 3.75, 7F    PCI:  Heparin for -300    Whisper wire into OM 1 over finecross. Exchanged for rota wire. Multiple passes with 1.5mm ne into OM1      Whisper wire into AV groove Lcx over finecross. Exchanged for rota wire. Multiple passes with 1.5mm ne into AV groove Lcx    Whisper in AV groove Lcx and Javier blue in OM1. Dilated both with 2.5mm NC balloon. 2 stent couloette technique    Proximal Lcx into OM1 stented with 2.5x18mm Xience Deepika TONO, deployed at 16 KATELYNN. POT with 3.0NC, subsequently with 3.5NC and 3.75NC at high pressure. Twin pass catheter used to wire into AV groove Lcx with Javier blue. Stent struts opened with 2.0 balloon. Proximal Lcx into AV groove Lcx stented with 2.5x23mm Xience Deepika TONO, deployed at 16 KATELYNN. POT with 3.75Nc balloon at 20 KATELYNN. Rewired OM1     Post-dilated both OM1 and AV groove Lcx with 2.5NC balloon at 20 KATELYNN. Ranjit Haring with 2.5NC in both OM1 and AV groove Lcx at 12  KATELYNN. Final POT with 3.75 NC balloon at 20 KATELYNN. IVUS showed mild narrowing at ostium of OM1. OM1 dilated with 2.5NC balloon at 20 KATELYNN. Final kiss with . 5NC in both OM1 and AV groove Lcx at 16  KATELYNN. Alo 3 flow pre and post.  No dissection or perforation.       Specimens Removed: None    Implants: Xience Deepika TONO x2     Closure Device: radial TR band    See full cath note.    Complications: none      Findings:  1. Successful bifurcation stenting of OM1 and AV groove Lcx with 2 stent couloette technique    Plan:    DAPT for at least 6 months, cont statin.   F/up with Dr. Natalie Botello scheduled 10/15/20    Modesta Kasper, DO Kellen Sarkar, DO  [facility] [address], [address]                                              Office [phone],LTJ [phone]

## 2020-09-25 ENCOUNTER — APPOINTMENT (OUTPATIENT)
Dept: GENERAL RADIOLOGY | Age: 85
End: 2020-09-25
Attending: STUDENT IN AN ORGANIZED HEALTH CARE EDUCATION/TRAINING PROGRAM
Payer: MEDICARE

## 2020-09-25 ENCOUNTER — TELEPHONE (OUTPATIENT)
Dept: CARDIOLOGY CLINIC | Age: 85
End: 2020-09-25

## 2020-09-25 VITALS
OXYGEN SATURATION: 97 % | HEIGHT: 69 IN | SYSTOLIC BLOOD PRESSURE: 106 MMHG | DIASTOLIC BLOOD PRESSURE: 50 MMHG | BODY MASS INDEX: 21.62 KG/M2 | WEIGHT: 145.94 LBS | TEMPERATURE: 99 F | RESPIRATION RATE: 16 BRPM | HEART RATE: 56 BPM

## 2020-09-25 LAB
ANION GAP SERPL CALC-SCNC: 6 MMOL/L (ref 5–15)
BUN SERPL-MCNC: 20 MG/DL (ref 6–20)
BUN/CREAT SERPL: 18 (ref 12–20)
CALCIUM SERPL-MCNC: 8.8 MG/DL (ref 8.5–10.1)
CHLORIDE SERPL-SCNC: 112 MMOL/L (ref 97–108)
CO2 SERPL-SCNC: 25 MMOL/L (ref 21–32)
CREAT SERPL-MCNC: 1.12 MG/DL (ref 0.7–1.3)
ERYTHROCYTE [DISTWIDTH] IN BLOOD BY AUTOMATED COUNT: 12 % (ref 11.5–14.5)
GLUCOSE BLD STRIP.AUTO-MCNC: 195 MG/DL (ref 65–100)
GLUCOSE SERPL-MCNC: 223 MG/DL (ref 65–100)
HCT VFR BLD AUTO: 28.4 % (ref 36.6–50.3)
HGB BLD-MCNC: 9.3 G/DL (ref 12.1–17)
MCH RBC QN AUTO: 31.3 PG (ref 26–34)
MCHC RBC AUTO-ENTMCNC: 32.7 G/DL (ref 30–36.5)
MCV RBC AUTO: 95.6 FL (ref 80–99)
NRBC # BLD: 0 K/UL (ref 0–0.01)
NRBC BLD-RTO: 0 PER 100 WBC
PLATELET # BLD AUTO: 141 K/UL (ref 150–400)
PMV BLD AUTO: 9.5 FL (ref 8.9–12.9)
POTASSIUM SERPL-SCNC: 4.1 MMOL/L (ref 3.5–5.1)
RBC # BLD AUTO: 2.97 M/UL (ref 4.1–5.7)
SERVICE CMNT-IMP: ABNORMAL
SODIUM SERPL-SCNC: 143 MMOL/L (ref 136–145)
WBC # BLD AUTO: 7.1 K/UL (ref 4.1–11.1)

## 2020-09-25 PROCEDURE — 85027 COMPLETE CBC AUTOMATED: CPT

## 2020-09-25 PROCEDURE — 80048 BASIC METABOLIC PNL TOTAL CA: CPT

## 2020-09-25 PROCEDURE — 36415 COLL VENOUS BLD VENIPUNCTURE: CPT

## 2020-09-25 PROCEDURE — 99217 PR OBSERVATION CARE DISCHARGE MANAGEMENT: CPT | Performed by: INTERNAL MEDICINE

## 2020-09-25 PROCEDURE — 74011636637 HC RX REV CODE- 636/637: Performed by: STUDENT IN AN ORGANIZED HEALTH CARE EDUCATION/TRAINING PROGRAM

## 2020-09-25 PROCEDURE — 74011250637 HC RX REV CODE- 250/637: Performed by: STUDENT IN AN ORGANIZED HEALTH CARE EDUCATION/TRAINING PROGRAM

## 2020-09-25 PROCEDURE — 82962 GLUCOSE BLOOD TEST: CPT

## 2020-09-25 RX ADMIN — LOSARTAN POTASSIUM 25 MG: 25 TABLET, FILM COATED ORAL at 09:23

## 2020-09-25 RX ADMIN — ATORVASTATIN CALCIUM 40 MG: 40 TABLET, FILM COATED ORAL at 08:34

## 2020-09-25 RX ADMIN — CLOPIDOGREL BISULFATE 75 MG: 75 TABLET ORAL at 08:34

## 2020-09-25 RX ADMIN — PANTOPRAZOLE SODIUM 40 MG: 40 TABLET, DELAYED RELEASE ORAL at 07:12

## 2020-09-25 RX ADMIN — Medication 10 ML: at 07:12

## 2020-09-25 RX ADMIN — INSULIN LISPRO 2 UNITS: 100 INJECTION, SOLUTION INTRAVENOUS; SUBCUTANEOUS at 07:12

## 2020-09-25 RX ADMIN — ASPIRIN 81 MG: 81 TABLET, COATED ORAL at 08:34

## 2020-09-25 RX ADMIN — ISOSORBIDE MONONITRATE 30 MG: 30 TABLET, EXTENDED RELEASE ORAL at 09:23

## 2020-09-25 NOTE — PROGRESS NOTES
TR Band removed and guaze with Tegaderm placed. Pt hand is cool and pink. Right hand fingers with good mobility and capillary refill. Guaze with tegaderm to site. Pt verbalized understanding to keep pressure off of right wrist and arm. Right forearm soft and slightly ecchymotic. Dr Karissa Daniels updated on Pt status.

## 2020-09-25 NOTE — TELEPHONE ENCOUNTER
PTIDx2 daughter explained that Lo Pardo attempted to call her multiple times last evening without success. Procedure went well and patient will have some bruising at insertion site. Daughter would like to speak to  further about procedure.

## 2020-09-25 NOTE — CARDIO/PULMONARY
Cardiac Rehab: CAD education folder given to [facility]. Educated using teach back method. Reviewed CAD diagnosis definition and purpose of intervention. Discussed risk factors for CAD to include the following: hypertension and diabetes. He quit smoking in the 50's he said. Discussed Heart Healthy/Low Sodium (2000 mg) diet. Reviewed the importance of medication compliance, the purpose of plavix, and potential side effects. Discussed follow up appointments with cardiologist, signs and symptoms of angina, and what to report to physician after discharge. He sees Dr. Dixon Young. Emphasized the value of cardiac rehab. Discussed Cardiac Rehab Program format, benefits, and encouraged enrollment to assist with risk modification and management. Cardiac Rehab Program at [facility] information is on the AVS. [facility] verbalized understanding and he had no questions.  Vear Buerger, RN

## 2020-09-25 NOTE — PROGRESS NOTES
Transitions of Care Plan:   RUR: N/A   S/P cardiac cath; discharge today   Baseline - independent without DME; [facility]   Disposition - home with family to transport    Reason for Admission:   S/p cardiac cath                   RUR Score:          N/A           Plan for utilizing home health:      no    PCP: First and Last name:  Fatmata Long MD.   Name of Practice:    Are you a current patient: Yes/No: Yes   Approximate date of last visit:    Can you participate in a virtual visit with your PCP:                     Current Advanced Directive/Advance Care Plan: On file. Transition of Care Plan:                      CM met with patient at bedside for dispo plan: Independent without DME  [facility] on [address]  [address] to transport home    Maryjo Dickey, MPH    Care Management Interventions  PCP Verified by CM: Yes  Palliative Care Criteria Met (RRAT>21 & CHF Dx)?: No  Mode of Transport at Discharge: Other (see comment)(Family, private car)  Transition of Care Consult (CM Consult): Discharge Planning  MyChart Signup: Yes  Discharge Durable Medical Equipment: No  Health Maintenance Reviewed: Yes  Physical Therapy Consult: No  Occupational Therapy Consult: No  Speech Therapy Consult: No  Current Support Network:  Other(Independent Living)  Confirm Follow Up Transport: Self  Discharge Location  Discharge Placement: Home

## 2020-09-25 NOTE — TELEPHONE ENCOUNTER
Discussed case with pts daughter in detail. I attempted to call her 4xs after pci yesterday. I apologized for inability to communicate with her. Discussed at length clinical course for management of right forearm hematoma related to cardiac catheterization site.

## 2020-09-25 NOTE — DISCHARGE INSTRUCTIONS
DO NOT resume metformin until 9/26/20  Please continue Aspirin/plavix until otherwise instructed by Dr. Marcelino Meier or Dr. Aakash Jordan. Also, if you run out of medication or are unable to obtain, please notify Dr. Marcelino Meier immediately. Radial Cardiac Catheterization/Angiography Discharge Instructions    It is normal to feel tired the first couple days. Take it easy and follow the physicians instructions. CHECK THE CATHETER INSERTION SITE DAILY:    Remove the wrist dressing 24 hours after the procedure. You may shower 24 hours after the procedure. Wash with soap and water and pat dry. Gentle cleaning of the site with soap and water is sufficient, cover with a dry clean dressing or bandage. Do not apply creams or powders to the area. No soaking the wrist for 3 days. Leave the puncture site open to air after 24 hours post-procedure. CALL THE PHYSICIANS:     If the site becomes red, swollen or feels warm to the touch  If there is bleeding or drainage or if there is unusual pain at the radial site. If there is any minor oozing, you may apply a band-aid and remove after 12 hours. If the bleeding continues, hold pressure with the middle finger against the puncture site and the thumb against the back of the wrist,call 911 to be transported to the hospital.  DO NOT DRIVE YOURSELF, [place] [address]. ACTIVITY:   For the first 24 hours do not manipulate the wrist.  Keep RUE elevated when not walking. No lifting, pushing or pulling over 3-5 pounds with the affected wrist for 7 daysand no straining the insertion site. Do not life grocery bags or the garbage can, do not run the vacuum  or  for 7 days. Start with short walks as in the hospital and gradually increase as tolerated each day. It is recommended to walk 30 minutes 5-7 days per week. Follow your physicians instructions on activity. Avoid walking outside in extremes of heat or cold.   Walk inside when it is cold and windy or hot and humid. Things to keep in mind:  No driving for at least 24 hours, or as designated by your physician. Limit the number of times you go up and down the stairs  Take rests and pace yourself with activity. Be careful and do not strain with bowel movements. Notify Dr. Carson Hazard office immediately if develops decreased sensation or numbness to RUE. Or if swelling worsens. MEDICATIONS:    Take all medications as prescribed  Call your physician if you have any questions  Keep an updated list of your medications with you at all times and give a list to your physician and pharmacist    SIGNS AND SYMPTOMS:   Be cautious of symptoms of angina or recurrent symptoms such as chest discomfort, unusual shortness of breath or fatigue. These could be symptoms of restenosis, a new blockage or a heart attack. If your symptoms are relieved with rest it is still recommended that you notify your physician of recurrent chest pain or discomfort. For CHEST PAIN or symptoms of angina not relieved with rest:  If the discomfort is not relieved with rest, and you have been prescribed Nitroglycerin, take as directed (taken under the tongue, one at a time 5 minutes apart for a total of 3 doses). If the discomfort is not relieved after the 3rd nitroglycerin, call 911. If you have not been prescribed Nitroglycerin  and your chest discomfort is not relieved with rest, call 911. AFTER CARE:   Follow up with your physician as instructed. Follow a heart healthy diet with proper portion control, daily stress management, daily exercise, blood pressure and cholesterol control , and smoking cessation.

## 2020-09-25 NOTE — TELEPHONE ENCOUNTER
Patients daughter is calling to speak with someone in regards to how the patients procedure went yesterday With Dr. Faraz Rand. Please advise.     Phone: [phone]

## 2020-09-25 NOTE — DISCHARGE SUMMARY
Cardiology Discharge Summary     Patient ID:  Jacque Portillo  [id number]  20 y.o.  9/22/1929    Admit Date: 9/24/2020    Discharge Date: 9/25/2020    Admitting Physician: Canelo Wiseman DO     Discharge Physician: Wojciech Hendricks MD    Admission Diagnoses:   Abnormal stress test [R94.39]  S/P cardiac cath [Z98.890]    Discharge Diagnoses: Active Problems:    S/P cardiac cath (9/24/2020)        Discharge Condition: Good    Cardiology Procedures this Admission:  Left heart catheterization with PCI   (Successful bifurcation stenting of OM1 and AV groove Lcx with 2 stent couloette technique)    Consults: None    Hospital Course: Mr. Makayla Mancini is a 80 y.o. 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 The lesion was fairly calcified and prior attempt at PCI by Dr. Shelly Sullivan were not successful. He thus came to [facility] as it was thought he may need rotational arthrectomy. He underwent successful PCI/bifurcation stenting of OM1 and AV groove Lcx with 2 stent couloette technique by Dr. Shelly Sullivan on 9/25/20. He developed right radial hematoma post procedure. However this stabilized by day of discharge. On day of discharge, His RUE  remained swollen and was evaluated by Dr. Kayley White but no signs of active bleeding or compartment syndrome. He was ambulatory at discharge. His Imdur was discontinued. Plavix based DAPT is planned for at least 6 months. Continue statin. Future Appointments   Date Time Provider [facility] Marie   10/15/2020  1:00 PM Carlos Salvador MD CAVSF BS AMB       Visit Vitals  BP (!) 129/55 (BP 1 Location: Left arm, BP Patient Position: At rest)   Pulse 60   Temp 98.6 °F (37 °C)   Resp 16   Ht 5' 9\" (1.753 m)   Wt 145 lb 15.1 oz (66.2 kg)   SpO2 96%   BMI 21.55 kg/m²       Physical Exam  Abdomen: soft, non-tender. Bowel sounds normal.   Extremities: no LE edema, + PP bilaterally  Rt radial access site/RUE edematous, mild radial site ecchymosis. No visible bleeding.   Rt hand warm to touch, +sensation intact to light touch, moves right hand, fingers, arm. Heart: regular rate and rhythm, S1, S2 normal, no murmurs, clicks, rubs or gallops  Lungs: clear to auscultation bilaterally  Neck: supple, symmetrical  Neurologic: Grossly normal  Pulses: 2+ and symmetrical    Labs:   Recent Labs     09/25/20  0346   WBC 7.1   HGB 9.3*   HCT 28.4*   *     Recent Labs     09/25/20  0346      K 4.1   *   CO2 25   *   BUN 20   CREA 1.12   CA 8.8       No results for input(s): TROIQ, CPK, CKMB in the last 72 hours. EKG:   CXR:   Other Diagnostic Tests:   Device check:     Disposition: home    Patient Instructions:   Current Discharge Medication List      CONTINUE these medications which have NOT CHANGED    Details   clopidogreL (Plavix) 75 mg tab Take 1 Tab by mouth daily. Qty: 30 Tab, Refills: 3      gabapentin (NEURONTIN) 100 mg capsule Take 2 Caps by mouth nightly. Max Daily Amount: 200 mg. Decreased dose 8/24/20  Qty: 180 Cap, Refills: 0    Associated Diagnoses: Trigeminal neuralgia of right side of face      atorvastatin (LIPITOR) 40 mg tablet TAKE 1 TABLET BY MOUTH EVERY DAY IN THE EVENING  Qty: 90 Tab, Refills: 1    Associated Diagnoses: Hypercholesterolemia      Omeprazole delayed release (PRILOSEC D/R) 20 mg tablet TAKE 1 TABLET BY MOUTH EVERY DAY  Qty: 84 Tab, Refills: 1      olmesartan (BENICAR) 20 mg tablet Take 0.5 Tabs by mouth daily. Decreased dose 5/7/19  Qty: 45 Tab, Refills: 1    Associated Diagnoses: Benign essential HTN      dorzolamide-timolol (COSOPT) 22.3-6.8 mg/mL ophthalmic solution APPLY 1 DROP(S) IN BOTH EYES 2 TIMES A DAY  Refills: 2      latanoprost (XALATAN) 0.005 % ophthalmic solution APPLY 1 DROP(S) IN LEFT EYE AT BEDTIME  Refills: 1      cholecalciferol (VITAMIN D3) 1,000 unit tablet Take  by mouth daily. cyanocobalamin (VITAMIN B-12) 500 mcg tablet Take 500 mcg by mouth daily. aspirin delayed-release (ASPIR-81) 81 mg tablet Take 1 Tab by mouth daily.   Qty: 30 Tab, Refills: 11    Associated Diagnoses: Controlled type 2 diabetes mellitus without complication, without long-term current use of insulin (HCC)      metFORMIN ER (GLUCOPHAGE XR) 500 mg tablet TAKE 2 TABLETS BY MOUTH EVERY DAY WITH DINNER  Qty: 180 Tab, Refills: 1    Comments: DX Code Needed  . Associated Diagnoses: Type 2 diabetes mellitus with microalbuminuria, without long-term current use of insulin (HCC)      metroNIDAZOLE (METROCREAM) 0.75 % topical cream Apply  to affected area two (2) times a day. Use a thin layer to affected areas after washing  Qty: 45 g, Refills: 5    Associated Diagnoses: Rosacea         STOP taking these medications       isosorbide mononitrate ER (IMDUR) 30 mg tablet Comments:   Reason for Stopping:               Reference discharge instructions provided by nursing for diet and activity. Follow-up with   Future Appointments   Date Time Provider Marcel Salazar   10/15/2020  1:00 PM MD JOHNSON Ramirez BS AMB       Signed:  Katja Lucero NP

## 2020-09-25 NOTE — PROGRESS NOTES
0840: R radial site stable, +1 edema, radial pulse is palpable and hand is warm with good sensation. 1212: iv and tele removed, discharge instructions reviewed. No new medications. DISCHARGE SUMMARY from Nurse    PATIENT INSTRUCTIONS:    After general anesthesia or intravenous sedation, for 24 hours or while taking prescription Narcotics:  · Limit your activities  · Do not drive and operate hazardous machinery  · Do not make important personal or business decisions  · Do  not drink alcoholic beverages  · If you have not urinated within 8 hours after discharge, please contact your surgeon on call. Report the following to your surgeon:  · Excessive pain, swelling, redness or odor of or around the surgical area  · Temperature over 100.5  · Nausea and vomiting lasting longer than 4 hours or if unable to take medications  · Any signs of decreased circulation or nerve impairment to extremity: change in color, persistent  numbness, tingling, coldness or increase pain  · Any questions    What to do at Home:  Recommended activity: Activity as tolerated and no driving for today,     If you experience any of the following symptoms chest pain, please follow up with cardiology. *  Please give a list of your current medications to your Primary Care Provider. *  Please update this list whenever your medications are discontinued, doses are      changed, or new medications (including over-the-counter products) are added. *  Please carry medication information at all times in case of emergency situations. These are general instructions for a healthy lifestyle:    No smoking/ No tobacco products/ Avoid exposure to second hand smoke  Surgeon General's Warning:  Quitting smoking now greatly reduces serious risk to your health.     Obesity, smoking, and sedentary lifestyle greatly increases your risk for illness    A healthy diet, regular physical exercise & weight monitoring are important for maintaining a healthy lifestyle    You may be retaining fluid if you have a history of heart failure or if you experience any of the following symptoms:  Weight gain of 3 pounds or more overnight or 5 pounds in a week, increased swelling in our hands or feet or shortness of breath while lying flat in bed. Please call your doctor as soon as you notice any of these symptoms; do not wait until your next office visit. The discharge information has been reviewed with the patient. The patient verbalized understanding. Discharge medications reviewed with the patient and appropriate educational materials and side effects teaching were provided.   ___________________________________________________________________________________________________________________________________

## 2020-09-25 NOTE — PROGRESS NOTES
Pt's right thumb slightly swollen. Dr Maciej Hunter updated and BP cuff removed per Dr Maciej Hunter. Will continue to monitor and attempt removal of TR Band per protocol.

## 2020-09-25 NOTE — PROGRESS NOTES
TRANSFER - IN REPORT:    Verbal report received from Kimper, RN(name) on Jaclyn Dennis  being received from CCL(unit) for routine progression of care      Report consisted of patients Situation, Background, Assessment and   Recommendations(SBAR). Information from the following report(s) SBAR, Kardex, Procedure Summary, Intake/Output, MAR, Recent Results and Cardiac Rhythm SB was reviewed with the receiving nurse. Opportunity for questions and clarification was provided. 2150: Assessment completed upon patients arrival to unit and care assumed. 0730: Bedside and Verbal shift change report given to Ledy Driver RN (oncoming nurse) by Rocio Matias RN (offgoing nurse). Report included the following information SBAR, Kardex, Intake/Output, MAR, Recent Results and Cardiac Rhythm NSR.

## 2020-09-25 NOTE — PROGRESS NOTES
TRANSFER - OUT REPORT:    Verbal report given to 7 Rohitchon Francis on Johnny Freshwater being transferred to Room 470 for routine progression of care       Report consisted of patients Situation, Background, Assessment and   Recommendations(SBAR). Information from the following report(s) SBAR, Procedure Summary, MAR, Recent Results and Cardiac Rhythm Sinus Bradycardia was reviewed with the receiving nurse. Opportunity for questions and clarification was provided.

## 2020-09-26 LAB
ATRIAL RATE: 50 BPM
CALCULATED P AXIS, ECG09: 50 DEGREES
CALCULATED R AXIS, ECG10: -39 DEGREES
CALCULATED T AXIS, ECG11: 10 DEGREES
DIAGNOSIS, 93000: NORMAL
P-R INTERVAL, ECG05: 214 MS
Q-T INTERVAL, ECG07: 456 MS
QRS DURATION, ECG06: 130 MS
QTC CALCULATION (BEZET), ECG08: 415 MS
VENTRICULAR RATE, ECG03: 50 BPM

## 2020-09-27 DIAGNOSIS — R80.9 TYPE 2 DIABETES MELLITUS WITH MICROALBUMINURIA, WITHOUT LONG-TERM CURRENT USE OF INSULIN (HCC): ICD-10-CM

## 2020-09-27 DIAGNOSIS — E11.29 TYPE 2 DIABETES MELLITUS WITH MICROALBUMINURIA, WITHOUT LONG-TERM CURRENT USE OF INSULIN (HCC): ICD-10-CM

## 2020-09-27 RX ORDER — METFORMIN HYDROCHLORIDE 500 MG/1
TABLET, EXTENDED RELEASE ORAL
Qty: 180 TAB | Refills: 1 | Status: SHIPPED | OUTPATIENT
Start: 2020-09-27 | End: 2021-03-25

## 2020-10-05 ENCOUNTER — TRANSCRIBE ORDER (OUTPATIENT)
Dept: CARDIAC REHAB | Age: 85
End: 2020-10-05

## 2020-10-05 DIAGNOSIS — Z95.5 STENTED CORONARY ARTERY: Primary | ICD-10-CM

## 2020-10-14 ENCOUNTER — HOSPITAL ENCOUNTER (OUTPATIENT)
Dept: CARDIAC REHAB | Age: 85
Discharge: HOME | End: 2020-10-14
Payer: MEDICARE

## 2020-10-14 VITALS — BODY MASS INDEX: 21.42 KG/M2 | HEIGHT: 69 IN | WEIGHT: 144.6 LBS

## 2020-10-14 PROCEDURE — 93798 PHYS/QHP OP CAR RHAB W/ECG: CPT

## 2020-10-14 NOTE — PROGRESS NOTES
Ramírez Gaffney MD [facility]  [address],  [facility], [address] [facility]    Office [phone]  Fax [phone]  Cell [phone]       Helen Murdock is a 80 y.o. male last seen by me 1 month ago. Follow up from PCI 9/24/20 by Dr. Ferdinand Wiseman. Diagnoses    Encounter Diagnoses     ICD-10-CM ICD-9-CM   1. Coronary artery disease involving native coronary artery of native heart without angina pectoris  I25.10 414.01   2. Benign essential HTN  I10 401.1   3. Paroxysmal atrial fibrillation (HCC)  I48.0 427.31   4. Type 2 diabetes mellitus with diabetic neuropathy, without long-term current use of insulin (HCC)  E11.40 250.60     357.2   5. Dyslipidemia  E78.5 272.4   6. History of bladder cancer  Z85.51 V10.51       Assessment/Recommendations:    CAD w/ severe 1 vessel disease involving LCX/OM1 bifurcation s/p PCI 9/24/20. He has had no recurrent angina but still experiences GONZALEZ w/ moderate activity. I suspect this is deconditioning. Agree with cardiac rehab. - Continue DAPT x1 year  - Continue cardiac rehab     Exercise induced AF. No sxs recurrence. Moderate LAE by recent echo. If AF recurrence documented during cardiac rehab, would start [facility] in place of ASA. HTN, normotensive on combination therapy    T2DM on metformin. Dyslipidemia. Updated lipids from August w/ LDL 55, TG 86   - Continue Atorva 40 mg/d    Low-grade bladder cancer followed by Dr. Sharron Koenig. Will need to discuss timing of cysto next year w/ respect to temporary interruption of Plavix. Follow-up and Dispositions    · Return in about 3 months (around 1/15/2021). Subjective:    Initial attempts at PCI by Dr. Ferdinand Wiseman on 9/8/20 were unsuccessful. He subsequently underwent repeat PCI at [facility] utilizing rotablation. Underwent successful stenting of LCX and OM1. He reports that he feels much better since his PCI w/o recurrent chest pressure.  He states that he still has stable pattern of GONZALEZ with moderate effort. Patient denies any palpitations, syncope, orthopnea, edema or paroxysmal nocturnal dyspnea. Intact right radial pulse. Post PCI he had significant bruising of right arm that is slowly improving. Patient denies any palpitations, syncope, orthopnea, edema or paroxysmal nocturnal dyspnea. Starting w/ cardiac rehab here at [facility] [address] [facility] tomorrow    He reports a diminished appetite. But no weight loss    No bleeding issues on Plavix and ASA. Dx w/ low-grade bladder cancer. Followed by Dr. Carlos Chavarria for regular check-ups every year. Lipids followed by Crys Ortiz MD.     Of note, his daughter lives close to him and likes to be included in his care. He lives in [facility]. Cardiac Risk Factors  HTN yes  DM yes  Smoking former, quit   Family Hx of CAD no    Cardiac testing  Echo 20 - EF 49%. Normal LV size and wall thickness. G1DD. Mod LAE. Mild MR. Mild TR. PASP 34 mmHg. Cath 20 -  Multivessel CAD. Culprit vessel = LCX/OM1 lesions, severe with significant calcification. Normal LVEDP. Unsuccessful PCI attempt with POBA due to calcification. Elective rotational atherectomy LCX/OM1      Past Medical History:   Diagnosis Date    Benign essential HTN     Bladder cancer ([facility] ?    s/p BCGx2, resection. low grade. in [facility]. now Dr. Millie Jett. cystoscopy 2020 clear    Closed left arm fracture     GERD (gastroesophageal reflux disease)     with hiatal hernia    Glaucoma     Dr. Columba Medrano    Hemorrhoids     Hypercholesterolemia     Microalbuminuria due to type 2 diabetes mellitus ([facility]     [facility]     Dr. Millie Jett. s/p prostatectomy 2000 in [facility].   PSA \"0.4\" since then    Rosacea     Trigeminal neuralgia of right side of face     Type 2 diabetes mellitus with microalbuminuria (HCC)     Vertigo         Social History     Socioeconomic History    Marital status: SINGLE     Spouse name:     Number of children: 3    Years of education: Not on file    Highest education level: Not on file   Occupational History    Occupation: Moved to    Tobacco Use    Smoking status: Former Smoker     Last attempt to quit: 1962     Years since quittin.8    Smokeless tobacco: Never Used   Substance and Sexual Activity    Alcohol use: Yes     Alcohol/week: 5.0 standard drinks     Types: 5 Glasses of wine per week    Drug use: Never   Social History Narrative    Has 3 children, 11 grandkids       Current Outpatient Medications   Medication Sig Dispense Refill    metFORMIN ER (GLUCOPHAGE XR) 500 mg tablet TAKE 2 TABLETS BY MOUTH EVERY DAY WITH DINNER 180 Tab 1    clopidogreL (Plavix) 75 mg tab Take 1 Tab by mouth daily. 30 Tab 3    gabapentin (NEURONTIN) 100 mg capsule Take 2 Caps by mouth nightly. Max Daily Amount: 200 mg. Decreased dose 20 180 Cap 0    atorvastatin (LIPITOR) 40 mg tablet TAKE 1 TABLET BY MOUTH EVERY DAY IN THE EVENING 90 Tab 1    Omeprazole delayed release (PRILOSEC D/R) 20 mg tablet TAKE 1 TABLET BY MOUTH EVERY DAY 84 Tab 1    olmesartan (BENICAR) 20 mg tablet Take 0.5 Tabs by mouth daily. Decreased dose 19 45 Tab 1    dorzolamide-timolol (COSOPT) 22.3-6.8 mg/mL ophthalmic solution APPLY 1 DROP(S) IN BOTH EYES 2 TIMES A DAY  2    latanoprost (XALATAN) 0.005 % ophthalmic solution APPLY 1 DROP(S) IN LEFT EYE AT BEDTIME  1    cholecalciferol (VITAMIN D3) 1,000 unit tablet Take  by mouth daily.  cyanocobalamin (VITAMIN B-12) 500 mcg tablet Take 500 mcg by mouth daily.  aspirin delayed-release (ASPIR-81) 81 mg tablet Take 1 Tab by mouth daily. 30 Tab 11    metroNIDAZOLE (METROCREAM) 0.75 % topical cream Apply  to affected area two (2) times a day. Use a thin layer to affected areas after washing 45 g 5       Allergies   Allergen Reactions    Lisinopril Cough          Review of Symptoms:  Constitutional: Negative for fever, chills, malaise/fatigue and diaphoresis.  +loss of appetite   Respiratory: Negative for cough, hemoptysis, sputum production, and wheezing. +GONZALEZ  Cardiovascular: Negative for chest pain, palpitations, orthopnea, claudication, leg swelling and PND. Gastrointestinal: Negative for heartburn, nausea, vomiting, blood in stool and melena. Genitourinary: Negative for dysuria and flank pain. Musculoskeletal: Negative for joint pain and back pain. +right arm pain  Skin: Negative for rash. Neurological: Negative for focal weakness, seizures, loss of consciousness, weakness and headaches. Endo/Heme/Allergies: Does not bruise/bleed easily. Psychiatric/Behavioral: Negative for memory loss. The patient does not have insomnia. Physical Exam  Visit Vitals  /70 (BP 1 Location: Left arm, BP Patient Position: Sitting)   Pulse 68   Ht 5' 9\" (1.753 m)   Wt 143 lb (64.9 kg)   SpO2 99%   BMI 21.12 kg/m²     Wt Readings from Last 3 Encounters:   10/15/20 143 lb (64.9 kg)   10/14/20 144 lb 9.6 oz (65.6 kg)   09/25/20 145 lb 15.1 oz (66.2 kg)     General - WDWN  HEENT: Eyes without jaundice, Hearing intact  Neck - JVP normal, thyroid nl  Cardiac - normal S1,S2, no murmurs, rubs or gallops. No clicks +intact right radial pulse   Vascular - carotids without bruits, radials, femorals and pedal pulses equal bilateral  Lungs - clear to auscultation bilaterals, no rales ,wheezing or rhonchi  Abd - soft nontender, no HSM, no abd bruits  Extremities - no edema  Neuro - nonfocal, normal gait  Psych - mood and affect normal    Labs:      Cardiographics      Written by Ailyn Stewart, as dictated by Singh Cisse M.D.      Singh Cisse MD

## 2020-10-14 NOTE — CARDIO/PULMONARY
Luis Hill 80 y.o. 
 
presented to cardiac rehab for orientation and exercise tolerance test today with a primary diagnosis of PCI X 2 (9/14/20). Cardiac risk factors include smoking/ tobacco exposure, dyslipidemia, diabetes mellitus, hypertension. Luis Hill is  and lives at [address] [place] [address]. PHQ9, depression score, is 3 and this is considered normal. The result was discussed with patient who affirms this score. Joise Pena states he does not feel depressed, he has a daughter close by, two sons many (6) grandchildren to talk to and a lot of friends at [employer]. He enjoys power walking as a hobby, he use to enjoy long distance running. Patient denied chest pain or SOB during 6 minute walk test and was in SR/ST with few MF PVC. Exercise prescription developed around patient stated goals, to be supplemented with home exercise recommendations. EF is 49%. Education manual given to patient and reviewed. Patient will attend cardiac rehab 2-3 times/week and education

## 2020-10-15 ENCOUNTER — OFFICE VISIT (OUTPATIENT)
Dept: CARDIOLOGY CLINIC | Age: 85
End: 2020-10-15
Payer: MEDICARE

## 2020-10-15 VITALS
HEIGHT: 69 IN | BODY MASS INDEX: 21.18 KG/M2 | DIASTOLIC BLOOD PRESSURE: 70 MMHG | OXYGEN SATURATION: 99 % | HEART RATE: 68 BPM | WEIGHT: 143 LBS | SYSTOLIC BLOOD PRESSURE: 134 MMHG

## 2020-10-15 DIAGNOSIS — E11.40 TYPE 2 DIABETES MELLITUS WITH DIABETIC NEUROPATHY, WITHOUT LONG-TERM CURRENT USE OF INSULIN (HCC): ICD-10-CM

## 2020-10-15 DIAGNOSIS — I10 BENIGN ESSENTIAL HTN: ICD-10-CM

## 2020-10-15 DIAGNOSIS — I25.10 CORONARY ARTERY DISEASE INVOLVING NATIVE CORONARY ARTERY OF NATIVE HEART WITHOUT ANGINA PECTORIS: Primary | ICD-10-CM

## 2020-10-15 DIAGNOSIS — E78.5 DYSLIPIDEMIA: ICD-10-CM

## 2020-10-15 DIAGNOSIS — I48.0 PAROXYSMAL ATRIAL FIBRILLATION (HCC): ICD-10-CM

## 2020-10-15 DIAGNOSIS — Z85.51 HISTORY OF BLADDER CANCER: ICD-10-CM

## 2020-10-15 PROCEDURE — 99214 OFFICE O/P EST MOD 30 MIN: CPT | Performed by: SPECIALIST

## 2020-10-15 PROCEDURE — G8420 CALC BMI NORM PARAMETERS: HCPCS | Performed by: SPECIALIST

## 2020-10-15 PROCEDURE — G8432 DEP SCR NOT DOC, RNG: HCPCS | Performed by: SPECIALIST

## 2020-10-15 PROCEDURE — G8536 NO DOC ELDER MAL SCRN: HCPCS | Performed by: SPECIALIST

## 2020-10-15 PROCEDURE — G8427 DOCREV CUR MEDS BY ELIG CLIN: HCPCS | Performed by: SPECIALIST

## 2020-10-15 PROCEDURE — 3051F HG A1C>EQUAL 7.0%<8.0%: CPT | Performed by: SPECIALIST

## 2020-10-15 PROCEDURE — 1101F PT FALLS ASSESS-DOCD LE1/YR: CPT | Performed by: SPECIALIST

## 2020-10-15 NOTE — PROGRESS NOTES
Lisbet Handley is a 80 y.o. male    Visit Vitals  /70 (BP 1 Location: Left arm, BP Patient Position: Sitting)   Pulse 68   Ht 5' 9\" (1.753 m)   Wt 143 lb (64.9 kg)   SpO2 99%   BMI 21.12 kg/m²       Chief Complaint   Patient presents with    Coronary Artery Disease       Chest pain no  SOB no  Dizziness yes sometimes  Swelling no  Recent hospital visit no  Refills no

## 2020-10-15 NOTE — LETTER
10/15/20 Patient: Bárbara Rojas YOB: 1929 Date of Visit: 10/15/2020 Emily Escobar MD 
[address] [facility] [id number] VIA In Basket Dear Emily Escobar MD, Thank you for referring Mr. Bárbara Rojas to [facility] for evaluation. My notes for this consultation are attached. If you have questions, please do not hesitate to call me. I look forward to following your patient along with you. Sincerely, Caterina Jain MD

## 2020-10-16 ENCOUNTER — HOSPITAL ENCOUNTER (OUTPATIENT)
Dept: CARDIAC REHAB | Age: 85
Discharge: HOME | End: 2020-10-16
Payer: MEDICARE

## 2020-10-16 VITALS — BODY MASS INDEX: 21.35 KG/M2 | WEIGHT: 144.6 LBS

## 2020-10-16 PROCEDURE — 93798 PHYS/QHP OP CAR RHAB W/ECG: CPT

## 2020-10-19 ENCOUNTER — HOSPITAL ENCOUNTER (OUTPATIENT)
Dept: CARDIAC REHAB | Age: 85
Discharge: HOME | End: 2020-10-19
Payer: MEDICARE

## 2020-10-19 VITALS — BODY MASS INDEX: 21.44 KG/M2 | WEIGHT: 145.2 LBS

## 2020-10-19 PROCEDURE — 93798 PHYS/QHP OP CAR RHAB W/ECG: CPT

## 2020-10-21 ENCOUNTER — HOSPITAL ENCOUNTER (OUTPATIENT)
Dept: CARDIAC REHAB | Age: 85
Discharge: HOME | End: 2020-10-21
Payer: MEDICARE

## 2020-10-21 VITALS — BODY MASS INDEX: 21.47 KG/M2 | WEIGHT: 145.4 LBS

## 2020-10-21 PROCEDURE — 93798 PHYS/QHP OP CAR RHAB W/ECG: CPT

## 2020-10-23 ENCOUNTER — HOSPITAL ENCOUNTER (OUTPATIENT)
Dept: CARDIAC REHAB | Age: 85
Discharge: HOME | End: 2020-10-23
Payer: MEDICARE

## 2020-10-23 VITALS — WEIGHT: 144.8 LBS | BODY MASS INDEX: 21.38 KG/M2

## 2020-10-23 PROCEDURE — 93798 PHYS/QHP OP CAR RHAB W/ECG: CPT

## 2020-10-26 ENCOUNTER — HOSPITAL ENCOUNTER (OUTPATIENT)
Dept: CARDIAC REHAB | Age: 85
Discharge: HOME | End: 2020-10-26
Payer: MEDICARE

## 2020-10-26 VITALS — BODY MASS INDEX: 21.32 KG/M2 | WEIGHT: 144.4 LBS

## 2020-10-26 PROCEDURE — 93798 PHYS/QHP OP CAR RHAB W/ECG: CPT

## 2020-10-28 ENCOUNTER — HOSPITAL ENCOUNTER (OUTPATIENT)
Dept: CARDIAC REHAB | Age: 85
Discharge: HOME | End: 2020-10-28
Payer: MEDICARE

## 2020-10-28 VITALS — WEIGHT: 144.4 LBS | BODY MASS INDEX: 21.32 KG/M2

## 2020-10-28 PROCEDURE — 93798 PHYS/QHP OP CAR RHAB W/ECG: CPT

## 2020-10-30 ENCOUNTER — HOSPITAL ENCOUNTER (OUTPATIENT)
Dept: CARDIAC REHAB | Age: 85
Discharge: HOME | End: 2020-10-30
Payer: MEDICARE

## 2020-10-30 VITALS — BODY MASS INDEX: 21.18 KG/M2 | WEIGHT: 143.4 LBS

## 2020-10-30 PROCEDURE — 93798 PHYS/QHP OP CAR RHAB W/ECG: CPT

## 2020-11-02 ENCOUNTER — HOSPITAL ENCOUNTER (OUTPATIENT)
Dept: CARDIAC REHAB | Age: 85
Discharge: HOME | End: 2020-11-02
Payer: MEDICARE

## 2020-11-02 VITALS — BODY MASS INDEX: 21.35 KG/M2 | WEIGHT: 144.6 LBS

## 2020-11-02 PROCEDURE — 93798 PHYS/QHP OP CAR RHAB W/ECG: CPT

## 2020-11-04 ENCOUNTER — HOSPITAL ENCOUNTER (OUTPATIENT)
Dept: CARDIAC REHAB | Age: 85
Discharge: HOME | End: 2020-11-04
Payer: MEDICARE

## 2020-11-04 VITALS — BODY MASS INDEX: 21.29 KG/M2 | WEIGHT: 144.2 LBS

## 2020-11-04 PROCEDURE — 93798 PHYS/QHP OP CAR RHAB W/ECG: CPT

## 2020-11-06 ENCOUNTER — HOSPITAL ENCOUNTER (OUTPATIENT)
Dept: CARDIAC REHAB | Age: 85
Discharge: HOME | End: 2020-11-06
Payer: MEDICARE

## 2020-11-06 VITALS — WEIGHT: 144.8 LBS | BODY MASS INDEX: 21.38 KG/M2

## 2020-11-06 DIAGNOSIS — I10 BENIGN ESSENTIAL HTN: ICD-10-CM

## 2020-11-06 PROCEDURE — 93798 PHYS/QHP OP CAR RHAB W/ECG: CPT

## 2020-11-06 RX ORDER — OLMESARTAN MEDOXOMIL 20 MG/1
10 TABLET ORAL DAILY
Qty: 45 TAB | Refills: 1 | Status: SHIPPED | OUTPATIENT
Start: 2020-11-06 | End: 2021-04-29

## 2020-11-09 ENCOUNTER — HOSPITAL ENCOUNTER (OUTPATIENT)
Dept: CARDIAC REHAB | Age: 85
Discharge: HOME | End: 2020-11-09
Payer: MEDICARE

## 2020-11-09 VITALS — WEIGHT: 145 LBS | BODY MASS INDEX: 21.41 KG/M2

## 2020-11-09 PROCEDURE — 93798 PHYS/QHP OP CAR RHAB W/ECG: CPT

## 2020-11-11 ENCOUNTER — HOSPITAL ENCOUNTER (OUTPATIENT)
Dept: CARDIAC REHAB | Age: 85
Discharge: HOME | End: 2020-11-11
Payer: MEDICARE

## 2020-11-11 VITALS — WEIGHT: 145.6 LBS | BODY MASS INDEX: 21.5 KG/M2

## 2020-11-11 PROCEDURE — 93798 PHYS/QHP OP CAR RHAB W/ECG: CPT

## 2020-11-13 ENCOUNTER — HOSPITAL ENCOUNTER (OUTPATIENT)
Dept: CARDIAC REHAB | Age: 85
Discharge: HOME | End: 2020-11-13
Payer: MEDICARE

## 2020-11-13 VITALS — WEIGHT: 144.8 LBS | BODY MASS INDEX: 21.38 KG/M2

## 2020-11-13 PROCEDURE — 93798 PHYS/QHP OP CAR RHAB W/ECG: CPT

## 2020-11-16 ENCOUNTER — HOSPITAL ENCOUNTER (OUTPATIENT)
Dept: CARDIAC REHAB | Age: 85
Discharge: HOME | End: 2020-11-16
Payer: MEDICARE

## 2020-11-16 VITALS — BODY MASS INDEX: 21.44 KG/M2 | WEIGHT: 145.2 LBS

## 2020-11-16 PROCEDURE — 93798 PHYS/QHP OP CAR RHAB W/ECG: CPT

## 2020-11-18 ENCOUNTER — HOSPITAL ENCOUNTER (OUTPATIENT)
Dept: CARDIAC REHAB | Age: 85
Discharge: HOME | End: 2020-11-18
Payer: MEDICARE

## 2020-11-18 VITALS — WEIGHT: 146 LBS | BODY MASS INDEX: 21.56 KG/M2

## 2020-11-18 PROCEDURE — 93798 PHYS/QHP OP CAR RHAB W/ECG: CPT

## 2020-11-20 ENCOUNTER — HOSPITAL ENCOUNTER (OUTPATIENT)
Dept: CARDIAC REHAB | Age: 85
Discharge: HOME | End: 2020-11-20
Payer: MEDICARE

## 2020-11-20 VITALS — WEIGHT: 147.2 LBS | BODY MASS INDEX: 21.74 KG/M2

## 2020-11-20 PROCEDURE — 93798 PHYS/QHP OP CAR RHAB W/ECG: CPT

## 2020-11-23 ENCOUNTER — HOSPITAL ENCOUNTER (OUTPATIENT)
Dept: CARDIAC REHAB | Age: 85
Discharge: HOME | End: 2020-11-23
Payer: MEDICARE

## 2020-11-23 VITALS — WEIGHT: 146.4 LBS | BODY MASS INDEX: 21.62 KG/M2

## 2020-11-23 PROCEDURE — 93798 PHYS/QHP OP CAR RHAB W/ECG: CPT

## 2020-11-25 ENCOUNTER — HOSPITAL ENCOUNTER (OUTPATIENT)
Dept: CARDIAC REHAB | Age: 85
Discharge: HOME | End: 2020-11-25
Payer: MEDICARE

## 2020-11-25 VITALS — WEIGHT: 147 LBS | BODY MASS INDEX: 21.71 KG/M2

## 2020-11-25 PROCEDURE — 93798 PHYS/QHP OP CAR RHAB W/ECG: CPT

## 2020-11-25 NOTE — CARDIO/PULMONARY
[facility] Cardiopulmonary Rehab Pt participating in Phase II Cardiac Rehab. Pre-exercise -170's/70's. (post exercise -140/60-70) There is a benefit of BP reduction of exercising. Discussion with pt regarding elevated BP upon arrival. 
Pt states entering the hospital is bringing memories of his [address] in [facility]. These memories cause some anxiety upon entering. Coached in relaxation, music therapy. Pt asked to take blood pressures at home. BP readings throughout the day are 110-130/60-70. Reading are over the span of months. Will exercise pt with pre-exercise BP within the range of 175/95 as there is great benefit post- ex BP. Will work with pt regarding continuing relaxation and coping strategies. Also, home BP machine brought in and does correlate with department BP machines.   
Ruben Hamilton RN

## 2020-11-27 ENCOUNTER — HOSPITAL ENCOUNTER (OUTPATIENT)
Dept: CARDIAC REHAB | Age: 85
Discharge: HOME | End: 2020-11-27
Payer: MEDICARE

## 2020-11-27 VITALS — BODY MASS INDEX: 21.77 KG/M2 | WEIGHT: 147.4 LBS

## 2020-11-27 PROCEDURE — 93798 PHYS/QHP OP CAR RHAB W/ECG: CPT

## 2020-11-30 ENCOUNTER — HOSPITAL ENCOUNTER (OUTPATIENT)
Dept: CARDIAC REHAB | Age: 85
Discharge: HOME | End: 2020-11-30
Payer: MEDICARE

## 2020-11-30 VITALS — WEIGHT: 147.7 LBS | BODY MASS INDEX: 21.81 KG/M2

## 2020-11-30 PROCEDURE — 93798 PHYS/QHP OP CAR RHAB W/ECG: CPT

## 2020-12-02 ENCOUNTER — HOSPITAL ENCOUNTER (OUTPATIENT)
Dept: CARDIAC REHAB | Age: 85
Discharge: HOME | End: 2020-12-02
Payer: MEDICARE

## 2020-12-02 VITALS — WEIGHT: 147.8 LBS | BODY MASS INDEX: 21.83 KG/M2

## 2020-12-02 PROCEDURE — 93798 PHYS/QHP OP CAR RHAB W/ECG: CPT

## 2020-12-04 ENCOUNTER — HOSPITAL ENCOUNTER (OUTPATIENT)
Dept: CARDIAC REHAB | Age: 85
Discharge: HOME | End: 2020-12-04
Payer: MEDICARE

## 2020-12-04 VITALS — WEIGHT: 148.2 LBS | BODY MASS INDEX: 21.89 KG/M2

## 2020-12-04 PROCEDURE — 93798 PHYS/QHP OP CAR RHAB W/ECG: CPT

## 2020-12-07 ENCOUNTER — HOSPITAL ENCOUNTER (OUTPATIENT)
Dept: CARDIAC REHAB | Age: 85
Discharge: HOME | End: 2020-12-07
Payer: MEDICARE

## 2020-12-07 VITALS — BODY MASS INDEX: 21.96 KG/M2 | WEIGHT: 148.7 LBS

## 2020-12-07 PROCEDURE — 93798 PHYS/QHP OP CAR RHAB W/ECG: CPT

## 2020-12-09 RX ORDER — CLOPIDOGREL BISULFATE 75 MG/1
75 TABLET ORAL DAILY
Qty: 30 TAB | Refills: 5 | Status: SHIPPED | OUTPATIENT
Start: 2020-12-09 | End: 2021-01-06 | Stop reason: SDUPTHER

## 2020-12-11 ENCOUNTER — HOSPITAL ENCOUNTER (OUTPATIENT)
Dept: CARDIAC REHAB | Age: 85
Discharge: HOME | End: 2020-12-11
Payer: MEDICARE

## 2020-12-11 VITALS — BODY MASS INDEX: 21.91 KG/M2 | WEIGHT: 148.4 LBS

## 2020-12-11 PROCEDURE — 93798 PHYS/QHP OP CAR RHAB W/ECG: CPT

## 2020-12-14 ENCOUNTER — HOSPITAL ENCOUNTER (OUTPATIENT)
Dept: CARDIAC REHAB | Age: 85
Discharge: HOME | End: 2020-12-14
Payer: MEDICARE

## 2020-12-14 VITALS — WEIGHT: 148.2 LBS | BODY MASS INDEX: 21.89 KG/M2

## 2020-12-14 PROCEDURE — 93798 PHYS/QHP OP CAR RHAB W/ECG: CPT

## 2020-12-16 ENCOUNTER — HOSPITAL ENCOUNTER (OUTPATIENT)
Dept: CARDIAC REHAB | Age: 85
Discharge: HOME | End: 2020-12-16
Payer: MEDICARE

## 2020-12-16 VITALS — WEIGHT: 148.8 LBS | BODY MASS INDEX: 21.97 KG/M2

## 2020-12-16 PROCEDURE — 93798 PHYS/QHP OP CAR RHAB W/ECG: CPT

## 2020-12-18 ENCOUNTER — HOSPITAL ENCOUNTER (OUTPATIENT)
Dept: CARDIAC REHAB | Age: 85
Discharge: HOME | End: 2020-12-18
Payer: MEDICARE

## 2020-12-18 VITALS — BODY MASS INDEX: 21.81 KG/M2 | WEIGHT: 147.7 LBS

## 2020-12-18 PROCEDURE — 93798 PHYS/QHP OP CAR RHAB W/ECG: CPT

## 2020-12-21 ENCOUNTER — HOSPITAL ENCOUNTER (OUTPATIENT)
Dept: CARDIAC REHAB | Age: 85
Discharge: HOME | End: 2020-12-21
Payer: MEDICARE

## 2020-12-21 VITALS — WEIGHT: 147.8 LBS | BODY MASS INDEX: 21.83 KG/M2

## 2020-12-21 PROCEDURE — 93798 PHYS/QHP OP CAR RHAB W/ECG: CPT

## 2020-12-23 ENCOUNTER — HOSPITAL ENCOUNTER (OUTPATIENT)
Dept: CARDIAC REHAB | Age: 85
Discharge: HOME | End: 2020-12-23
Payer: MEDICARE

## 2020-12-23 VITALS — WEIGHT: 147.8 LBS | BODY MASS INDEX: 21.83 KG/M2

## 2020-12-23 PROCEDURE — 93798 PHYS/QHP OP CAR RHAB W/ECG: CPT

## 2020-12-28 ENCOUNTER — HOSPITAL ENCOUNTER (OUTPATIENT)
Dept: CARDIAC REHAB | Age: 85
Discharge: HOME | End: 2020-12-28
Payer: MEDICARE

## 2020-12-28 VITALS — BODY MASS INDEX: 21.81 KG/M2 | WEIGHT: 147.7 LBS

## 2020-12-28 PROCEDURE — 93798 PHYS/QHP OP CAR RHAB W/ECG: CPT

## 2020-12-30 ENCOUNTER — HOSPITAL ENCOUNTER (OUTPATIENT)
Dept: CARDIAC REHAB | Age: 85
Discharge: HOME | End: 2020-12-30
Payer: MEDICARE

## 2020-12-30 VITALS — BODY MASS INDEX: 21.83 KG/M2 | WEIGHT: 147.8 LBS

## 2020-12-30 PROCEDURE — 93798 PHYS/QHP OP CAR RHAB W/ECG: CPT

## 2021-01-04 ENCOUNTER — HOSPITAL ENCOUNTER (OUTPATIENT)
Dept: CARDIAC REHAB | Age: 86
Discharge: HOME | End: 2021-01-04
Payer: MEDICARE

## 2021-01-04 VITALS — BODY MASS INDEX: 21.83 KG/M2 | WEIGHT: 147.8 LBS

## 2021-01-04 PROCEDURE — 93798 PHYS/QHP OP CAR RHAB W/ECG: CPT

## 2021-01-04 NOTE — CARDIO/PULMONARY
[facility] Cardiopulmonary Rehab:  FAX sent to Dr Mitali Hurd regarding increased SOB on 2/30/20 while on [employer] (arms only) with peak . Irregular rhythm was evident when intensity was decreased and HR decreased. SOB resolved quickly. Irregular rhythm appeared to be PAF vs WAP. Baseline rhythm is consistently SR with occ PACs and few PVCs. Pt exercised today with peak . He denied SOB today.

## 2021-01-06 ENCOUNTER — HOSPITAL ENCOUNTER (OUTPATIENT)
Dept: CARDIAC REHAB | Age: 86
Discharge: HOME | End: 2021-01-06
Payer: MEDICARE

## 2021-01-06 VITALS — WEIGHT: 148.2 LBS | BODY MASS INDEX: 21.89 KG/M2

## 2021-01-06 PROCEDURE — 93798 PHYS/QHP OP CAR RHAB W/ECG: CPT

## 2021-01-06 RX ORDER — CLOPIDOGREL BISULFATE 75 MG/1
75 TABLET ORAL DAILY
Qty: 90 TAB | Refills: 2 | Status: SHIPPED | OUTPATIENT
Start: 2021-01-06 | End: 2021-01-06 | Stop reason: SDUPTHER

## 2021-01-08 ENCOUNTER — HOSPITAL ENCOUNTER (OUTPATIENT)
Dept: CARDIAC REHAB | Age: 86
Discharge: HOME | End: 2021-01-08
Payer: MEDICARE

## 2021-01-08 VITALS — WEIGHT: 148.6 LBS | BODY MASS INDEX: 21.94 KG/M2

## 2021-01-08 PROCEDURE — 93798 PHYS/QHP OP CAR RHAB W/ECG: CPT

## 2021-01-11 ENCOUNTER — HOSPITAL ENCOUNTER (OUTPATIENT)
Dept: CARDIAC REHAB | Age: 86
Discharge: HOME | End: 2021-01-11
Payer: MEDICARE

## 2021-01-11 VITALS — BODY MASS INDEX: 21.83 KG/M2 | WEIGHT: 147.8 LBS

## 2021-01-11 PROCEDURE — 93798 PHYS/QHP OP CAR RHAB W/ECG: CPT

## 2021-01-11 NOTE — CARDIO/PULMONARY
[address] [facility] Cardiopulmonary Rehab  Lisa Richardson  Completed phase II cardiac rehab and attended 36 sessions from 10/14/20-1/11/21. Lisa Richardson is interested in maintaining optimal health and will work with Dr. Philipp Pina has improved his endurance and stamina through regular exercise, is at his stated goal weight. Blood pressure is 133/66 and is WNL. During participation, pre-ex blood pressure was at times greater then 130/70. Pt attributed this to PTSD from when he was in the war. Pt responded well to relaxation attempts. He has also improved his nutrition, [employer] and depression scores and these were reviewed with patient. Lisa Richardson plans to continue exercising at home, at a gym and has set revised goals that include cardio equipment, light weights and walking 3 to 5 times a week for at least 30 minutes.     Beckie Haywood RN  1/11/2021

## 2021-02-12 ENCOUNTER — OFFICE VISIT (OUTPATIENT)
Dept: CARDIOLOGY CLINIC | Age: 86
End: 2021-02-12
Payer: MEDICARE

## 2021-02-12 VITALS
DIASTOLIC BLOOD PRESSURE: 80 MMHG | HEART RATE: 60 BPM | HEIGHT: 69 IN | WEIGHT: 148 LBS | BODY MASS INDEX: 21.92 KG/M2 | SYSTOLIC BLOOD PRESSURE: 130 MMHG | OXYGEN SATURATION: 98 %

## 2021-02-12 DIAGNOSIS — I10 BENIGN ESSENTIAL HTN: ICD-10-CM

## 2021-02-12 DIAGNOSIS — I25.118 CORONARY ARTERY DISEASE OF NATIVE ARTERY OF NATIVE HEART WITH STABLE ANGINA PECTORIS (HCC): Primary | ICD-10-CM

## 2021-02-12 PROCEDURE — 99214 OFFICE O/P EST MOD 30 MIN: CPT | Performed by: SPECIALIST

## 2021-02-12 PROCEDURE — G8536 NO DOC ELDER MAL SCRN: HCPCS | Performed by: SPECIALIST

## 2021-02-12 PROCEDURE — G8420 CALC BMI NORM PARAMETERS: HCPCS | Performed by: SPECIALIST

## 2021-02-12 PROCEDURE — 1101F PT FALLS ASSESS-DOCD LE1/YR: CPT | Performed by: SPECIALIST

## 2021-02-12 PROCEDURE — G8510 SCR DEP NEG, NO PLAN REQD: HCPCS | Performed by: SPECIALIST

## 2021-02-12 PROCEDURE — G8427 DOCREV CUR MEDS BY ELIG CLIN: HCPCS | Performed by: SPECIALIST

## 2021-02-12 RX ORDER — CLOPIDOGREL BISULFATE 75 MG/1
TABLET ORAL
COMMUNITY
Start: 2021-01-06 | End: 2021-09-17

## 2021-02-12 NOTE — PROGRESS NOTES
Room 5    Visit Vitals  /80 (BP 1 Location: Right arm, BP Patient Position: Sitting, BP Cuff Size: Adult)   Pulse 60   Ht 5' 9\" (1.753 m)   Wt 148 lb (67.1 kg)   SpO2 98%   BMI 21.86 kg/m²       Chest pain: no  Shortness of breath: no  Edema: no  Palpitations: no  Dizziness: no    New diagnosis/Surgeries: no    ER/Hospitalizations: no    Refills: no

## 2021-02-12 NOTE — PROGRESS NOTES
Ward Brady MD. [facility] [place]              Patient: Eugenia Aguilar  : 1929      Today's Date: 2021            HISTORY OF PRESENT ILLNESS:     History of Present Illness:  He is doing well overall. Has some heart burn, but otherwise OK. No CP. Gets dizzy every now and then, but tolerable. Feeling better after PCI. Breathing is OK. No heart racing spells. Vitals look perfect at home. PAST MEDICAL HISTORY:     Past Medical History:   Diagnosis Date    Benign essential HTN     Bladder cancer ([facility] ?    s/p BCGx2, resection. low grade. in [facility]. now Dr. Lobo Garcia. cystoscopy 2020 clear    CAD (coronary artery disease)     Closed left arm fracture     GERD (gastroesophageal reflux disease)     with hiatal hernia    Glaucoma     Dr. Surjit Abebe    Hemorrhoids     HTN (hypertension)     Hypercholesterolemia     Microalbuminuria due to type 2 diabetes mellitus ([facility]     Prostate cancer ([facility] [address]     Dr. Lobo Garcia. s/p prostatectomy 2000 in [facility]. PSA \"0.4\" since then    Rosacea     Trigeminal neuralgia of right side of face     Type 2 diabetes mellitus with microalbuminuria (HCC)     Vertigo          Past Surgical History:   Procedure Laterality Date    HX COLONOSCOPY  ? reports 2 colonoscopies    HX OTHER SURGICAL  2020    bladder biopsy    HX PROSTATECTOMY  2000           MEDICATIONS:     Current Outpatient Medications   Medication Sig Dispense Refill    clopidogreL (PLAVIX) 75 mg tab TAKE 1 TABLET BY MOUTH EVERY DAY      olmesartan (BENICAR) 20 mg tablet Take 0.5 Tabs by mouth daily.  Decreased dose 19 45 Tab 1    metFORMIN ER (GLUCOPHAGE XR) 500 mg tablet TAKE 2 TABLETS BY MOUTH EVERY DAY WITH DINNER 180 Tab 1    atorvastatin (LIPITOR) 40 mg tablet TAKE 1 TABLET BY MOUTH EVERY DAY IN THE EVENING 90 Tab 1    Omeprazole delayed release (PRILOSEC D/R) 20 mg tablet TAKE 1 TABLET BY MOUTH EVERY DAY 84 Tab 1    dorzolamide-timolol (COSOPT) 22.3-6.8 mg/mL ophthalmic solution APPLY 1 DROP(S) IN BOTH EYES 2 TIMES A DAY  2    latanoprost (XALATAN) 0.005 % ophthalmic solution APPLY 1 DROP(S) IN LEFT EYE AT BEDTIME  1    cholecalciferol (VITAMIN D3) 1,000 unit tablet Take  by mouth daily.  cyanocobalamin (VITAMIN B-12) 500 mcg tablet Take 500 mcg by mouth daily.  aspirin delayed-release (ASPIR-81) 81 mg tablet Take 1 Tab by mouth daily. 30 Tab 11    metroNIDAZOLE (METROCREAM) 0.75 % topical cream Apply  to affected area two (2) times a day. Use a thin layer to affected areas after washing 45 g 5    gabapentin (NEURONTIN) 100 mg capsule Take 2 Caps by mouth nightly. Max Daily Amount: 200 mg. Decreased dose 20 180 Cap 0       Allergies   Allergen Reactions    Lisinopril Cough             SOCIAL HISTORY:     Social History     Tobacco Use    Smoking status: Former Smoker     Quit date: 1962     Years since quittin.1    Smokeless tobacco: Never Used   Substance Use Topics    Alcohol use: Yes     Alcohol/week: 5.0 standard drinks     Types: 5 Glasses of wine per week    Drug use: Never         FAMILY HISTORY:     Family History   Problem Relation Age of Onset    Cancer Mother         breast    Cancer Father         prostate    Cancer Sister         breast    Cancer Brother         prostate           REVIEW OF SYMPTOMS:     Review of Symptoms:  Constitutional: Negative for fever, chills  HEENT: Negative for nosebleeds, tinnitus, and vision changes. Respiratory: Negative for cough, wheezing  Cardiovascular: Negative for orthopnea, claudication, syncope, and PND. Gastrointestinal: Negative for abdominal pain, diarrhea, melena. Genitourinary: Negative for dysuria  Musculoskeletal: Negative for myalgias. Skin: Negative for rash  Heme: No problems bleeding. Neurological: Negative for speech change and focal weakness.    + some balance problems       PHYSICAL EXAM:     Physical Exam:  Visit Vitals  /80 (BP 1 Location: Right arm, BP Patient Position: Sitting, BP Cuff Size: Adult)   Pulse 60   Ht 5' 9\" (1.753 m)   Wt 148 lb (67.1 kg)   SpO2 98%   BMI 21.86 kg/m²     Patient appears generally well, mood and affect are appropriate and pleasant. HEENT:  Hearing intact, non-icteric, normocephalic, atraumatic. Neck Exam: Supple, No JVD or carotid bruits. Lung Exam: Clear to auscultation, even breath sounds. Cardiac Exam: Regular rate and rhythm with no murmur or rub  Abdomen: Soft, non-tender, normal bowel sounds. No bruits or masses. Extremities: Moves all ext well. No lower extremity edema. MSKTL: Overall good ROM ext  Skin: No significant rashes  Psych: Appropriate affect  Neuro - Grossly intact      LABS / OTHER STUDIES reviewed:     Lab Results   Component Value Date/Time    Sodium 143 09/25/2020 03:46 AM    Potassium 4.1 09/25/2020 03:46 AM    Chloride 112 (H) 09/25/2020 03:46 AM    CO2 25 09/25/2020 03:46 AM    Anion gap 6 09/25/2020 03:46 AM    Glucose 223 (H) 09/25/2020 03:46 AM    BUN 20 09/25/2020 03:46 AM    Creatinine 1.12 09/25/2020 03:46 AM    BUN/Creatinine ratio 18 09/25/2020 03:46 AM    GFR est AA >60 09/25/2020 03:46 AM    GFR est non-AA >60 09/25/2020 03:46 AM    Calcium 8.8 09/25/2020 03:46 AM    Bilirubin, total 0.5 08/24/2020 10:21 PM    Alk.  phosphatase 68 08/24/2020 10:21 PM    Protein, total 6.8 08/24/2020 10:21 PM    Albumin 4.1 08/24/2020 10:21 PM    Globulin 2.7 08/24/2020 10:21 PM    A-G Ratio 1.5 08/24/2020 10:21 PM    ALT (SGPT) 22 08/24/2020 10:21 PM    AST (SGOT) 13 (L) 08/24/2020 10:21 PM     Lab Results   Component Value Date/Time    WBC 7.1 09/25/2020 03:46 AM    HGB 9.3 (L) 09/25/2020 03:46 AM    HCT 28.4 (L) 09/25/2020 03:46 AM    PLATELET 626 (L) 85/79/2865 03:46 AM    MCV 95.6 09/25/2020 03:46 AM       Lab Results   Component Value Date/Time    Cholesterol, total 143 08/24/2020 10:21 PM    HDL Cholesterol 71 08/24/2020 10:21 PM    LDL, calculated 54.8 08/24/2020 10:21 PM    VLDL, calculated 17.2 08/24/2020 10:21 PM    Triglyceride 86 08/24/2020 10:21 PM    CHOL/HDL Ratio 2.0 08/24/2020 10:21 PM             CARDIAC DIAGNOSTICS:     Cardiac Evaluation Includes:  I reviewed the results below. Echo 9/1/20 - EF 49%. Normal LV size and wall thickness. G1DD. Mod LAE. Mild MR. Mild TR. PASP 34 mmHg.     Cath 9/8/20 -   Multivessel CAD. Culprit vessel = LCX/OM1 lesions, severe with significant calcification. Normal LVEDP. Unsuccessful PCI attempt with POBA due to calcification. Cath 9/24/20 ---> Elective rotational atherectomy LCX/OM1    Event Monitor 1/11/21-2/9/21 - sinus, HR , Avg 62 bpm.  10% PVC's. Most severe heart block was 2nd degree Type 1 (slowest 39 bpm). No symptoms. No Afib          ASSESSMENT AND PLAN:     Assessment and Plan:    1) CAD   - w/ severe 1 vessel disease involving LCX/OM1 bifurcation s/p PCI 9/24/20.   - He has had no recurrent angina with some GONZALEZ w/ moderate activity. - Continue DAPT x1 year s/p PCI --> plan to stop Plavix in Sept 2021   - He has completed cardiac rehab and is exercising at home      2) Question of exercise induced Afib   - He had some tachycardia on 1/4/21 on an exercise bike during cardiac rehab --> It was transient and on my review of the data, could not definitively say it was Afib. - We proceeded with an event monitor 1/11/21-2/9/21 which was normal and did not show any Afib   - Will reassess as needed (he follows vitals closely at home)   - Will plan to switch to [facility] if he demonstrates Afib      3) HTN  - BP looks great at home   - cont meds     4) Dyslipidemia   - cont statin (being tolerated)   - prior lipids look good      5) Low-grade bladder cancer followed by Dr. Meriel Lesches. 6) See me in 6 months. Of note, his daughter lives close to him and likes to be included in his care. He lives in [facility]. 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 Did some work in Angelo Montenegro MD, [address] [place] [address] Rd., [address]  [place] [address], [address]  [address]  Ph: [phone]   Ph [phone]

## 2021-03-25 DIAGNOSIS — E11.29 TYPE 2 DIABETES MELLITUS WITH MICROALBUMINURIA, WITHOUT LONG-TERM CURRENT USE OF INSULIN (HCC): ICD-10-CM

## 2021-03-25 DIAGNOSIS — R80.9 TYPE 2 DIABETES MELLITUS WITH MICROALBUMINURIA, WITHOUT LONG-TERM CURRENT USE OF INSULIN (HCC): ICD-10-CM

## 2021-03-25 RX ORDER — METFORMIN HYDROCHLORIDE 500 MG/1
TABLET, EXTENDED RELEASE ORAL
Qty: 180 TAB | Refills: 1 | Status: SHIPPED | OUTPATIENT
Start: 2021-03-25 | End: 2021-09-19

## 2021-04-29 DIAGNOSIS — I10 BENIGN ESSENTIAL HTN: ICD-10-CM

## 2021-04-29 RX ORDER — OLMESARTAN MEDOXOMIL 20 MG/1
TABLET ORAL
Qty: 45 TAB | Refills: 1 | Status: SHIPPED | OUTPATIENT
Start: 2021-04-29 | End: 2021-10-26 | Stop reason: SDUPTHER

## 2021-05-11 RX ORDER — PHENOL/SODIUM PHENOLATE
AEROSOL, SPRAY (ML) MUCOUS MEMBRANE
Qty: 84 TAB | Refills: 1 | Status: SHIPPED | OUTPATIENT
Start: 2021-05-11 | End: 2021-10-24

## 2021-06-29 ENCOUNTER — TELEPHONE (OUTPATIENT)
Dept: PHARMACY | Age: 86
End: 2021-06-29

## 2021-06-29 NOTE — TELEPHONE ENCOUNTER
Patient returning call. Confirms he's taking 1/2 tab every day of olmesartan - thinks he get ahead on supply when it changed from whole tablet to half tablet and he's using up surplus (appears dose change was in 2019). Patient also confirms he takes the metformin 2 tablets every day and has been for a while - he's unsure why that one would show a possible delay in refill. Briefly discussed.   As below, appears due for PCP follow up.    =========================================================   For Pharmacy [id number] [place] [address] Road in place: No   Recommendation Provided To: Patient/Caregiver: 1 via Telephone   Intervention Detail: Adherence Monitorin   Gap Closed?: No   Total # of Interventions Recommended: 1   Total # of Interventions Accepted: 0   Intervention Accepted By: Patient/Caregiver: 0   Time Spent (min): 15

## 2021-06-29 NOTE — TELEPHONE ENCOUNTER
CLINICAL PHARMACY: ADHERENCE REVIEW  Identified care gap per Ray[place]; fills at [facility]: ACE/ARB adherence    Last Visit: 8/24/20    Patient also appears to be prescribed: DM, statin    Patient not found in Outcomes MTM    ASSESSMENT  ACE/ARB ADHERENCE    Per Insurance Records through 6/21/21 (YTD [facility] = filled only once):   Olmesartan 20mg, 1/2 tab daily, last filled on 2/1/21 for 90 day supply. Next refill due: 5/2/21    Per chart, was reordered 4/29/21 for #45, 1 refill    BP Readings from Last 3 Encounters:   02/12/21 130/80   10/15/20 134/70   09/25/20 (!) 106/50     CrCl cannot be calculated (Patient's most recent lab result is older than the maximum 180 days allowed. ). DIABETES ADHERENCE    Per Insurance Records through 6/21/21 [facility] = filled only once):   Metformin 500mg, 2 tabs daily, last filled on 6/20/21 for 90 day supply. Next refill due: 9/18/21    Per Reconciled Dispense Report: (from 12/11/20 to 2/12/21)  Metformin ER 500mg filled on 12/26/20 for 90 day supply. Lab Results   Component Value Date/Time    Hemoglobin A1c 7.4 (H) 08/24/2020 10:21 PM    Hemoglobin A1c 7.4 (H) 02/06/2020 10:52 AM    Hemoglobin A1c (POC) 6.9 08/06/2019 08:20 AM     STATIN ADHERENCE    Per Insurance Records through 6/21/21 (YTD [facility] Adri[facility] = 100%; Potential Fail Date: 11/3/21): Atorvastatin 40mg daily last filled on 5/16/21 for 90 day supply. Next refill due: 9/5/21    Lab Results   Component Value Date/Time    Cholesterol, total 143 08/24/2020 10:21 PM    HDL Cholesterol 71 08/24/2020 10:21 PM    LDL, calculated 54.8 08/24/2020 10:21 PM    VLDL, calculated 17.2 08/24/2020 10:21 PM    Triglyceride 86 08/24/2020 10:21 PM    CHOL/HDL Ratio 2.0 08/24/2020 10:21 PM     ALT (SGPT)   Date Value Ref Range Status   08/24/2020 22 12 - 78 U/L Final     AST (SGOT)   Date Value Ref Range Status   08/24/2020 13 (L) 15 - 37 U/L Final     The ASCVD Risk score (Phong De La Vega., et al., 2013) failed to calculate for the following reasons:     The 2013 ASCVD risk score is only valid for ages 36 to 78     PLAN  The following are interventions that have been identified:  - Patient overdue refilling olmesartan (refill was due @5/2 and appears was reordered 4/29) and active on home medication list  - Need to confirm if patient is taking metformin 2 tabs or 1 tab daily? (per available info, #180 may have lasted from Dec to June?)   - Appears due for PCP appt/follow-up    Attempting to reach patient to review.  Left message asking for return call.     Future Appointments   Date Time Provider Marcel Salazar   9/17/2021  9:00 AM Carline Nguyễn MD [facility] COLLEEN Lundberg, PharmD, [address]  Direct: [phone]  Department, toll free: [phone], option 7

## 2021-08-03 PROBLEM — I10 BENIGN ESSENTIAL HTN: Status: RESOLVED | Noted: 2021-08-03 | Resolved: 2021-08-03

## 2021-08-15 DIAGNOSIS — E78.00 HYPERCHOLESTEROLEMIA: ICD-10-CM

## 2021-08-15 RX ORDER — ATORVASTATIN CALCIUM 40 MG/1
TABLET, FILM COATED ORAL
Qty: 90 TABLET | Refills: 1 | Status: SHIPPED | OUTPATIENT
Start: 2021-08-15 | End: 2022-02-21

## 2021-09-17 ENCOUNTER — OFFICE VISIT (OUTPATIENT)
Dept: CARDIOLOGY CLINIC | Age: 86
End: 2021-09-17
Payer: MEDICARE

## 2021-09-17 VITALS
DIASTOLIC BLOOD PRESSURE: 72 MMHG | WEIGHT: 149.2 LBS | SYSTOLIC BLOOD PRESSURE: 120 MMHG | OXYGEN SATURATION: 98 % | HEART RATE: 62 BPM | HEIGHT: 69 IN | BODY MASS INDEX: 22.1 KG/M2

## 2021-09-17 DIAGNOSIS — I10 HYPERTENSION, UNSPECIFIED TYPE: ICD-10-CM

## 2021-09-17 DIAGNOSIS — I25.118 CORONARY ARTERY DISEASE OF NATIVE ARTERY OF NATIVE HEART WITH STABLE ANGINA PECTORIS (HCC): Primary | ICD-10-CM

## 2021-09-17 DIAGNOSIS — E78.5 DYSLIPIDEMIA: ICD-10-CM

## 2021-09-17 PROBLEM — I48.0 PAROXYSMAL ATRIAL FIBRILLATION (HCC): Status: RESOLVED | Noted: 2020-09-14 | Resolved: 2021-09-17

## 2021-09-17 PROCEDURE — G8536 NO DOC ELDER MAL SCRN: HCPCS | Performed by: SPECIALIST

## 2021-09-17 PROCEDURE — G8420 CALC BMI NORM PARAMETERS: HCPCS | Performed by: SPECIALIST

## 2021-09-17 PROCEDURE — G8432 DEP SCR NOT DOC, RNG: HCPCS | Performed by: SPECIALIST

## 2021-09-17 PROCEDURE — 93000 ELECTROCARDIOGRAM COMPLETE: CPT | Performed by: SPECIALIST

## 2021-09-17 PROCEDURE — G8427 DOCREV CUR MEDS BY ELIG CLIN: HCPCS | Performed by: SPECIALIST

## 2021-09-17 PROCEDURE — 1101F PT FALLS ASSESS-DOCD LE1/YR: CPT | Performed by: SPECIALIST

## 2021-09-17 PROCEDURE — 99214 OFFICE O/P EST MOD 30 MIN: CPT | Performed by: SPECIALIST

## 2021-09-17 NOTE — PROGRESS NOTES
Yanira Mcgowan is a 80 y.o. male    Chief Complaint   Patient presents with    Follow-up     7 month f/u     Hypertension    Coronary Artery Disease       Chest pain No    SOB No    Dizziness due to medication    Swelling some in his right leg and ankle occasionally     Refills No  ? If to continue plavix     Visit Vitals  /72 (BP 1 Location: Left upper arm, BP Patient Position: Sitting)   Pulse 62   Ht 5' 9\" (1.753 m)   Wt 149 lb 3.2 oz (67.7 kg)   SpO2 98%   BMI 22.03 kg/m²       1. Have you been to the ER, urgent care clinic since your last visit? Hospitalized since your last visit? No    2. Have you seen or consulted any other health care providers outside of the [address] [place] [address] since your last visit? Include any pap smears or colon screening.   No

## 2021-09-17 NOTE — PROGRESS NOTES
Chai Vaca MD. [facility] [place]              Patient: Tamsen Holter  : 1929      Today's Date: 2021            HISTORY OF PRESENT ILLNESS:     History of Present Illness:  He is doing well overall. No CP or SOB. Feels great overall. Not as active as he used to be. PAST MEDICAL HISTORY:     Past Medical History:   Diagnosis Date    Benign essential HTN     Bladder cancer ([facility] ?    s/p BCGx2, resection. low grade. in [facility]. now Dr. Zuri Scott. cystoscopy 2020 clear    CAD (coronary artery disease)     Closed left arm fracture     GERD (gastroesophageal reflux disease)     with hiatal hernia    Glaucoma     Dr. Miranda London    Hemorrhoids     HTN (hypertension)     Hypercholesterolemia     Microalbuminuria due to type 2 diabetes mellitus ([facility]     [facility]     Dr. Zuri Scott. s/p prostatectomy 2000 in [facility]. PSA \"0.4\" since then    Rosacea     Trigeminal neuralgia of right side of face     Type 2 diabetes mellitus with microalbuminuria (HCC)     Vertigo          Past Surgical History:   Procedure Laterality Date    HX COLONOSCOPY  ? reports 2 colonoscopies    HX OTHER SURGICAL  2020    bladder biopsy    HX PROSTATECTOMY  2000           MEDICATIONS:     Current Outpatient Medications   Medication Sig Dispense Refill    atorvastatin (LIPITOR) 40 mg tablet TAKE 1 TABLET BY MOUTH EVERY DAY 90 Tablet 1    Omeprazole delayed release (PRILOSEC D/R) 20 mg tablet TAKE 1 TABLET BY MOUTH EVERY DAY 84 Tab 1    olmesartan (BENICAR) 20 mg tablet TAKE 1/2 TABLET BY MOUTH EVERY DAY 45 Tab 1    metFORMIN ER (GLUCOPHAGE XR) 500 mg tablet TAKE 2 TABLETS BY MOUTH EVERY DAY WITH DINNER 180 Tab 1    clopidogreL (PLAVIX) 75 mg tab TAKE 1 TABLET BY MOUTH EVERY DAY      gabapentin (NEURONTIN) 100 mg capsule Take 2 Caps by mouth nightly. Max Daily Amount: 200 mg.  Decreased dose 20 180 Cap 0    dorzolamide-timolol (COSOPT) 22.3-6.8 mg/mL ophthalmic solution APPLY 1 DROP(S) IN BOTH EYES 2 TIMES A DAY  2    latanoprost (XALATAN) 0.005 % ophthalmic solution APPLY 1 DROP(S) IN LEFT EYE AT BEDTIME  1    cholecalciferol (VITAMIN D3) 1,000 unit tablet Take  by mouth daily.  cyanocobalamin (VITAMIN B-12) 500 mcg tablet Take 500 mcg by mouth daily.  aspirin delayed-release (ASPIR-81) 81 mg tablet Take 1 Tab by mouth daily. 30 Tab 11    metroNIDAZOLE (METROCREAM) 0.75 % topical cream Apply  to affected area two (2) times a day. Use a thin layer to affected areas after washing 45 g 5       Allergies   Allergen Reactions    Lisinopril Cough             SOCIAL HISTORY:     Social History     Tobacco Use    Smoking status: Former Smoker     Quit date: 1962     Years since quittin.7    Smokeless tobacco: Never Used   Substance Use Topics    Alcohol use: Yes     Alcohol/week: 5.0 standard drinks     Types: 5 Glasses of wine per week    Drug use: Never         FAMILY HISTORY:     Family History   Problem Relation Age of Onset    Cancer Mother         breast    Cancer Father         prostate    Cancer Sister         breast    Cancer Brother         prostate           REVIEW OF SYMPTOMS:     Review of Symptoms:  Constitutional: Negative for fever, chills  HEENT: Negative for nosebleeds, tinnitus, and vision changes. Respiratory: Negative for cough, wheezing  Cardiovascular: Negative for orthopnea, claudication, syncope, and PND. Gastrointestinal: Negative for abdominal pain, diarrhea, melena. Genitourinary: Negative for dysuria  Musculoskeletal: Negative for myalgias. Skin: Negative for rash  Heme: No problems bleeding. Neurological: Negative for speech change and focal weakness.    + some balance problems       PHYSICAL EXAM:     Physical Exam:  Visit Vitals  /72 (BP 1 Location: Left upper arm, BP Patient Position: Sitting)   Pulse 62   Ht 5' 9\" (1.753 m)   Wt 149 lb 3.2 oz (67.7 kg)   SpO2 98%   BMI 22.03 kg/m²     Patient appears generally well, mood and affect are appropriate and pleasant. HEENT:  Hearing intact, non-icteric, normocephalic, atraumatic. Neck Exam: Supple, No JVD or carotid bruits. Lung Exam: Clear to auscultation, even breath sounds. Cardiac Exam: Regular rate and rhythm with no murmur or rub  Abdomen: Soft, non-tender, normal bowel sounds. No bruits or masses. Extremities: Moves all ext well. No lower extremity edema. MSKTL: Overall good ROM ext  Skin: No significant rashes  Psych: Appropriate affect  Neuro - Grossly intact      LABS / OTHER STUDIES reviewed:     Lab Results   Component Value Date/Time    Sodium 143 09/25/2020 03:46 AM    Potassium 4.1 09/25/2020 03:46 AM    Chloride 112 (H) 09/25/2020 03:46 AM    CO2 25 09/25/2020 03:46 AM    Anion gap 6 09/25/2020 03:46 AM    Glucose 223 (H) 09/25/2020 03:46 AM    BUN 20 09/25/2020 03:46 AM    Creatinine 1.12 09/25/2020 03:46 AM    BUN/Creatinine ratio 18 09/25/2020 03:46 AM    GFR est AA >60 09/25/2020 03:46 AM    GFR est non-AA >60 09/25/2020 03:46 AM    Calcium 8.8 09/25/2020 03:46 AM    Bilirubin, total 0.5 08/24/2020 10:21 PM    Alk.  phosphatase 68 08/24/2020 10:21 PM    Protein, total 6.8 08/24/2020 10:21 PM    Albumin 4.1 08/24/2020 10:21 PM    Globulin 2.7 08/24/2020 10:21 PM    A-G Ratio 1.5 08/24/2020 10:21 PM    ALT (SGPT) 22 08/24/2020 10:21 PM    AST (SGOT) 13 (L) 08/24/2020 10:21 PM     Lab Results   Component Value Date/Time    WBC 7.1 09/25/2020 03:46 AM    HGB 9.3 (L) 09/25/2020 03:46 AM    HCT 28.4 (L) 09/25/2020 03:46 AM    PLATELET 960 (L) 24/07/4698 03:46 AM    MCV 95.6 09/25/2020 03:46 AM       Lab Results   Component Value Date/Time    Cholesterol, total 143 08/24/2020 10:21 PM    HDL Cholesterol 71 08/24/2020 10:21 PM    LDL, calculated 54.8 08/24/2020 10:21 PM    VLDL, calculated 17.2 08/24/2020 10:21 PM    Triglyceride 86 08/24/2020 10:21 PM    CHOL/HDL Ratio 2.0 08/24/2020 10:21 PM             CARDIAC DIAGNOSTICS:     Cardiac Evaluation Includes:  I reviewed the results below. EKG 9/17/21 - sinus damon, PRWP, IVCD       Echo 9/1/20 - EF 49%. Normal LV size and wall thickness. G1DD. Mod LAE. Mild MR. Mild TR. PASP 34 mmHg.     Cath 9/8/20 -   Multivessel CAD. Culprit vessel = LCX/OM1 lesions, severe with significant calcification. Normal LVEDP. Unsuccessful PCI attempt with POBA due to calcification. Cath 9/24/20 ---> Elective rotational atherectomy LCX/OM1    Event Monitor 1/11/21-2/9/21 - sinus, HR , Avg 62 bpm.  10% PVC's. Most severe heart block was 2nd degree Type 1 (slowest 39 bpm). No symptoms. No Afib          ASSESSMENT AND PLAN:     Assessment and Plan:    1) CAD   - w/ severe 1 vessel disease involving LCX/OM1 bifurcation s/p PCI 9/24/20.   - He has had no recurrent angina with some GONZALEZ w/ moderate activity. - Cont ASA, statin   - He has completed cardiac rehab and is exercising at home   ---> he can stop plavix 9/24/21      2) Question of exercise induced Afib   - He had some tachycardia on 1/4/21 on an exercise bike during cardiac rehab --> It was transient and on my review of the data, could not definitively say it was Afib. - We proceeded with an event monitor 1/11/21-2/9/21 which was normal and did not show any Afib   - Will reassess as needed (he follows vitals closely at home)   - Will plan to switch to [address] [place] [address] if he demonstrates Afib      3) HTN  - BP looks great at home   - cont meds     4) Dyslipidemia   - cont statin (being tolerated)   - prior lipids look good   - he is to have repeat labs with Dr. Ariadna Delgadillo 45 soon      5) Low-grade bladder cancer followed by Dr. No Handley. 6) See me in 12 months. Of note, his daughter lives close to him and likes to be included in his care. He lives in [facility]. 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 Served in [place] ([employer]).   Did some work in [facility].    Robert Mahoney MD, [address] [place] [address] Drive.  [address], [address] [place] [address]  Ph: [phone]    [phone]

## 2021-09-18 DIAGNOSIS — E11.29 TYPE 2 DIABETES MELLITUS WITH MICROALBUMINURIA, WITHOUT LONG-TERM CURRENT USE OF INSULIN (HCC): ICD-10-CM

## 2021-09-18 DIAGNOSIS — R80.9 TYPE 2 DIABETES MELLITUS WITH MICROALBUMINURIA, WITHOUT LONG-TERM CURRENT USE OF INSULIN (HCC): ICD-10-CM

## 2021-09-19 RX ORDER — METFORMIN HYDROCHLORIDE 500 MG/1
TABLET, EXTENDED RELEASE ORAL
Qty: 180 TABLET | Refills: 1 | Status: SHIPPED | OUTPATIENT
Start: 2021-09-19 | End: 2022-03-17

## 2021-09-20 NOTE — TELEPHONE ENCOUNTER
I sent in refills for his requested medication(s), but patient is due for office follow-up. Please schedule appointment in the next 1-2 months, if not already done.   Thanks

## 2021-10-21 NOTE — Clinical Note
Guidewire removed. The guidewire removed due to: wire reshaping. [General Appearance - Alert] : alert [General Appearance - In No Acute Distress] : in no acute distress [Sclera] : the sclera and conjunctiva were normal [Jugular Venous Distention Increased] : there was no jugular-venous distention [Auscultation Breath Sounds / Voice Sounds] : lungs were clear to auscultation bilaterally [Heart Rate And Rhythm] : heart rate was normal and rhythm regular [Heart Sounds Gallop] : no gallops [Murmurs] : no murmurs [Heart Sounds Pericardial Friction Rub] : no pericardial rub [Edema] : there was no peripheral edema [Abdomen Soft] : soft [Abdomen Tenderness] : non-tender [No CVA Tenderness] : no ~M costovertebral angle tenderness [Involuntary Movements] : no involuntary movements were seen [] : no rash [No Focal Deficits] : no focal deficits [Oriented To Time, Place, And Person] : oriented to person, place, and time [Affect] : the affect was normal

## 2021-10-24 RX ORDER — PHENOL/SODIUM PHENOLATE
AEROSOL, SPRAY (ML) MUCOUS MEMBRANE
Qty: 84 TABLET | Refills: 1 | Status: SHIPPED | OUTPATIENT
Start: 2021-10-24 | End: 2021-11-24 | Stop reason: ALTCHOICE

## 2021-10-26 DIAGNOSIS — I10 BENIGN ESSENTIAL HTN: ICD-10-CM

## 2021-10-26 RX ORDER — OLMESARTAN MEDOXOMIL 20 MG/1
10 TABLET ORAL DAILY
Qty: 45 TABLET | Refills: 1 | Status: ON HOLD | OUTPATIENT
Start: 2021-10-26 | End: 2022-07-22

## 2021-11-24 ENCOUNTER — OFFICE VISIT (OUTPATIENT)
Dept: INTERNAL MEDICINE CLINIC | Age: 86
End: 2021-11-24
Payer: MEDICARE

## 2021-11-24 VITALS
HEIGHT: 69 IN | BODY MASS INDEX: 21.62 KG/M2 | HEART RATE: 65 BPM | WEIGHT: 146 LBS | TEMPERATURE: 98.1 F | OXYGEN SATURATION: 96 % | SYSTOLIC BLOOD PRESSURE: 134 MMHG | RESPIRATION RATE: 14 BRPM | DIASTOLIC BLOOD PRESSURE: 78 MMHG

## 2021-11-24 DIAGNOSIS — K21.9 GASTROESOPHAGEAL REFLUX DISEASE, UNSPECIFIED WHETHER ESOPHAGITIS PRESENT: ICD-10-CM

## 2021-11-24 DIAGNOSIS — I25.118 CORONARY ARTERY DISEASE OF NATIVE ARTERY OF NATIVE HEART WITH STABLE ANGINA PECTORIS (HCC): ICD-10-CM

## 2021-11-24 DIAGNOSIS — I10 BENIGN ESSENTIAL HTN: ICD-10-CM

## 2021-11-24 DIAGNOSIS — E78.5 DYSLIPIDEMIA: ICD-10-CM

## 2021-11-24 DIAGNOSIS — Z00.00 MEDICARE ANNUAL WELLNESS VISIT, SUBSEQUENT: Primary | ICD-10-CM

## 2021-11-24 DIAGNOSIS — E11.40 TYPE 2 DIABETES MELLITUS WITH DIABETIC NEUROPATHY, WITHOUT LONG-TERM CURRENT USE OF INSULIN (HCC): ICD-10-CM

## 2021-11-24 LAB
ALBUMIN SERPL-MCNC: 4.1 G/DL (ref 3.5–5)
ALBUMIN/GLOB SERPL: 1.4 {RATIO} (ref 1.1–2.2)
ALP SERPL-CCNC: 79 U/L (ref 45–117)
ALT SERPL-CCNC: 38 U/L (ref 12–78)
ANION GAP SERPL CALC-SCNC: 5 MMOL/L (ref 5–15)
AST SERPL-CCNC: 20 U/L (ref 15–37)
BILIRUB SERPL-MCNC: 0.5 MG/DL (ref 0.2–1)
BUN SERPL-MCNC: 25 MG/DL (ref 6–20)
BUN/CREAT SERPL: 20 (ref 12–20)
CALCIUM SERPL-MCNC: 9.8 MG/DL (ref 8.5–10.1)
CHLORIDE SERPL-SCNC: 110 MMOL/L (ref 97–108)
CHOLEST SERPL-MCNC: 125 MG/DL
CO2 SERPL-SCNC: 23 MMOL/L (ref 21–32)
CREAT SERPL-MCNC: 1.28 MG/DL (ref 0.7–1.3)
CREAT UR-MCNC: 82 MG/DL
ERYTHROCYTE [DISTWIDTH] IN BLOOD BY AUTOMATED COUNT: 12.3 % (ref 11.5–14.5)
EST. AVERAGE GLUCOSE BLD GHB EST-MCNC: 166 MG/DL
GLOBULIN SER CALC-MCNC: 2.9 G/DL (ref 2–4)
GLUCOSE SERPL-MCNC: 158 MG/DL (ref 65–100)
HBA1C MFR BLD: 7.4 % (ref 4–5.6)
HCT VFR BLD AUTO: 37.2 % (ref 36.6–50.3)
HDLC SERPL-MCNC: 68 MG/DL
HDLC SERPL: 1.8 {RATIO} (ref 0–5)
HGB BLD-MCNC: 12.3 G/DL (ref 12.1–17)
LDLC SERPL CALC-MCNC: 42.2 MG/DL (ref 0–100)
MCH RBC QN AUTO: 31.5 PG (ref 26–34)
MCHC RBC AUTO-ENTMCNC: 33.1 G/DL (ref 30–36.5)
MCV RBC AUTO: 95.1 FL (ref 80–99)
MICROALBUMIN UR-MCNC: 4.32 MG/DL
MICROALBUMIN/CREAT UR-RTO: 53 MG/G (ref 0–30)
NRBC # BLD: 0 K/UL (ref 0–0.01)
NRBC BLD-RTO: 0 PER 100 WBC
PLATELET # BLD AUTO: 220 K/UL (ref 150–400)
PMV BLD AUTO: 9.6 FL (ref 8.9–12.9)
POTASSIUM SERPL-SCNC: 4.9 MMOL/L (ref 3.5–5.1)
PROT SERPL-MCNC: 7 G/DL (ref 6.4–8.2)
RBC # BLD AUTO: 3.91 M/UL (ref 4.1–5.7)
SODIUM SERPL-SCNC: 138 MMOL/L (ref 136–145)
TRIGL SERPL-MCNC: 74 MG/DL (ref ?–150)
UR CULT HOLD, URHOLD: NORMAL
VLDLC SERPL CALC-MCNC: 14.8 MG/DL
WBC # BLD AUTO: 7.8 K/UL (ref 4.1–11.1)

## 2021-11-24 PROCEDURE — G8427 DOCREV CUR MEDS BY ELIG CLIN: HCPCS | Performed by: INTERNAL MEDICINE

## 2021-11-24 PROCEDURE — 99214 OFFICE O/P EST MOD 30 MIN: CPT | Performed by: INTERNAL MEDICINE

## 2021-11-24 PROCEDURE — G0439 PPPS, SUBSEQ VISIT: HCPCS | Performed by: INTERNAL MEDICINE

## 2021-11-24 PROCEDURE — 1101F PT FALLS ASSESS-DOCD LE1/YR: CPT | Performed by: INTERNAL MEDICINE

## 2021-11-24 PROCEDURE — G8536 NO DOC ELDER MAL SCRN: HCPCS | Performed by: INTERNAL MEDICINE

## 2021-11-24 PROCEDURE — G8510 SCR DEP NEG, NO PLAN REQD: HCPCS | Performed by: INTERNAL MEDICINE

## 2021-11-24 PROCEDURE — G8420 CALC BMI NORM PARAMETERS: HCPCS | Performed by: INTERNAL MEDICINE

## 2021-11-24 RX ORDER — OMEPRAZOLE 40 MG/1
40 CAPSULE, DELAYED RELEASE ORAL DAILY
Qty: 90 CAPSULE | Refills: 1 | Status: SHIPPED | OUTPATIENT
Start: 2021-11-24 | End: 2022-05-22

## 2021-11-24 NOTE — PROGRESS NOTES
This is a Subsequent Medicare Annual Wellness Visit providing Personalized Prevention Plan Services (PPPS) (Performed 12 months after initial AWV and PPPS )    I have reviewed the patient's medical history in detail and updated the computerized patient record. Last seen by me August 2020. He is overall doing fairly well. Living in [facility] living. He remains active, woodworking. Will see his daughter who lives locally for Thanksgiving. Reports some mild intermittent dysphagia in his throat. This has been stable for a while. Denies any regurgitation or pain with swallowing. He does also have some discomfort in his epigastric region at times which feels like a fullness and he feels it will improve if he could belch, but he cannot. He does have GERD and does have a history of hiatal hernia. Taking omeprazole 20 mg daily. Coronary disease. He is seeing cardiology Dr. Arelis Arevalo now. Status post catheterization and atherectomy with stent September 2020. Asymptomatic without any significant shortness of breath and his exercise tolerance is stable. Denies chest pain. He is taking aspirin, statin. No beta-blocker. Blood pressure 134/78, pulse 65, temperature 98.1 °F (36.7 °C), temperature source Oral, resp. rate 14, height 5' 9\" (1.753 m), weight 146 lb (66.2 kg), SpO2 96 %. Hypertension  Hypertension ROS: taking medications as instructed, no medication side effects noted, no TIA's, no chest pain on exertion, no dyspnea on exertion, no swelling of ankles     reports that he quit smoking about 59 years ago. He has never used smokeless tobacco.    reports current alcohol use of about 5.0 standard drinks of alcohol per week.    BP Readings from Last 2 Encounters:   11/24/21 134/78   09/17/21 120/72     Diabetes Mellitus follow-up  Last hemoglobin a1c   Lab Results   Component Value Date/Time    Hemoglobin A1c 7.4 (H) 08/24/2020 10:21 PM    Hemoglobin A1c (POC) 6.9 08/06/2019 08:20 AM   medication compliance: compliant all of the time. Diabetic diet compliance: compliant most of the time. Home glucose monitoring: fasting values range none. Hypoglycemic episodes:  None. Further diabetic ROS: no polyuria or polydipsia, no chest pain, dyspnea or TIA's, no numbness, tingling or pain in extremities. Seeing Dr. Abbey Hill for bladder cancer. Surveillance has been stable. Also is having PSA testing done there. History     Past Medical History:   Diagnosis Date    Benign essential HTN     [facility]?    s/p BCGx2, resection. low grade. in [facility]. now Dr. Abbey Hill. cystoscopy 6/2020 clear    CAD (coronary artery disease) 09/2021    Dr Anabel Metcalf. Cath 9/24/21 Successful bifurcation stenting of OM1 and AV groove Lcx with 2 stent couloette technique    Closed left arm fracture     GERD (gastroesophageal reflux disease)     with hiatal hernia    Glaucoma     Dr. Patricia Shen    Hemorrhoids     HTN (hypertension)     Hypercholesterolemia     Microalbuminuria due to type 2 diabetes mellitus ([facility]     Prostate cancer ([facility] 2000    Dr. Abbey Hill. s/p prostatectomy 1/2000 in [facility]. PSA \"0.4\" since then    Rosacea     Trigeminal neuralgia of right side of face     Type 2 diabetes mellitus with microalbuminuria (HCC)     Vertigo        Past Surgical History:   Procedure Laterality Date    HX COLONOSCOPY  2005? reports 2 colonoscopies    HX HEART CATHETERIZATION  09/24/2020    Successful bifurcation stenting of OM1 and AV groove Lcx with 2 stent couloette technique    HX HEART CATHETERIZATION  09/08/2020    Multivessel CAD. LCX/OM1 lesions, severe with significant calcification. Normal LVEDP.  Unsuccessful PCI attempt with POBA due to calcification.      HX OTHER SURGICAL  01/2020    bladder biopsy    HX PROSTATECTOMY  01/2000       Current Outpatient Medications   Medication Sig    omeprazole (PRILOSEC) 40 mg capsule Take 1 Capsule by mouth daily. Increased dose 11/24/21  Indications: heartburn    olmesartan (BENICAR) 20 mg tablet Take 0.5 Tablets by mouth daily.  metFORMIN ER (GLUCOPHAGE XR) 500 mg tablet TAKE 2 TABLETS BY MOUTH EVERY DAY WITH DINNER    atorvastatin (LIPITOR) 40 mg tablet TAKE 1 TABLET BY MOUTH EVERY DAY    dorzolamide-timolol (COSOPT) 22.3-6.8 mg/mL ophthalmic solution APPLY 1 DROP(S) IN BOTH EYES 2 TIMES A DAY    latanoprost (XALATAN) 0.005 % ophthalmic solution APPLY 1 DROP(S) IN LEFT EYE AT BEDTIME    cholecalciferol (VITAMIN D3) 1,000 unit tablet Take  by mouth daily.  cyanocobalamin (VITAMIN B-12) 500 mcg tablet Take 500 mcg by mouth daily.  aspirin delayed-release (ASPIR-81) 81 mg tablet Take 1 Tab by mouth daily. No current facility-administered medications for this visit. Allergies   Allergen Reactions    Lisinopril Cough       Family History   Problem Relation Age of Onset    Cancer Mother         breast    Cancer Father         prostate    Cancer Sister         breast    Cancer Brother         prostate        reports that he quit smoking about 59 years ago. He has never used smokeless tobacco.   reports current alcohol use of about 5.0 standard drinks of alcohol per week. Depression Risk Factor Screening:       Alcohol Risk Factor Screening: On any occasion during the past 3 months, have you had more than 3 drinks containing alcohol? No    Do you average more than 14 drinks per week? No      Functional Ability and Level of Safety:     Hearing Loss   mild    Activities of Daily Living   Self-care. Requires assistance with: no ADLs    Fall Risk     Fall Risk Assessment, last 12 mths 11/24/2021   Able to walk? Yes   Fall in past 12 months? 0   Do you feel unsteady? 0   Are you worried about falling 0         Abuse Screen   Patient is not abused    Review of Systems   A comprehensive review of systems was negative except for that written in the HPI.     Physical Examination     Evaluation of Cognitive Function:  Mood/affect:  neutral, happy  Appearance: age appropriate  Family member/caregiver input: none    Blood pressure 134/78, pulse 65, temperature 98.1 °F (36.7 °C), temperature source Oral, resp. rate 14, height 5' 9\" (1.753 m), weight 146 lb (66.2 kg), SpO2 96 %. General appearance: alert, cooperative, no distress, appears stated age  Neck: supple, symmetrical, trachea midline, no adenopathy, thyroid: not enlarged, symmetric, no tenderness/mass/nodules, no carotid bruit and no JVD  Lungs: clear to auscultation bilaterally  Heart: regular rate and rhythm, S1, S2 normal, no murmur, click, rub or gallop  Extremities: extremities normal, atraumatic, no cyanosis or edema    Patient Care Team:  Larissa Clark MD as PCP - General (Internal Medicine)  Larissa Clark MD as PCP - [facility] Provider  Kat Villanueva MD as Physician (Urology)      Advice/Referrals/Counseling   Education and counseling provided. See below for specific orders    Assessment/Plan   Diagnoses and all orders for this visit:    1. Medicare annual wellness visit, subsequent    2. Type 2 diabetes mellitus with diabetic neuropathy, without long-term current use of insulin (HCC)  Unclear current control, but I suspect will be relatively well controlled given his loss of weight and some decrease in appetite. We will continue Metformin but follow labs closely. -     MICROALBUMIN, UR, RAND W/ MICROALB/CREAT RATIO; Future  -     METABOLIC PANEL, COMPREHENSIVE; Future  -     HEMOGLOBIN A1C WITH EAG; Future  -     LIPID PANEL; Future  -     CBC W/O DIFF; Future    3. Coronary artery disease of native artery of native heart with stable angina pectoris [facility])  This condition appears to be stable and is being evaluated and managed by his specialist Dr. Mindy Lopez. No acute findings today warrant change in management plan. Consider low-dose beta-blocker but would have concern with fatigue in this 80year-old.     4. Benign essential HTN  This condition is controlled on current medication regimen as written in medication list.  Medications refilled. Consider low-dose beta-blocker but would have concern with fatigue in this 80year-old. -     CBC W/O DIFF; Future    5. Dyslipidemia  This condition is controlled on current medication regimen as written in medication list.  Medications refilled. 6 GERD  Hiatal hernia  Symptoms are borderline controlled. Increase omeprazole. Consider endoscopy or esophagram.  -     omeprazole (PRILOSEC) 40 mg capsule; Take 1 Capsule by mouth daily. Increased dose 11/24/21  Indications: heartburn    . Potential medication side effects were discussed with the patient; let me know if any occur.   Return for yearly Annual Wellness Visits

## 2022-02-21 DIAGNOSIS — E78.00 HYPERCHOLESTEROLEMIA: ICD-10-CM

## 2022-02-21 RX ORDER — ATORVASTATIN CALCIUM 40 MG/1
TABLET, FILM COATED ORAL
Qty: 90 TABLET | Refills: 1 | Status: SHIPPED | OUTPATIENT
Start: 2022-02-21 | End: 2022-08-02 | Stop reason: ALTCHOICE

## 2022-03-17 DIAGNOSIS — E11.29 TYPE 2 DIABETES MELLITUS WITH MICROALBUMINURIA, WITHOUT LONG-TERM CURRENT USE OF INSULIN (HCC): ICD-10-CM

## 2022-03-17 DIAGNOSIS — R80.9 TYPE 2 DIABETES MELLITUS WITH MICROALBUMINURIA, WITHOUT LONG-TERM CURRENT USE OF INSULIN (HCC): ICD-10-CM

## 2022-03-17 RX ORDER — METFORMIN HYDROCHLORIDE 500 MG/1
TABLET, EXTENDED RELEASE ORAL
Qty: 180 TABLET | Refills: 1 | Status: SHIPPED | OUTPATIENT
Start: 2022-03-17 | End: 2022-09-19

## 2022-03-18 PROBLEM — Z85.51 HISTORY OF BLADDER CANCER: Status: ACTIVE | Noted: 2018-05-10

## 2022-03-18 PROBLEM — I25.118 CORONARY ARTERY DISEASE OF NATIVE ARTERY OF NATIVE HEART WITH STABLE ANGINA PECTORIS (HCC): Status: ACTIVE | Noted: 2020-09-14

## 2022-03-19 PROBLEM — Z98.890 S/P CARDIAC CATH: Status: ACTIVE | Noted: 2020-09-24

## 2022-03-19 PROBLEM — E78.5 DYSLIPIDEMIA: Status: ACTIVE | Noted: 2020-10-15

## 2022-03-19 PROBLEM — E11.21 TYPE 2 DIABETES WITH NEPHROPATHY (HCC): Status: ACTIVE | Noted: 2018-02-10

## 2022-03-19 PROBLEM — Z85.46 HISTORY OF PROSTATE CANCER: Status: ACTIVE | Noted: 2018-05-10

## 2022-03-20 PROBLEM — E11.40 TYPE 2 DIABETES MELLITUS WITH DIABETIC NEUROPATHY (HCC): Status: ACTIVE | Noted: 2020-02-06

## 2022-05-22 RX ORDER — OMEPRAZOLE 40 MG/1
40 CAPSULE, DELAYED RELEASE ORAL DAILY
Qty: 90 CAPSULE | Refills: 1 | Status: SHIPPED | OUTPATIENT
Start: 2022-05-22 | End: 2022-10-21

## 2022-07-18 ENCOUNTER — TELEPHONE (OUTPATIENT)
Dept: INTERNAL MEDICINE CLINIC | Age: 87
End: 2022-07-18

## 2022-07-18 DIAGNOSIS — G50.0 TRIGEMINAL NEURALGIA OF RIGHT SIDE OF FACE: Primary | ICD-10-CM

## 2022-07-18 RX ORDER — NIRMATRELVIR AND RITONAVIR 300-100 MG
3 KIT ORAL EVERY 12 HOURS
Qty: 1 BOX | Refills: 0 | Status: SHIPPED | OUTPATIENT
Start: 2022-07-18 | End: 2022-07-23

## 2022-07-18 RX ORDER — TRAMADOL HYDROCHLORIDE 50 MG/1
50 TABLET ORAL
Qty: 21 TABLET | Refills: 0 | Status: SHIPPED | OUTPATIENT
Start: 2022-07-18 | End: 2022-07-25

## 2022-07-18 RX ORDER — GABAPENTIN 300 MG/1
300 CAPSULE ORAL 3 TIMES DAILY
Qty: 30 CAPSULE | Refills: 0 | Status: SHIPPED | OUTPATIENT
Start: 2022-07-18 | End: 2022-08-02 | Stop reason: SDUPTHER

## 2022-07-18 NOTE — TELEPHONE ENCOUNTER
It sounds as if his COVID-19 infection may have reactivated some of his trigeminal neuralgia symptoms. Gabapentin does not work immediately but will build up over a couple of days. I sent in 300 mg pills. Take 1 every 8 hours. I also sent in tramadol 50 mg every 8 hours as needed for pain. This will be hopefully helpful for the neuropathic pain while the gabapentin is starting to kick in. For the COVID-19 infection, I recommend Paxlovid Rx and I sent in a prescription for that as well.

## 2022-07-18 NOTE — TELEPHONE ENCOUNTER
T/C to Ami/Dtr (HIPPA VERIFIED) to update on Dr. Mac Mayen comments, recommendations and scripts sent to pharmacy for . No answer and LVM.

## 2022-07-18 NOTE — TELEPHONE ENCOUNTER
I would advise against DayQuil or NyQuil since they may increase his blood pressure and he already has coronary disease. Would try Coricidin, plain Mucinex twice daily, nasal saline, Flonase nasal spray.

## 2022-07-18 NOTE — TELEPHONE ENCOUNTER
Spoke with Ami/Dtr (Yefri Reddy) and update don Dr. Botello Province comments and recommendations regarding otc medications to treat symptoms of COVID. She states understanding and that she will relay all information to her father. Grateful for the call.

## 2022-07-18 NOTE — TELEPHONE ENCOUNTER
Spoke with patient and updated on Dr. Kaiden Thompson comments, recommendations and scripts sent to his pharmacy. He states understanding and grateful for the call. He asks that I call his dtr and update her so she can  medications. Grateful for the call.

## 2022-07-18 NOTE — TELEPHONE ENCOUNTER
Spoke with Ami/Halley (HIPPA VERIFIED) and she reports that patient tested positive of COVID 7/17/22. He had a fever of 102 yesterday. Cough and Congestion, fatigue and is feeling poorly. He is vaccinated and boosted x1. She is calling on his behalf. Advised that I would call him and update Dr. Bipin Jay. She reports that he lives at [facility] and is self isolating. Grateful for the call.

## 2022-07-18 NOTE — TELEPHONE ENCOUNTER
Patient is experiencing his pain again and wants to start taking his gabapentin again. He tested positive for Covid. The neuralgia in his head has increased.   Please call Kristin Mckinnon at [phone]

## 2022-07-18 NOTE — TELEPHONE ENCOUNTER
Daughter wants to know if it's ok for her dad to take over the counter  dayquil or night quil with all his medications.   Please call her and let her know

## 2022-07-18 NOTE — TELEPHONE ENCOUNTER
Spoke with patient and he reports started to have his trigeminal neuralgia badly on 7/16/22 and he reports that yesterday he tested positive for COVID with fever, cough, congestion, headache and severe neuralgia pain 10/10. He reports taking 600 mg of Gabapentin and that it was snot very effective and he believes he needs a higher dose. Denies SOB but reports fatigue. Advised patient to not take anymore until Dr. Garfield Kocher can advise him what to do. He states \" I can't take the pain in my head\"   He is self isolating as reccommended. He asks that Dr. Garfield Kocher advise what to do as soon as possible. He states understanding and grateful for the call. Dr. Garfield Kocher notified.

## 2022-07-19 ENCOUNTER — HOSPITAL ENCOUNTER (INPATIENT)
Age: 87
LOS: 4 days | Discharge: HOME | DRG: 871 | End: 2022-07-23
Attending: EMERGENCY MEDICINE | Admitting: INTERNAL MEDICINE
Payer: MEDICARE

## 2022-07-19 ENCOUNTER — TELEPHONE (OUTPATIENT)
Dept: INTERNAL MEDICINE CLINIC | Age: 87
End: 2022-07-19

## 2022-07-19 ENCOUNTER — HOSPITAL ENCOUNTER (EMERGENCY)
Dept: GENERAL RADIOLOGY | Age: 87
Discharge: HOME | DRG: 871 | End: 2022-07-19
Attending: EMERGENCY MEDICINE
Payer: MEDICARE

## 2022-07-19 ENCOUNTER — APPOINTMENT (OUTPATIENT)
Dept: CT IMAGING | Age: 87
DRG: 871 | End: 2022-07-19
Attending: EMERGENCY MEDICINE
Payer: MEDICARE

## 2022-07-19 DIAGNOSIS — R53.1 WEAKNESS GENERALIZED: ICD-10-CM

## 2022-07-19 DIAGNOSIS — S00.431A HEMATOMA OF RIGHT AURICULAR REGION: ICD-10-CM
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
## 2022-07-19 DIAGNOSIS — J18.9 COMMUNITY ACQUIRED PNEUMONIA OF LEFT LUNG, UNSPECIFIED PART OF LUNG: ICD-10-CM

## 2022-07-19 DIAGNOSIS — N39.0 COMPLICATED UTI (URINARY TRACT INFECTION): Primary | ICD-10-CM

## 2022-07-19 DIAGNOSIS — U07.1 COVID-19 VIRUS INFECTION: ICD-10-CM

## 2022-07-19 DIAGNOSIS — A41.9 SEPSIS, DUE TO UNSPECIFIED ORGANISM, UNSPECIFIED WHETHER ACUTE ORGAN DYSFUNCTION PRESENT (HCC): ICD-10-CM

## 2022-07-19 DIAGNOSIS — S01.01XA LACERATION OF SCALP, INITIAL ENCOUNTER: ICD-10-CM

## 2022-07-19 PROBLEM — J12.82 PNEUMONIA DUE TO COVID-19 VIRUS: Status: ACTIVE | Noted: 2022-07-19

## 2022-07-19 PROBLEM — J12.82 PNEUMONIA DUE TO COVID-19 VIRUS: Status: ACTIVE | Noted: 2022-01-01

## 2022-07-19 LAB
ANION GAP SERPL CALC-SCNC: 10 MMOL/L (ref 5–15)
APPEARANCE UR: ABNORMAL
B PERT DNA SPEC QL NAA+PROBE: NOT DETECTED
BACTERIA URNS QL MICRO: ABNORMAL /HPF
BASOPHILS # BLD: 0 K/UL (ref 0–0.1)
BASOPHILS NFR BLD: 0 % (ref 0–1)
BILIRUB UR QL: NEGATIVE
BORDETELLA PARAPERTUSSIS PCR, BORPAR: NOT DETECTED
BUN SERPL-MCNC: 15 MG/DL (ref 6–20)
BUN/CREAT SERPL: 12 (ref 12–20)
C PNEUM DNA SPEC QL NAA+PROBE: NOT DETECTED
CALCIUM SERPL-MCNC: 9.3 MG/DL (ref 8.5–10.1)
CHLORIDE SERPL-SCNC: 102 MMOL/L (ref 97–108)
CO2 SERPL-SCNC: 23 MMOL/L (ref 21–32)
COLOR UR: ABNORMAL
COMMENT, HOLDF: NORMAL
CREAT SERPL-MCNC: 1.28 MG/DL (ref 0.7–1.3)
CRP SERPL-MCNC: 2.34 MG/DL (ref 0–0.6)
D DIMER PPP FEU-MCNC: 2.54 MG/L FEU (ref 0–0.65)
DIFFERENTIAL METHOD BLD: ABNORMAL
EOSINOPHIL # BLD: 0 K/UL (ref 0–0.4)
EOSINOPHIL NFR BLD: 0 % (ref 0–7)
EPITH CASTS URNS QL MICRO: ABNORMAL /LPF
ERYTHROCYTE [DISTWIDTH] IN BLOOD BY AUTOMATED COUNT: 12.1 % (ref 11.5–14.5)
FLUAV H1 2009 PAND RNA SPEC QL NAA+PROBE: NOT DETECTED
FLUAV H1 RNA SPEC QL NAA+PROBE: NOT DETECTED
FLUAV H3 RNA SPEC QL NAA+PROBE: NOT DETECTED
FLUAV SUBTYP SPEC NAA+PROBE: NOT DETECTED
FLUBV RNA SPEC QL NAA+PROBE: NOT DETECTED
GLUCOSE BLD STRIP.AUTO-MCNC: 302 MG/DL (ref 65–117)
GLUCOSE SERPL-MCNC: 222 MG/DL (ref 65–100)
GLUCOSE UR STRIP.AUTO-MCNC: NEGATIVE MG/DL
HADV DNA SPEC QL NAA+PROBE: NOT DETECTED
HCOV 229E RNA SPEC QL NAA+PROBE: NOT DETECTED
HCOV HKU1 RNA SPEC QL NAA+PROBE: NOT DETECTED
HCOV NL63 RNA SPEC QL NAA+PROBE: NOT DETECTED
HCOV OC43 RNA SPEC QL NAA+PROBE: NOT DETECTED
HCT VFR BLD AUTO: 37.3 % (ref 36.6–50.3)
HGB BLD-MCNC: 12.7 G/DL (ref 12.1–17)
HGB UR QL STRIP: ABNORMAL
HMPV RNA SPEC QL NAA+PROBE: NOT DETECTED
HPIV1 RNA SPEC QL NAA+PROBE: NOT DETECTED
HPIV2 RNA SPEC QL NAA+PROBE: NOT DETECTED
HPIV3 RNA SPEC QL NAA+PROBE: NOT DETECTED
HPIV4 RNA SPEC QL NAA+PROBE: NOT DETECTED
HYALINE CASTS URNS QL MICRO: ABNORMAL /LPF (ref 0–2)
IMM GRANULOCYTES # BLD AUTO: 0 K/UL (ref 0–0.04)
IMM GRANULOCYTES NFR BLD AUTO: 0 % (ref 0–0.5)
KETONES UR QL STRIP.AUTO: ABNORMAL MG/DL
LACTATE BLD-SCNC: 0.73 MMOL/L (ref 0.4–2)
LACTATE BLD-SCNC: 2.24 MMOL/L (ref 0.4–2)
LEUKOCYTE ESTERASE UR QL STRIP.AUTO: ABNORMAL
LYMPHOCYTES # BLD: 0.5 K/UL (ref 0.8–3.5)
LYMPHOCYTES NFR BLD: 5 % (ref 12–49)
M PNEUMO DNA SPEC QL NAA+PROBE: NOT DETECTED
MCH RBC QN AUTO: 31.1 PG (ref 26–34)
MCHC RBC AUTO-ENTMCNC: 34 G/DL (ref 30–36.5)
MCV RBC AUTO: 91.2 FL (ref 80–99)
MONOCYTES # BLD: 0.6 K/UL (ref 0–1)
MONOCYTES NFR BLD: 6 % (ref 5–13)
NEUTS SEG # BLD: 8.8 K/UL (ref 1.8–8)
NEUTS SEG NFR BLD: 89 % (ref 32–75)
NITRITE UR QL STRIP.AUTO: POSITIVE
NRBC # BLD: 0 K/UL (ref 0–0.01)
NRBC BLD-RTO: 0 PER 100 WBC
PH UR STRIP: 5.5 [PH] (ref 5–8)
PLATELET # BLD AUTO: 185 K/UL (ref 150–400)
PMV BLD AUTO: 8.9 FL (ref 8.9–12.9)
POTASSIUM SERPL-SCNC: 4.3 MMOL/L (ref 3.5–5.1)
PROCALCITONIN SERPL-MCNC: 0.91 NG/ML
PROT UR STRIP-MCNC: 100 MG/DL
RBC # BLD AUTO: 4.09 M/UL (ref 4.1–5.7)
RBC #/AREA URNS HPF: ABNORMAL /HPF (ref 0–5)
RBC MORPH BLD: ABNORMAL
RSV RNA SPEC QL NAA+PROBE: NOT DETECTED
RV+EV RNA SPEC QL NAA+PROBE: NOT DETECTED
SAMPLES BEING HELD,HOLD: NORMAL
SARS-COV-2 PCR, COVPCR: DETECTED
SERVICE CMNT-IMP: ABNORMAL
SODIUM SERPL-SCNC: 135 MMOL/L (ref 136–145)
SP GR UR REFRACTOMETRY: 1.01 (ref 1–1.03)
TROPONIN-HIGH SENSITIVITY: 85 NG/L (ref 0–76)
UR CULT HOLD, URHOLD: NORMAL
UROBILINOGEN UR QL STRIP.AUTO: 0.2 EU/DL (ref 0.2–1)
WBC # BLD AUTO: 9.9 K/UL (ref 4.1–11.1)
WBC URNS QL MICRO: >100 /HPF (ref 0–4)

## 2022-07-19 PROCEDURE — 99285 EMERGENCY DEPT VISIT HI MDM: CPT

## 2022-07-19 PROCEDURE — 73030 X-RAY EXAM OF SHOULDER: CPT

## 2022-07-19 PROCEDURE — 82962 GLUCOSE BLOOD TEST: CPT

## 2022-07-19 PROCEDURE — 93005 ELECTROCARDIOGRAM TRACING: CPT

## 2022-07-19 PROCEDURE — 70450 CT HEAD/BRAIN W/O DYE: CPT

## 2022-07-19 PROCEDURE — 96360 HYDRATION IV INFUSION INIT: CPT

## 2022-07-19 PROCEDURE — 83605 ASSAY OF LACTIC ACID: CPT

## 2022-07-19 PROCEDURE — 0202U NFCT DS 22 TRGT SARS-COV-2: CPT

## 2022-07-19 PROCEDURE — 75810000463 HC INC/DRN HEMATOMA/SEROMA LVL 2 5052

## 2022-07-19 PROCEDURE — 72125 CT NECK SPINE W/O DYE: CPT

## 2022-07-19 PROCEDURE — 87040 BLOOD CULTURE FOR BACTERIA: CPT

## 2022-07-19 PROCEDURE — 85025 COMPLETE CBC W/AUTO DIFF WBC: CPT

## 2022-07-19 PROCEDURE — 81001 URINALYSIS AUTO W/SCOPE: CPT

## 2022-07-19 PROCEDURE — 74011000250 HC RX REV CODE- 250: Performed by: INTERNAL MEDICINE

## 2022-07-19 PROCEDURE — 74011250637 HC RX REV CODE- 250/637: Performed by: EMERGENCY MEDICINE

## 2022-07-19 PROCEDURE — 74011000250 HC RX REV CODE- 250: Performed by: EMERGENCY MEDICINE

## 2022-07-19 PROCEDURE — 96374 THER/PROPH/DIAG INJ IV PUSH: CPT

## 2022-07-19 PROCEDURE — 0H92XZZ DRAINAGE OF RIGHT EAR SKIN, EXTERNAL APPROACH: ICD-10-PCS | Performed by: EMERGENCY MEDICINE

## 2022-07-19 PROCEDURE — 84484 ASSAY OF TROPONIN QUANT: CPT

## 2022-07-19 PROCEDURE — 74011250637 HC RX REV CODE- 250/637: Performed by: INTERNAL MEDICINE

## 2022-07-19 PROCEDURE — 84145 PROCALCITONIN (PCT): CPT

## 2022-07-19 PROCEDURE — 90714 TD VACC NO PRESV 7 YRS+ IM: CPT | Performed by: EMERGENCY MEDICINE

## 2022-07-19 PROCEDURE — 36415 COLL VENOUS BLD VENIPUNCTURE: CPT

## 2022-07-19 PROCEDURE — 74011636637 HC RX REV CODE- 636/637: Performed by: INTERNAL MEDICINE

## 2022-07-19 PROCEDURE — 74011250636 HC RX REV CODE- 250/636: Performed by: EMERGENCY MEDICINE

## 2022-07-19 PROCEDURE — 90471 IMMUNIZATION ADMIN: CPT

## 2022-07-19 PROCEDURE — 86140 C-REACTIVE PROTEIN: CPT

## 2022-07-19 PROCEDURE — 87077 CULTURE AEROBIC IDENTIFY: CPT

## 2022-07-19 PROCEDURE — 87086 URINE CULTURE/COLONY COUNT: CPT

## 2022-07-19 PROCEDURE — 80048 BASIC METABOLIC PNL TOTAL CA: CPT

## 2022-07-19 PROCEDURE — 65270000029 HC RM PRIVATE

## 2022-07-19 PROCEDURE — 87186 SC STD MICRODIL/AGAR DIL: CPT

## 2022-07-19 PROCEDURE — 85379 FIBRIN DEGRADATION QUANT: CPT

## 2022-07-19 PROCEDURE — 74011250636 HC RX REV CODE- 250/636: Performed by: INTERNAL MEDICINE

## 2022-07-19 PROCEDURE — 71045 X-RAY EXAM CHEST 1 VIEW: CPT

## 2022-07-19 RX ORDER — DEXAMETHASONE SODIUM PHOSPHATE 10 MG/ML
6 INJECTION INTRAMUSCULAR; INTRAVENOUS ONCE
Status: ACTIVE | OUTPATIENT
Start: 2022-07-19 | End: 2022-07-20

## 2022-07-19 RX ORDER — IPRATROPIUM BROMIDE AND ALBUTEROL SULFATE 2.5; .5 MG/3ML; MG/3ML
3 SOLUTION RESPIRATORY (INHALATION)
Status: DISCONTINUED | OUTPATIENT
Start: 2022-07-19 | End: 2022-07-23 | Stop reason: HOSPADM

## 2022-07-19 RX ORDER — ONDANSETRON 2 MG/ML
4 INJECTION INTRAMUSCULAR; INTRAVENOUS
Status: DISCONTINUED | OUTPATIENT
Start: 2022-07-19 | End: 2022-07-23 | Stop reason: HOSPADM

## 2022-07-19 RX ORDER — GUAIFENESIN 600 MG/1
600 TABLET, EXTENDED RELEASE ORAL EVERY 12 HOURS
Status: DISCONTINUED | OUTPATIENT
Start: 2022-07-19 | End: 2022-07-23 | Stop reason: HOSPADM

## 2022-07-19 RX ORDER — LIDOCAINE HYDROCHLORIDE 10 MG/ML
10 INJECTION, SOLUTION EPIDURAL; INFILTRATION; INTRACAUDAL; PERINEURAL ONCE
Status: COMPLETED | OUTPATIENT
Start: 2022-07-19 | End: 2022-07-19

## 2022-07-19 RX ORDER — PROMETHAZINE HYDROCHLORIDE 25 MG/1
12.5 TABLET ORAL
Status: DISCONTINUED | OUTPATIENT
Start: 2022-07-19 | End: 2022-07-23 | Stop reason: HOSPADM

## 2022-07-19 RX ORDER — MAGNESIUM SULFATE 100 %
4 CRYSTALS MISCELLANEOUS AS NEEDED
Status: DISCONTINUED | OUTPATIENT
Start: 2022-07-19 | End: 2022-07-23 | Stop reason: HOSPADM

## 2022-07-19 RX ORDER — SODIUM CHLORIDE 0.9 % (FLUSH) 0.9 %
5-40 SYRINGE (ML) INJECTION EVERY 8 HOURS
Status: DISCONTINUED | OUTPATIENT
Start: 2022-07-19 | End: 2022-07-23 | Stop reason: HOSPADM

## 2022-07-19 RX ORDER — INSULIN LISPRO 100 [IU]/ML
INJECTION, SOLUTION INTRAVENOUS; SUBCUTANEOUS
Status: DISCONTINUED | OUTPATIENT
Start: 2022-07-19 | End: 2022-07-23 | Stop reason: HOSPADM

## 2022-07-19 RX ORDER — DEXAMETHASONE 6 MG/1
6 TABLET ORAL DAILY
Status: DISCONTINUED | OUTPATIENT
Start: 2022-07-19 | End: 2022-07-22

## 2022-07-19 RX ORDER — SODIUM CHLORIDE 9 MG/ML
100 INJECTION, SOLUTION INTRAVENOUS CONTINUOUS
Status: DISCONTINUED | OUTPATIENT
Start: 2022-07-19 | End: 2022-07-22

## 2022-07-19 RX ORDER — ACETAMINOPHEN 500 MG
1000 TABLET ORAL ONCE
Status: COMPLETED | OUTPATIENT
Start: 2022-07-19 | End: 2022-07-19

## 2022-07-19 RX ORDER — PANTOPRAZOLE SODIUM 40 MG/1
40 TABLET, DELAYED RELEASE ORAL
Status: DISCONTINUED | OUTPATIENT
Start: 2022-07-20 | End: 2022-07-23 | Stop reason: HOSPADM

## 2022-07-19 RX ORDER — ATORVASTATIN CALCIUM 20 MG/1
40 TABLET, FILM COATED ORAL DAILY
Status: DISCONTINUED | OUTPATIENT
Start: 2022-07-20 | End: 2022-07-23 | Stop reason: HOSPADM

## 2022-07-19 RX ORDER — VANCOMYCIN/0.9 % SOD CHLORIDE 1.5G/250ML
1500 PLASTIC BAG, INJECTION (ML) INTRAVENOUS ONCE
Status: COMPLETED | OUTPATIENT
Start: 2022-07-19 | End: 2022-07-19

## 2022-07-19 RX ORDER — ZINC SULFATE 50(220)MG
1 CAPSULE ORAL DAILY
Status: DISCONTINUED | OUTPATIENT
Start: 2022-07-20 | End: 2022-07-23 | Stop reason: HOSPADM

## 2022-07-19 RX ORDER — ASPIRIN 81 MG/1
81 TABLET ORAL DAILY
Status: DISCONTINUED | OUTPATIENT
Start: 2022-07-20 | End: 2022-07-23 | Stop reason: HOSPADM

## 2022-07-19 RX ORDER — ASCORBIC ACID 500 MG
500 TABLET ORAL DAILY
Status: DISCONTINUED | OUTPATIENT
Start: 2022-07-20 | End: 2022-07-23 | Stop reason: HOSPADM

## 2022-07-19 RX ORDER — FACIAL-BODY WIPES
10 EACH TOPICAL DAILY PRN
Status: DISCONTINUED | OUTPATIENT
Start: 2022-07-19 | End: 2022-07-23 | Stop reason: HOSPADM

## 2022-07-19 RX ORDER — TRAMADOL HYDROCHLORIDE 50 MG/1
50 TABLET ORAL
Status: DISCONTINUED | OUTPATIENT
Start: 2022-07-19 | End: 2022-07-23 | Stop reason: HOSPADM

## 2022-07-19 RX ORDER — ACETAMINOPHEN 325 MG/1
650 TABLET ORAL
Status: DISCONTINUED | OUTPATIENT
Start: 2022-07-19 | End: 2022-07-23 | Stop reason: HOSPADM

## 2022-07-19 RX ORDER — ENOXAPARIN SODIUM 100 MG/ML
40 INJECTION SUBCUTANEOUS DAILY
Status: DISCONTINUED | OUTPATIENT
Start: 2022-07-20 | End: 2022-07-23 | Stop reason: HOSPADM

## 2022-07-19 RX ORDER — LATANOPROST 50 UG/ML
1 SOLUTION/ DROPS OPHTHALMIC EVERY EVENING
Status: DISCONTINUED | OUTPATIENT
Start: 2022-07-19 | End: 2022-07-23 | Stop reason: HOSPADM

## 2022-07-19 RX ORDER — ACETAMINOPHEN 650 MG/1
650 SUPPOSITORY RECTAL
Status: DISCONTINUED | OUTPATIENT
Start: 2022-07-19 | End: 2022-07-23 | Stop reason: HOSPADM

## 2022-07-19 RX ORDER — SODIUM CHLORIDE 9 MG/ML
25 INJECTION, SOLUTION INTRAVENOUS AS NEEDED
Status: DISCONTINUED | OUTPATIENT
Start: 2022-07-19 | End: 2022-07-23 | Stop reason: HOSPADM

## 2022-07-19 RX ORDER — SODIUM CHLORIDE 0.9 % (FLUSH) 0.9 %
5-40 SYRINGE (ML) INJECTION AS NEEDED
Status: DISCONTINUED | OUTPATIENT
Start: 2022-07-19 | End: 2022-07-23 | Stop reason: HOSPADM

## 2022-07-19 RX ORDER — BENZONATATE 100 MG/1
100 CAPSULE ORAL
Status: DISCONTINUED | OUTPATIENT
Start: 2022-07-19 | End: 2022-07-23 | Stop reason: HOSPADM

## 2022-07-19 RX ADMIN — LIDOCAINE HYDROCHLORIDE 10 ML: 10 INJECTION, SOLUTION EPIDURAL; INFILTRATION; INTRACAUDAL; PERINEURAL at 17:51

## 2022-07-19 RX ADMIN — SODIUM CHLORIDE 75 ML/HR: 9 INJECTION, SOLUTION INTRAVENOUS at 19:56

## 2022-07-19 RX ADMIN — TETANUS AND DIPHTHERIA TOXOIDS ADSORBED 0.5 ML: 2; 2 INJECTION INTRAMUSCULAR at 17:12

## 2022-07-19 RX ADMIN — INSULIN LISPRO 4 UNITS: 100 INJECTION, SOLUTION INTRAVENOUS; SUBCUTANEOUS at 21:50

## 2022-07-19 RX ADMIN — VANCOMYCIN HYDROCHLORIDE 1500 MG: 10 INJECTION, POWDER, LYOPHILIZED, FOR SOLUTION INTRAVENOUS at 19:58

## 2022-07-19 RX ADMIN — GUAIFENESIN 600 MG: 600 TABLET ORAL at 20:59

## 2022-07-19 RX ADMIN — LATANOPROST 1 DROP: 50 SOLUTION OPHTHALMIC at 20:59

## 2022-07-19 RX ADMIN — AZITHROMYCIN MONOHYDRATE 500 MG: 500 INJECTION, POWDER, LYOPHILIZED, FOR SOLUTION INTRAVENOUS at 18:54

## 2022-07-19 RX ADMIN — DEXAMETHASONE 6 MG: 6 TABLET ORAL at 19:10

## 2022-07-19 RX ADMIN — SODIUM CHLORIDE, PRESERVATIVE FREE 10 ML: 5 INJECTION INTRAVENOUS at 21:03

## 2022-07-19 RX ADMIN — SODIUM CHLORIDE 500 ML: 9 INJECTION, SOLUTION INTRAVENOUS at 19:10

## 2022-07-19 RX ADMIN — CEFEPIME HYDROCHLORIDE 2 G: 2 INJECTION, POWDER, FOR SOLUTION INTRAVENOUS at 17:52

## 2022-07-19 RX ADMIN — ACETAMINOPHEN 1000 MG: 500 TABLET ORAL at 17:10

## 2022-07-19 RX ADMIN — SODIUM CHLORIDE 500 ML: 9 INJECTION, SOLUTION INTRAVENOUS at 17:09

## 2022-07-19 NOTE — TELEPHONE ENCOUNTER
Spoke with Ami/Dtr (HIPPA VERIFIED) to advise that hot tea and honey, chloraseptic lozenges or spray could be used for the sore throat. No answer and LVM.

## 2022-07-19 NOTE — ED TRIAGE NOTES
Pt brought in from home by EMS who states pt tested positive for covid using a home test.  C/o weakness, dizziness, unsteady gait, and head lac. Pt states he did not fall, but instead was \"put himself down\" because he was feeling dizzy and as he was rising up he hit the top of his head on his beside table. Per EMT VSS en route.

## 2022-07-19 NOTE — H&P
[facility]  [address] [facility], [address] [place] [address]  [phone]    Admission History and Physical      NAME:  Keny Love   :   1929   MRN:  [id number]     PCP:  Mohini Chung MD     Date/Time of service:  2022  6:37 PM        Subjective:     CHIEF COMPLAINT: weakness, dizziness, fevers     HISTORY OF PRESENT ILLNESS:     The patient is a 79 yo hx of HTN, DM, CAD, trigeminal neuralgia, presented w/ weakness, dizziness, sepsis, COVID pneumonia, UTI. The patient c/o a dry cough and fevers for several days, but today, he developed worsening weakness, dizziness, and fall. He used a home COVID test and it was positive. He was also recently started on neurontin for the trigeminal neuralgia. Denied syncope, neuro deficits, nausea, vomiting, diarrhea. In the ED, WBC was 9.9, lactate 2.2. U/A positive for UTI. CXR with L sided pneumonia. Head CT neg. Allergies   Allergen Reactions    Lisinopril Cough       Prior to Admission medications    Medication Sig Start Date End Date Taking? Authorizing Provider   gabapentin (NEURONTIN) 300 mg capsule Take 1 Capsule by mouth three (3) times daily. Max Daily Amount: 900 mg. 22   Mohini Chung MD   Paxlovid, EUA, 150 mg x 2- 100 mg tablet Take 3 Tablets by mouth every twelve (12) hours for 5 days. Indications: COVID-19 (emergency use authorization) 22  Mohini Chung MD   traMADoL Paul Hue) 50 mg tablet Take 1 Tablet by mouth every eight (8) hours as needed for Pain for up to 7 days. Max Daily Amount: 150 mg. 22  Mohini Chung MD   omeprazole (PRILOSEC) 40 mg capsule TAKE 1 CAPSULE BY MOUTH DAILY.  INCREASED DOSE 21 INDICATIONS: HEARTBURN 22   Mohini Chung MD   metFORMIN ER (GLUCOPHAGE XR) 500 mg tablet TAKE 2 TABLETS BY MOUTH EVERY DAY WITH DINNER 3/17/22   Deanna Rod MD   atorvastatin (LIPITOR) 40 mg tablet TAKE 1 TABLET BY MOUTH EVERY DAY 22   Deanna Rod MD   olmesartan (BENICAR) 20 mg tablet Take 0.5 Tablets by mouth daily. 10/26/21   Shaji Rod MD   dorzolamide-timolol (COSOPT) 22.3-6.8 mg/mL ophthalmic solution APPLY 1 DROP(S) IN BOTH EYES 2 TIMES A DAY 2/1/18   Provider, Historical   latanoprost (XALATAN) 0.005 % ophthalmic solution APPLY 1 DROP(S) IN LEFT EYE AT BEDTIME 1/20/18   Provider, Historical   cholecalciferol (VITAMIN D3) 1,000 unit tablet Take  by mouth daily. Provider, Historical   cyanocobalamin (VITAMIN B-12) 500 mcg tablet Take 500 mcg by mouth daily. Provider, Historical   aspirin delayed-release (ASPIR-81) 81 mg tablet Take 1 Tab by mouth daily. 2/8/18   Jan Mcghee MD       Past Medical History:   Diagnosis Date    Benign essential HTN     Bladder cancer [facility]) 2013?    s/p BCGx2, resection. low grade. in [facility]. now Dr. Paige Cooper. cystoscopy 6/2020 clear    CAD (coronary artery disease) 09/2021    Dr Petar Pérez. Cath 9/24/21 Successful bifurcation stenting of OM1 and AV groove Lcx with 2 stent couloette technique    Closed left arm fracture     GERD (gastroesophageal reflux disease)     with hiatal hernia    Glaucoma     Dr. Chicas Half    Hemorrhoids     HTN (hypertension)     Hypercholesterolemia     Microalbuminuria due to type 2 diabetes mellitus ([facility]     Prostate cancer ([facility] 2000    Dr. Paige Cooper. s/p prostatectomy 1/2000 in [facility]. PSA \"0.4\" since then    Rosacea     Trigeminal neuralgia of right side of face     Type 2 diabetes mellitus with microalbuminuria (HCC)     Vertigo         Past Surgical History:   Procedure Laterality Date    HX COLONOSCOPY  2005? reports 2 colonoscopies    HX HEART CATHETERIZATION  09/24/2020    Successful bifurcation stenting of OM1 and AV groove Lcx with 2 stent couloette technique    HX HEART CATHETERIZATION  09/08/2020    Multivessel CAD. LCX/OM1 lesions, severe with significant calcification. Normal LVEDP.  Unsuccessful PCI attempt with POBA due to calcification.      HX OTHER SURGICAL  2020    bladder biopsy    HX PROSTATECTOMY  2000       Social History     Tobacco Use    Smoking status: Former Smoker     Quit date: 1962     Years since quittin.5    Smokeless tobacco: Never Used   Substance Use Topics    Alcohol use: Yes     Alcohol/week: 5.0 standard drinks     Types: 5 Glasses of wine per week        Family History   Problem Relation Age of Onset    Cancer Mother         breast    Cancer Father         prostate    Cancer Sister         breast    Cancer Brother         prostate        Review of Systems:  (bold if positive, if negative)    Gen:  , fever, chillsEyes:  ENT:  CVS:  dizziness,Pulm:  Cough, dyspnea, GI:  :  MS:  Skin:  Psych:  Endo:  Hem:  Renal:  Neuro:          Objective:      VITALS:    Vital signs reviewed; most recent are:    Visit Vitals  BP (!) 135/55 (BP 1 Location: Right upper arm, BP Patient Position: At rest)   Pulse 94   Temp (!) 101.4 °F (38.6 °C)   Resp 18   Ht 5' 9\" (1.753 m)   Wt 59.9 kg (132 lb)   SpO2 94%   BMI 19.49 kg/m²     SpO2 Readings from Last 6 Encounters:   22 94%   21 96%   21 98%   21 98%   10/15/20 99%   20 97%        No intake or output data in the 24 hours ending 22 3037     Exam:     Physical Exam:    Gen:  Elderly, ill-appearing, frail, NAD  HEENT:  Pink conjunctivae, PERRL, hearing intact to voice, right ear bruising, small head lac  Neck:  Supple, without masses, thyroid non-tender  Resp:  No accessory muscle use, rhonchi at bases  Card:  No murmurs, normal S1, S2 without thrills, bruits or peripheral edema  Abd:  Soft, non-tender, non-distended, normoactive bowel sounds are present  Lymph:  No cervical adenopathy  Musc:  No cyanosis or clubbing  Skin:  No rashes   Neuro:  Cranial nerves 3-12 are grossly intact, generalized weakness, follows commands appropriately  Psych:  Alert with good insight.   Oriented to person, place, and time    Labs:    Recent Labs 07/19/22  1631   WBC 9.9   HGB 12.7   HCT 37.3        Recent Labs     07/19/22  1631   *   K 4.3      CO2 23   *   BUN 15   CREA 1.28   CA 9.3     Lab Results   Component Value Date/Time    Glucose (POC) 195 (H) 09/25/2020 07:06 AM    Glucose (POC) 193 (H) 09/24/2020 10:01 PM     No results for input(s): PH, PCO2, PO2, HCO3, FIO2 in the last 72 hours. No results for input(s): INR, INREXT in the last 72 hours. Radiology and EKG reviewed:   CXR reviewed    **Old Records reviewed in [facility]**       Assessment/Plan:       Principal Problem:    79 yo hx of HTN, DM, CAD, trigeminal neuralgia, presented w/ weakness, dizziness, sepsis, COVID pneumonia, UTI    1) Pneumonia due to COVID-19 virus: sepsis POA. Also concerning for underlying bacterial pneumonia. Will check viral panel. Start IV CTX/azithro, dexamethasone, Vit C/Zinc.  Monitor inflammatory labs. Consult Pulm    2) UTI: monitor urine Cx. Already on IV CTX    3) Weakness/dizziness/ataxia: could be from COVID and neurontin. Head CT neg. Will obtain head MRI. Consult Neuro, PT/OT    4) HTN: hold ACEi    5) CAD: cont ASA, statin    6) DM type 2: check A1C. Hold metformin. Start SSI    7) Trigeminal neuralgia: hold neurontin due to dizziness.   Defer to Neuro    Risk of deterioration: high      Total time spent with patient care: 79 Minutes **I personally saw and examined the patient during this time period**                 Care Plan discussed with: Patient, nursing      Discussed:  Care Plan    Prophylaxis:  Lovenox    Probable Disposition:  SNF/LTC           ___________________________________________________    Attending Physician: Rosa Elena Soto MD

## 2022-07-19 NOTE — TELEPHONE ENCOUNTER
Patients daughter called , her dad has a real sore throat and has a scratchy voice and wanted to know what you recommend medication for this.   Please call her [phone]

## 2022-07-19 NOTE — TELEPHONE ENCOUNTER
Spoke with Ami/Halley (HIPPA VERIFIED) and updated on recommendations for hot tea and honey, chloraseptic lozenges or spray for sore throat. She states understanding and grateful for the call.

## 2022-07-19 NOTE — ED NOTES
Pt's daughter has asked to hold the MRI so that she may speak to the MD for more explanation as to the reason for MRI.

## 2022-07-19 NOTE — TELEPHONE ENCOUNTER
Spoke with patient and advised that hot tea and honey, chloraseptic lozenges or spray could be used for the sore throat. He states understanding and grateful for the call.

## 2022-07-19 NOTE — ED PROVIDER NOTES
89M w/ hx CAD s/p coronary stent, HTN, distant prostate and bladder Ca, DM, TGN p/w unwitnessed fall. Pt lives alone at home and was found on the floor by his daughter. Pt states that he felt lightheaded while walking and attempted to lay down but fell and his his head against the nightstand. He denies syncope or LOC. Has no hx of seizures. Denies any pain anywhere including no head/neck or chest/abd pain. Recently dx w/ COVID19 (vaccinated and boosted) and now c/o cough and subjective F/C. No N/V/D, dyspnea or urinary symptoms. Pt unsure when last tetanus. No anticoagulation besides 81mg asa. Past Medical History:   Diagnosis Date    Benign essential HTN     Bladder cancer ([facility] [address] 2013?    s/p BCGx2, resection. low grade. in [facility]. now Dr. Staci Nicholson. cystoscopy 6/2020 clear    CAD (coronary artery disease) 09/2021    Dr Mindy Lopez. Cath 9/24/21 Successful bifurcation stenting of OM1 and AV groove Lcx with 2 stent couloette technique    Closed left arm fracture     GERD (gastroesophageal reflux disease)     with hiatal hernia    Glaucoma     Dr. 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 Staci Nicholson. s/p prostatectomy 1/2000 in [facility]. PSA \"0.4\" since then    Rosacea     Trigeminal neuralgia of right side of face     Type 2 diabetes mellitus with microalbuminuria (HCC)     Vertigo        Past Surgical History:   Procedure Laterality Date    HX COLONOSCOPY  2005? reports 2 colonoscopies    HX HEART CATHETERIZATION  09/24/2020    Successful bifurcation stenting of OM1 and AV groove Lcx with 2 stent couloette technique    HX HEART CATHETERIZATION  09/08/2020    Multivessel CAD. LCX/OM1 lesions, severe with significant calcification. Normal LVEDP.  Unsuccessful PCI attempt with POBA due to calcification.      HX OTHER SURGICAL  01/2020    bladder biopsy  HX PROSTATECTOMY  2000         Family History:   Problem Relation Age of Onset    Cancer Mother         breast    Cancer Father         prostate    Cancer Sister         breast    Cancer Brother         prostate       Social History     Socioeconomic History    Marital status:      Spouse name:     Number of children: 1    Years of education: Not on file    Highest education level: Not on file   Occupational History    Occupation: Moved to    Tobacco Use    Smoking status: Former Smoker     Quit date: 1962     Years since quittin.5    Smokeless tobacco: Never Used   Substance and Sexual Activity    Alcohol use: Yes     Alcohol/week: 5.0 standard drinks     Types: 5 Glasses of wine per week    Drug use: Never    Sexual activity: Not on file   Other Topics Concern    Not on file   Social History Narrative    Has 3 children, 6 grandkids     Social Determinants of Health     Financial Resource Strain:     Difficulty of Paying Living Expenses: Not on file   Food Insecurity:     Worried About Running Out of Food in the Last Year: Not on file    Diane of Food in the Last Year: Not on file   Transportation Needs:     Lack of Transportation (Medical): Not on file    Lack of Transportation (Non-Medical):  Not on file   Physical Activity:     Days of Exercise per Week: Not on file    Minutes of Exercise per Session: Not on file   Stress:     Feeling of Stress : Not on file   Social Connections:     Frequency of Communication with Friends and Family: Not on file    Frequency of Social Gatherings with Friends and Family: Not on file    Attends Synagogue Services: Not on file    Active Member of Clubs or Organizations: Not on file    Attends Club or Organization Meetings: Not on file    Marital Status: Not on file   Intimate Partner Violence:     Fear of Current or Ex-Partner: Not on file    Emotionally Abused: Not on file    Physically Abused: Not on file   [employer] Sexually Abused: Not on file   Housing Stability:     Unable to Pay for Housing in the Last Year: Not on file    Number of Places Lived in the Last Year: Not on file    Unstable Housing in the Last Year: Not on file         ALLERGIES: Lisinopril    Review of Systems   Constitutional: Negative for chills, diaphoresis and fever. HENT: Negative for facial swelling, mouth sores, nosebleeds, trouble swallowing and voice change. Eyes: Negative for pain and visual disturbance. Respiratory: Negative for apnea, cough, shortness of breath, wheezing and stridor. Cardiovascular: Negative for chest pain, palpitations and leg swelling. Gastrointestinal: Negative for abdominal distention, abdominal pain, blood in stool, diarrhea, nausea and vomiting. Genitourinary: Negative for difficulty urinating, dysuria, flank pain, hematuria, scrotal swelling and testicular pain. Musculoskeletal: Negative for joint swelling. Skin: Positive for wound. Negative for color change and rash. Allergic/Immunologic: Negative for immunocompromised state. Neurological: Positive for light-headedness. Negative for dizziness and syncope. Hematological: Does not bruise/bleed easily. Psychiatric/Behavioral: Negative for agitation and behavioral problems. Vitals:    07/19/22 1602 07/19/22 1719 07/19/22 1756   BP: (!) 135/55     Pulse: (!) 112  94   Resp: 14  18   Temp: (!) 103.5 °F (39.7 °C)  (!) 101.4 °F (38.6 °C)   SpO2: 92% 95% 94%   Weight: 59.9 kg (132 lb)     Height: 5' 9\" (1.753 m)              Physical Exam  Vitals and nursing note reviewed. Constitutional:       General: He is not in acute distress. Appearance: Normal appearance. He is not ill-appearing or toxic-appearing. HENT:      Head: Normocephalic.       Comments: Right auricular hematoma involving superior part of helix (tragus and lobe unaffected)  Small mid parietal scalp lac     Right Ear: External ear normal.      Left Ear: External ear normal. Nose: Nose normal.      Mouth/Throat:      Mouth: Mucous membranes are moist.      Pharynx: Oropharynx is clear. No oropharyngeal exudate or posterior oropharyngeal erythema. Eyes:      General: No scleral icterus. Extraocular Movements: Extraocular movements intact. Conjunctiva/sclera: Conjunctivae normal.      Pupils: Pupils are equal, round, and reactive to light. Cardiovascular:      Rate and Rhythm: Normal rate and regular rhythm. Pulses: Normal pulses. Heart sounds: No murmur heard. No friction rub. No gallop. Pulmonary:      Effort: Pulmonary effort is normal. No respiratory distress. Breath sounds: Normal breath sounds. No stridor. No wheezing, rhonchi or rales. Abdominal:      General: Bowel sounds are normal. There is no distension. Palpations: Abdomen is soft. Tenderness: There is no abdominal tenderness. There is no guarding or rebound. Musculoskeletal:         General: Swelling present. No deformity. Normal range of motion. Cervical back: Normal range of motion and neck supple. No rigidity. Right lower leg: No edema. Left lower leg: No edema. Comments: Right shoulder abrasions and ecchymosis but no joint effusion and full ROM  RUE motor/sensory and distal pulses equal/intact   Skin:     General: Skin is warm. Capillary Refill: Capillary refill takes less than 2 seconds. Coloration: Skin is not jaundiced. Neurological:      General: No focal deficit present. Mental Status: He is alert. Cranial Nerves: No cranial nerve deficit. Sensory: No sensory deficit. Motor: No weakness. Coordination: Coordination normal.   Psychiatric:         Mood and Affect: Mood normal.         Behavior: Behavior normal.         Thought Content:  Thought content normal.         Judgment: Judgment normal.          MDM  Number of Diagnoses or Management Options  Community acquired pneumonia of left lung, unspecified part of lung  Complicated UTI (urinary tract infection)  COVID-19 virus infection  Fall from standing, initial encounter  Hematoma of right auricular region  Laceration of scalp, initial encounter  Sepsis, due to unspecified organism, unspecified whether acute organ dysfunction present [facility])  Diagnosis management comments: 89M w/ hx CAD s/p coronary stent, HTN, distant prostate and bladder Ca, DM, TGN p/w unwitnessed fall and lightheadedness. Pt nontoxic appearing but febrile 103.5, hemodynamically stable and no resp distress but sats low 90s on RA. GCS15, nonfocal neuro exam. Right auricular hematoma and small scalp lac. Ddx includes PNA/empyema/pulm abscess vs influenza/URI or nonspecific viral process vs UTI/pyelo vs soft tissue (cellulitis, abscess, less likely necrotizing soft tissue infection) vs HEENT vs bacteremia vs intra-abd (diverticulitis, acute cholecystitis, less likely appendicitis or cholangitis), much less likely endocarditis/myocarditis or CNS infection (no AMS, meningeal signs, rash, focal neuro deficits). Ordered CT head/cspine for trauma. Also xray chest and shoulder. Check EKG, labs, UA. Send lactate and cx. Give IVF, tylenol and broad spectrum abx. Updated tetanus. Call sepsis alert. 1900 Pt remains hemodynamically stable. Fever improving. CT head/cspine neg for acute findings including trauma. CXR suggestive of left mid lung consolidation c/f PNA. Already known covid+. UA grossly infected. No leukocytosis but lactate elevated. Mildly elevated Cr. Trop borderline elevated 87. EKG SR w/o ischemic changes or signs of heart block. Gave decadron and broad spectrum abx (vanc/cefepime/azithro) for urinary and pulmonary sources. Strict isolation since known covid+. I performed an auricular block of right ear and made a small incision to drain the auricular hematoma after which placed a compression dressing. Admit to tele.        Amount and/or Complexity of Data Reviewed  Clinical lab tests: ordered and reviewed  Tests in the radiology section of CPT®: ordered and reviewed  Tests in the medicine section of CPT®: reviewed and ordered  Discussion of test results with the performing providers: yes  Decide to obtain previous medical records or to obtain history from someone other than the patient: yes  Review and summarize past medical records: yes  Discuss the patient with other providers: yes  Independent visualization of images, tracings, or specimens: yes    Risk of Complications, Morbidity, and/or Mortality  Presenting problems: high  Diagnostic procedures: high  Management options: high           Critical Care  Performed by: Odilia Luong MD  Authorized by: Odilia Luong MD     Critical care provider statement:     Critical care time (minutes):  42    Critical care was necessary to treat or prevent imminent or life-threatening deterioration of the following conditions:  Sepsis and trauma    Critical care was time spent personally by me on the following activities:  Ordering and performing treatments and interventions, ordering and review of laboratory studies, ordering and review of radiographic studies, pulse oximetry, re-evaluation of patient's condition, review of old charts, blood draw for specimens, development of treatment plan with patient or surrogate, discussions with primary provider, evaluation of patient's response to treatment, examination of patient, interpretation of cardiac output measurements and obtaining history from patient or surrogate  I&D Abcess Complex    Date/Time: 7/19/2022 7:09 PM  Performed by: Odilia Luong MD  Authorized by: Odilia Luong MD     Consent:     Consent obtained:  Verbal    Consent given by:  Patient    Risks discussed:  Bleeding, damage to other organs and infection    Alternatives discussed:  No treatment  Location:     Type:  Hematoma    Size:  Right ear hematoma    Location: right ear.   Pre-procedure details:     Skin preparation:  Betadine  Anesthesia (see MAR for exact dosages): Anesthesia method:  Nerve block    Block location:  Right auricular block    Block needle gauge:  25 G    Block injection procedure:  Anatomic landmarks identified, introduced needle, anatomic landmarks palpated, negative aspiration for blood and incremental injection    Block outcome:  Anesthesia achieved  Procedure type:     Complexity:  Complex  Procedure details:     Incision types:  Elliptical    Incision depth:  Dermal    Scalpel blade:  11    Wound management:  Probed and deloculated    Drainage:  Bloody    Drainage amount:  Scant    Wound treatment:  Wound left open    Packing material: compression dressing. Post-procedure details:     Patient tolerance of procedure: Tolerated well, no immediate complications      Total critical care time (not including time spent performing separately reportable procedures): 42        EKG Interpretation   ST, narrow QRS, nl intervals, no CECI/STD/TWI  (EKG tracing interpreted by ED physician)    LABORATORY TESTS:  Admission on 07/19/2022   Component Date Value Ref Range Status    WBC 07/19/2022 9.9  4.1 - 11.1 K/uL Final    RBC 07/19/2022 4.09* 4.10 - 5.70 M/uL Final    HGB 07/19/2022 12.7  12.1 - 17.0 g/dL Final    HCT 07/19/2022 37.3  36.6 - 50.3 % Final    MCV 07/19/2022 91.2  80.0 - 99.0 FL Final    MCH 07/19/2022 31.1  26.0 - 34.0 PG Final    MCHC 07/19/2022 34.0  30.0 - 36.5 g/dL Final    RDW 07/19/2022 12.1  11.5 - 14.5 % Final    PLATELET 06/80/8569 881  150 - 400 K/uL Final    MPV 07/19/2022 8.9  8.9 - 12.9 FL Final    NRBC 07/19/2022 0.0  0  WBC Final    ABSOLUTE NRBC 07/19/2022 0.00  0.00 - 0.01 K/uL Final    NEUTROPHILS 07/19/2022 89* 32 - 75 % Final    LYMPHOCYTES 07/19/2022 5* 12 - 49 % Final    MONOCYTES 07/19/2022 6  5 - 13 % Final    EOSINOPHILS 07/19/2022 0  0 - 7 % Final    BASOPHILS 07/19/2022 0  0 - 1 % Final    IMMATURE GRANULOCYTES 07/19/2022 0  0.0 - 0.5 % Final    ABS.  NEUTROPHILS 07/19/2022 8.8* 1.8 - 8.0 K/UL Final    ABS. LYMPHOCYTES 07/19/2022 0.5* 0.8 - 3.5 K/UL Final    ABS. MONOCYTES 07/19/2022 0.6  0.0 - 1.0 K/UL Final    ABS. EOSINOPHILS 07/19/2022 0.0  0.0 - 0.4 K/UL Final    ABS. BASOPHILS 07/19/2022 0.0  0.0 - 0.1 K/UL Final    ABS. IMM. GRANS. 07/19/2022 0.0  0.00 - 0.04 K/UL Final    DF 07/19/2022 AUTOMATED    Final    SMEAR SCANNED    RBC COMMENTS 07/19/2022 NORMOCYTIC, NORMOCHROMIC    Final    Sodium 07/19/2022 135* 136 - 145 mmol/L Final    Potassium 07/19/2022 4.3  3.5 - 5.1 mmol/L Final    Chloride 07/19/2022 102  97 - 108 mmol/L Final    CO2 07/19/2022 23  21 - 32 mmol/L Final    Anion gap 07/19/2022 10  5 - 15 mmol/L Final    Glucose 07/19/2022 222* 65 - 100 mg/dL Final    BUN 07/19/2022 15  6 - 20 MG/DL Final    Creatinine 07/19/2022 1.28  0.70 - 1.30 MG/DL Final    BUN/Creatinine ratio 07/19/2022 12  12 - 20   Final    GFR est AA 07/19/2022 >60  >60 ml/min/1.73m2 Final    GFR est non-AA 07/19/2022 53* >60 ml/min/1.73m2 Final    Estimated GFR is calculated using the IDMS-traceable Modification of Diet in Renal Disease (MDRD) Study equation, reported for both  Americans (GFRAA) and non- Americans (GFRNA), and normalized to 1.73m2 body surface area. The physician must decide which value applies to the patient.  Calcium 07/19/2022 9.3  8.5 - 10.1 MG/DL Final    Troponin-High Sensitivity 07/19/2022 85* 0 - 76 ng/L Final    Comment: A HS troponin value change of (+ or -) 50% or more below the 99th percentile, in a 1/2/3 hr interval represents a significant change. Clinical correcation is recommended. A HS troponin value change of (+ or -) 20% or above the 99th percentile, in a 1/2/3 hr interval represents a significant change. Clinical correlation is recommended.   99th Percentile:   Women: 0-51 ng/L                                                                Men:   0-76 ng/L      Lactic Acid (POC) 07/19/2022 2.24* 0.40 - 2.00 mmol/L Final    Color 07/19/2022 YELLOW/STRAW    Final    Color Reference Range: Straw, Yellow or Dark Yellow    Appearance 07/19/2022 TURBID* CLEAR   Final    Specific gravity 07/19/2022 1.012  1.003 - 1.030   Final    pH (UA) 07/19/2022 5.5  5.0 - 8.0   Final    Protein 07/19/2022 100* NEG mg/dL Final    Glucose 07/19/2022 Negative  NEG mg/dL Final    Ketone 07/19/2022 TRACE* NEG mg/dL Final    Bilirubin 07/19/2022 Negative  NEG   Final    Blood 07/19/2022 LARGE* NEG   Final    Urobilinogen 07/19/2022 0.2  0.2 - 1.0 EU/dL Final    Nitrites 07/19/2022 Positive* NEG   Final    Leukocyte Esterase 07/19/2022 LARGE* NEG   Final    WBC 07/19/2022 >100* 0 - 4 /hpf Final    RBC 07/19/2022 20-50  0 - 5 /hpf Final    Epithelial cells 07/19/2022 FEW  FEW /lpf Final    Epithelial cell category consists of squamous cells and /or transitional urothelial cells. Renal tubular cells, if present, are separately identified as such.  Bacteria 07/19/2022 TOO NUMEROUS TO COUNT* NEG /hpf Final    Hyaline cast 07/19/2022 2-5  0 - 2 /lpf Final    SAMPLES BEING HELD 07/19/2022 1red,1blue,1sst   Final    COMMENT 07/19/2022 Add-on orders for these samples will be processed based on acceptable specimen integrity and analyte stability, which may vary by analyte. Final    Urine culture hold 07/19/2022 Urine on hold in Microbiology dept for 2 days. If unpreserved urine is submitted, it cannot be used for addtional testing after 24 hours, recollection will be required. Final    Procalcitonin 07/19/2022 0.91  ng/mL Final    Comment:      Suspected Sepsis:  <0.50 ng/mL     Low likelihood of sepsis. 0.50-2.00 ng/mL    Increased likelihood of sepsis. Antibiotics encouraged. >2.00 ng/mL  High risk of sepsis/shock. Antibiotics strongly encouraged. Suspected Lower Resp Tract Infections:  <0.24 ng/mL    Low likelihood of bacterial infection. >0.24 ng/mL    Increased likelihood of bacterial infection. Antibiotics encouraged.        With successful antibiotic therapy, PCT levels should decrease rapidly. (Half-life of 24 to 36 hours)       Procalcitonin values from samples collected within the first 6 hours of systemic infection may still be low. Retesting may be indicated. Values from day 1 and day 4 can be entered into the Change in Procalcitonin Calculator ([email or web address]-pct-calculator. com) to determine the patient's Mortality Risk Prognosis. In healthy neonates, plasma Procalcitonin (PCT) concentrations increase gradually after birth, reaching peak values at about 24 hours of age then decrease to normal valu                           es below 0.5 ng/mL by 48-72 hours of age.  C-Reactive protein 07/19/2022 2.34* 0.00 - 0.60 mg/dL Final    Comment: CRP is a nonspecific acute phase reactant that shows rapid, marked increases with inflammation, infection, trauma, tissue necrosis, malignancies and autoimmune diseases. Sequential CRP levels are useful in monitoring response to antibacterial therapy. This assay is not equivalent to the hsCRP test since the presence of one or more of the foregoing disease processes obviates the risk stratification information available from hsCRP testing.  D-dimer 07/19/2022 2.54* 0.00 - 0.65 mg/L FEU Final    Comment: (NOTE)  The combination of a low pre-test probability based on Wells criteria  and a D-Dimer result below the cutoff value of 0.5 mg/L increases the   negative predictive value for DVT to %. IMAGING RESULTS:  XR CHEST PORT   Final Result   Left midlung interstitial and patchy airspace disease. Recommend   follow-up to complete resolution. XR SHOULDER RT AP/LAT MIN 2 V   Final Result   No acute fracture or dislocation. CT HEAD WO CONT   Final Result   No acute intracranial abnormality. CT SPINE CERV WO CONT   Final Result   No evidence of fracture or subluxation. Degenerative changes in the cervical   spine.       MRI BRAIN WO CONT    (Results Pending)       MEDICATIONS GIVEN:  Medications   azithromycin (ZITHROMAX) 500 mg in 0.9% sodium chloride 250 mL ([id number]) (500 mg IntraVENous New Bag 7/19/22 1854)   vancomycin (VANCOCIN) 1500 mg in  ml infusion (has no administration in time range)   cefTRIAXone (ROCEPHIN) 2 g in 0.9% sodium chloride 20 mL IV syringe (has no administration in time range)   ascorbic acid (vitamin C) (VITAMIN C) tablet 500 mg (has no administration in time range)   zinc sulfate (ZINCATE) 50 mg zinc (220 mg) capsule 1 Capsule (has no administration in time range)   albuterol-ipratropium (DUO-NEB) 2.5 MG-0.5 MG/3 ML (has no administration in time range)   dexAMETHasone (DECADRON) tablet 6 mg (6 mg Oral Given 7/19/22 1910)   guaiFENesin ER (MUCINEX) tablet 600 mg (has no administration in time range)   benzonatate (TESSALON) capsule 100 mg (has no administration in time range)   aspirin delayed-release tablet 81 mg (has no administration in time range)   atorvastatin (LIPITOR) tablet 40 mg (has no administration in time range)   latanoprost (XALATAN) 0.005 % ophthalmic solution 1 Drop (has no administration in time range)   pantoprazole (PROTONIX) tablet 40 mg (has no administration in time range)   traMADoL (ULTRAM) tablet 50 mg (has no administration in time range)   0.9% sodium chloride infusion (has no administration in time range)   sodium chloride (NS) flush 5-40 mL (has no administration in time range)   sodium chloride (NS) flush 5-40 mL (has no administration in time range)   0.9% sodium chloride infusion 25 mL (has no administration in time range)   acetaminophen (TYLENOL) tablet 650 mg (has no administration in time range)     Or   acetaminophen (TYLENOL) suppository 650 mg (has no administration in time range)   bisacodyL (DULCOLAX) suppository 10 mg (has no administration in time range)   promethazine (PHENERGAN) tablet 12.5 mg (has no administration in time range)     Or   ondansetron (ZOFRAN) injection 4 mg (has no administration in time range)   enoxaparin (LOVENOX) injection 40 mg (has no administration in time range)   insulin lispro (HUMALOG) injection (has no administration in time range)   glucose chewable tablet 16 g (has no administration in time range)   glucagon (GLUCAGEN) injection 1 mg (has no administration in time range)   azithromycin (ZITHROMAX) 500 mg in 0.9% sodium chloride 250 mL (Ptnt0Wgm) (has no administration in time range)   sodium chloride 0.9 % bolus infusion 500 mL (500 mL IntraVENous New Bag 7/19/22 1910)   dexamethasone (PF) (DECADRON) 10 mg/mL injection 6 mg (has no administration in time range)   tetanus-diphtheria toxoids-Td 2-2 Lf unit/0.5 mL injection 0.5 mL (0.5 mL IntraMUSCular Given 7/19/22 1712)   lidocaine (PF) (XYLOCAINE) 10 mg/mL (1 %) injection 10 mL (10 mL IntraDERMal Given 7/19/22 1751)   sodium chloride 0.9 % bolus infusion 500 mL (500 mL IntraVENous New Bag 7/19/22 1709)   acetaminophen (TYLENOL) tablet 1,000 mg (1,000 mg Oral Given 7/19/22 1710)   cefepime (MAXIPIME) 2 g in sterile water (preservative free) 10 mL IV syringe (2 g IntraVENous New Bag 7/19/22 1752)       PROGRESS NOTE:   7:11 PM Patient's symptoms have improved after treatment    CONSULTS:  none    IMPRESSION:  1. Complicated UTI (urinary tract infection)    2. COVID-19 virus infection    3. Community acquired pneumonia of left lung, unspecified part of lung    4. Sepsis, due to unspecified organism, unspecified whether acute organ dysfunction present ([facility] [address]    5. Hematoma of right auricular region    6. Laceration of scalp, initial encounter    7. Fall from standing, initial encounter        PLAN:  - Admit to hospitalist    Carmelita Olson MD      Perfect Serve Consult for Admission  6:14 PM    ED Room Number: ER10/10  Patient Name and age:  Lamona  80 y.o.  male  Working Diagnosis:   1. Complicated UTI (urinary tract infection)    2. COVID-19 virus infection    3.  Community acquired pneumonia of left lung, unspecified part of lung    4. Sepsis, due to unspecified organism, unspecified whether acute organ dysfunction present ([facility] [address]    5. Hematoma of right auricular region    6. Laceration of scalp, initial encounter    7.  Fall from standing, initial encounter        COVID-19 Suspicion:  yes  Sepsis present:  yes  Reassessment needed: yes  Code Status:  Full Code  Readmission: no  Isolation Requirements:  yes  Recommended Level of Care:  telemetry  Department:[facility] ED - [phone]

## 2022-07-20 ENCOUNTER — HOSPITAL ENCOUNTER (INPATIENT)
Dept: MRI IMAGING | Age: 87
Discharge: HOME | DRG: 871 | End: 2022-07-20
Attending: INTERNAL MEDICINE
Payer: MEDICARE

## 2022-07-20 LAB
ALBUMIN SERPL-MCNC: 2.9 G/DL (ref 3.5–5)
ALBUMIN/GLOB SERPL: 0.9 {RATIO} (ref 1.1–2.2)
ALP SERPL-CCNC: 74 U/L (ref 45–117)
ALT SERPL-CCNC: 40 U/L (ref 12–78)
ANION GAP SERPL CALC-SCNC: 9 MMOL/L (ref 5–15)
AST SERPL-CCNC: 138 U/L (ref 15–37)
ATRIAL RATE: 110 BPM
BILIRUB DIRECT SERPL-MCNC: 0.1 MG/DL (ref 0–0.2)
BILIRUB SERPL-MCNC: 0.4 MG/DL (ref 0.2–1)
BUN SERPL-MCNC: 17 MG/DL (ref 6–20)
BUN/CREAT SERPL: 15 (ref 12–20)
CALCIUM SERPL-MCNC: 8.7 MG/DL (ref 8.5–10.1)
CALCULATED P AXIS, ECG09: 96 DEGREES
CALCULATED R AXIS, ECG10: -46 DEGREES
CALCULATED T AXIS, ECG11: 92 DEGREES
CHLORIDE SERPL-SCNC: 107 MMOL/L (ref 97–108)
CK SERPL-CCNC: ABNORMAL U/L (ref 39–308)
CO2 SERPL-SCNC: 23 MMOL/L (ref 21–32)
CREAT SERPL-MCNC: 1.14 MG/DL (ref 0.7–1.3)
CRP SERPL-MCNC: 6.29 MG/DL (ref 0–0.6)
D DIMER PPP FEU-MCNC: 1.64 MG/L FEU (ref 0–0.65)
DIAGNOSIS, 93000: NORMAL
ERYTHROCYTE [DISTWIDTH] IN BLOOD BY AUTOMATED COUNT: 11.9 % (ref 11.5–14.5)
EST. AVERAGE GLUCOSE BLD GHB EST-MCNC: 200 MG/DL
GLOBULIN SER CALC-MCNC: 3.2 G/DL (ref 2–4)
GLUCOSE BLD STRIP.AUTO-MCNC: 254 MG/DL (ref 65–117)
GLUCOSE BLD STRIP.AUTO-MCNC: 273 MG/DL (ref 65–117)
GLUCOSE BLD STRIP.AUTO-MCNC: 287 MG/DL (ref 65–117)
GLUCOSE BLD STRIP.AUTO-MCNC: 296 MG/DL (ref 65–117)
GLUCOSE SERPL-MCNC: 261 MG/DL (ref 65–100)
HBA1C MFR BLD: 8.6 % (ref 4–5.6)
HCT VFR BLD AUTO: 33.9 % (ref 36.6–50.3)
HGB BLD-MCNC: 11.3 G/DL (ref 12.1–17)
MAGNESIUM SERPL-MCNC: 1.8 MG/DL (ref 1.6–2.4)
MCH RBC QN AUTO: 31 PG (ref 26–34)
MCHC RBC AUTO-ENTMCNC: 33.3 G/DL (ref 30–36.5)
MCV RBC AUTO: 93.1 FL (ref 80–99)
NRBC # BLD: 0 K/UL (ref 0–0.01)
NRBC BLD-RTO: 0 PER 100 WBC
P-R INTERVAL, ECG05: 200 MS
PHOSPHATE SERPL-MCNC: 2.1 MG/DL (ref 2.6–4.7)
PLATELET # BLD AUTO: 158 K/UL (ref 150–400)
PMV BLD AUTO: 9 FL (ref 8.9–12.9)
POTASSIUM SERPL-SCNC: 3.7 MMOL/L (ref 3.5–5.1)
PROCALCITONIN SERPL-MCNC: 6.39 NG/ML
PROT SERPL-MCNC: 6.1 G/DL (ref 6.4–8.2)
Q-T INTERVAL, ECG07: 336 MS
QRS DURATION, ECG06: 120 MS
QTC CALCULATION (BEZET), ECG08: 454 MS
RBC # BLD AUTO: 3.64 M/UL (ref 4.1–5.7)
SERVICE CMNT-IMP: ABNORMAL
SODIUM SERPL-SCNC: 139 MMOL/L (ref 136–145)
VENTRICULAR RATE, ECG03: 110 BPM
WBC # BLD AUTO: 10 K/UL (ref 4.1–11.1)

## 2022-07-20 PROCEDURE — 97530 THERAPEUTIC ACTIVITIES: CPT

## 2022-07-20 PROCEDURE — 86140 C-REACTIVE PROTEIN: CPT

## 2022-07-20 PROCEDURE — 85027 COMPLETE CBC AUTOMATED: CPT

## 2022-07-20 PROCEDURE — 99223 1ST HOSP IP/OBS HIGH 75: CPT | Performed by: PSYCHIATRY & NEUROLOGY

## 2022-07-20 PROCEDURE — 85379 FIBRIN DEGRADATION QUANT: CPT

## 2022-07-20 PROCEDURE — 97116 GAIT TRAINING THERAPY: CPT

## 2022-07-20 PROCEDURE — 70551 MRI BRAIN STEM W/O DYE: CPT

## 2022-07-20 PROCEDURE — 83735 ASSAY OF MAGNESIUM: CPT

## 2022-07-20 PROCEDURE — 74011636637 HC RX REV CODE- 636/637: Performed by: INTERNAL MEDICINE

## 2022-07-20 PROCEDURE — 65270000029 HC RM PRIVATE

## 2022-07-20 PROCEDURE — 36415 COLL VENOUS BLD VENIPUNCTURE: CPT

## 2022-07-20 PROCEDURE — 84145 PROCALCITONIN (PCT): CPT

## 2022-07-20 PROCEDURE — 74011000250 HC RX REV CODE- 250: Performed by: INTERNAL MEDICINE

## 2022-07-20 PROCEDURE — 82550 ASSAY OF CK (CPK): CPT

## 2022-07-20 PROCEDURE — 77030041074 HC DRSG AG OPTIFRM MDII -A

## 2022-07-20 PROCEDURE — 2709999900 HC NON-CHARGEABLE SUPPLY

## 2022-07-20 PROCEDURE — 74011250637 HC RX REV CODE- 250/637: Performed by: INTERNAL MEDICINE

## 2022-07-20 PROCEDURE — 80048 BASIC METABOLIC PNL TOTAL CA: CPT

## 2022-07-20 PROCEDURE — 84100 ASSAY OF PHOSPHORUS: CPT

## 2022-07-20 PROCEDURE — 82962 GLUCOSE BLOOD TEST: CPT

## 2022-07-20 PROCEDURE — 87040 BLOOD CULTURE FOR BACTERIA: CPT

## 2022-07-20 PROCEDURE — 97535 SELF CARE MNGMENT TRAINING: CPT

## 2022-07-20 PROCEDURE — 83036 HEMOGLOBIN GLYCOSYLATED A1C: CPT

## 2022-07-20 PROCEDURE — 97161 PT EVAL LOW COMPLEX 20 MIN: CPT

## 2022-07-20 PROCEDURE — 74011250637 HC RX REV CODE- 250/637: Performed by: PSYCHIATRY & NEUROLOGY

## 2022-07-20 PROCEDURE — 97165 OT EVAL LOW COMPLEX 30 MIN: CPT

## 2022-07-20 PROCEDURE — 74011250636 HC RX REV CODE- 250/636: Performed by: INTERNAL MEDICINE

## 2022-07-20 PROCEDURE — 80076 HEPATIC FUNCTION PANEL: CPT

## 2022-07-20 RX ORDER — GABAPENTIN 100 MG/1
100 CAPSULE ORAL 2 TIMES DAILY
Status: DISCONTINUED | OUTPATIENT
Start: 2022-07-20 | End: 2022-07-23 | Stop reason: HOSPADM

## 2022-07-20 RX ORDER — INSULIN GLARGINE 100 [IU]/ML
10 INJECTION, SOLUTION SUBCUTANEOUS DAILY
Status: DISCONTINUED | OUTPATIENT
Start: 2022-07-20 | End: 2022-07-21

## 2022-07-20 RX ADMIN — GABAPENTIN 100 MG: 100 CAPSULE ORAL at 10:26

## 2022-07-20 RX ADMIN — INSULIN GLARGINE 10 UNITS: 100 INJECTION, SOLUTION SUBCUTANEOUS at 09:37

## 2022-07-20 RX ADMIN — SODIUM CHLORIDE, PRESERVATIVE FREE 10 ML: 5 INJECTION INTRAVENOUS at 05:57

## 2022-07-20 RX ADMIN — GUAIFENESIN 600 MG: 600 TABLET ORAL at 09:38

## 2022-07-20 RX ADMIN — OXYCODONE HYDROCHLORIDE AND ACETAMINOPHEN 500 MG: 500 TABLET ORAL at 09:37

## 2022-07-20 RX ADMIN — CEFTRIAXONE 2 G: 2 INJECTION, POWDER, FOR SOLUTION INTRAMUSCULAR; INTRAVENOUS at 09:36

## 2022-07-20 RX ADMIN — GABAPENTIN 100 MG: 100 CAPSULE ORAL at 17:56

## 2022-07-20 RX ADMIN — ASPIRIN 81 MG: 81 TABLET, COATED ORAL at 09:38

## 2022-07-20 RX ADMIN — SODIUM CHLORIDE, PRESERVATIVE FREE 5 ML: 5 INJECTION INTRAVENOUS at 21:48

## 2022-07-20 RX ADMIN — INSULIN LISPRO 5 UNITS: 100 INJECTION, SOLUTION INTRAVENOUS; SUBCUTANEOUS at 13:24

## 2022-07-20 RX ADMIN — GUAIFENESIN 600 MG: 600 TABLET ORAL at 21:48

## 2022-07-20 RX ADMIN — AZITHROMYCIN MONOHYDRATE 500 MG: 500 INJECTION, POWDER, LYOPHILIZED, FOR SOLUTION INTRAVENOUS at 10:27

## 2022-07-20 RX ADMIN — ENOXAPARIN SODIUM 40 MG: 100 INJECTION SUBCUTANEOUS at 09:37

## 2022-07-20 RX ADMIN — INSULIN LISPRO 3 UNITS: 100 INJECTION, SOLUTION INTRAVENOUS; SUBCUTANEOUS at 22:34

## 2022-07-20 RX ADMIN — SODIUM CHLORIDE, PRESERVATIVE FREE 10 ML: 5 INJECTION INTRAVENOUS at 13:24

## 2022-07-20 RX ADMIN — Medication 1 CAPSULE: at 09:38

## 2022-07-20 RX ADMIN — INSULIN LISPRO 5 UNITS: 100 INJECTION, SOLUTION INTRAVENOUS; SUBCUTANEOUS at 17:56

## 2022-07-20 RX ADMIN — PANTOPRAZOLE SODIUM 40 MG: 40 TABLET, DELAYED RELEASE ORAL at 05:58

## 2022-07-20 RX ADMIN — DEXAMETHASONE 6 MG: 6 TABLET ORAL at 09:38

## 2022-07-20 RX ADMIN — INSULIN LISPRO 5 UNITS: 100 INJECTION, SOLUTION INTRAVENOUS; SUBCUTANEOUS at 09:36

## 2022-07-20 RX ADMIN — ATORVASTATIN CALCIUM 40 MG: 20 TABLET, FILM COATED ORAL at 09:37

## 2022-07-20 RX ADMIN — LATANOPROST 1 DROP: 50 SOLUTION OPHTHALMIC at 21:00

## 2022-07-20 NOTE — PROGRESS NOTES
7/20/22  3:55 PM    Care Management Initial Evaluation:    Reason for Admission:  COVID  PMH HTN, DM, CAD, trigeminal neuralgia, presented w/ weakness, dizziness, sepsis, COVID pneumonia, UTI. RUR Score:   11%  Level: Low    Payor: [employer] Medicare                  Plan for utilizing home health:   No current home health history       PCP: First and Last name:  Cameron Díaz MD     Name of Practice: [facility]   Are you a current patient: Yes/No: Yes   Approximate date of last visit: 11/24/21   Can you participate in a virtual visit with your PCP:                     Current Advanced Directive/Advance Care Plan: Full Code  Has ACP documents    Healthcare Decision Maker:   Click here to complete [address] including selection of the Healthcare Decision Maker Relationship (ie \"Primary\")   Cathi Carr: Daughter- [phone]                          Transition of Care Plan:                    -Patient lives alone in independent living at [address] prior to admission, drives, uses no DME    1). Patient admitted for emergent care  2). PT/OT consulted- no skilled needs  3). Family will transport at dc  4). CM following for dc needs    [facility] Management Interventions  PCP Verified by CM: Yes  Last Visit to PCP: 11/24/21  Mode of Transport at Discharge:  Other (see comment) (Patients daughter will transport at [facility])  Transition of Care Consult (CM Consult): Discharge Planning  Discharge Durable Medical Equipment: No  Physical Therapy Consult: Yes  Occupational Therapy Consult: Yes  Speech Therapy Consult: No  Support Systems: Child(aman)  Confirm Follow Up Transport: Self  Discharge Location  Patient Expects to be Discharged to[de-identified] Home with family assistance

## 2022-07-20 NOTE — ED NOTES
Report given to Violeta Garsia, [address] [facility]. Patient ready for transport to [facility].

## 2022-07-20 NOTE — CONSULTS
NEUROLOGY IN-PATIENT NEW CONSULTATION      2022    RE: Lin Damon         1929      REFERRED BY:  Kellen Benavides MD      CHIEF COMPLAINT:  This is Lin Damon is a 80 y.o. male  who had no chief complaint listed for this encounter. HPI:     Patient comes in with weakness, dizziness, sepsis, COVID pneumonia and UTI. Having dry cough and fevers for several days with weakness, dizziness and fall,. COVID (+)    Was having R facial pain for the past 1 yr. Discussed with neurosurgeon Dr Luis Esposito. Was started on Neurontin for trigeminal neuralgia. Was increased to 200 mg TID recently. ROS  (-) fever  (-) rash  All other systems reviewed and are negative    Past Medical Hx  Past Medical History:   Diagnosis Date    Benign essential HTN     Bladder cancer ([facility] ?    s/p BCGx2, resection. low grade. in [facility]. now Dr. Dayne Rene. cystoscopy 2020 clear    CAD (coronary artery disease) 2021    Dr Aleena Dos Santos. Cath 21 Successful bifurcation stenting of OM1 and AV groove Lcx with 2 stent couloette technique    Closed left arm fracture     GERD (gastroesophageal reflux disease)     with hiatal hernia    Glaucoma     Dr. Taylor Romero    Hemorrhoids     HTN (hypertension)     Hypercholesterolemia     Microalbuminuria due to type 2 diabetes mellitus [facility])     Prostate cancer ([facility]     Dr. Dayne Rene. s/p prostatectomy 2000 in [facility]. PSA \"0.4\" since then    Rosacea     Trigeminal neuralgia of right side of face     Type 2 diabetes mellitus with microalbuminuria (HCC)     Vertigo        Social Hx  Social History     Socioeconomic History    Marital status:      Spouse name:     Number of children: 3   Occupational History    Occupation: Moved to    Tobacco Use    Smoking status: Former     Types: Cigarettes     Quit date: 1962     Years since quittin.5    Smokeless tobacco: Never   Substance and Sexual Activity    Alcohol use:  Yes     Alcohol/week: 5.0 standard drinks     Types: 5 Glasses of wine per week    Drug use: Never   Social History Narrative    Has 3 children, 6 grandkids       Family Hx  Family History   Problem Relation Age of Onset    Cancer Mother         breast    Cancer Father         prostate    Cancer Sister         breast    Cancer Brother         prostate       ALLERGIES  Allergies   Allergen Reactions    Lisinopril Cough       CURRENT MEDS  Current Facility-Administered Medications   Medication Dose Route Frequency Provider Last Rate Last Admin    insulin glargine (LANTUS) injection 10 Units  10 Units SubCUTAneous DAILY Martinez Murillo MD        cefTRIAXone (ROCEPHIN) 2 g in 0.9% sodium chloride 20 mL IV syringe  2 g IntraVENous Q24H Martinez Murillo MD        ascorbic acid (vitamin C) (VITAMIN C) tablet 500 mg  500 mg Oral DAILY Martinez Murillo MD        zinc sulfate (ZINCATE) 50 mg zinc (220 mg) capsule 1 Capsule  1 Capsule Oral DAILY Martinez Murillo MD        albuterol-ipratropium (DUO-NEB) 2.5 MG-0.5 MG/3 ML  3 mL Nebulization Q4H PRN Martinez Murillo MD        dexAMETHasone (DECADRON) tablet 6 mg  6 mg Oral DAILY Martinez Murillo MD   6 mg at 07/19/22 1910    guaiFENesin ER (MUCINEX) tablet 600 mg  600 mg Oral Q12H Martinez Murillo MD   600 mg at 07/19/22 2059    benzonatate (TESSALON) capsule 100 mg  100 mg Oral TID PRN Martinez Murillo MD        aspirin delayed-release tablet 81 mg  81 mg Oral DAILY Martinez Murillo MD        atorvastatin (LIPITOR) tablet 40 mg  40 mg Oral DAILY Martinez Murillo MD        latanoprost (XALATAN) 0.005 % ophthalmic solution 1 Drop  1 Drop Left Eye QPM Martinez Murillo MD   1 Drop at 07/19/22 2059    pantoprazole (PROTONIX) tablet 40 mg  40 mg Oral ACB Martinez Murillo MD   40 mg at 07/20/22 0558    traMADoL (ULTRAM) tablet 50 mg  50 mg Oral Q8H PRN Martinez Murillo MD        0.9% sodium chloride infusion  75 mL/hr IntraVENous CONTINUOUS Martinez Murillo MD 75 mL/hr at 07/19/22 1956 75 mL/hr at 07/19/22 1956    sodium chloride (NS) flush 5-40 mL  5-40 mL IntraVENous Q8H Jolly Murillo MD   10 mL at 07/20/22 0557    sodium chloride (NS) flush 5-40 mL  5-40 mL IntraVENous PRN Jolly Murillo MD        0.9% sodium chloride infusion 25 mL  25 mL IntraVENous PRN Martinez Murillo MD        acetaminophen (TYLENOL) tablet 650 mg  650 mg Oral Q6H PRN Martinez Murillo MD        Or    acetaminophen (TYLENOL) suppository 650 mg  650 mg Rectal Q6H PRN Martinez Murillo MD        bisacodyL (DULCOLAX) suppository 10 mg  10 mg Rectal DAILY PRN Martinez Murillo MD        promethazine (PHENERGAN) tablet 12.5 mg  12.5 mg Oral Q6H PRN Martinez Murillo MD        Or    ondansetron (ZOFRAN) injection 4 mg  4 mg IntraVENous Q6H PRN Martinez Murillo MD        enoxaparin (LOVENOX) injection 40 mg  40 mg SubCUTAneous DAILY Jolly Murillo MD        insulin lispro (HUMALOG) injection   SubCUTAneous QID WITH MEALS Jolly Murillo MD   4 Units at 07/19/22 2150    glucose chewable tablet 16 g  4 Tablet Oral PRN Martinez Murillo MD        glucagon (GLUCAGEN) injection 1 mg  1 mg IntraMUSCular PRN Martinez Murillo MD        azithromycin (ZITHROMAX) 500 mg in 0.9% sodium chloride 250 mL ([id number])  500 mg IntraVENous Q24H Jolly Murillo MD               PREVIOUS WORKUP: (reviewed)  IMAGING:    CT Results (recent):  Results from [place] encounter on 07/19/22    CT SPINE CERV WO CONT    Narrative  EXAM:  CT CERVICAL SPINE WITHOUT CONTRAST    INDICATION: fall, head trauma. COMPARISON: None. CONTRAST:  None. TECHNIQUE: Multislice helical CT of the cervical spine was performed without  intravenous contrast administration. Sagittal and coronal reformats were  generated. CT dose reduction was achieved through use of a standardized  protocol tailored for this examination and automatic exposure control for dose  modulation. FINDINGS:    The alignment is within normal limits. There is no fracture or compression  deformity. The odontoid process is intact. The craniocervical junction is within  normal limits.     Multilevel degenerative changes in cervical spine most pronounced at C5-6 and  C6-7 with multilevel facet arthrosis. Impression  No evidence of fracture or subluxation. Degenerative changes in the cervical  spine. MRI Results (recent):  No results found for this or any previous visit. IR Results (recent):  No results found for this or any previous visit. VAS/US Results (recent):  No results found for this or any previous visit. LABS (reviewed)  Results for orders placed or performed during the hospital encounter of 07/19/22   CULTURE, BLOOD, PERIPHERAL    Specimen: Blood   Result Value Ref Range    Special Requests: NO SPECIAL REQUESTS      Culture result: NO GROWTH AFTER 12 HOURS     CULTURE, BLOOD, PERIPHERAL    Specimen: Blood   Result Value Ref Range    Special Requests: NO SPECIAL REQUESTS      Culture result: NO GROWTH AFTER 13 HOURS     URINE CULTURE HOLD SAMPLE    Specimen: Serum   Result Value Ref Range    Urine culture hold        Urine on hold in Microbiology dept for 2 days. If unpreserved urine is submitted, it cannot be used for addtional testing after 24 hours, recollection will be required.    RESPIRATORY VIRUS PANEL W/COVID-19, PCR    Specimen: Nasopharyngeal   Result Value Ref Range    Adenovirus Not detected NOTD      Coronavirus 229E Not detected NOTD      Coronavirus HKU1 Not detected NOTD      Coronavirus CVNL63 Not detected NOTD      Coronavirus OC43 Not detected NOTD      SARS-CoV-2, PCR Detected (A) NOTD      Metapneumovirus Not detected NOTD      Rhinovirus and Enterovirus Not detected NOTD      Influenza A Not detected NOTD      Influenza A, subtype H1 Not detected NOTD      Influenza A, subtype H3 Not detected NOTD      INFLUENZA A H1N1 PCR Not detected NOTD      Influenza B Not detected NOTD      Parainfluenza 1 Not detected NOTD      Parainfluenza 2 Not detected NOTD      Parainfluenza 3 Not detected NOTD      Parainfluenza virus 4 Not detected NOTD      RSV by PCR Not detected NOTD      B. parapertussis, PCR Not detected NOTD      Bordetella pertussis - PCR Not detected NOTD      Chlamydophila pneumoniae DNA, QL, PCR Not detected NOTD      Mycoplasma pneumoniae DNA, QL, PCR Not detected NOTD     CBC WITH AUTOMATED DIFF   Result Value Ref Range    WBC 9.9 4.1 - 11.1 K/uL    RBC 4.09 (L) 4.10 - 5.70 M/uL    HGB 12.7 12.1 - 17.0 g/dL    HCT 37.3 36.6 - 50.3 %    MCV 91.2 80.0 - 99.0 FL    MCH 31.1 26.0 - 34.0 PG    MCHC 34.0 30.0 - 36.5 g/dL    RDW 12.1 11.5 - 14.5 %    PLATELET 255 429 - 893 K/uL    MPV 8.9 8.9 - 12.9 FL    NRBC 0.0 0  WBC    ABSOLUTE NRBC 0.00 0.00 - 0.01 K/uL    NEUTROPHILS 89 (H) 32 - 75 %    LYMPHOCYTES 5 (L) 12 - 49 %    MONOCYTES 6 5 - 13 %    EOSINOPHILS 0 0 - 7 %    BASOPHILS 0 0 - 1 %    IMMATURE GRANULOCYTES 0 0.0 - 0.5 %    ABS. NEUTROPHILS 8.8 (H) 1.8 - 8.0 K/UL    ABS. LYMPHOCYTES 0.5 (L) 0.8 - 3.5 K/UL    ABS. MONOCYTES 0.6 0.0 - 1.0 K/UL    ABS. EOSINOPHILS 0.0 0.0 - 0.4 K/UL    ABS. BASOPHILS 0.0 0.0 - 0.1 K/UL    ABS. IMM.  GRANS. 0.0 0.00 - 0.04 K/UL    DF AUTOMATED      RBC COMMENTS NORMOCYTIC, NORMOCHROMIC     METABOLIC PANEL, BASIC   Result Value Ref Range    Sodium 135 (L) 136 - 145 mmol/L    Potassium 4.3 3.5 - 5.1 mmol/L    Chloride 102 97 - 108 mmol/L    CO2 23 21 - 32 mmol/L    Anion gap 10 5 - 15 mmol/L    Glucose 222 (H) 65 - 100 mg/dL    BUN 15 6 - 20 MG/DL    Creatinine 1.28 0.70 - 1.30 MG/DL    BUN/Creatinine ratio 12 12 - 20      GFR est AA >60 >60 ml/min/1.73m2    GFR est non-AA 53 (L) >60 ml/min/1.73m2    Calcium 9.3 8.5 - 10.1 MG/DL   TROPONIN-HIGH SENSITIVITY   Result Value Ref Range    Troponin-High Sensitivity 85 (H) 0 - 76 ng/L   URINALYSIS W/ RFLX MICROSCOPIC   Result Value Ref Range    Color YELLOW/STRAW      Appearance TURBID (A) CLEAR      Specific gravity 1.012 1.003 - 1.030      pH (UA) 5.5 5.0 - 8.0      Protein 100 (A) NEG mg/dL    Glucose Negative NEG mg/dL    Ketone TRACE (A) NEG mg/dL    Bilirubin Negative NEG      Blood LARGE (A) NEG      Urobilinogen 0.2 0.2 - 1.0 EU/dL    Nitrites Positive (A) NEG      Leukocyte Esterase LARGE (A) NEG      WBC >100 (H) 0 - 4 /hpf    RBC 20-50 0 - 5 /hpf    Epithelial cells FEW FEW /lpf    Bacteria TOO NUMEROUS TO COUNT (A) NEG /hpf    Hyaline cast 2-5 0 - 2 /lpf   SAMPLES BEING HELD   Result Value Ref Range    SAMPLES BEING HELD 1red,1blue,1sst     COMMENT        Add-on orders for these samples will be processed based on acceptable specimen integrity and analyte stability, which may vary by analyte. PROCALCITONIN   Result Value Ref Range    Procalcitonin 0.91 ng/mL   C REACTIVE PROTEIN, QT   Result Value Ref Range    C-Reactive protein 2.34 (H) 0.00 - 0.60 mg/dL   D DIMER   Result Value Ref Range    D-dimer 2.54 (H) 0.00 - 0.65 mg/L FEU   METABOLIC PANEL, BASIC   Result Value Ref Range    Sodium 139 136 - 145 mmol/L    Potassium 3.7 3.5 - 5.1 mmol/L    Chloride 107 97 - 108 mmol/L    CO2 23 21 - 32 mmol/L    Anion gap 9 5 - 15 mmol/L    Glucose 261 (H) 65 - 100 mg/dL    BUN 17 6 - 20 MG/DL    Creatinine 1.14 0.70 - 1.30 MG/DL    BUN/Creatinine ratio 15 12 - 20      GFR est AA >60 >60 ml/min/1.73m2    GFR est non-AA >60 >60 ml/min/1.73m2    Calcium 8.7 8.5 - 10.1 MG/DL   HEPATIC FUNCTION PANEL   Result Value Ref Range    Protein, total 6.1 (L) 6.4 - 8.2 g/dL    Albumin 2.9 (L) 3.5 - 5.0 g/dL    Globulin 3.2 2.0 - 4.0 g/dL    A-G Ratio 0.9 (L) 1.1 - 2.2      Bilirubin, total 0.4 0.2 - 1.0 MG/DL    Bilirubin, direct 0.1 0.0 - 0.2 MG/DL    Alk.  phosphatase 74 45 - 117 U/L    AST (SGOT) 138 (H) 15 - 37 U/L    ALT (SGPT) 40 12 - 78 U/L   MAGNESIUM   Result Value Ref Range    Magnesium 1.8 1.6 - 2.4 mg/dL   CBC W/O DIFF   Result Value Ref Range    WBC 10.0 4.1 - 11.1 K/uL    RBC 3.64 (L) 4.10 - 5.70 M/uL    HGB 11.3 (L) 12.1 - 17.0 g/dL    HCT 33.9 (L) 36.6 - 50.3 %    MCV 93.1 80.0 - 99.0 FL    MCH 31.0 26.0 - 34.0 PG    MCHC 33.3 30.0 - 36.5 g/dL    RDW 11.9 11.5 - 14.5 %    PLATELET 788 686 - 359 K/uL    MPV 9.0 8.9 - 12.9 FL    NRBC 0.0 0  WBC    ABSOLUTE NRBC 0.00 0.00 - 0.01 K/uL   PHOSPHORUS   Result Value Ref Range    Phosphorus 2.1 (L) 2.6 - 4.7 MG/DL   D DIMER   Result Value Ref Range    D-dimer 1.64 (H) 0.00 - 0.65 mg/L FEU   C REACTIVE PROTEIN, QT   Result Value Ref Range    C-Reactive protein 6.29 (H) 0.00 - 0.60 mg/dL   PROCALCITONIN   Result Value Ref Range    Procalcitonin 6.39 ng/mL   POC LACTIC ACID   Result Value Ref Range    Lactic Acid (POC) 2.24 (HH) 0.40 - 2.00 mmol/L   POC LACTIC ACID   Result Value Ref Range    Lactic Acid (POC) 0.73 0.40 - 2.00 mmol/L   GLUCOSE, POC   Result Value Ref Range    Glucose (POC) 302 (H) 65 - 117 mg/dL    Performed by STEVIE BATES PCT    GLUCOSE, POC   Result Value Ref Range    Glucose (POC) 273 (H) 65 - 117 mg/dL    Performed by Kaz Wall        Physical Exam:   Visit Vitals  BP (!) 150/65 (BP 1 Location: Right upper arm, BP Patient Position: At rest)   Pulse 60   Temp 97.5 °F (36.4 °C)   Resp 15   Ht 5' 9\" (1.753 m)   Wt 59.9 kg (132 lb)   SpO2 95%   BMI 19.49 kg/m²     General:  Alert, cooperative, no distress. Head:  Normocephalic, without obvious abnormality, atraumatic. Eyes:  Conjunctivae/corneas clear. Lungs:  Heart:   Non labored breathing  Regular rate and rhythm, no carotid bruits   Abdomen:   Soft, non-distended   Extremities: Extremities normal, atraumatic, no cyanosis or edema. Pulses: 2+ and symmetric all extremities. Skin: Skin color, texture, turgor normal. No rashes or lesions.   Neurologic Exam     Gen: Attention normal             Language: naming, repetition, fluency normal             Memory: intact recent and remote memory  Cranial Nerves:  I: smell Not tested   II: visual fields Full to confrontation   II: pupils Equal, round, reactive to light   II: optic disc No papilledema   III,VII: ptosis none   III,IV,VI: extraocular muscles  Full ROM   V: mastication normal   V: facial light touch sensation  normal   VII: facial muscle function   symmetric   VIII: hearing symmetric   IX: soft palate elevation  normal   XI: trapezius strength  5/5   XI: sternocleidomastoid strength 5/5   XI: neck flexion strength  5/5   XII: tongue  midline     Motor: normal bulk and tone, no tremor              Strength: 5/5 all four extremities  Sensory: intact to LT, PP, vibration, and temperature  Reflexes: 2+ throughout; Down going toes  Coordination: Good FTN and HTS  Gait: deferred           Impression:     Jose Carlos Garcia is a 80 y.o. male who  has a past medical history of Benign essential HTN, Bladder cancer ([facility] (2013?), CAD (coronary artery disease) (09/2021), Closed left arm fracture, GERD (gastroesophageal reflux disease), Glaucoma, Hemorrhoids, HTN (hypertension), Hypercholesterolemia, Microalbuminuria due to type 2 diabetes mellitus ([address] Prostate cancer ([facility] (2000), Rosacea, Trigeminal neuralgia of right side of face, Type 2 diabetes mellitus with microalbuminuria ([facility] and Vertigo. who comes in with dry cough and fevers for several days with weakness, dizziness and fall. Was having R facial pain for the past 1 yr. Discussed with neurosurgeon Dr Ilda Jean. Was started on Neurontin for trigeminal neuralgia. Was increased to 200 mg TID recently. Considerations include generalized weakness with fall, coughing and dizziness, multifactorial (I.e. COVID, UTI, medication side effect -Gabapentin)    RECOMMENDATIONS  1. I had a long discussion with patient and daugther. Discussed diagnosis, prognosis, pathophysiology and available treatment. Reviewed test results. All questions were answered. 2. Can be placed back on Gabapentin but at a lower dose (100 mg BID)  3. Treat UTI  4.  Address COVID    Follow up with his neurosurgeon Dr Ilda Jean regarding trigeminal neuralgia    Please call for questions        Thank you for the consultation      Larry Arellano MD  Diplomate, American Board of Psychiatry and Neurology  Diplomate, Neuromuscular Medicine  Diplomate, American Board of Electrodiagnostic Medicine    Greater than 50% of time spent counseling patient        CC: Petra Otero MD  Fax: [phone]

## 2022-07-20 NOTE — PROGRESS NOTES
[facility]  [address] [facility], [address] [place] [address]  [phone]      Medical Progress Note      NAME: Domenic Mcfarland   :  1929  MRM:  [id number]    Date/Time of service: 2022  11:24 AM       Subjective:     Chief Complaint:  Patient was personally seen and examined by me during this time period. Chart reviewed. Dyspnea is improving. Had low grade fevers       Objective:       Vitals:       Last 24hrs VS reviewed since prior progress note. Most recent are:    Visit Vitals  BP (!) 150/65 (BP 1 Location: Right upper arm, BP Patient Position: At rest)   Pulse 60   Temp 97.5 °F (36.4 °C)   Resp 15   Ht 5' 9\" (1.753 m)   Wt 59.9 kg (132 lb)   SpO2 95%   BMI 19.49 kg/m²     SpO2 Readings from Last 6 Encounters:   22 95%   21 96%   21 98%   21 98%   10/15/20 99%   20 97%          Intake/Output Summary (Last 24 hours) at 2022 1124  Last data filed at 2022 2226  Gross per 24 hour   Intake 1260 ml   Output 900 ml   Net 360 ml        Exam:     Physical Exam:    Gen:  Elderly, ill-appearing, frail, NAD  HEENT:  Pink conjunctivae, PERRL, hearing intact to voice, right ear bruising, small head lac  Neck:  Supple, without masses, thyroid non-tender  Resp:  No accessory muscle use, rhonchi at bases  Card:  No murmurs, normal S1, S2 without thrills, bruits or peripheral edema  Abd:  Soft, non-tender, non-distended, normoactive bowel sounds are present  Lymph:  No cervical adenopathy  Musc:  No cyanosis or clubbing  Skin:  No rashes  Neuro:  Cranial nerves 3-12 are grossly intact, generalized weakness, follows commands appropriately  Psych:  Alert with good insight.   Oriented to person, place, and time    Medications Reviewed: (see below)    Lab Data Reviewed: (see below)    ______________________________________________________________________    Medications:     Current Facility-Administered Medications   Medication Dose Route Frequency insulin glargine (LANTUS) injection 10 Units  10 Units SubCUTAneous DAILY    gabapentin (NEURONTIN) capsule 100 mg  100 mg Oral BID    cefTRIAXone (ROCEPHIN) 2 g in 0.9% sodium chloride 20 mL IV syringe  2 g IntraVENous Q24H    ascorbic acid (vitamin C) (VITAMIN C) tablet 500 mg  500 mg Oral DAILY    zinc sulfate (ZINCATE) 50 mg zinc (220 mg) capsule 1 Capsule  1 Capsule Oral DAILY    albuterol-ipratropium (DUO-NEB) 2.5 MG-0.5 MG/3 ML  3 mL Nebulization Q4H PRN    dexAMETHasone (DECADRON) tablet 6 mg  6 mg Oral DAILY    guaiFENesin ER (MUCINEX) tablet 600 mg  600 mg Oral Q12H    benzonatate (TESSALON) capsule 100 mg  100 mg Oral TID PRN    aspirin delayed-release tablet 81 mg  81 mg Oral DAILY    atorvastatin (LIPITOR) tablet 40 mg  40 mg Oral DAILY    latanoprost (XALATAN) 0.005 % ophthalmic solution 1 Drop  1 Drop Left Eye QPM    pantoprazole (PROTONIX) tablet 40 mg  40 mg Oral ACB    traMADoL (ULTRAM) tablet 50 mg  50 mg Oral Q8H PRN    0.9% sodium chloride infusion  75 mL/hr IntraVENous CONTINUOUS    sodium chloride (NS) flush 5-40 mL  5-40 mL IntraVENous Q8H    sodium chloride (NS) flush 5-40 mL  5-40 mL IntraVENous PRN    0.9% sodium chloride infusion 25 mL  25 mL IntraVENous PRN    acetaminophen (TYLENOL) tablet 650 mg  650 mg Oral Q6H PRN    Or    acetaminophen (TYLENOL) suppository 650 mg  650 mg Rectal Q6H PRN    bisacodyL (DULCOLAX) suppository 10 mg  10 mg Rectal DAILY PRN    promethazine (PHENERGAN) tablet 12.5 mg  12.5 mg Oral Q6H PRN    Or    ondansetron (ZOFRAN) injection 4 mg  4 mg IntraVENous Q6H PRN    enoxaparin (LOVENOX) injection 40 mg  40 mg SubCUTAneous DAILY    insulin lispro (HUMALOG) injection   SubCUTAneous QID WITH MEALS    glucose chewable tablet 16 g  4 Tablet Oral PRN    glucagon (GLUCAGEN) injection 1 mg  1 mg IntraMUSCular PRN    azithromycin (ZITHROMAX) 500 mg in 0.9% sodium chloride 250 mL ([id number])  500 mg IntraVENous Q24H          Lab Review:     Recent Labs     07/20/22  0057 07/19/22  1631   WBC 10.0 9.9   HGB 11.3* 12.7   HCT 33.9* 37.3    185     Recent Labs     07/20/22  0057 07/19/22  1631    135*   K 3.7 4.3    102   CO2 23 23   * 222*   BUN 17 15   CREA 1.14 1.28   CA 8.7 9.3   MG 1.8  --    PHOS 2.1*  --    ALB 2.9*  --    TBILI 0.4  --    ALT 40  --      Lab Results   Component Value Date/Time    Glucose (POC) 273 (H) 07/20/2022 07:57 AM    Glucose (POC) 302 (H) 07/19/2022 09:44 PM    Glucose (POC) 195 (H) 09/25/2020 07:06 AM    Glucose (POC) 193 (H) 09/24/2020 10:01 PM    Glucose (POC) 211 (H) 09/24/2020 09:39 PM          Assessment / Plan:     79 yo hx of HTN, DM, CAD, trigeminal neuralgia, presented w/ weakness, dizziness, sepsis, COVID pneumonia, UTI     1) Pneumonia due to COVID-19 virus: sepsis POA. Also concerning for underlying bacterial pneumonia given high procalcitonin. Viral panel only positive for COVID. Cont IV CTX/azithro, dexamethasone, Vit C/Zinc.  Monitor inflammatory labs. Pulm following     2) UTI: monitor urine Cx. Already on IV CTX     3) Weakness/dizziness/ataxia: could be from COVID and neurontin. Head CT neg. Awaiting head MRI. Consult PT/OT     4) HTN: hold ACEi     5) CAD: cont ASA, statin     6) DM type 2: A1C 8.6%. Hold metformin. Start Lantus to cover steroids. Cont SSI     7) Trigeminal neuralgia: follows by Neurosurg, Dr. Sara Ahumada. Will decrease neurontin due to dizziness.    Neuro following    Total time spent with patient care: 45 Minutes **I personally saw and examined the patient during this time period**                 Care Plan discussed with: Patient, nursing, daughter     Discussed:  Care Plan    Prophylaxis:  Lovenox    Disposition:  SNF/LTC           ___________________________________________________    Attending Physician: Sapna Macdonald MD

## 2022-07-20 NOTE — CONSULTS
Consulting service: Hospitalist  Reason for consult: COVID, pneumonia    CC: Dizzy    HPI: 80year old male with HTN, DM, CAD s/p stenting who presented to the ER after a fall in the setting of a known COVID infection. Workup in the ER revealed a UTI as well as left sided airspace disease on CXR. Started on antibiotics and dex and admitted. Pulmonary has been asked to consult. Patient is resting comfortably in bed. States he still feels dizzy when moving around but is otherwise ok. No dyspnea. Has been coughing up some white mucus. No fevers. Minimal nausea. No known lung disease and takes no inhalers at home. Has had COVID vaccine x 3.     PMH:   -HTN  -DM  -CAD s/p stenting  -trigeminal neuralgia  -bladder cancer  -remote prostate cancer  -hyperlipidemia    SH:   -remote tobacco use (quit 1955)    FH:   -no lung disease that he is aware of    ROS: 12 point review of systems completed and negative other than as noted in the HPI    Vitals:    07/19/22 2323 07/20/22 0000 07/20/22 0440 07/20/22 0801   BP:  (!) 112/31 (!) 132/59 (!) 150/65   Pulse: (!) 56 (!) 53 64 60   Resp:  15 16 15   Temp:  99 °F (37.2 °C) 97.7 °F (36.5 °C) 97.5 °F (36.4 °C)   SpO2:  97% 96% 95%   Weight:       Height:          Gen: Elderly male in no distress  HEENT: Head bandaged  CV: Regular rhythm  Lungs: CTA B/L, on room air  GI: Soft  Ext: Moves all, no edema  Neuro: Alert, oriented    Lab Results   Component Value Date/Time    WBC 10.0 07/20/2022 12:57 AM    HGB 11.3 (L) 07/20/2022 12:57 AM    HCT 33.9 (L) 07/20/2022 12:57 AM    PLATELET 067 42/79/9470 12:57 AM    MCV 93.1 07/20/2022 12:57 AM      Lab Results   Component Value Date/Time    Sodium 139 07/20/2022 12:57 AM    Potassium 3.7 07/20/2022 12:57 AM    Chloride 107 07/20/2022 12:57 AM    CO2 23 07/20/2022 12:57 AM    Anion gap 9 07/20/2022 12:57 AM    Glucose 261 (H) 07/20/2022 12:57 AM    BUN 17 07/20/2022 12:57 AM    Creatinine 1.14 07/20/2022 12:57 AM    BUN/Creatinine ratio 15 07/20/2022 12:57 AM    GFR est AA >60 07/20/2022 12:57 AM    GFR est non-AA >60 07/20/2022 12:57 AM    Calcium 8.7 07/20/2022 12:57 AM      CXR Results  (Last 48 hours)                 07/19/22 1730  XR CHEST PORT Final result    Impression:  Left midlung interstitial and patchy airspace disease. Recommend   follow-up to complete resolution. Narrative:  INDICATION: Cough. Portable AP view of the chest.       Direct comparison made to prior chest x-ray dated August 2020. Cardiomediastinal silhouette is stable. There is interstitial and patchy   airspace disease in the left midlung. There is no pleural fluid. There is no   pneumothorax.                   Impression:  -COVID 19 infection  -left sided pneumonia, suspect may be bacterial rather than related to the COVID  -UTI  -dizziness s/p fall    Plan:  -agree with dex  -agree with antibiotics to cover CAP  -procalcitonin high, would not use baricitinib  -O2 as needed, on room air currently  -cultures pending  -RVP only positive for COVID  -neuro to see  -cautious use of IVF

## 2022-07-20 NOTE — PROGRESS NOTES
Problem: Mobility Impaired (Adult and Pediatric)  Goal: *Acute Goals and Plan of Care (Insert Text)  Description: FUNCTIONAL STATUS PRIOR TO ADMISSION: Patient was independent and active without use of DME.    HOME SUPPORT PRIOR TO ADMISSION: The patient lived alone with neighbors and [facility] to provide assistance. Pt receives ~ half of his meals from [facility]. Drives. Physical Therapy Goals  Initiated 7/20/2022  1. Patient will move from supine to sit and sit to supine , scoot up and down, and roll side to side in bed with independence within 7 day(s). 2.  Patient will transfer from bed to chair and chair to bed with independence using the least restrictive device within 7 day(s). 3.  Patient will perform sit to stand with independence within 7 day(s). 4.  Patient will ambulate with independence for 300 feet with the least restrictive device within 7 day(s). 5.  Patient will ascend/descend 5 stairs with 1 handrail(s) with supervision/set-up within 7 day(s). Outcome: Progressing Towards Goal   PHYSICAL THERAPY EVALUATION  Patient: Kelly Jalloh (50 y.o. male)  Date: 7/20/2022  Primary Diagnosis: Pneumonia due to COVID-19 virus [U07.1, J12.82]       Precautions:        ASSESSMENT  Based on the objective data described below, the patient presents with near baseline mobility following admission for COVID 19, dizziness and fall PTA. Orthostatics taken and negative. SpO2 >94% on RA throughout evaluation and with increased activity. Ambulated 60ft in room with SBA. At most stands from low height of bed at a CGA level. Pt is at risk of deconditioning and weakening while admitted and must remain at a MOD INDEP level to return to [facility] therefore will continue to follow 3x/week to ensure continued mobility. Current Level of Function Impacting Discharge (mobility/balance): CGA to stand from low bed otherwise SBA    Functional Outcome Measure:   The patient scored 23/28 on the Tinetti outcome measure which is indicative of moderate fall risk. Other factors to consider for discharge: COVID 23, lives alone at [address], on RA currently     Patient will benefit from skilled therapy intervention to address the above noted impairments. PLAN :  Recommendations and Planned Interventions: bed mobility training, transfer training, gait training, therapeutic exercises, neuromuscular re-education, patient and family training/education, and therapeutic activities      Frequency/Duration: Patient will be followed by physical therapy:  3 times a week to address goals. Recommendation for discharge: (in order for the patient to meet his/her long term goals)  No skilled physical therapy/ follow up rehabilitation needs identified at this time. This discharge recommendation:  Has been made in collaboration with the attending provider and/or case management    IF patient discharges home will need the following DME: none         SUBJECTIVE:   Patient stated This is the most fun I have had the whole time I've been here.     OBJECTIVE DATA SUMMARY:   HISTORY:    Past Medical History:   Diagnosis Date    Benign essential HTN     [facility]?    s/p BCGx2, resection. low grade. in [facility]. now Dr. Paige Cooper. cystoscopy 6/2020 clear    CAD (coronary artery disease) 09/2021    Dr Petar Pérez. Cath 9/24/21 Successful bifurcation stenting of OM1 and AV groove Lcx with 2 stent couloette technique    Closed left arm fracture     GERD (gastroesophageal reflux disease)     with hiatal hernia    Glaucoma     Dr. Chicas Half    Hemorrhoids     HTN (hypertension)     Hypercholesterolemia     [facility] to type [facility]     Prostate cancer ([facility] 2000    Dr. Paige Cooper. s/p prostatectomy 1/2000 in [facility].   PSA \"0.4\" since then    Rosacea     Trigeminal neuralgia of right side of face     Type 2 diabetes mellitus with microalbuminuria (HCC)     Vertigo      Past Surgical History:   Procedure Laterality Date    HX COLONOSCOPY  2005? reports 2 colonoscopies    HX HEART CATHETERIZATION  09/24/2020    Successful bifurcation stenting of OM1 and AV groove Lcx with 2 stent couloette technique    HX HEART CATHETERIZATION  09/08/2020    Multivessel CAD. LCX/OM1 lesions, severe with significant calcification. Normal LVEDP. Unsuccessful PCI attempt with POBA due to calcification.  HX OTHER SURGICAL  01/2020    bladder biopsy    HX PROSTATECTOMY  01/2000       Personal factors and/or comorbidities impacting plan of care: bladder cancer, HTN, fall, vertigo, diabetes    Home Situation  Home Environment: Independent living ([facility])  # Steps to Enter: 0  One/Two Story Residence: One story  Living Alone: Yes  Support Systems:  (neighbors and ILF facility)  Patient Expects to be Discharged to[de-identified] Home  Current DME Used/Available at Home: None  Tub or Shower Type: Tub/Shower combination    EXAMINATION/PRESENTATION/DECISION MAKING:   Critical Behavior:  Neurologic State: Alert, Eyes open spontaneously  Orientation Level: Oriented X4  Cognition: Follows commands     Hearing:     Skin:    Edema:   Range Of Motion:  AROM: Within functional limits           PROM: Within functional limits           Strength:    Strength:  Within functional limits                    Tone & Sensation:                                  Coordination:  Coordination: Within functional limits  Vision:      Functional Mobility:  Bed Mobility:  Rolling: Independent  Supine to Sit: Independent  Sit to Supine: Independent     Transfers:  Sit to Stand: Contact guard assistance  Stand to Sit: Stand-by assistance  Stand Pivot Transfers: Stand-by assistance                    Balance:   Sitting: Intact  Standing: Intact  Ambulation/Gait Training:  Distance (ft): 60 Feet (ft)  Assistive Device: Gait belt  Ambulation - Level of Assistance: Stand-by assistance     Gait Description (WDL): Exceptions to WDL  Gait Abnormalities: Decreased step clearance        Base of Support: Narrowed     Speed/Carmencita: Pace decreased (<100 feet/min)                        Stairs: Therapeutic Exercises:       Functional Measure:  Tinetti test:    Sitting Balance: 1  Arises: 1  Attempts to Rise: 1  Immediate Standing Balance: 1  Standing Balance: 1  Nudged: 2  Eyes Closed: 1  Turn 360 Degrees - Continuous/Discontinuous: 1  Turn 360 Degrees - Steady/Unsteady: 1  Sitting Down: 2  Balance Score: 12 Balance total score  Indication of Gait: 1  R Step Length/Height: 1  L Step Length/Height: 1  R Foot Clearance: 1  L Foot Clearance: 1  Step Symmetry: 1  Step Continuity: 1  Path: 1  Trunk: 2  Walking Time: 1  Gait Score: 11 Gait total score  Total Score: 23/28 Overall total score         Tinetti Tool Score Risk of Falls  <19 = High Fall Risk  19-24 = Moderate Fall Risk  25-28 = Low Fall Risk  Tinetti ME. Performance-Oriented Assessment of Mobility Problems in Elderly Patients. [facility] 66; [id number].  (Scoring Description: PT Bulletin Feb. 10, 1993)    Older adults: Ramona Simon et al, 2009; n = 1000 [facility] elderly evaluated with ABC, KAMERON, ADL, and IADL)  · Mean KAMERON score for males aged 69-68 years = 26.21(3.40)  · Mean KAMERON score for females age 69-68 years = 25.16(4.30)  · Mean KAMERON score for males over 80 years = 23.29(6.02)  · Mean KAMERON score for females over 80 years = 17.20(8.32)            Physical Therapy Evaluation Charge Determination   History Examination Presentation Decision-Making   MEDIUM  Complexity : 1-2 comorbidities / personal factors will impact the outcome/ POC  MEDIUM Complexity : 3 Standardized tests and measures addressing body structure, function, activity limitation and / or participation in recreation  LOW Complexity : Stable, uncomplicated  Other outcome measures Tinetti  LOW       Based on the above components, the patient evaluation is determined to be of the following complexity level: LOW     Pain Rating:  none    Activity Tolerance:   Good and SpO2 stable on RA    After treatment patient left in no apparent distress:   Sitting in chair and Call bell within reach    COMMUNICATION/EDUCATION:   The patients plan of care was discussed with: Registered nurse. Fall prevention education was provided and the patient/caregiver indicated understanding., Patient/family have participated as able in goal setting and plan of care. , and Patient/family agree to work toward stated goals and plan of care.     Thank you for this referral.  Nguyễn Mahoney, PT   Time Calculation: 26 mins

## 2022-07-20 NOTE — PROGRESS NOTES
OCCUPATIONAL THERAPY EVALUATION/DISCHARGE  Patient: Geovanny White (11 y.o. male)  Date: 7/20/2022  Primary Diagnosis: Pneumonia due to COVID-19 virus [U07.1, J12.82]       Precautions: droplet plus        ASSESSMENT  Based on the objective data described below, the patient presents with good overall activity tolerance following admission for PNA in the setting of COVID-19. Patient reports an episode of dizziness prompting a fall PTA. Patient with stable vitals for tx today including SpO2 >93% on room air. Patient was educated on energy conservation, pacing, and pursed lip breathing techniques. Patient performed transfers and ADLs without LOB or physical assistance needed. He was able to transfer into the bathroom to perform seated and standing ADLs without difficulty. Patient was encouraged to be OOB to the chair frequently, educated on HEP (focused UE ex in sitting/supine), and attempt to achieve prone positioning in bed as able. Patient has no further skilled OT needs at this time. Current Level of Function (ADLs/self-care): Patient is independent to SBA level for ADLs and functional mobility. Functional Outcome Measure: The patient scored 95/100 on the Barthel Index outcome measure. PLAN :    Recommendation for discharge: (in order for the patient to meet his/her long term goals)  No skilled occupational therapy/ follow up rehabilitation needs identified at this time. This discharge recommendation:  Has been made in collaboration with the attending provider and/or case management    IF patient discharges home will need the following DME: none       SUBJECTIVE:   Patient agreeable to OT evaluation. OBJECTIVE DATA SUMMARY:   HISTORY:   Past Medical History:   Diagnosis Date    Benign essential HTN     [facility] 2013?    s/p BCGx2, resection. low grade. in [facility]. now Dr. Hank Yadav. cystoscopy 6/2020 clear    CAD (coronary artery disease) 09/2021    Dr Kim Morales. Cath 9/24/21 Successful bifurcation stenting of OM1 and AV groove Lcx with 2 stent couloette technique    Closed left arm fracture     GERD (gastroesophageal reflux disease)     with hiatal hernia    Glaucoma     Dr. Harish Jay    Hemorrhoids     HTN (hypertension)     Hypercholesterolemia     [facility]album[facility] due to type [facility]     Prostate [facility] ([facility] 2000    Dr. Vince Brown. s/p prostatectomy 1/2000 in [facility]. PSA \"0.4\" since then    Rosacea     Trigeminal neuralgia of right side of face     Type 2 diabetes mellitus with microalbuminuria (HCC)     Vertigo      Past Surgical History:   Procedure Laterality Date    HX COLONOSCOPY  2005? reports 2 colonoscopies    HX HEART CATHETERIZATION  09/24/2020    Successful bifurcation stenting of OM1 and AV groove Lcx with 2 stent couloette technique    HX HEART CATHETERIZATION  09/08/2020    Multivessel CAD. LCX/OM1 lesions, severe with significant calcification. Normal LVEDP. Unsuccessful PCI attempt with POBA due to calcification.  HX OTHER SURGICAL  01/2020    bladder biopsy    HX PROSTATECTOMY  01/2000       Prior Level of Function/Environment/Context: Patient lives alone at [address].     Expanded or extensive additional review of patient history:   Home Situation  Home Environment: Independent living ([facility])  # Steps to Enter: 0  One/Two Story Residence: One story  Living Alone: Yes  Support Systems: Child(aman)  Patient Expects to be Discharged to[de-identified] Home with family assistance  Current DME Used/Available at Home: None  Tub or Shower Type: Tub/Shower combination    Hand dominance: Right    EXAMINATION OF PERFORMANCE DEFICITS:  Cognitive/Behavioral Status:  Neurologic State: Alert  Orientation Level: Oriented X4  Cognition: Follows commands  Perception: Appears intact  Perseveration: No perseveration noted  Safety/Judgement: Awareness of environment    Skin: Intact in the uppers    Edema: None noted in the uppers    Vision/Perceptual:    Tracking: Able to track stimulus in all quadrants w/o difficulty    Diplopia: No    Acuity: Within Defined Limits       Range of Motion:  AROM: Within functional limits  PROM: Within functional limits       Strength:  Strength: Within functional limits    Coordination:  Coordination: Within functional limits  Fine Motor Skills-Upper: Left Intact; Right Intact    Gross Motor Skills-Upper: Left Intact; Right Intact    Tone & Sensation:  Tone: normal  Sensation: intact       Balance:  Sitting: Intact  Standing: Intact    Functional Mobility and Transfers for ADLs:  Bed Mobility:  Rolling: Independent  Supine to Sit: Independent  Sit to Supine: Independent    Transfers:  Sit to Stand: Stand-by assistance  Stand to Sit: Stand-by assistance  Bed to Chair: Stand-by assistance  Bathroom Mobility: Stand-by assistance  Toilet Transfer : Stand-by assistance    ADL Assessment:  Feeding: Independent    Oral Facial Hygiene/Grooming: Independent    Bathing: Modified independent     Upper Body Dressing: Modified independent    Lower Body Dressing: Modified independent    Toileting: Stand by assistance       Cognitive Retraining  Safety/Judgement: Awareness of environment    Functional Measure:    Barthel Index:  Bathin  Bladder: 10  Bowels: 10  Groomin  Dressing: 10  Feeding: 10  Mobility: 15  Stairs: 5  Toilet Use: 10  Transfer (Bed to Chair and Back): 15  Total: 95/100      The Barthel ADL Index: Guidelines  1. The index should be used as a record of what a patient does, not as a record of what a patient could do. 2. The main aim is to establish degree of independence from any help, physical or verbal, however minor and for whatever reason. 3. The need for supervision renders the patient not independent. 4. A patient's performance should be established using the best available evidence.  Asking the patient, friends/relatives and nurses are the usual sources, but direct observation and common sense are also important. However direct testing is not needed. 5. Usually the patient's performance over the preceding 24-48 hours is important, but occasionally longer periods will be relevant. 6. Middle categories imply that the patient supplies over 50 per cent of the effort. 7. Use of aids to be independent is allowed. Score Interpretation (from [address])    Independent   60-79 Minimally independent   40-59 Partially dependent   20-39 Very dependent   <20 Totally dependent     -Laura Bhatt., Barthel, DAlethaW. (1965). Functional evaluation: the Barthel Index. [address] ([address] (1997). The Barthel activities of daily living index: self-reporting versus actual performance in the old (> or = 75 years). Journal [address] 45(7), 14 [facility], ZBIGNIEW, Harmony Arango, Leticia Rucker. (1999). Measuring the change in disability after inpatient rehabilitation; comparison of the responsiveness of the Barthel Index and Functional [place] Measure. Journal of Neurology, Neurosurgery, and Psychiatry, 66(4), 195-712. Felecia Haney, N.J.A, MATIAS Longoria, & Maria Antonia Lyn, MAlethaA. (2004) Assessment of post-stroke quality of life in cost-effectiveness studies: The usefulness of the Barthel Index and the EuroQoL-5D.  Quality of Life Research, 15, 876-73        Occupational Therapy Evaluation Charge Determination   History Examination Decision-Making   LOW Complexity : Brief history review  LOW Complexity : 1-3 performance deficits relating to physical, cognitive , or psychosocial skils that result in activity limitations and / or participation restrictions  LOW Complexity : No comorbidities that affect functional and no verbal or physical assistance needed to complete eval tasks       Based on the above components, the patient evaluation is determined to be of the following complexity level: LOW     Activity Tolerance: Good    After treatment patient left in no apparent distress:    Supine in bed, Call bell within reach, and Bed / chair alarm activated    COMMUNICATION/EDUCATION:   The patients plan of care was discussed with: Physical therapist, Registered nurse, and patient .      Thank you for this referral.  Lawrence Benavides, OTR/L  Time Calculation: 36 mins

## 2022-07-20 NOTE — PROGRESS NOTES
Bedside and Verbal shift change report given to Sharron Robledo RN (oncoming nurse) by Benjie Steward RN (offgoing nurse). Report included the following information SBAR, Kardex and ED Summary.

## 2022-07-20 NOTE — PROGRESS NOTES
Bedside and Verbal shift change report given to Perla Duval (oncoming nurse) by Christopher Colon RN (offgoing nurse). Report included the following information SBAR, Kardex, Procedure Summary, Intake/Output, and Recent Results.

## 2022-07-20 NOTE — WOUND CARE
Wound Consult:  new consult Visit. Chart reviewed. Cdoonsulted for left ear and head laceration. COVID 23  Spoke with patients nurse,  Rhonda Lind RN. Patient is resting on a jim bed with BRIAN mattress. Heels off loaded with pillows at end of visit. Patient is awake, alert, oriented X4; patient moves easily side to side in bed  Max score 17  Assessment:  Right ear- ER drained hematoma on admission, ear ecchymotic, linear incision, clean. Left top of head- 0.5x0.4x0.1cm- laceration, skin flap 40%, no drainage, dry blood at site. No pain, no odor. Bilateral heels- no redness  Sacrum/buttocks- red/pink, blanches  Treatment:  Right ear- cleanse with NSS, silver foam to ear incision  POA Head laceration- cleanse with wound cleanser and cover with silver foam dressing  Wound Recommendations:  Right ear- cleanse with NSS, silver foam to ear incision. Change every 3 days  Head laceration- cleanse with wound cleanser and cover with silver foam dressing. change every 3 days  Mobility team  Skin Care / PI Prevention Recommendations:  1. Minimize friction/shear: minimize layers of linen/pads under patient. 2. Off load pressure/reposition: turn and reposition approximately every 2 hours; float heels with pillows or use off loading heel boots; waffle cushion for sitting; position wedge. 3. Manage Moisture - keep skin folds dry; incontinence skin care with incontinence wipes; zinc guard barrier ointment. 4. Continue to monitor nutrition, pain, and skin risk scale, and skin assessment. Plan:  Spoke with Dr Reino Apley regarding findings and proposed orders for treatment. We will continue to reassess weekly and as needed. Please re-consult should concerns arise despite continued skin/PI prevention measures.       [address], Wound / [address] [facility] Healing Office [phone]

## 2022-07-20 NOTE — ED NOTES
Verbal report given to Jose Daniel Tam RN (name) on Chava Aranda being transferred to ED Hold (unit) for routine progression of care    Report consisted of patient's Situation, Background, Assessment and Recommendations (SBAR)    Information from the following report(s)  SBAR, Kardex, ED Summary, Intake/Output, MAR, Med Rec Status and Cardiac Rhythm NSR was reviewed with the receiving nurse. Opportunity for questions and clarification was provided.     Last Filed Values:  Temp: (!) 101.4 °F (38.6 °C) (07/19/22 1756)  Pulse (Heart Rate): 89 (07/19/22 1812)  Resp Rate: 15 (07/19/22 1812)  O2 Sat (%): 94 % (07/19/22 1812)  BP: (!) 127/40 (07/19/22 1812)  MAP (Monitor): 69 (07/19/22 1812)  MAP (Calculated): 69 (07/19/22 1812)  Level of Consciousness: Alert (0) (07/19/22 1756)      Lab Results   Component Value Date/Time    WBC 9.9 07/19/2022 04:31 PM       Repeat LA: 0.74  Time Due n/a    Blood Cultures Drawn:  yes    Fluid Resuscitation:  Total needed 1000, Status completed, amount 1000    All Antibiotics Started:  no, Dose Due Vancomycin    VS x 2 post-fluid resuscitation:   yes    Vasopressor Infusion:  no    Provider Reassessment needed and notified:  no     Additional Interventions/Comments:  N/A

## 2022-07-21 LAB
ALBUMIN SERPL-MCNC: 2.7 G/DL (ref 3.5–5)
ALBUMIN/GLOB SERPL: 0.9 {RATIO} (ref 1.1–2.2)
ALP SERPL-CCNC: 67 U/L (ref 45–117)
ALT SERPL-CCNC: 59 U/L (ref 12–78)
ANION GAP SERPL CALC-SCNC: 7 MMOL/L (ref 5–15)
AST SERPL-CCNC: 213 U/L (ref 15–37)
BILIRUB SERPL-MCNC: 0.3 MG/DL (ref 0.2–1)
BUN SERPL-MCNC: 24 MG/DL (ref 6–20)
BUN/CREAT SERPL: 21 (ref 12–20)
CALCIUM SERPL-MCNC: 8.6 MG/DL (ref 8.5–10.1)
CHLORIDE SERPL-SCNC: 111 MMOL/L (ref 97–108)
CK SERPL-CCNC: 9312 U/L (ref 39–308)
CO2 SERPL-SCNC: 21 MMOL/L (ref 21–32)
CREAT SERPL-MCNC: 1.12 MG/DL (ref 0.7–1.3)
CRP SERPL-MCNC: 5.66 MG/DL (ref 0–0.6)
D DIMER PPP FEU-MCNC: 0.99 MG/L FEU (ref 0–0.65)
ERYTHROCYTE [DISTWIDTH] IN BLOOD BY AUTOMATED COUNT: 12.1 % (ref 11.5–14.5)
GLOBULIN SER CALC-MCNC: 3 G/DL (ref 2–4)
GLUCOSE BLD STRIP.AUTO-MCNC: 239 MG/DL (ref 65–117)
GLUCOSE BLD STRIP.AUTO-MCNC: 240 MG/DL (ref 65–117)
GLUCOSE BLD STRIP.AUTO-MCNC: 261 MG/DL (ref 65–117)
GLUCOSE BLD STRIP.AUTO-MCNC: 278 MG/DL (ref 65–117)
GLUCOSE SERPL-MCNC: 290 MG/DL (ref 65–100)
HCT VFR BLD AUTO: 30.6 % (ref 36.6–50.3)
HGB BLD-MCNC: 10.4 G/DL (ref 12.1–17)
LACTATE SERPL-SCNC: 1.2 MMOL/L (ref 0.4–2)
MAGNESIUM SERPL-MCNC: 1.9 MG/DL (ref 1.6–2.4)
MCH RBC QN AUTO: 31.2 PG (ref 26–34)
MCHC RBC AUTO-ENTMCNC: 34 G/DL (ref 30–36.5)
MCV RBC AUTO: 91.9 FL (ref 80–99)
NRBC # BLD: 0 K/UL (ref 0–0.01)
NRBC BLD-RTO: 0 PER 100 WBC
PHOSPHATE SERPL-MCNC: 1.9 MG/DL (ref 2.6–4.7)
PLATELET # BLD AUTO: 176 K/UL (ref 150–400)
PMV BLD AUTO: 9.3 FL (ref 8.9–12.9)
POTASSIUM SERPL-SCNC: 4 MMOL/L (ref 3.5–5.1)
PROT SERPL-MCNC: 5.7 G/DL (ref 6.4–8.2)
RBC # BLD AUTO: 3.33 M/UL (ref 4.1–5.7)
SERVICE CMNT-IMP: ABNORMAL
SODIUM SERPL-SCNC: 139 MMOL/L (ref 136–145)
WBC # BLD AUTO: 15.9 K/UL (ref 4.1–11.1)

## 2022-07-21 PROCEDURE — 36415 COLL VENOUS BLD VENIPUNCTURE: CPT

## 2022-07-21 PROCEDURE — 83735 ASSAY OF MAGNESIUM: CPT

## 2022-07-21 PROCEDURE — 84100 ASSAY OF PHOSPHORUS: CPT

## 2022-07-21 PROCEDURE — 80053 COMPREHEN METABOLIC PANEL: CPT

## 2022-07-21 PROCEDURE — 82962 GLUCOSE BLOOD TEST: CPT

## 2022-07-21 PROCEDURE — 74011250637 HC RX REV CODE- 250/637: Performed by: PSYCHIATRY & NEUROLOGY

## 2022-07-21 PROCEDURE — 65270000029 HC RM PRIVATE

## 2022-07-21 PROCEDURE — 74011636637 HC RX REV CODE- 636/637: Performed by: INTERNAL MEDICINE

## 2022-07-21 PROCEDURE — 85379 FIBRIN DEGRADATION QUANT: CPT

## 2022-07-21 PROCEDURE — 74011000250 HC RX REV CODE- 250: Performed by: INTERNAL MEDICINE

## 2022-07-21 PROCEDURE — 85027 COMPLETE CBC AUTOMATED: CPT

## 2022-07-21 PROCEDURE — 83605 ASSAY OF LACTIC ACID: CPT

## 2022-07-21 PROCEDURE — 82550 ASSAY OF CK (CPK): CPT

## 2022-07-21 PROCEDURE — 86140 C-REACTIVE PROTEIN: CPT

## 2022-07-21 PROCEDURE — 74011250637 HC RX REV CODE- 250/637: Performed by: INTERNAL MEDICINE

## 2022-07-21 PROCEDURE — 74011250636 HC RX REV CODE- 250/636: Performed by: INTERNAL MEDICINE

## 2022-07-21 RX ORDER — AZITHROMYCIN 250 MG/1
500 TABLET, FILM COATED ORAL DAILY
Status: DISCONTINUED | OUTPATIENT
Start: 2022-07-22 | End: 2022-07-23 | Stop reason: HOSPADM

## 2022-07-21 RX ORDER — INSULIN GLARGINE 100 [IU]/ML
14 INJECTION, SOLUTION SUBCUTANEOUS DAILY
Status: DISCONTINUED | OUTPATIENT
Start: 2022-07-21 | End: 2022-07-23 | Stop reason: HOSPADM

## 2022-07-21 RX ADMIN — INSULIN LISPRO 3 UNITS: 100 INJECTION, SOLUTION INTRAVENOUS; SUBCUTANEOUS at 21:02

## 2022-07-21 RX ADMIN — LATANOPROST 1 DROP: 50 SOLUTION OPHTHALMIC at 20:54

## 2022-07-21 RX ADMIN — INSULIN GLARGINE 14 UNITS: 100 INJECTION, SOLUTION SUBCUTANEOUS at 08:31

## 2022-07-21 RX ADMIN — SODIUM CHLORIDE, PRESERVATIVE FREE 10 ML: 5 INJECTION INTRAVENOUS at 22:35

## 2022-07-21 RX ADMIN — GUAIFENESIN 600 MG: 600 TABLET ORAL at 08:30

## 2022-07-21 RX ADMIN — SODIUM CHLORIDE, PRESERVATIVE FREE 10 ML: 5 INJECTION INTRAVENOUS at 14:54

## 2022-07-21 RX ADMIN — AZITHROMYCIN MONOHYDRATE 500 MG: 500 INJECTION, POWDER, LYOPHILIZED, FOR SOLUTION INTRAVENOUS at 08:29

## 2022-07-21 RX ADMIN — GUAIFENESIN 600 MG: 600 TABLET ORAL at 20:53

## 2022-07-21 RX ADMIN — ENOXAPARIN SODIUM 40 MG: 100 INJECTION SUBCUTANEOUS at 08:30

## 2022-07-21 RX ADMIN — INSULIN LISPRO 3 UNITS: 100 INJECTION, SOLUTION INTRAVENOUS; SUBCUTANEOUS at 17:06

## 2022-07-21 RX ADMIN — Medication 1 CAPSULE: at 08:30

## 2022-07-21 RX ADMIN — SODIUM CHLORIDE 100 ML/HR: 9 INJECTION, SOLUTION INTRAVENOUS at 04:49

## 2022-07-21 RX ADMIN — DEXAMETHASONE 6 MG: 6 TABLET ORAL at 08:30

## 2022-07-21 RX ADMIN — ATORVASTATIN CALCIUM 40 MG: 20 TABLET, FILM COATED ORAL at 08:30

## 2022-07-21 RX ADMIN — CEFTRIAXONE 2 G: 2 INJECTION, POWDER, FOR SOLUTION INTRAMUSCULAR; INTRAVENOUS at 08:30

## 2022-07-21 RX ADMIN — OXYCODONE HYDROCHLORIDE AND ACETAMINOPHEN 500 MG: 500 TABLET ORAL at 08:30

## 2022-07-21 RX ADMIN — GABAPENTIN 100 MG: 100 CAPSULE ORAL at 17:06

## 2022-07-21 RX ADMIN — PANTOPRAZOLE SODIUM 40 MG: 40 TABLET, DELAYED RELEASE ORAL at 06:32

## 2022-07-21 RX ADMIN — SODIUM CHLORIDE 100 ML/HR: 9 INJECTION, SOLUTION INTRAVENOUS at 16:34

## 2022-07-21 RX ADMIN — INSULIN LISPRO 5 UNITS: 100 INJECTION, SOLUTION INTRAVENOUS; SUBCUTANEOUS at 08:31

## 2022-07-21 RX ADMIN — SODIUM CHLORIDE, PRESERVATIVE FREE 5 ML: 5 INJECTION INTRAVENOUS at 06:33

## 2022-07-21 RX ADMIN — ASPIRIN 81 MG: 81 TABLET, COATED ORAL at 08:30

## 2022-07-21 RX ADMIN — GABAPENTIN 100 MG: 100 CAPSULE ORAL at 08:31

## 2022-07-21 RX ADMIN — INSULIN LISPRO 5 UNITS: 100 INJECTION, SOLUTION INTRAVENOUS; SUBCUTANEOUS at 12:02

## 2022-07-21 NOTE — PROGRESS NOTES
[facility] Dosing Services: IV to PO Conversion    The pharmacist has determined that this patient meets P & T approved criteria for conversion from IV to oral therapy for the following medication:Azithromycin 500 mg IV daily x3 doses left    The pharmacist has written the following order for the patient: Azithromycin 500 mg po daily x3 doses left  The pharmacist will continue to monitor the patient's status and advise the physician if conversion back to IV therapy is recommended.     Signed Weston Edmond Contact information:  [phone]

## 2022-07-21 NOTE — PROGRESS NOTES
Bedside shift change report given to Allen (oncoming nurse) by Noah Orozco (offgoing nurse). Report included the following information SBAR, Kardex, MAR, Recent Results, and Med Rec Status.

## 2022-07-21 NOTE — PROGRESS NOTES
Rashid Mar [facility]  [address] [facility], [id number] [place] [address]  [phone]      Medical Progress Note      NAME: Lisa Richardson   :  1929  MRM:  [id number]    Date/Time of service: 2022         Subjective:     Chief Complaint:  Patient was personally seen and examined by me during this time period. Chart reviewed. Follow up covid, sepsis, UTI and rhabdo. Spoke with daughter at bedside. Objective:       Vitals:       Last 24hrs VS reviewed since prior progress note. Most recent are:    Visit Vitals  BP (!) 148/62 (BP 1 Location: Left lower arm, BP Patient Position: At rest)   Pulse 60   Temp 98.1 °F (36.7 °C)   Resp 18   Ht 5' 9\" (1.753 m)   Wt 59.9 kg (132 lb)   SpO2 95%   BMI 19.49 kg/m²     SpO2 Readings from Last 6 Encounters:   22 95%   21 96%   21 98%   21 98%   10/15/20 99%   20 97%          Intake/Output Summary (Last 24 hours) at 2022 1113  Last data filed at 2022 0758  Gross per 24 hour   Intake 2140 ml   Output 1050 ml   Net 1090 ml          Exam:     Physical Exam:    Gen:  Elderly, ill-appearing, frail, NAD  HEENT:  Pink conjunctivae, PERRL, hearing intact to voice, right ear bruising, small head lac  Resp:  No accessory muscle use, rhonchi at bases  Card:  No murmurs, normal S1, S2 without thrills, bruits or peripheral edema  Abd:  Soft, non-tender, non-distended, normoactive bowel sounds are present  Lymph:  No cervical adenopathy  Musc:  No cyanosis or clubbing  Skin:  No rashes  Neuro:  Cranial nerves 3-12 are grossly intact, generalized weakness, follows commands appropriately  Psych:  Alert with good insight.   Oriented to person, place, and time    Medications Reviewed: (see below)    Lab Data Reviewed: (see below)    ______________________________________________________________________    Medications:     Current Facility-Administered Medications   Medication Dose Route Frequency    insulin glargine (LANTUS) injection 14 Units  14 Units SubCUTAneous DAILY    gabapentin (NEURONTIN) capsule 100 mg  100 mg Oral BID    cefTRIAXone (ROCEPHIN) 2 g in 0.9% sodium chloride 20 mL IV syringe  2 g IntraVENous Q24H    ascorbic acid (vitamin C) (VITAMIN C) tablet 500 mg  500 mg Oral DAILY    zinc sulfate (ZINCATE) 50 mg zinc (220 mg) capsule 1 Capsule  1 Capsule Oral DAILY    albuterol-ipratropium (DUO-NEB) 2.5 MG-0.5 MG/3 ML  3 mL Nebulization Q4H PRN    dexAMETHasone (DECADRON) tablet 6 mg  6 mg Oral DAILY    guaiFENesin ER (MUCINEX) tablet 600 mg  600 mg Oral Q12H    benzonatate (TESSALON) capsule 100 mg  100 mg Oral TID PRN    aspirin delayed-release tablet 81 mg  81 mg Oral DAILY    atorvastatin (LIPITOR) tablet 40 mg  40 mg Oral DAILY    latanoprost (XALATAN) 0.005 % ophthalmic solution 1 Drop  1 Drop Left Eye QPM    pantoprazole (PROTONIX) tablet 40 mg  40 mg Oral ACB    traMADoL (ULTRAM) tablet 50 mg  50 mg Oral Q8H PRN    0.9% sodium chloride infusion  100 mL/hr IntraVENous CONTINUOUS    sodium chloride (NS) flush 5-40 mL  5-40 mL IntraVENous Q8H    sodium chloride (NS) flush 5-40 mL  5-40 mL IntraVENous PRN    0.9% sodium chloride infusion 25 mL  25 mL IntraVENous PRN    acetaminophen (TYLENOL) tablet 650 mg  650 mg Oral Q6H PRN    Or    acetaminophen (TYLENOL) suppository 650 mg  650 mg Rectal Q6H PRN    bisacodyL (DULCOLAX) suppository 10 mg  10 mg Rectal DAILY PRN    promethazine (PHENERGAN) tablet 12.5 mg  12.5 mg Oral Q6H PRN    Or    ondansetron (ZOFRAN) injection 4 mg  4 mg IntraVENous Q6H PRN    enoxaparin (LOVENOX) injection 40 mg  40 mg SubCUTAneous DAILY    insulin lispro (HUMALOG) injection   SubCUTAneous QID WITH MEALS    glucose chewable tablet 16 g  4 Tablet Oral PRN    glucagon (GLUCAGEN) injection 1 mg  1 mg IntraMUSCular PRN    azithromycin (ZITHROMAX) 500 mg in 0.9% sodium chloride 250 mL ([id number])  500 mg IntraVENous Q24H          Lab Review:     Recent Labs     07/21/22  0120 07/20/22  0057 07/19/22  1631   WBC 15.9* 10.0 9.9   HGB 10.4* 11.3* 12.7   HCT 30.6* 33.9* 37.3    158 185       Recent Labs     07/21/22  0120 07/20/22  0057 07/19/22  1631    139 135*   K 4.0 3.7 4.3   * 107 102   CO2 21 23 23   * 261* 222*   BUN 24* 17 15   CREA 1.12 1.14 1.28   CA 8.6 8.7 9.3   MG 1.9 1.8  --    PHOS 1.9* 2.1*  --    ALB 2.7* 2.9*  --    TBILI 0.3 0.4  --    ALT 59 40  --        Lab Results   Component Value Date/Time    Glucose (POC) 261 (H) 07/21/2022 10:38 AM    Glucose (POC) 278 (H) 07/21/2022 07:20 AM    Glucose (POC) 287 (H) 07/20/2022 09:47 PM    Glucose (POC) 296 (H) 07/20/2022 03:53 PM    Glucose (POC) 254 (H) 07/20/2022 12:03 PM          Assessment / Plan:     81 yo hx of HTN, DM, CAD, trigeminal neuralgia, presented w/ weakness, dizziness, sepsis, COVID pneumonia, UTI     Pneumonia due to COVID-19 virus: sepsis POA. Also concerning for underlying bacterial pneumonia given high procalcitonin. Viral panel only positive for COVID. Cont IV CTX/azithro, dexamethasone, Vit C/Zinc.  Monitor inflammatory labs. Pulm following    UTI: urine Cx w/ gram neg rods; follow for speciation and sensitivities. Already on IV CTX     Fall/Weakness/dizziness/ataxia: could be from COVID and rhabdomyolysis. Head CT neg. MRI brain w/ no acute fidnings or mass; mild atrophy. PT/OT     Rhabdomyolysis POA: due to fall and COVID. Continue IVF. Monitor CK      HTN: hold ACEi     CAD: cont ASA, hold statin statin due to rhabdomyolysis      DM type 2: A1C 8.6%. Hold metformin. Increase Lantus (titrate PRN). Cont SSI     Trigeminal neuralgia: follows by Neurosurg, Dr. Sara Ahumada. Decreased neurontin due to dizziness.    Neuro following    Total time spent with patient care: 45 Minutes **I personally saw and examined the patient during this time period**                 Care Plan discussed with: Patient, nursing, daughter     Discussed:  Care Plan    Prophylaxis:  Lovenox    Disposition: [facility]/LTC           ___________________________________________________    Attending Physician: Ga Townsend DO

## 2022-07-21 NOTE — PROGRESS NOTES
Bedside and Verbal shift change report given to Karina Lea RN (oncoming nurse) by Shahid Thomas RN (offgoing nurse). Report included the following information SBAR, Kardex, Procedure Summary, MAR, and Recent Results. Home eye drops brought in by patient's daughter endorsed to Karina Lea, [address] [place] [address]. Waiting on MD orders so eye drop can be sent to pharmacy for labels.

## 2022-07-21 NOTE — PROGRESS NOTES
7/21/2022   CARE MANAGEMENT NOTE:  CM reviewed EMR and handoff received from previous  Michell Urbina). Pt was admitted with COVID. Reportedly, pt resides at [facility]. Dtr Dailytal Annette ([phone]) is the primary family contact. RUR 11%    Transition Plan of Care:  Neurology, Pulmonary following for medical management  PT/OT evals complete; pt ambulated 60 feet on 7/20 and no further rehab services recommended at this time  Plan is for pt to return home  Home oxygen as needed (currently on room air)  Outpatient follow up  Dtr will transport pt home    CM will continue to follow pt until discharged.   Cleo

## 2022-07-21 NOTE — PROGRESS NOTES
Consulting service: Hospitalist  Reason for consult: COVID, pneumonia    CC: Dizzy    HPI: 80year old male with HTN, DM, CAD s/p stenting who presented to the ER after a fall in the setting of a known COVID infection. Workup in the ER revealed a UTI as well as left sided airspace disease on CXR. Started on antibiotics and dex and admitted. Pulmonary has been asked to consult. Patient is resting comfortably in bed. States he still feels dizzy when moving around but is otherwise ok. No dyspnea. Has been coughing up some white mucus. No fevers. Minimal nausea. No known lung disease and takes no inhalers at home. Has had COVID vaccine x 3. Interval history  Afebrile  BP stable  Sats 95% on RA  WBC 15.9  Hgb 10.4  Glucose elevated  Urine cx with GNR  7/19 7/20 blood cx ngtd  RVP with COVID + PCR    Brain MRI - no acute process    ROS: Feeling okay - a little fatigued/run down but not terrible. Denies SOB. Denies fever or chills. Reports cough with yellow sputum. Denies CP.       PMH:   -HTN  -DM  -CAD s/p stenting  -trigeminal neuralgia  -bladder cancer  -remote prostate cancer  -hyperlipidemia    SH:   -remote tobacco use (quit 1955)    FH:   -no lung disease that he is aware of    ROS: 12 point review of systems completed and negative other than as noted in the HPI    Vitals:    07/20/22 2012 07/21/22 0355 07/21/22 0724 07/21/22 1039   BP: (!) 144/66 (!) 146/70 (!) 161/69 (!) 148/62   Pulse: 63 60 62 60   Resp: 16 18 16 18   Temp: 98.3 °F (36.8 °C) 98.8 °F (37.1 °C) 97.8 °F (36.6 °C) 98.1 °F (36.7 °C)   SpO2:   93% 95%   Weight:       Height:          Gen: Elderly male in no distress  HEENT: Richt ear swollen/bruised with laceration  CV: Regular rhythm  Lungs: CTA B/L, on room air  GI: Soft  Ext: Moves all, no edema  Neuro: Alert, oriented    Lab Results   Component Value Date/Time    WBC 15.9 (H) 07/21/2022 01:20 AM    HGB 10.4 (L) 07/21/2022 01:20 AM    HCT 30.6 (L) 07/21/2022 01:20 AM    PLATELET 170 07/21/2022 01:20 AM    MCV 91.9 07/21/2022 01:20 AM      Lab Results   Component Value Date/Time    Sodium 139 07/21/2022 01:20 AM    Potassium 4.0 07/21/2022 01:20 AM    Chloride 111 (H) 07/21/2022 01:20 AM    CO2 21 07/21/2022 01:20 AM    Anion gap 7 07/21/2022 01:20 AM    Glucose 290 (H) 07/21/2022 01:20 AM    BUN 24 (H) 07/21/2022 01:20 AM    Creatinine 1.12 07/21/2022 01:20 AM    BUN/Creatinine ratio 21 (H) 07/21/2022 01:20 AM    GFR est AA >60 07/21/2022 01:20 AM    GFR est non-AA >60 07/21/2022 01:20 AM    Calcium 8.6 07/21/2022 01:20 AM      CXR Results  (Last 48 hours)                 07/19/22 1730  XR CHEST PORT Final result    Impression:  Left midlung interstitial and patchy airspace disease. Recommend   follow-up to complete resolution. Narrative:  INDICATION: Cough. Portable AP view of the chest.       Direct comparison made to prior chest x-ray dated August 2020. Cardiomediastinal silhouette is stable. There is interstitial and patchy   airspace disease in the left midlung. There is no pleural fluid. There is no   pneumothorax. Impression:  -COVID 19 infection  -left sided pneumonia, suspect may be bacterial rather than related to the COVID  -UTI  -dizziness s/p fall    Plan:  -continue with dex - converted to po to complete 10 day total course  -continue with antibiotics to cover CAP - converted to po azithro  -procalcitonin high, would not use baricitinib  -O2 as needed, on room air currently  -fu blood and urine cultures  -RVP only positive for COVID  -neuro has evaluated  -cautious use of IVF  -PT/OT evaluated and bother determined patient has no skilled physical therapy/ follow up rehabilitation needs    Patient is stable from a pulmonary standpoint. We will sign off and arrange for outpatient pulmonary follow up in 1-2 weeks. He will also need repeat chest imaging in 6-8 weeks to ensure resolution of left sided pneumonia.   Please call with questions.

## 2022-07-21 NOTE — PROGRESS NOTES
Problem: Pressure Injury - Risk of  Goal: *Prevention of pressure injury  Description: Document Max Scale and appropriate interventions in the flowsheet. Outcome: Progressing Towards Goal  Note: Pressure Injury Interventions:  Sensory Interventions: Assess changes in LOC, Chair cushion    Moisture Interventions: Absorbent underpads, Limit adult briefs, Minimize layers, Moisture barrier    Activity Interventions: Assess need for specialty bed    Mobility Interventions: Assess need for specialty bed, HOB 30 degrees or less    Nutrition Interventions: Document food/fluid/supplement intake, Offer support with meals,snacks and hydration    Friction and Shear Interventions: Apply protective barrier, creams and emollients, Minimize layers, HOB 30 degrees or less, Lift sheet                Problem: Falls - Risk of  Goal: *Absence of Falls  Description: Document Tracey Fall Risk and appropriate interventions in the flowsheet.   Outcome: Progressing Towards Goal  Note: Fall Risk Interventions:  Mobility Interventions: Bed/chair exit alarm, Patient to call before getting OOB    Mentation Interventions: Adequate sleep, hydration, pain control, Bed/chair exit alarm, Update white board, Toileting rounds    Medication Interventions: Bed/chair exit alarm, Patient to call before getting OOB, Teach patient to arise slowly    Elimination Interventions: Bed/chair exit alarm, Call light in reach, Patient to call for help with toileting needs, Stay With Me (per policy), Urinal in reach    History of Falls Interventions: Bed/chair exit alarm

## 2022-07-22 DIAGNOSIS — I10 BENIGN ESSENTIAL HTN: ICD-10-CM

## 2022-07-22 LAB
ALBUMIN SERPL-MCNC: 2.6 G/DL (ref 3.5–5)
ALBUMIN/GLOB SERPL: 0.9 {RATIO} (ref 1.1–2.2)
ALP SERPL-CCNC: 65 U/L (ref 45–117)
ALT SERPL-CCNC: 103 U/L (ref 12–78)
ANION GAP SERPL CALC-SCNC: 4 MMOL/L (ref 5–15)
AST SERPL-CCNC: 222 U/L (ref 15–37)
BACTERIA SPEC CULT: ABNORMAL
BASOPHILS # BLD: 0 K/UL (ref 0–0.1)
BASOPHILS NFR BLD: 0 % (ref 0–1)
BILIRUB SERPL-MCNC: 0.4 MG/DL (ref 0.2–1)
BUN SERPL-MCNC: 24 MG/DL (ref 6–20)
BUN/CREAT SERPL: 26 (ref 12–20)
CALCIUM SERPL-MCNC: 8.4 MG/DL (ref 8.5–10.1)
CC UR VC: ABNORMAL
CHLORIDE SERPL-SCNC: 114 MMOL/L (ref 97–108)
CK SERPL-CCNC: 5841 U/L (ref 39–308)
CO2 SERPL-SCNC: 23 MMOL/L (ref 21–32)
CREAT SERPL-MCNC: 0.91 MG/DL (ref 0.7–1.3)
CRP SERPL-MCNC: 2.19 MG/DL (ref 0–0.6)
D DIMER PPP FEU-MCNC: 0.74 MG/L FEU (ref 0–0.65)
DIFFERENTIAL METHOD BLD: ABNORMAL
EOSINOPHIL # BLD: 0 K/UL (ref 0–0.4)
EOSINOPHIL NFR BLD: 0 % (ref 0–7)
ERYTHROCYTE [DISTWIDTH] IN BLOOD BY AUTOMATED COUNT: 12.4 % (ref 11.5–14.5)
GLOBULIN SER CALC-MCNC: 2.9 G/DL (ref 2–4)
GLUCOSE BLD STRIP.AUTO-MCNC: 202 MG/DL (ref 65–117)
GLUCOSE BLD STRIP.AUTO-MCNC: 212 MG/DL (ref 65–117)
GLUCOSE BLD STRIP.AUTO-MCNC: 238 MG/DL (ref 65–117)
GLUCOSE BLD STRIP.AUTO-MCNC: 253 MG/DL (ref 65–117)
GLUCOSE SERPL-MCNC: 203 MG/DL (ref 65–100)
HCT VFR BLD AUTO: 29.4 % (ref 36.6–50.3)
HGB BLD-MCNC: 10 G/DL (ref 12.1–17)
IMM GRANULOCYTES # BLD AUTO: 0.1 K/UL (ref 0–0.04)
IMM GRANULOCYTES NFR BLD AUTO: 1 % (ref 0–0.5)
LYMPHOCYTES # BLD: 1.1 K/UL (ref 0.8–3.5)
LYMPHOCYTES NFR BLD: 8 % (ref 12–49)
MCH RBC QN AUTO: 31.1 PG (ref 26–34)
MCHC RBC AUTO-ENTMCNC: 34 G/DL (ref 30–36.5)
MCV RBC AUTO: 91.3 FL (ref 80–99)
MONOCYTES # BLD: 0.5 K/UL (ref 0–1)
MONOCYTES NFR BLD: 4 % (ref 5–13)
NEUTS SEG # BLD: 11.4 K/UL (ref 1.8–8)
NEUTS SEG NFR BLD: 87 % (ref 32–75)
NRBC # BLD: 0 K/UL (ref 0–0.01)
NRBC BLD-RTO: 0 PER 100 WBC
PLATELET # BLD AUTO: 164 K/UL (ref 150–400)
PMV BLD AUTO: 9 FL (ref 8.9–12.9)
POTASSIUM SERPL-SCNC: 4.1 MMOL/L (ref 3.5–5.1)
PROT SERPL-MCNC: 5.5 G/DL (ref 6.4–8.2)
RBC # BLD AUTO: 3.22 M/UL (ref 4.1–5.7)
SERVICE CMNT-IMP: ABNORMAL
SODIUM SERPL-SCNC: 141 MMOL/L (ref 136–145)
WBC # BLD AUTO: 13 K/UL (ref 4.1–11.1)

## 2022-07-22 PROCEDURE — 82550 ASSAY OF CK (CPK): CPT

## 2022-07-22 PROCEDURE — 74011636637 HC RX REV CODE- 636/637: Performed by: INTERNAL MEDICINE

## 2022-07-22 PROCEDURE — 74011250637 HC RX REV CODE- 250/637: Performed by: PSYCHIATRY & NEUROLOGY

## 2022-07-22 PROCEDURE — 82962 GLUCOSE BLOOD TEST: CPT

## 2022-07-22 PROCEDURE — 85379 FIBRIN DEGRADATION QUANT: CPT

## 2022-07-22 PROCEDURE — 97116 GAIT TRAINING THERAPY: CPT

## 2022-07-22 PROCEDURE — 65270000029 HC RM PRIVATE

## 2022-07-22 PROCEDURE — 74011250636 HC RX REV CODE- 250/636: Performed by: INTERNAL MEDICINE

## 2022-07-22 PROCEDURE — 74011250637 HC RX REV CODE- 250/637: Performed by: INTERNAL MEDICINE

## 2022-07-22 PROCEDURE — 74011250637 HC RX REV CODE- 250/637: Performed by: HOSPITALIST

## 2022-07-22 PROCEDURE — 85025 COMPLETE CBC W/AUTO DIFF WBC: CPT

## 2022-07-22 PROCEDURE — 86140 C-REACTIVE PROTEIN: CPT

## 2022-07-22 PROCEDURE — 74011250636 HC RX REV CODE- 250/636: Performed by: HOSPITALIST

## 2022-07-22 PROCEDURE — 77010033678 HC OXYGEN DAILY

## 2022-07-22 PROCEDURE — 36415 COLL VENOUS BLD VENIPUNCTURE: CPT

## 2022-07-22 PROCEDURE — 94761 N-INVAS EAR/PLS OXIMETRY MLT: CPT

## 2022-07-22 PROCEDURE — 74011000250 HC RX REV CODE- 250: Performed by: INTERNAL MEDICINE

## 2022-07-22 PROCEDURE — 80053 COMPREHEN METABOLIC PANEL: CPT

## 2022-07-22 RX ORDER — CALCIUM CARBONATE 200(500)MG
200 TABLET,CHEWABLE ORAL
Status: DISCONTINUED | OUTPATIENT
Start: 2022-07-22 | End: 2022-07-22

## 2022-07-22 RX ORDER — OLMESARTAN MEDOXOMIL 20 MG/1
TABLET ORAL
Qty: 45 TABLET | Refills: 1 | Status: SHIPPED | OUTPATIENT
Start: 2022-07-22

## 2022-07-22 RX ORDER — SODIUM CHLORIDE 9 MG/ML
100 INJECTION, SOLUTION INTRAVENOUS CONTINUOUS
Status: DISCONTINUED | OUTPATIENT
Start: 2022-07-22 | End: 2022-07-23 | Stop reason: HOSPADM

## 2022-07-22 RX ORDER — CALCIUM CARBONATE 200(500)MG
400 TABLET,CHEWABLE ORAL
Status: DISCONTINUED | OUTPATIENT
Start: 2022-07-22 | End: 2022-07-23 | Stop reason: HOSPADM

## 2022-07-22 RX ADMIN — INSULIN GLARGINE 14 UNITS: 100 INJECTION, SOLUTION SUBCUTANEOUS at 08:12

## 2022-07-22 RX ADMIN — INSULIN LISPRO 5 UNITS: 100 INJECTION, SOLUTION INTRAVENOUS; SUBCUTANEOUS at 07:58

## 2022-07-22 RX ADMIN — SODIUM CHLORIDE 100 ML/HR: 9 INJECTION, SOLUTION INTRAVENOUS at 02:22

## 2022-07-22 RX ADMIN — ENOXAPARIN SODIUM 40 MG: 100 INJECTION SUBCUTANEOUS at 08:12

## 2022-07-22 RX ADMIN — CEFTRIAXONE 2 G: 2 INJECTION, POWDER, FOR SOLUTION INTRAMUSCULAR; INTRAVENOUS at 07:58

## 2022-07-22 RX ADMIN — SODIUM CHLORIDE, PRESERVATIVE FREE 10 ML: 5 INJECTION INTRAVENOUS at 14:51

## 2022-07-22 RX ADMIN — INSULIN LISPRO 2 UNITS: 100 INJECTION, SOLUTION INTRAVENOUS; SUBCUTANEOUS at 22:23

## 2022-07-22 RX ADMIN — GABAPENTIN 100 MG: 100 CAPSULE ORAL at 08:13

## 2022-07-22 RX ADMIN — SODIUM CHLORIDE 100 ML/HR: 9 INJECTION, SOLUTION INTRAVENOUS at 18:57

## 2022-07-22 RX ADMIN — AZITHROMYCIN MONOHYDRATE 500 MG: 250 TABLET ORAL at 08:13

## 2022-07-22 RX ADMIN — GABAPENTIN 100 MG: 100 CAPSULE ORAL at 17:40

## 2022-07-22 RX ADMIN — INSULIN LISPRO 2 UNITS: 100 INJECTION, SOLUTION INTRAVENOUS; SUBCUTANEOUS at 17:41

## 2022-07-22 RX ADMIN — Medication 1 CAPSULE: at 08:13

## 2022-07-22 RX ADMIN — SODIUM CHLORIDE, PRESERVATIVE FREE 10 ML: 5 INJECTION INTRAVENOUS at 21:45

## 2022-07-22 RX ADMIN — SODIUM CHLORIDE, PRESERVATIVE FREE 10 ML: 5 INJECTION INTRAVENOUS at 06:25

## 2022-07-22 RX ADMIN — DEXAMETHASONE 6 MG: 6 TABLET ORAL at 08:14

## 2022-07-22 RX ADMIN — GUAIFENESIN 600 MG: 600 TABLET ORAL at 21:45

## 2022-07-22 RX ADMIN — CALCIUM CARBONATE 400 MG: 500 TABLET, CHEWABLE ORAL at 17:55

## 2022-07-22 RX ADMIN — ASPIRIN 81 MG: 81 TABLET, COATED ORAL at 08:14

## 2022-07-22 RX ADMIN — CALCIUM CARBONATE 400 MG: 500 TABLET, CHEWABLE ORAL at 10:28

## 2022-07-22 RX ADMIN — GUAIFENESIN 600 MG: 600 TABLET ORAL at 08:14

## 2022-07-22 RX ADMIN — PANTOPRAZOLE SODIUM 40 MG: 40 TABLET, DELAYED RELEASE ORAL at 06:32

## 2022-07-22 RX ADMIN — LATANOPROST 1 DROP: 50 SOLUTION OPHTHALMIC at 21:59

## 2022-07-22 RX ADMIN — INSULIN LISPRO 3 UNITS: 100 INJECTION, SOLUTION INTRAVENOUS; SUBCUTANEOUS at 11:52

## 2022-07-22 RX ADMIN — SODIUM CHLORIDE 100 ML/HR: 9 INJECTION, SOLUTION INTRAVENOUS at 14:52

## 2022-07-22 RX ADMIN — OXYCODONE HYDROCHLORIDE AND ACETAMINOPHEN 500 MG: 500 TABLET ORAL at 08:14

## 2022-07-22 NOTE — PROGRESS NOTES
0700  Bedside and Verbal shift change report given to Val Mo RN (oncoming nurse) by Lynette Quezada RN (offgoing nurse). Report included the following information SBAR, Kardex, Procedure Summary, Intake/Output, MAR, Recent Results, and Med Rec Status. 1900  Bedside and Verbal shift change report given to Lynette Quezada RN (oncoming nurse) by Val Mo RN (offgoing nurse). Report included the following information SBAR, Kardex, Procedure Summary, Intake/Output, MAR, Recent Results, and Med Rec Status.

## 2022-07-22 NOTE — PROGRESS NOTES
7/22/2022   CARE MANAGEMENT NOTE:  CM reviewed EMR and handoff received from previous  Jocelyn Hassan). Pt was admitted with COVID. Reportedly, pt resides at [facility]. Dtr Rachel Barron [phone]) is the primary family contact. RUR 11%     Transition Plan of Care:  Neurology, Pulmonary following for medical management  PT/OT evals complete; pt ambulated 60 feet on 7/20 and no further rehab services recommended at this time  Plan is for pt to return home  Home oxygen as needed (currently on room air)  Outpatient follow up  Dtr will transport pt home     CM will continue to follow pt until discharged.   Cleo

## 2022-07-22 NOTE — PROGRESS NOTES
Rashid Mar [facility] 79  [address] [facility], [address] [place] [address]  [phone]      Medical Progress Note      NAME: Troy Terrazas   :  1929  MRM:  [id number]    Date/Time: 2022  1:28 PM           Assessment / Plan:     79 yo hx of HTN, DM, CAD, trigeminal neuralgia, presented w/ weakness, dizziness, sepsis, COVID pneumonia, UTI. Pneumonia due to COVID-19 virus: sepsis POA. Also concerning for underlying bacterial pneumonia given high procalcitonin. Viral panel only positive for COVID. Not currently requiring supplemental oxygen. Continue with azithromycin and Rocephin for now. Given concern for bacterial pneumonia, current worsening hyperglycemia, and confounding leukocytosis, I will discontinue Decadron. Will need repeat chest imaging to evaluate resolution of infiltrate in 6 to 8 weeks. Outpatient follow-up with pulmonary medicine in 1 to 2 weeks. UTI: urine Cx w/ gram neg rods; follow-up cultures for ID and sensitivities. Follow-up blood cultures. Rocephin as above. Acute rhabdomyolysis. Likely from recent fall or may be related to COVID-19 infection. CK levels downtrending. Continue with IV fluid hydration for now. Fall/Weakness/dizziness/ataxia: Likely related to above illness. Continue management as above. PT OT assessment. HTN: hold ACEi     CAD: cont ASA, hold statin statin due to rhabdomyolysis. DM type 2: A1C 8.6%. Hold metformin. Increase Lantus (titrate PRN). Cont SSI and titrate accordingly. Decadron discontinued as above. Trigeminal neuralgia: followed by Neurosurg, Dr. Sagar Parekh. Decreased neurontin due to dizziness. Care Plan discussed with: Patient and Family    Prophylaxis:  Lovenox    Disposition:  Home w/Family           ___________________________________________________    Attending Physician: Danya Sim MD        Subjective:     Chief Complaint: Seen and examined at bedside.   Daughter and patient at bedside. Patient reports he feels better, no current cough fever or chills. Did endorse urinary frequency but no current dysuria. ROS:  Rest of review of systems negative otherwise. Objective:       Vitals:     Last 24hrs VS reviewed since prior progress note. Most recent are:    Visit Vitals  /66 (BP 1 Location: Right lower arm, BP Patient Position: At rest)   Pulse (!) 51   Temp 98 °F (36.7 °C)   Resp 18   Ht 5' 9\" (1.753 m)   Wt 59.9 kg (132 lb)   SpO2 95%   BMI 19.49 kg/m²     SpO2 Readings from Last 6 Encounters:   07/22/22 95%   11/24/21 96%   09/17/21 98%   02/12/21 98%   10/15/20 99%   09/25/20 97%          Intake/Output Summary (Last 24 hours) at 7/22/2022 1328  Last data filed at 7/22/2022 0900  Gross per 24 hour   Intake 240 ml   Output 575 ml   Net -335 ml          Exam:     Physical Exam:    Gen:  No acute distress. HEENT:  NC/AT. Pink conjunctivae, hearing intact to voice, moist mucous membranes. Neck:  Supple. Resp:  Unlabored breathing. Clear breath sounds with good air entry. No wheezes rales or rhonchi. Card:  Regular rate and rhythm. Normal S1 and S2. No murmurs. Abd:  Soft, non-tender, non-distended, normoactive bowel sounds. Ext: No clubbing, cyanosis or edema. Skin:  No rashes or ulcers, skin turgor is good. Neuro:  Moving all extremities with no gross focal deficits appreciated. Psych:  Good insight, oriented to person, place and time, alert.     Medications Reviewed: (see below)    Lab Data Reviewed: (see below)  ______________________________________________________________________    Medications:     Current Facility-Administered Medications   Medication Dose Route Frequency    calcium carbonate (TUMS) chewable tablet 400 mg [elemental]  400 mg Oral TID WITH MEALS    0.9% sodium chloride infusion  100 mL/hr IntraVENous CONTINUOUS    insulin glargine (LANTUS) injection 14 Units  14 Units SubCUTAneous DAILY    azithromycin (ZITHROMAX) tablet 500 mg  500 mg Oral DAILY    gabapentin (NEURONTIN) capsule 100 mg  100 mg Oral BID    cefTRIAXone (ROCEPHIN) 2 g in 0.9% sodium chloride 20 mL IV syringe  2 g IntraVENous Q24H    ascorbic acid (vitamin C) (VITAMIN C) tablet 500 mg  500 mg Oral DAILY    zinc sulfate (ZINCATE) 50 mg zinc (220 mg) capsule 1 Capsule  1 Capsule Oral DAILY    albuterol-ipratropium (DUO-NEB) 2.5 MG-0.5 MG/3 ML  3 mL Nebulization Q4H PRN    dexAMETHasone (DECADRON) tablet 6 mg  6 mg Oral DAILY    guaiFENesin ER (MUCINEX) tablet 600 mg  600 mg Oral Q12H    benzonatate (TESSALON) capsule 100 mg  100 mg Oral TID PRN    aspirin delayed-release tablet 81 mg  81 mg Oral DAILY    [Held by provider] atorvastatin (LIPITOR) tablet 40 mg  40 mg Oral DAILY    latanoprost (XALATAN) 0.005 % ophthalmic solution 1 Drop  1 Drop Left Eye QPM    pantoprazole (PROTONIX) tablet 40 mg  40 mg Oral ACB    traMADoL (ULTRAM) tablet 50 mg  50 mg Oral Q8H PRN    sodium chloride (NS) flush 5-40 mL  5-40 mL IntraVENous Q8H    sodium chloride (NS) flush 5-40 mL  5-40 mL IntraVENous PRN    0.9% sodium chloride infusion 25 mL  25 mL IntraVENous PRN    acetaminophen (TYLENOL) tablet 650 mg  650 mg Oral Q6H PRN    Or    acetaminophen (TYLENOL) suppository 650 mg  650 mg Rectal Q6H PRN    bisacodyL (DULCOLAX) suppository 10 mg  10 mg Rectal DAILY PRN    promethazine (PHENERGAN) tablet 12.5 mg  12.5 mg Oral Q6H PRN    Or    ondansetron (ZOFRAN) injection 4 mg  4 mg IntraVENous Q6H PRN    enoxaparin (LOVENOX) injection 40 mg  40 mg SubCUTAneous DAILY    insulin lispro (HUMALOG) injection   SubCUTAneous QID WITH MEALS    glucose chewable tablet 16 g  4 Tablet Oral PRN    glucagon (GLUCAGEN) injection 1 mg  1 mg IntraMUSCular PRN        Lab Review:     Recent Labs     07/22/22  0114 07/21/22  0120 07/20/22  0057   WBC 13.0* 15.9* 10.0   HGB 10.0* 10.4* 11.3*   HCT 29.4* 30.6* 33.9*    176 158     Recent Labs     07/22/22  0114 07/21/22  0120 07/20/22  0057    139 139   K 4.1 4.0 3.7   * 111* 107   CO2 23 21 23   * 290* 261*   BUN 24* 24* 17   CREA 0.91 1.12 1.14   CA 8.4* 8.6 8.7   MG  --  1.9 1.8   PHOS  --  1.9* 2.1*   ALB 2.6* 2.7* 2.9*   * 59 40     No components found for: [place]

## 2022-07-22 NOTE — PROGRESS NOTES
Problem: Pressure Injury - Risk of  Goal: *Prevention of pressure injury  Description: Document Max Scale and appropriate interventions in the flowsheet. Outcome: Progressing Towards Goal  Note: Pressure Injury Interventions:  Sensory Interventions: Assess changes in LOC, Check visual cues for pain, Discuss PT/OT consult with provider, Float heels, Keep linens dry and wrinkle-free, Maintain/enhance activity level, Minimize linen layers, Monitor skin under medical devices    Moisture Interventions: Absorbent underpads, Assess need for specialty bed, Limit adult briefs, Maintain skin hydration (lotion/cream), Minimize layers, Moisture barrier, Offer toileting Q_hr    Activity Interventions: Assess need for specialty bed, Increase time out of bed, PT/OT evaluation, Pressure redistribution bed/mattress(bed type)    Mobility Interventions: Assess need for specialty bed, Float heels, HOB 30 degrees or less, Pressure redistribution bed/mattress (bed type), PT/OT evaluation    Nutrition Interventions: Document food/fluid/supplement intake, Offer support with meals,snacks and hydration    Friction and Shear Interventions: Apply protective barrier, creams and emollients, Feet elevated on foot rest, HOB 30 degrees or less, Foam dressings/transparent film/skin sealants, Lift sheet, Lift team/patient mobility team, Minimize layers, Transfer aides:transfer board/Haim lift/ceiling lift                Problem: Patient Education: Go to Patient Education Activity  Goal: Patient/Family Education  Outcome: Progressing Towards Goal     Problem: Falls - Risk of  Goal: *Absence of Falls  Description: Document Tracey Fall Risk and appropriate interventions in the flowsheet.   Outcome: Progressing Towards Goal  Note: Fall Risk Interventions:  Mobility Interventions: Bed/chair exit alarm, OT consult for ADLs, Patient to call before getting OOB, PT Consult for mobility concerns, PT Consult for assist device competence, Strengthening exercises (ROM-active/passive), Utilize walker, cane, or other assistive device, Utilize gait belt for transfers/ambulation    Mentation Interventions: Adequate sleep, hydration, pain control, Bed/chair exit alarm, Door open when patient unattended, Evaluate medications/consider consulting pharmacy, Eyeglasses and hearing aids, Increase mobility, More frequent rounding, Reorient patient, Room close to nurse's station, Self-releasing belt, Toileting rounds, Update white board    Medication Interventions: Assess postural VS orthostatic hypotension, Bed/chair exit alarm, Evaluate medications/consider consulting pharmacy, Patient to call before getting OOB, Teach patient to arise slowly, Utilize gait belt for transfers/ambulation    Elimination Interventions: Bed/chair exit alarm, Call light in reach, Elevated toilet seat, Patient to call for help with toileting needs, Stay With Me (per policy), Toilet paper/wipes in reach, Toileting schedule/hourly rounds, Urinal in reach    History of Falls Interventions: Bed/chair exit alarm, Door open when patient unattended, Evaluate medications/consider consulting pharmacy, Investigate reason for fall, Room close to nurse's station, Utilize gait belt for transfer/ambulation, Assess for delayed presentation/identification of injury for 48 hrs (comment for end date), Vital signs minimum Q4HRs X 24 hrs (comment for end date)         Problem: Patient Education: Go to Patient Education Activity  Goal: Patient/Family Education  Outcome: Progressing Towards Goal     Problem: Diabetes Self-Management  Goal: *Disease process and treatment process  Description: Define diabetes and identify own type of diabetes; list 3 options for treating diabetes. Outcome: Progressing Towards Goal  Goal: *Incorporating nutritional management into lifestyle  Description: Describe effect of type, amount and timing of food on blood glucose; list 3 methods for planning meals.   Outcome: Progressing Towards Goal  Goal: *Incorporating physical activity into lifestyle  Description: State effect of exercise on blood glucose levels. Outcome: Progressing Towards Goal  Goal: *Developing strategies to promote health/change behavior  Description: Define the ABC's of diabetes; identify appropriate screenings, schedule and personal plan for screenings. Outcome: Progressing Towards Goal  Goal: *Using medications safely  Description: State effect of diabetes medications on diabetes; name diabetes medication taking, action and side effects. Outcome: Progressing Towards Goal  Goal: *Monitoring blood glucose, interpreting and using results  Description: Identify recommended blood glucose targets  and personal targets. Outcome: Progressing Towards Goal  Goal: *Prevention, detection, treatment of acute complications  Description: List symptoms of hyper- and hypoglycemia; describe how to treat low blood sugar and actions for lowering  high blood glucose level. Outcome: Progressing Towards Goal  Goal: *Prevention, detection and treatment of chronic complications  Description: Define the natural course of diabetes and describe the relationship of blood glucose levels to long term complications of diabetes.   Outcome: Progressing Towards Goal  Goal: *Developing strategies to address psychosocial issues  Description: Describe feelings about living with diabetes; identify support needed and support network  Outcome: Progressing Towards Goal  Goal: *Insulin pump training  Outcome: Progressing Towards Goal  Goal: *Sick day guidelines  Outcome: Progressing Towards Goal  Goal: *Patient Specific Goal (EDIT GOAL, INSERT TEXT)  Outcome: Progressing Towards Goal     Problem: Patient Education: Go to Patient Education Activity  Goal: Patient/Family Education  Outcome: Progressing Towards Goal     Problem: Patient Education: Go to Patient Education Activity  Goal: Patient/Family Education  Outcome: Progressing Towards Goal

## 2022-07-22 NOTE — PROGRESS NOTES
Problem: Mobility Impaired (Adult and Pediatric)  Goal: *Acute Goals and Plan of Care (Insert Text)  Description: FUNCTIONAL STATUS PRIOR TO ADMISSION: Patient was independent and active without use of DME.    HOME SUPPORT PRIOR TO ADMISSION: The patient lived alone with neighbors and ILF to provide assistance. Pt receives ~ half of his meals from [facility]. Drives. Physical Therapy Goals  Initiated 7/20/2022  1. Patient will move from supine to sit and sit to supine , scoot up and down, and roll side to side in bed with independence within 7 day(s). 2.  Patient will transfer from bed to chair and chair to bed with independence using the least restrictive device within 7 day(s). 3.  Patient will perform sit to stand with independence within 7 day(s). 4.  Patient will ambulate with independence for 300 feet with the least restrictive device within 7 day(s). 5.  Patient will ascend/descend 5 stairs with 1 handrail(s) with supervision/set-up within 7 day(s). Outcome: Progressing Towards Goal   PHYSICAL THERAPY TREATMENT  Patient: Ressie Duane (56 y.o. male)  Date: 7/22/2022  Diagnosis: Pneumonia due to COVID-19 virus [U07.1, J12.82] Pneumonia due to COVID-19 virus      Precautions:  (droplet +)  Chart, physical therapy assessment, plan of care and goals were reviewed. ASSESSMENT  Patient continues with skilled PT services and is progressing towards goals. He ambulates increased distance with stable SpO2 using room air. Pt reports improved gait confidence and activity tolerance with assistive device use. Pt trials rollator walker and rolling walker, demonstrates proper techniques with both devices, and states preference for RW acquisition/use after discharge home for maximum safety. RW order on chart.      Current Level of Function Impacting Discharge (mobility/balance): supervision    Other factors to consider for discharge: none additionanl         PLAN :  Patient continues to benefit from skilled intervention to address the above impairments. Continue treatment per established plan of care. to address goals. Recommendation for discharge: (in order for the patient to meet his/her long term goals)  Physical therapy at least 2 days/week in the home vs. none    This discharge recommendation:  Has not yet been discussed the attending provider and/or case management    IF patient discharges home will need the following DME: rolling walker       SUBJECTIVE:   Patient stated I told my twin grand daughters they can plan my 80 (birthday party) so I have to make it there.     Pt received supine, agreeable to PT and cleared by RN. OBJECTIVE DATA SUMMARY:   Critical Behavior:  Neurologic State: Alert  Orientation Level: Oriented X4  Cognition: Follows commands  Safety/Judgement: Awareness of environment  Functional Mobility Training:  Bed Mobility:  Rolling: Independent  Supine to Sit: Independent  Sit to Supine: Independent  Scooting: Independent        Transfers:  Sit to Stand: Supervision;Modified independent  Stand to Sit: Supervision;Modified independent                             Balance:  Sitting: Intact  Standing: Intact  Ambulation/Gait Training:  Distance (ft): 90 Feet (ft) (+30)     Ambulation - Level of Assistance: Supervision        Gait Abnormalities: Decreased step clearance        Base of Support: Narrowed     Speed/Carmencita: Pace decreased (<100 feet/min)                     No loss of balance               Pain Rating:  Denies pain    Activity Tolerance:   SpO2 stable on RA and requires rest breaks    After treatment patient left in no apparent distress:   Supine in bed, Call bell within reach, and Side rails x 3    COMMUNICATION/COLLABORATION:   The patients plan of care was discussed with: Registered nurse.      Ebonie Covarrubias PT, DPT   Time Calculation: 29 mins

## 2022-07-22 NOTE — ROUTINE PROCESS
Bedside and Verbal shift change report given to Vianey Sims  (oncoming nurse) by Hammad Horner RN (offgoing nurse). Report included the following information SBAR, Kardex, MAR, and Recent Results.

## 2022-07-23 VITALS
OXYGEN SATURATION: 96 % | HEART RATE: 56 BPM | TEMPERATURE: 98.1 F | DIASTOLIC BLOOD PRESSURE: 51 MMHG | SYSTOLIC BLOOD PRESSURE: 136 MMHG | HEIGHT: 69 IN | BODY MASS INDEX: 19.55 KG/M2 | RESPIRATION RATE: 17 BRPM | WEIGHT: 132 LBS

## 2022-07-23 LAB
ALBUMIN SERPL-MCNC: 2.4 G/DL (ref 3.5–5)
ALBUMIN/GLOB SERPL: 0.9 {RATIO} (ref 1.1–2.2)
ALP SERPL-CCNC: 60 U/L (ref 45–117)
ALT SERPL-CCNC: 170 U/L (ref 12–78)
ANION GAP SERPL CALC-SCNC: 5 MMOL/L (ref 5–15)
AST SERPL-CCNC: 192 U/L (ref 15–37)
BILIRUB SERPL-MCNC: 0.3 MG/DL (ref 0.2–1)
BUN SERPL-MCNC: 26 MG/DL (ref 6–20)
BUN/CREAT SERPL: 30 (ref 12–20)
CALCIUM SERPL-MCNC: 8.3 MG/DL (ref 8.5–10.1)
CHLORIDE SERPL-SCNC: 116 MMOL/L (ref 97–108)
CK SERPL-CCNC: 2923 U/L (ref 39–308)
CO2 SERPL-SCNC: 23 MMOL/L (ref 21–32)
CREAT SERPL-MCNC: 0.87 MG/DL (ref 0.7–1.3)
CRP SERPL-MCNC: 1.03 MG/DL (ref 0–0.6)
D DIMER PPP FEU-MCNC: 0.89 MG/L FEU (ref 0–0.65)
GLOBULIN SER CALC-MCNC: 2.8 G/DL (ref 2–4)
GLUCOSE BLD STRIP.AUTO-MCNC: 125 MG/DL (ref 65–117)
GLUCOSE BLD STRIP.AUTO-MCNC: 151 MG/DL (ref 65–117)
GLUCOSE SERPL-MCNC: 130 MG/DL (ref 65–100)
POTASSIUM SERPL-SCNC: 3.8 MMOL/L (ref 3.5–5.1)
PROT SERPL-MCNC: 5.2 G/DL (ref 6.4–8.2)
SERVICE CMNT-IMP: ABNORMAL
SERVICE CMNT-IMP: ABNORMAL
SODIUM SERPL-SCNC: 144 MMOL/L (ref 136–145)

## 2022-07-23 PROCEDURE — 36415 COLL VENOUS BLD VENIPUNCTURE: CPT

## 2022-07-23 PROCEDURE — 80053 COMPREHEN METABOLIC PANEL: CPT

## 2022-07-23 PROCEDURE — 82962 GLUCOSE BLOOD TEST: CPT

## 2022-07-23 PROCEDURE — 82550 ASSAY OF CK (CPK): CPT

## 2022-07-23 PROCEDURE — 74011250636 HC RX REV CODE- 250/636: Performed by: HOSPITALIST

## 2022-07-23 PROCEDURE — 85379 FIBRIN DEGRADATION QUANT: CPT

## 2022-07-23 PROCEDURE — 86140 C-REACTIVE PROTEIN: CPT

## 2022-07-23 PROCEDURE — 74011636637 HC RX REV CODE- 636/637: Performed by: INTERNAL MEDICINE

## 2022-07-23 PROCEDURE — 74011250636 HC RX REV CODE- 250/636: Performed by: INTERNAL MEDICINE

## 2022-07-23 PROCEDURE — 74011250637 HC RX REV CODE- 250/637: Performed by: INTERNAL MEDICINE

## 2022-07-23 PROCEDURE — 74011636637 HC RX REV CODE- 636/637: Performed by: HOSPITALIST

## 2022-07-23 PROCEDURE — 74011250637 HC RX REV CODE- 250/637: Performed by: HOSPITALIST

## 2022-07-23 PROCEDURE — 74011250637 HC RX REV CODE- 250/637: Performed by: PSYCHIATRY & NEUROLOGY

## 2022-07-23 PROCEDURE — 74011000250 HC RX REV CODE- 250: Performed by: INTERNAL MEDICINE

## 2022-07-23 PROCEDURE — 93005 ELECTROCARDIOGRAM TRACING: CPT

## 2022-07-23 RX ORDER — INSULIN GLARGINE 100 [IU]/ML
10 INJECTION, SOLUTION SUBCUTANEOUS ONCE
Status: COMPLETED | OUTPATIENT
Start: 2022-07-23 | End: 2022-07-23

## 2022-07-23 RX ORDER — LEVOFLOXACIN 500 MG/1
500 TABLET, FILM COATED ORAL DAILY
Qty: 2 TABLET | Refills: 0 | Status: SHIPPED | OUTPATIENT
Start: 2022-07-24 | End: 2022-07-26

## 2022-07-23 RX ADMIN — SODIUM CHLORIDE 100 ML/HR: 9 INJECTION, SOLUTION INTRAVENOUS at 04:18

## 2022-07-23 RX ADMIN — OXYCODONE HYDROCHLORIDE AND ACETAMINOPHEN 500 MG: 500 TABLET ORAL at 10:43

## 2022-07-23 RX ADMIN — ENOXAPARIN SODIUM 40 MG: 100 INJECTION SUBCUTANEOUS at 10:42

## 2022-07-23 RX ADMIN — AZITHROMYCIN MONOHYDRATE 500 MG: 250 TABLET ORAL at 10:43

## 2022-07-23 RX ADMIN — ASPIRIN 81 MG: 81 TABLET, COATED ORAL at 10:42

## 2022-07-23 RX ADMIN — SODIUM CHLORIDE, PRESERVATIVE FREE 10 ML: 5 INJECTION INTRAVENOUS at 13:49

## 2022-07-23 RX ADMIN — SODIUM CHLORIDE, PRESERVATIVE FREE 10 ML: 5 INJECTION INTRAVENOUS at 05:44

## 2022-07-23 RX ADMIN — GABAPENTIN 100 MG: 100 CAPSULE ORAL at 10:43

## 2022-07-23 RX ADMIN — INSULIN LISPRO 2 UNITS: 100 INJECTION, SOLUTION INTRAVENOUS; SUBCUTANEOUS at 13:33

## 2022-07-23 RX ADMIN — CALCIUM CARBONATE 400 MG: 500 TABLET, CHEWABLE ORAL at 13:33

## 2022-07-23 RX ADMIN — PANTOPRAZOLE SODIUM 40 MG: 40 TABLET, DELAYED RELEASE ORAL at 06:30

## 2022-07-23 RX ADMIN — SODIUM CHLORIDE 100 ML/HR: 9 INJECTION, SOLUTION INTRAVENOUS at 11:03

## 2022-07-23 RX ADMIN — GUAIFENESIN 600 MG: 600 TABLET ORAL at 10:43

## 2022-07-23 RX ADMIN — Medication 1 CAPSULE: at 10:42

## 2022-07-23 RX ADMIN — INSULIN GLARGINE 10 UNITS: 100 INJECTION, SOLUTION SUBCUTANEOUS at 10:44

## 2022-07-23 RX ADMIN — CEFTRIAXONE 2 G: 2 INJECTION, POWDER, FOR SOLUTION INTRAMUSCULAR; INTRAVENOUS at 10:42

## 2022-07-23 NOTE — PROGRESS NOTES
Problem: Pressure Injury - Risk of  Goal: *Prevention of pressure injury  Description: Document Max Scale and appropriate interventions in the flowsheet. Outcome: Progressing Towards Goal  Note: Pressure Injury Interventions:  Sensory Interventions: Assess changes in LOC, Assess need for specialty bed, Avoid rigorous massage over bony prominences, Check visual cues for pain, Discuss PT/OT consult with provider, Float heels, Keep linens dry and wrinkle-free, Maintain/enhance activity level, Minimize linen layers, Monitor skin under medical devices, Pad between skin to skin    Moisture Interventions: Absorbent underpads, Apply protective barrier, creams and emollients, Assess need for specialty bed, Limit adult briefs, Maintain skin hydration (lotion/cream), Minimize layers, Moisture barrier, Offer toileting Q_hr    Activity Interventions: Chair cushion, Assess need for specialty bed, Increase time out of bed, Pressure redistribution bed/mattress(bed type), PT/OT evaluation    Mobility Interventions: Assess need for specialty bed, Float heels, HOB 30 degrees or less, Pressure redistribution bed/mattress (bed type), PT/OT evaluation    Nutrition Interventions: Document food/fluid/supplement intake, Offer support with meals,snacks and hydration    Friction and Shear Interventions: Apply protective barrier, creams and emollients, Foam dressings/transparent film/skin sealants, HOB 30 degrees or less, Lift sheet, Lift team/patient mobility team, Minimize layers, Sit at 90-degree angle, Transfer aides:transfer board/Haim lift/ceiling lift                Problem: Patient Education: Go to Patient Education Activity  Goal: Patient/Family Education  Outcome: Progressing Towards Goal     Problem: Falls - Risk of  Goal: *Absence of Falls  Description: Document Marshall Diallo Fall Risk and appropriate interventions in the flowsheet.   Outcome: Progressing Towards Goal  Note: Fall Risk Interventions:  Mobility Interventions: Bed/chair exit alarm, OT consult for ADLs, Patient to call before getting OOB, PT Consult for mobility concerns, PT Consult for assist device competence, Strengthening exercises (ROM-active/passive), Utilize walker, cane, or other assistive device, Utilize gait belt for transfers/ambulation    Mentation Interventions: Adequate sleep, hydration, pain control, Bed/chair exit alarm, Eyeglasses and hearing aids, More frequent rounding, Reorient patient, Room close to nurse's station, Self-releasing belt, Toileting rounds, Update white board    Medication Interventions: Assess postural VS orthostatic hypotension, Bed/chair exit alarm, Evaluate medications/consider consulting pharmacy, Patient to call before getting OOB, Utilize gait belt for transfers/ambulation, Teach patient to arise slowly    Elimination Interventions: Bed/chair exit alarm, Call light in reach, Elevated toilet seat, Patient to call for help with toileting needs, Stay With Me (per policy), Toileting schedule/hourly rounds, Toilet paper/wipes in reach    History of Falls Interventions: Bed/chair exit alarm         Problem: Patient Education: Go to Patient Education Activity  Goal: Patient/Family Education  Outcome: Progressing Towards Goal     Problem: Diabetes Self-Management  Goal: *Disease process and treatment process  Description: Define diabetes and identify own type of diabetes; list 3 options for treating diabetes. Outcome: Progressing Towards Goal  Goal: *Incorporating nutritional management into lifestyle  Description: Describe effect of type, amount and timing of food on blood glucose; list 3 methods for planning meals. Outcome: Progressing Towards Goal  Goal: *Incorporating physical activity into lifestyle  Description: State effect of exercise on blood glucose levels.   Outcome: Progressing Towards Goal  Goal: *Developing strategies to promote health/change behavior  Description: Define the ABC's of diabetes; identify appropriate screenings, schedule and personal plan for screenings. Outcome: Progressing Towards Goal  Goal: *Using medications safely  Description: State effect of diabetes medications on diabetes; name diabetes medication taking, action and side effects. Outcome: Progressing Towards Goal  Goal: *Monitoring blood glucose, interpreting and using results  Description: Identify recommended blood glucose targets  and personal targets. Outcome: Progressing Towards Goal  Goal: *Prevention, detection, treatment of acute complications  Description: List symptoms of hyper- and hypoglycemia; describe how to treat low blood sugar and actions for lowering  high blood glucose level. Outcome: Progressing Towards Goal  Goal: *Prevention, detection and treatment of chronic complications  Description: Define the natural course of diabetes and describe the relationship of blood glucose levels to long term complications of diabetes.   Outcome: Progressing Towards Goal  Goal: *Developing strategies to address psychosocial issues  Description: Describe feelings about living with diabetes; identify support needed and support network  Outcome: Progressing Towards Goal  Goal: *Insulin pump training  Outcome: Progressing Towards Goal  Goal: *Sick day guidelines  Outcome: Progressing Towards Goal  Goal: *Patient Specific Goal (EDIT GOAL, INSERT TEXT)  Outcome: Progressing Towards Goal     Problem: Patient Education: Go to Patient Education Activity  Goal: Patient/Family Education  Outcome: Progressing Towards Goal     Problem: Patient Education: Go to Patient Education Activity  Goal: Patient/Family Education  Outcome: Progressing Towards Goal

## 2022-07-23 NOTE — DISCHARGE SUMMARY
Physician Discharge Summary     Patient ID:  Perico Bergman  [id number]  80 y.o.  9/22/1929    Admit date: 7/19/2022    Discharge date and time: 7/23/2022    Admission Diagnoses: Pneumonia due to COVID-19 virus [U07.1, J12.82]    Discharge Diagnoses:    Pneumonia due to COVID-19 virus  Potential bacterial pneumonia  Klebsiella urinary tract infection  Sepsis    Hospital Course:     79 yo hx of HTN, DM, CAD, trigeminal neuralgia, presented w/ weakness, dizziness, sepsis, COVID pneumonia, UTI. Pneumonia due to COVID-19 virus: Sepsis POA. Also concerning for underlying bacterial pneumonia given high procalcitonin. Viral panel only positive for COVID. Treated with Rocephin and azithromycin. Initial steroids were discontinued. No further requirements for oxygen. Will need repeat chest imaging to evaluate resolution of infiltrate in 6 to 8 weeks. Outpatient follow-up with pulmonary medicine in 1 to 2 weeks. Discharged home on 2 additional days of oral levofloxacin. UTI: Klebsiella UTI sensitive to Rocephin and levofloxacin upon discharge. Acute rhabdomyolysis. Likely from recent fall or may be related to COVID-19 infection. Hydrated with IV fluids with downward trending care. Holding atorvastatin upon discharge x1 week. Fall/Weakness/dizziness/ataxia: Likely related to above illness. Improved since admission. Ambulating with a walker. Lightheadedness and dizziness reportedly chronic when rising from a sitting or lying position. Currently no lightheadedness or dizziness. Counseled patient on slow transitioning from lying to sitting to standing up. 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 HTN: Home antihypertensives resumed upon discharge. CAD: Continue home medications. DM type 2: A1C 8.6%. Home metformin resumed. Outpatient follow-up with PCP for further management. Trigeminal neuralgia: followed by Neurosurg, Dr. Luis Esposito. Decreased neurontin due to dizziness. PCP: Kellen Benavides MD     Consults: Pulmonary/Intensive care    Condition of patient at discharge: good    Discharge Exam:    Physical Exam:  Gen:  No acute distress. HEENT:  NC/AT. Pink conjunctivae, hearing intact to voice, moist mucous membranes. Neck:  Supple. Resp:  Unlabored breathing. Clear breath sounds with good air entry. No wheezes rales or rhonchi. Card:  Regular rate and rhythm. Normal S1 and S2. No murmurs. Abd:  Soft, non-tender, non-distended, normoactive bowel sounds. Ext: No clubbing, cyanosis or edema. Skin:  No rashes or ulcers, skin turgor is good. Neuro:  Moving all extremities with no gross focal deficits appreciated. Psych:  Good insight, oriented to person, place and time, alert. Disposition: home    Patient Instructions:   Current Discharge Medication List        START taking these medications    Details   levoFLOXacin (Levaquin) 500 mg tablet Take 1 Tablet by mouth in the morning for 2 doses. Indications: bacterial pneumonia caused by Streptococcus pneumoniae  Qty: 2 Tablet, Refills: 0           CONTINUE these medications which have NOT CHANGED    Details   olmesartan (BENICAR) 20 mg tablet TAKE 1/2 TABLET BY MOUTH EVERY DAY  Qty: 45 Tablet, Refills: 1    Associated Diagnoses: Benign essential HTN      gabapentin (NEURONTIN) 300 mg capsule Take 1 Capsule by mouth three (3) times daily. Max Daily Amount: 900 mg. Qty: 30 Capsule, Refills: 0    Associated Diagnoses: Trigeminal neuralgia of right side of face      traMADoL (ULTRAM) 50 mg tablet Take 1 Tablet by mouth every eight (8) hours as needed for Pain for up to 7 days. Max Daily Amount: 150 mg.  Qty: 21 Tablet, Refills: 0    Associated Diagnoses: Trigeminal neuralgia of right side of face      omeprazole (PRILOSEC) 40 mg capsule TAKE 1 CAPSULE BY MOUTH DAILY.  INCREASED DOSE 11/24/21 INDICATIONS: HEARTBURN  Qty: 90 Capsule, Refills: 1      metFORMIN ER (GLUCOPHAGE XR) 500 mg tablet TAKE 2 TABLETS BY MOUTH EVERY DAY WITH DINNER  Qty: 180 Tablet, Refills: 1    Associated Diagnoses: Type 2 diabetes mellitus with microalbuminuria, without long-term current use of insulin ([facility])      atorvastatin (LIPITOR) 40 mg tablet TAKE 1 TABLET BY MOUTH EVERY DAY  Qty: 90 Tablet, Refills: 1    Associated Diagnoses: Hypercholesterolemia      dorzolamide-timolol (COSOPT) 22.3-6.8 mg/mL ophthalmic solution APPLY 1 DROP(S) IN BOTH EYES 2 TIMES A DAY  Refills: 2      latanoprost (XALATAN) 0.005 % ophthalmic solution APPLY 1 DROP(S) IN LEFT EYE AT BEDTIME  Refills: 1      cholecalciferol (VITAMIN D3) 1,000 unit tablet Take  by mouth daily. cyanocobalamin (VITAMIN B-12) 500 mcg tablet Take 500 mcg by mouth daily. aspirin delayed-release (ASPIR-81) 81 mg tablet Take 1 Tab by mouth daily. Qty: 30 Tab, Refills: 11    Associated Diagnoses: Controlled type 2 diabetes mellitus without complication, without long-term current use of insulin ([facility])           STOP taking these medications       Paxlovid, EUA, 150 mg x 2- 100 mg tablet Comments:   Reason for Stopping:             Activity: Activity as tolerated  Diet: Diabetic Diet  Wound Care: None needed    Follow-up with Xuan Dye MD in 7 day. Follow-up tests/labs chest x-ray in 6 to 8 weeks to evaluate resolution of infiltrate. To follow-up with primary care doctor in 1 to 2 weeks. Your primary care doctor will need to order repeat chest x-ray in 6 to 8 weeks to evaluate resolution of pneumonia. Discharge home on 2 additional days of oral levofloxacin for pneumonia as well as urinary tract infection. No further active treatment for COVID-19 at the time of discharge. Would recommend you hold/stop taking atorvastatin for an additional 1 week (because of current muscle damage). Can you resume on July 30. Approximate time spent in patient care on day of discharge: 35 minutes.     Signed:  Sheri Daugherty Britney Ventura MD  7/23/2022  12:41 PM

## 2022-07-23 NOTE — PROGRESS NOTES
Per MD, HOLD 14 units of Lantus and just give 10 units of Lantus instead for BG of 125  1500  Discharge instructions reviewed with patient. Verbalized understanding, peripheral IV removed catheter intact. Signed copy of AVS in patient's chart. Waiting on daughter to transport patient home.

## 2022-07-23 NOTE — ROUTINE PROCESS
Bedside and Verbal shift change report given to Allen  (oncoming nurse) by Karina Lea RN  (offgoing nurse). Report included the following information SBAR, Kardex, MAR, and Recent Results.

## 2022-07-23 NOTE — DISCHARGE INSTRUCTIONS
HOSPITALIST DISCHARGE INSTRUCTIONS  NAME: Ressie Duane   :  1929   MRN:  [id number]     Date/Time:  2022 12:33 PM    ADMIT DATE: 2022     DISCHARGE DATE: 2022     DISCHARGE DIAGNOSIS:  COVID-19 pneumonia  Potential bacterial or community-acquired pneumonia pneumonia  Urinary tract infection    To follow-up with primary care doctor in 1 to 2 weeks. Your primary care doctor will need to order repeat chest x-ray in 6 to 8 weeks to evaluate resolution of pneumonia. Discharge home on 2 additional days of oral levofloxacin for pneumonia as well as urinary tract infection. No further active treatment for COVID-19 at the time of discharge. Would recommend you hold/stop taking atorvastatin for an additional 1 week (because of current muscle damage). Can you resume on . MEDICATIONS:    It is important that you take the medication exactly as they are prescribed. Keep your medication in the bottles provided by the pharmacist and keep a list of the medication names, dosages, and times to be taken in your wallet. Do not take other medications without consulting your doctor. {Medication reconciliation information is now added to the patient's AVS automatically when it is printed. There is no need to use this SmartLink in discharge instructions. Highlight this text and delete it to clear this message}      If you start feeling unwell or experience any of the following symptoms (including but not limited to), please call your primary care physician or return to the emergency room if you cannot get hold of your doctor:  Fever, chills, nausea, vomiting, diarrhea, change in mentation, falling, bleeding, shortness of breath. Follow Up: As above. Information obtained by :  I understand that if any problems occur once I am at home I am to contact my physician. I understand and acknowledge receipt of the instructions indicated above. Physician's or R.N.'s Signature                                                                  Date/Time                                                                                                                                              Patient or Representative Signature                                                          Date/Time

## 2022-07-23 NOTE — PROGRESS NOTES
7/23/2022  Case Management Note    11:41 AM  RW delivered to patient's room as he may discharge today. RN aware to take it in next time. Still pending acceptance with [facility], but provided due to imminent discharge anyway. MAUREEN Montague    9:10 AM  Noted consult for rolling walker for home. Referral and order sent to [facility] for consideration. Will deliver to outside of patient's room (due to covid-19 positive status) once approved.      MAUREEN Montague

## 2022-07-25 ENCOUNTER — TELEPHONE (OUTPATIENT)
Dept: INTERNAL MEDICINE CLINIC | Age: 87
End: 2022-07-25

## 2022-07-25 ENCOUNTER — PATIENT OUTREACH (OUTPATIENT)
Dept: CASE MANAGEMENT | Age: 87
End: 2022-07-25

## 2022-07-25 LAB
ATRIAL RATE: 44 BPM
BACTERIA SPEC CULT: NORMAL
BACTERIA SPEC CULT: NORMAL
CALCULATED P AXIS, ECG09: 85 DEGREES
CALCULATED R AXIS, ECG10: -33 DEGREES
CALCULATED T AXIS, ECG11: 21 DEGREES
DIAGNOSIS, 93000: NORMAL
P-R INTERVAL, ECG05: 190 MS
Q-T INTERVAL, ECG07: 500 MS
QRS DURATION, ECG06: 148 MS
QTC CALCULATION (BEZET), ECG08: 427 MS
SERVICE CMNT-IMP: NORMAL
SERVICE CMNT-IMP: NORMAL
VENTRICULAR RATE, ECG03: 44 BPM

## 2022-07-25 NOTE — PROGRESS NOTES
Care Transitions Initial Call    Call within 2 business days of discharge: Yes     Patient: Cheyanne Chua Patient : 1929 MRN: [id number]    Last Discharge [address]       Date Complaint Diagnosis Description Type Department Provider    22 Generalized Weakness Complicated UTI (urinary tract infection) . .. ED to Hosp-Admission (Discharged) (ADMIT) Ashley Taylor MD; DAWNA Rosario. Was this an external facility discharge? No Discharge Facility: [facility] - Covid 19/PNA    Challenges to be reviewed by the provider   Additional needs identified to be addressed with provider: yes  [facility] - PNA/Covid 19/UTI klebsiella  Needs repeat cxr in 6-8 weeks. Method of communication with provider : chart routing    Discussed COVID-19 related testing which was available at this time. Test results were positive. Patient informed of results, if available? yes     Advance Care Planning:   Does patient have an Advance Directive: yes, reviewed and current, decision makers updated, not on file; education provided, not on file, patient declined education, referral to internal ACP facilitator. Inpatient Readmission Risk score: Unplanned Readmit Risk Score: 12.6    Was this a readmission? no   Patient stated reason for the admission: weak,dizzy,fell,Covid    Patients top risk factors for readmission: falls and medical condition-h/o HTN,DM,Trigeminal neuralgia,s/p PNA/Covid 19, potential bacterial PNA, UTI with Klebsiella. Interventions to address risk factors: Scheduled appointment with PCP-- patient will call and schedule  and Obtained and reviewed discharge summary and/or continuity of care documents    Care Transition Nurse (CTN) contacted the patient by telephone to perform post hospital discharge assessment. Verified name and  with patient as identifiers. Provided introduction to self, and explanation of the CTN role. Reports he feels good.  Walking in his apartment. No sob, no cough, does not need O2 he states. Does get tired easily- resting, drinking fluids. Daughter there with him now. Isolating in apartment due to Covid dx. CTN reviewed discharge instructions, medical action plan and red flags with patient who verbalized understanding. Were discharge instructions available to patient? yes. Reviewed appropriate site of care based on symptoms and resources available to patient including: PCP, Specialist, Urgent Care Clinics, When to call 911, and [employer]. Patient given an opportunity to ask questions and does not have any further questions or concerns at this time. The patient agrees to contact the PCP office for questions related to their healthcare. Medication reconciliation was performed with patient, who verbalizes understanding of administration of home medications. Advised obtaining a 90-day supply of all daily and as-needed medications. Referral to Pharm D needed: no     Home Health/Outpatient orders at discharge: [address]: na  Date of initial visit: na    Durable Medical Equipment ordered at discharge: None  [address]: na  Durable Medical Equipment received: na    Was patient discharged with a pulse oximeter? no    Discussed follow-up appointments. If no appointment was previously scheduled, appointment scheduling offered: . Is follow up appointment scheduled within 7 days of discharge? Pending. [facility] follow up appointment(s):   Future Appointments   Date Time Provider Marcel Salazar   10/3/2022  8:40 AM [facility]     Non[facility] follow up appointment(s): Joy Haney- patient to schedule. Plan for follow-up call in 5-7 days based on severity of symptoms and risk factors.   Plan for next call: symptom management-assess current symptoms, self management-following discharge instructions, follow up appointment-saw pcp for BRADLEY , and medication management-taking meds as ordered. CTN provided contact information for future needs. Goals Addressed                   This Visit's Progress     Prevent complications post hospitalization. 7/25/22 [facility] 7/19-7/23 PNA/Covid 19/UTI  Reviewed discharge instructions with patient using teachback. Reviewed meds with patient, education provided on new med, using teachback. Reviewed red flags with patient: fever,sob,chest discomfort, coughing,wheezing,nausea,vomiting,diarrhea,weakness. BRADLEY with pcp, Dr. Tavia Tripp- patient to call and request VV due to Covid dx. Given CTN contact info if questions/concerns. CTN to check back in about a week. mbt

## 2022-07-25 NOTE — TELEPHONE ENCOUNTER
Patient was in the hospital last week for 4 days  he has itchy hands and hives  daughter wants to know what they can do for this, she doesn't want to give anything until she speaks with you. Please call her.

## 2022-07-25 NOTE — TELEPHONE ENCOUNTER
Spoke with Ami/Dtr (HIPPA VERIFIED) regarding report of hives and itching to bilateral hands, arms and legs. She reports that she had washed his bedding and clothes with her own detergent and not the hypoallergenic detergent that he normally uses due to hives experienced in the past with  detergents with dyes and additives. She reports that she will go back over to his house and wash everything in his detergent but in the meantime the patient is asking if it is okay to take benadryl for the hives. Pateint is currently taking Levaquin which he had been taking in hospital w/o any ill effects. He is S/P hospitalization for COVID. Patient lives alone and daughter reports he is a little unsteady at this time. She would like to know what Dr. Melissa White recommends. Dr. Melissa White notified.

## 2022-07-25 NOTE — TELEPHONE ENCOUNTER
Benadryl 50 mg every 8 hours is fine. Only was given 2 doses of Levaquin, so should be done with that, just in case this was the cause.

## 2022-07-25 NOTE — TELEPHONE ENCOUNTER
Spoke with Ami/Dtr (HIPPA VERIFIED) regarding report of hives and itching to hands. Updated on Dr. Casper Olson comments and recommendations for Benadryl 50 mg every 8 hours as needed. She states understanding and grateful for the call.

## 2022-07-26 LAB
BACTERIA SPEC CULT: NORMAL
SERVICE CMNT-IMP: NORMAL

## 2022-07-27 ENCOUNTER — TELEPHONE (OUTPATIENT)
Dept: INTERNAL MEDICINE CLINIC | Age: 87
End: 2022-07-27

## 2022-07-27 NOTE — TELEPHONE ENCOUNTER
----- Message from Shantelle Sage sent at 7/27/2022 11:34 AM EDT -----  Subject: Message to Provider    QUESTIONS  Information for Provider? pt needs hospital follow up appt scheduled 1st   [facility] for 9/6/2022 pt is requesting a sooner appt if possible please, mis roldan [phone]  ---------------------------------------------------------------------------  --------------  [address]  [phone]; OK to leave message on voicemail  ---------------------------------------------------------------------------  --------------  SCRIPT ANSWERS  Relationship to Patient? Other  Representative Name? Karthik Sequeira  Is the Representative on the appropriate HIPAA document in Epic?  Yes

## 2022-07-27 NOTE — TELEPHONE ENCOUNTER
RTC to Ami/Dtr (HIPPA VERIFIED) regarding request for a sooner  hospital follow up appt. No answer and LVM.

## 2022-07-27 NOTE — TELEPHONE ENCOUNTER
----- Message from Bette Velasco sent at 7/27/2022 11:34 AM EDT -----  Subject: Message to Provider    QUESTIONS  Information for Provider? pt needs hospital follow up appt scheduled 1st   [facility] for 9/6/2022 pt is requesting a sooner appt if possible please, mis roldan [phone]  ---------------------------------------------------------------------------  --------------  [address]  [phone]; OK to leave message on voicemail  ---------------------------------------------------------------------------  --------------  SCRIPT ANSWERS  Relationship to Patient? Other  Representative Name? Aleah Ribera  Is the Representative on the appropriate HIPAA document in Epic?  Yes

## 2022-07-27 NOTE — TELEPHONE ENCOUNTER
Spoke with patient and rescheduled his hospital follow up for 8/2/22 At 11:00 AM.  Grateful for the call.

## 2022-08-02 ENCOUNTER — PATIENT OUTREACH (OUTPATIENT)
Dept: CASE MANAGEMENT | Age: 87
End: 2022-08-02

## 2022-08-02 ENCOUNTER — OFFICE VISIT (OUTPATIENT)
Dept: INTERNAL MEDICINE CLINIC | Age: 87
End: 2022-08-02
Payer: MEDICARE

## 2022-08-02 VITALS
OXYGEN SATURATION: 98 % | SYSTOLIC BLOOD PRESSURE: 144 MMHG | RESPIRATION RATE: 15 BRPM | HEART RATE: 60 BPM | WEIGHT: 142.2 LBS | TEMPERATURE: 98.1 F | DIASTOLIC BLOOD PRESSURE: 71 MMHG | BODY MASS INDEX: 21.06 KG/M2 | HEIGHT: 69 IN

## 2022-08-02 DIAGNOSIS — J18.9 COMMUNITY ACQUIRED PNEUMONIA OF LEFT LUNG, UNSPECIFIED PART OF LUNG: ICD-10-CM

## 2022-08-02 DIAGNOSIS — J12.82 PNEUMONIA DUE TO COVID-19 VIRUS: Primary | ICD-10-CM

## 2022-08-02 DIAGNOSIS — I25.118 CORONARY ARTERY DISEASE OF NATIVE ARTERY OF NATIVE HEART WITH STABLE ANGINA PECTORIS (HCC): ICD-10-CM

## 2022-08-02 DIAGNOSIS — N39.0 UTI DUE TO KLEBSIELLA SPECIES: ICD-10-CM

## 2022-08-02 DIAGNOSIS — B96.89 UTI DUE TO KLEBSIELLA SPECIES: ICD-10-CM

## 2022-08-02 DIAGNOSIS — E11.21 TYPE 2 DIABETES WITH NEPHROPATHY (HCC): ICD-10-CM

## 2022-08-02 DIAGNOSIS — M79.2 NEURALGIA INVOLVING SCALP: ICD-10-CM

## 2022-08-02 DIAGNOSIS — T79.6XXD TRAUMATIC RHABDOMYOLYSIS, SUBSEQUENT ENCOUNTER: ICD-10-CM

## 2022-08-02 DIAGNOSIS — G50.0 TRIGEMINAL NEURALGIA OF RIGHT SIDE OF FACE: ICD-10-CM

## 2022-08-02 DIAGNOSIS — U07.1 PNEUMONIA DUE TO COVID-19 VIRUS: Primary | ICD-10-CM

## 2022-08-02 DIAGNOSIS — R74.8 LIVER ENZYME ELEVATION: ICD-10-CM

## 2022-08-02 DIAGNOSIS — Z09 HOSPITAL DISCHARGE FOLLOW-UP: ICD-10-CM

## 2022-08-02 PROCEDURE — 1111F DSCHRG MED/CURRENT MED MERGE: CPT | Performed by: INTERNAL MEDICINE

## 2022-08-02 PROCEDURE — G8427 DOCREV CUR MEDS BY ELIG CLIN: HCPCS | Performed by: INTERNAL MEDICINE

## 2022-08-02 PROCEDURE — 99495 TRANSJ CARE MGMT MOD F2F 14D: CPT | Performed by: INTERNAL MEDICINE

## 2022-08-02 RX ORDER — GABAPENTIN 300 MG/1
300 CAPSULE ORAL 3 TIMES DAILY
Qty: 90 CAPSULE | Refills: 0 | Status: SHIPPED | OUTPATIENT
Start: 2022-08-02 | End: 2022-09-06 | Stop reason: SDUPTHER

## 2022-08-02 RX ORDER — GLIPIZIDE 2.5 MG/1
2.5 TABLET, EXTENDED RELEASE ORAL DAILY
Qty: 90 TABLET | Refills: 0 | Status: SHIPPED | OUTPATIENT
Start: 2022-08-02 | End: 2022-10-21

## 2022-08-02 NOTE — PROGRESS NOTES
Called and spoke to patient. Goals Addressed                   This Visit's Progress     Prevent complications post hospitalization. 7/25/22 [facility] 7/19-7/23 PNA/Covid 19/UTI  Reviewed discharge instructions with patient using teachback. Reviewed meds with patient, education provided on new med, using teachback. Reviewed red flags with patient: fever,sob,chest discomfort, coughing,wheezing,nausea,vomiting,diarrhea,weakness. BRADLEY with pcp, Dr. Grady Orta- patient to call and request VV due to Covid dx. Given CTN contact info if questions/concerns. CTN to check back in about a week. mbt  8/2/22  Says he is doing good. Saw pcp, Paige Brown, today. Thought he was doing well also. No symptoms of Covid remain, just some fatigue. Has appt 8/5 to see pulmonary. Advised continue current POC. CTN will check back in about a week. mbt

## 2022-08-02 NOTE — PROGRESS NOTES
HISTORY OF PRESENT ILLNESS    Chief Complaint   Patient presents with    Hospital Follow Up     [address] pneumonia    Neck Pain     Pain from top of head down neck - right side only. Presents for Transitional care management (TCM) visit s/p of hospital admission from 0719/22 until 07/23/2022 at [facility].    Nurse Navigator call noted to patient and documented in [facility] on 07/25/22. Hospital record and relevant lab results, test results and consult notes have personally been reviewed by me at this office visit. Medications reviewed and reconciled. Patient was hospitalized for weakness, dizziness, head injury secondary to COVID-19 pneumonia, urinary tract infection. Patient was also found to have acute rhabdomyolysis during hospitalization. Patient states that he was found down by his daughter after falling in his apartment. He was feeling increasingly weak, dizzy. Reports that he did have a head injury when he hit his head on the nightstand. He was recently diagnosed with COVID-19 on July 18. States that he also began having increased neuralgia pain of the right side of his scalp and had resumed taking gabapentin. In the hospital, chest x-ray showed left midlung interstitial and patchy airspace disease. CT of the head, cervical spine, right shoulder did not show any evidence of fracture. 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 MRI of the brain did not show any acute findings or mass. Mild atrophy. Creatinine kinase found to be 10,000 0 25. Initial creatinine 1.28 without any significant worsening. Viral panel negative except for COVID-19. Found to have a high procalcitonin, indicating treatment for possible bacterial pneumonia with Rocephin and azithromycin. Initial steroids were given then discontinued. Did not maintain requirement for oxygen.   Was found to have significant UTI, positive for Klebsiella with large leukocytes, blood and nitrates in the urinalysis. He denied any significant urinary symptoms, but in retrospect says he may have had a little bit of burning with urination. Was discharged home with Levaquin  Creatinine kinase improved to the 2000's after hydration. Was found to be somewhat dizzy and was evaluated by PT. Patient has chronic mild lightheadedness when changing position and chronic mild bradycardia which has been stable. Was found to have elevated transaminases. Atorvastatin was held. A1c was found to be 8.6%. He has been taking metformin. Previous A1c was 7.4% in November. Was continued on gabapentin for possible neuralgia. Today, patient drove himself here and ambulated independently into the office without any great difficulty. States he has no residual cough or shortness of breath. States that appetite is fair. No wheezing. Denies any urinary frequency, urgency or dysuria. States that gabapentin has been helping neuralgia somewhat, but still has some residual pain. Wt Readings from Last 3 Encounters:   08/02/22 142 lb 3.2 oz (64.5 kg)   07/19/22 132 lb (59.9 kg)   11/24/21 146 lb (66.2 kg)           Review of Systems   All other systems reviewed and are negative, except as noted in HPI    Past Medical and Surgical History   has a past medical history of Benign essential HTN, Bladder cancer ([address] (2013?), CAD (coronary artery disease) (09/2021), Closed left arm fracture, GERD (gastroesophageal reflux disease), Glaucoma, Hemorrhoids, HTN (hypertension), Hypercholesterolemia, Microalbuminuria due to type 2 diabetes mellitus ([address] Prostate cancer ([address] (2000), Rosacea, Trigeminal neuralgia of right side of face, Type 2 diabetes mellitus with microalbuminuria ([address] and Vertigo. has a past surgical history that includes hx prostatectomy (01/2000); hx other surgical (01/2020); hx colonoscopy (2005?); hx heart catheterization (09/24/2020); and hx heart catheterization (09/08/2020).      reports that he quit smoking about 60 years ago. He has never used smokeless tobacco. He reports current alcohol use of about 5.0 standard drinks per week. He reports that he does not use drugs. family history includes Cancer in his brother, father, mother, and sister. Physical Exam   Nursing note and vitals reviewed. Blood pressure (!) 144/71, pulse 60, temperature 98.1 °F (36.7 °C), temperature source Oral, resp. rate 15, height 5' 9\" (1.753 m), weight 142 lb 3.2 oz (64.5 kg), SpO2 98 %. Constitutional:  No distress. Eyes: Conjunctivae are normal.   Ears:  Hearing grossly intact  Cardiovascular: Normal rate. regular rhythm, no murmurs or gallops  No edema  Pulmonary/Chest: Effort normal.   CTAB  Musculoskeletal: moves all 4 extremities   Neurological: Alert and oriented to person, place, and time. Skin: No rash noted. Psychiatric: Normal mood and affect. Behavior is normal.     ASSESSMENT and PLAN  Diagnoses and all orders for this visit:    1. Pneumonia due to COVID-19 virus  2. Hospital discharge follow-up  His lungs are clear and he has completely recovered as far as pneumonia symptoms are concerned. -     ID DISCHARGE MEDS RECONCILED W/ CURRENT OUTPATIENT MED LIST    3. Community acquired pneumonia of left lung, unspecified part of lung  COVID-19 pneumonia most likely, but possible also bacterial overlay. He states that he has follow-up with pulmonary next week and we do advise repeat chest x-ray in September 2 document resolution, but he is asymptomatic. 4. UTI due to Klebsiella species  Has not been symptomatic, but did have clear evidence of infection based on his urinalysis. Repeat UA. Uncontrolled diabetes contributing to risk. -     URINALYSIS W/ RFLX MICROSCOPIC; Future    5. Type 2 diabetes with nephropathy (HCC)  A1c has been surprisingly more elevated recently. Continue metformin. We will add low-dose glipizide. Ideally would check glucoses intermittently if he is willing.   Repeat A1c in 2 months. -     glipiZIDE SR (GLUCOTROL XL) 2.5 mg CR tablet; Take 1 Tablet by mouth in the morning. Indications: type 2 diabetes mellitus    6. Coronary artery disease of native artery of native heart with stable angina pectoris [facility])  Currently remains asymptomatic. Continue current medications and follow-up with Dr. Gloria Welsh in cardiology. Continue statin once liver enzymes are normal.  Continue aspirin. Cannot take beta-blocker because of bradycardia. 7. Traumatic rhabdomyolysis, subsequent encounter  Secondary to falling. Marked improvement in CK during hospitalization. Will repeat. 8. Liver enzyme elevation  May be secondary to muscle breakdown from rhabdomyolysis. Holding statin for now, but likely will resume. Previously was tolerating dose just fine.  -     CK; Future  -     METABOLIC PANEL, COMPREHENSIVE; Future    9. Neuralgia involving scalp  Unclear etiology. Trigeminal?  Gabapentin appears to be helping. May be causing her some fatigue though. We will continue current dose for now. MRI of brain normal.  -     METABOLIC PANEL, COMPREHENSIVE; Future    10. Trigeminal neuralgia of right side of face  -     gabapentin (NEURONTIN) 300 mg capsule; Take 1 Capsule by mouth three (3) times daily. Max Daily Amount: 900 mg.      lab results and schedule of future lab studies reviewed with patient  reviewed medications and side effects in detail    Return to clinic for further evaluation if new symptoms develop        Current Outpatient Medications   Medication Sig    gabapentin (NEURONTIN) 300 mg capsule Take 1 Capsule by mouth three (3) times daily. Max Daily Amount: 900 mg.    glipiZIDE SR (GLUCOTROL XL) 2.5 mg CR tablet Take 1 Tablet by mouth in the morning. Indications: type 2 diabetes mellitus    olmesartan (BENICAR) 20 mg tablet TAKE 1/2 TABLET BY MOUTH EVERY DAY    omeprazole (PRILOSEC) 40 mg capsule TAKE 1 CAPSULE BY MOUTH DAILY.  INCREASED DOSE 11/24/21 INDICATIONS: HEARTBURN    metFORMIN ER (GLUCOPHAGE XR) 500 mg tablet TAKE 2 TABLETS BY MOUTH EVERY DAY WITH DINNER    dorzolamide-timolol (COSOPT) 22.3-6.8 mg/mL ophthalmic solution APPLY 1 DROP(S) IN BOTH EYES 2 TIMES A DAY    latanoprost (XALATAN) 0.005 % ophthalmic solution APPLY 1 DROP(S) IN LEFT EYE AT BEDTIME    cholecalciferol (VITAMIN D3) (1000 Units /25 mcg) tablet Take  by mouth daily. cyanocobalamin (VITAMIN B12) 500 mcg tablet Take 500 mcg by mouth daily. aspirin delayed-release (ASPIR-81) 81 mg tablet Take 1 Tab by mouth daily. No current facility-administered medications for this visit.

## 2022-08-03 LAB
ALBUMIN SERPL-MCNC: 3.4 G/DL (ref 3.5–5)
ALBUMIN/GLOB SERPL: 1.2 {RATIO} (ref 1.1–2.2)
ALP SERPL-CCNC: 89 U/L (ref 45–117)
ALT SERPL-CCNC: 66 U/L (ref 12–78)
ANION GAP SERPL CALC-SCNC: 7 MMOL/L (ref 5–15)
APPEARANCE UR: CLEAR
AST SERPL-CCNC: 19 U/L (ref 15–37)
BACTERIA URNS QL MICRO: NEGATIVE /HPF
BILIRUB SERPL-MCNC: 0.4 MG/DL (ref 0.2–1)
BILIRUB UR QL: NEGATIVE
BUN SERPL-MCNC: 27 MG/DL (ref 6–20)
BUN/CREAT SERPL: 25 (ref 12–20)
CALCIUM SERPL-MCNC: 9.6 MG/DL (ref 8.5–10.1)
CHLORIDE SERPL-SCNC: 108 MMOL/L (ref 97–108)
CK SERPL-CCNC: 78 U/L (ref 39–308)
CO2 SERPL-SCNC: 27 MMOL/L (ref 21–32)
COLOR UR: ABNORMAL
CREAT SERPL-MCNC: 1.06 MG/DL (ref 0.7–1.3)
EPITH CASTS URNS QL MICRO: ABNORMAL /LPF
GLOBULIN SER CALC-MCNC: 2.8 G/DL (ref 2–4)
GLUCOSE SERPL-MCNC: 212 MG/DL (ref 65–100)
GLUCOSE UR STRIP.AUTO-MCNC: NEGATIVE MG/DL
HGB UR QL STRIP: NEGATIVE
HYALINE CASTS URNS QL MICRO: ABNORMAL /LPF (ref 0–5)
KETONES UR QL STRIP.AUTO: NEGATIVE MG/DL
LEUKOCYTE ESTERASE UR QL STRIP.AUTO: NEGATIVE
NITRITE UR QL STRIP.AUTO: NEGATIVE
PH UR STRIP: 5.5 [PH] (ref 5–8)
POTASSIUM SERPL-SCNC: 4.4 MMOL/L (ref 3.5–5.1)
PROT SERPL-MCNC: 6.2 G/DL (ref 6.4–8.2)
PROT UR STRIP-MCNC: ABNORMAL MG/DL
RBC #/AREA URNS HPF: ABNORMAL /HPF (ref 0–5)
SODIUM SERPL-SCNC: 142 MMOL/L (ref 136–145)
SP GR UR REFRACTOMETRY: 1.02 (ref 1–1.03)
UROBILINOGEN UR QL STRIP.AUTO: 0.2 EU/DL (ref 0.2–1)
WBC URNS QL MICRO: ABNORMAL /HPF (ref 0–4)

## 2022-08-17 ENCOUNTER — PATIENT OUTREACH (OUTPATIENT)
Dept: CASE MANAGEMENT | Age: 87
End: 2022-08-17

## 2022-08-17 NOTE — PROGRESS NOTES
Called and spoke to patient. Goals Addressed                   This Visit's Progress     Prevent complications post hospitalization. 7/25/22 [facility] 7/19-7/23 PNA/Covid 19/UTI  Reviewed discharge instructions with patient using teachback. Reviewed meds with patient, education provided on new med, using teachback. Reviewed red flags with patient: fever,sob,chest discomfort, coughing,wheezing,nausea,vomiting,diarrhea,weakness. BRADLEY with pcp, Dr. Emma Morgan- patient to call and request VV due to Covid dx. Given CTN contact info if questions/concerns. CTN to check back in about a week. mbt  8/2/22  Says he is doing good. Saw pcp, Mac Tran, today. Thought he was doing well also. No symptoms of Covid remain, just some fatigue. Has appt 8/5 to see pulmonary. Advised continue current POC. CTN will check back in about a week. mbt  8/17/22  Reports he feels good. Tires quickly but no other ongoing symptoms from Covid. No fever,no cough,no sob. Eating and drinking without problem. Saw pulmonary on 8/5. Will f/u in 2 months and repeat CXR at that time. No red flags noted. Continue current POC. CTN to check back in about a week. mbt

## 2022-08-18 PROBLEM — N39.0 UTI (URINARY TRACT INFECTION): Status: RESOLVED | Noted: 2022-01-01 | Resolved: 2022-01-01

## 2022-08-20 DIAGNOSIS — E78.00 HYPERCHOLESTEROLEMIA: ICD-10-CM

## 2022-08-21 RX ORDER — ATORVASTATIN CALCIUM 40 MG/1
TABLET, FILM COATED ORAL
Qty: 90 TABLET | Refills: 1 | Status: SHIPPED | OUTPATIENT
Start: 2022-08-21

## 2022-08-25 ENCOUNTER — PATIENT OUTREACH (OUTPATIENT)
Dept: CASE MANAGEMENT | Age: 87
End: 2022-08-25

## 2022-08-25 NOTE — PROGRESS NOTES
Patient has graduated from the Transitions of Care Coordination  program on 8/25/22. Patient/family has the ability to self-manage at this time Care management goals have been completed. Patient was not referred to the [facility] team for further management. Goals Addressed                   This Visit's Progress     COMPLETED: Prevent complications post hospitalization. 7/25/22 [facility] 7/19-7/23 PNA/Covid 19/UTI  Reviewed discharge instructions with patient using teachback. Reviewed meds with patient, education provided on new med, using teachback. Reviewed red flags with patient: fever,sob,chest discomfort, coughing,wheezing,nausea,vomiting,diarrhea,weakness. BRADLEY with pcp, Dr. Marianne Kumar- patient to call and request VV due to Covid dx. Given CTN contact info if questions/concerns. CTN to check back in about a week. mbt  8/2/22  Says he is doing good. Saw pcp, Sisi Gil, today. Thought he was doing well also. No symptoms of Covid remain, just some fatigue. Has appt 8/5 to see pulmonary. Advised continue current POC. CTN will check back in about a week. mbt  8/17/22  Reports he feels good. Tires quickly but no other ongoing symptoms from Covid. No fever,no cough,no sob. Eating and drinking without problem. Saw pulmonary on 8/5. Will f/u in 2 months and repeat CXR at that time. No red flags noted. Continue current POC. CTN to check back in about a week. mbt  8/25/22  Reports he feels good. Back to baseline. No sxs other than still needs to take a nap in the afternoons. No red flags noted. Advised to continue to f/u with doctors as recommended. Wished him well.mbt              Patient has Care Transition Nurse's contact information for any further questions, concerns, or needs.   Patients upcoming visits:    Future Appointments   Date Time Provider Marcel Marie   10/3/2022  8:40 AM Akash Martinez MD [facility]

## 2022-09-02 ENCOUNTER — TRANSCRIBE ORDER (OUTPATIENT)
Dept: REGISTRATION | Age: 87
End: 2022-09-02

## 2022-09-02 ENCOUNTER — HOSPITAL ENCOUNTER (OUTPATIENT)
Dept: GENERAL RADIOLOGY | Age: 87
Discharge: HOME | End: 2022-09-02
Payer: MEDICARE

## 2022-09-02 DIAGNOSIS — J18.9 PNEUMONIA DUE TO INFECTIOUS ORGANISM, UNSPECIFIED LATERALITY, UNSPECIFIED PART OF LUNG: Primary | ICD-10-CM

## 2022-09-02 DIAGNOSIS — J18.9 PNEUMONIA DUE TO INFECTIOUS ORGANISM, UNSPECIFIED LATERALITY, UNSPECIFIED PART OF LUNG: ICD-10-CM

## 2022-09-02 PROCEDURE — 71046 X-RAY EXAM CHEST 2 VIEWS: CPT

## 2022-09-06 DIAGNOSIS — G50.0 TRIGEMINAL NEURALGIA OF RIGHT SIDE OF FACE: ICD-10-CM

## 2022-09-06 RX ORDER — GABAPENTIN 300 MG/1
300 CAPSULE ORAL 3 TIMES DAILY
Qty: 90 CAPSULE | Refills: 0 | Status: SHIPPED | OUTPATIENT
Start: 2022-09-06 | End: 2022-10-12 | Stop reason: SDUPTHER

## 2022-09-07 ENCOUNTER — TRANSCRIBE ORDER (OUTPATIENT)
Dept: SCHEDULING | Age: 87
End: 2022-09-07

## 2022-09-07 DIAGNOSIS — J18.9 PNEUMONIA DUE TO INFECTIOUS ORGANISM, UNSPECIFIED LATERALITY, UNSPECIFIED PART OF LUNG: Primary | ICD-10-CM

## 2022-09-15 ENCOUNTER — HOSPITAL ENCOUNTER (OUTPATIENT)
Dept: CT IMAGING | Age: 87
Discharge: HOME | End: 2022-09-15
Attending: INTERNAL MEDICINE
Payer: MEDICARE

## 2022-09-15 DIAGNOSIS — J18.9 PNEUMONIA DUE TO INFECTIOUS ORGANISM, UNSPECIFIED LATERALITY, UNSPECIFIED PART OF LUNG: ICD-10-CM

## 2022-09-15 PROCEDURE — 71260 CT THORAX DX C+: CPT

## 2022-09-15 PROCEDURE — 74011000636 HC RX REV CODE- 636: Performed by: INTERNAL MEDICINE

## 2022-09-15 RX ADMIN — IOPAMIDOL 100 ML: 612 INJECTION, SOLUTION INTRAVENOUS at 13:58

## 2022-09-19 DIAGNOSIS — E11.29 TYPE 2 DIABETES MELLITUS WITH MICROALBUMINURIA, WITHOUT LONG-TERM CURRENT USE OF INSULIN (HCC): ICD-10-CM

## 2022-09-19 DIAGNOSIS — R80.9 TYPE 2 DIABETES MELLITUS WITH MICROALBUMINURIA, WITHOUT LONG-TERM CURRENT USE OF INSULIN (HCC): ICD-10-CM

## 2022-09-19 RX ORDER — METFORMIN HYDROCHLORIDE 500 MG/1
TABLET, EXTENDED RELEASE ORAL
Qty: 180 TABLET | Refills: 1 | Status: SHIPPED | OUTPATIENT
Start: 2022-09-19

## 2022-09-28 ENCOUNTER — OFFICE VISIT (OUTPATIENT)
Dept: INTERNAL MEDICINE CLINIC | Age: 87
End: 2022-09-28
Payer: MEDICARE

## 2022-09-28 VITALS
TEMPERATURE: 99 F | HEART RATE: 79 BPM | DIASTOLIC BLOOD PRESSURE: 65 MMHG | SYSTOLIC BLOOD PRESSURE: 136 MMHG | RESPIRATION RATE: 15 BRPM | WEIGHT: 145.2 LBS | OXYGEN SATURATION: 98 % | HEIGHT: 69 IN | BODY MASS INDEX: 21.51 KG/M2

## 2022-09-28 DIAGNOSIS — R91.8 HILAR MASS: Primary | ICD-10-CM

## 2022-09-28 DIAGNOSIS — Z85.51 HISTORY OF BLADDER CANCER: ICD-10-CM

## 2022-09-28 DIAGNOSIS — G50.0 TRIGEMINAL NEURALGIA OF RIGHT SIDE OF FACE: ICD-10-CM

## 2022-09-28 DIAGNOSIS — Z85.46 HISTORY OF PROSTATE CANCER: ICD-10-CM

## 2022-09-28 DIAGNOSIS — R35.0 URINARY FREQUENCY: ICD-10-CM

## 2022-09-28 DIAGNOSIS — M89.9 LYTIC BONE LESIONS ON XRAY: ICD-10-CM

## 2022-09-28 DIAGNOSIS — R93.7 ABNORMAL X-RAY OF THORACIC SPINE: ICD-10-CM

## 2022-09-28 LAB
BILIRUB UR QL STRIP: NEGATIVE
GLUCOSE UR-MCNC: NEGATIVE MG/DL
KETONES P FAST UR STRIP-MCNC: NEGATIVE MG/DL
PH UR STRIP: 6 [PH] (ref 4.6–8)
PROT UR QL STRIP: NORMAL
SP GR UR STRIP: 1.01 (ref 1–1.03)
UA UROBILINOGEN AMB POC: NORMAL (ref 0.2–1)
URINALYSIS CLARITY POC: NORMAL
URINALYSIS COLOR POC: YELLOW
URINE BLOOD POC: NORMAL
URINE LEUKOCYTES POC: NORMAL
URINE NITRITES POC: NEGATIVE

## 2022-09-28 PROCEDURE — G8427 DOCREV CUR MEDS BY ELIG CLIN: HCPCS | Performed by: INTERNAL MEDICINE

## 2022-09-28 PROCEDURE — G8536 NO DOC ELDER MAL SCRN: HCPCS | Performed by: INTERNAL MEDICINE

## 2022-09-28 PROCEDURE — 99214 OFFICE O/P EST MOD 30 MIN: CPT | Performed by: INTERNAL MEDICINE

## 2022-09-28 PROCEDURE — G8510 SCR DEP NEG, NO PLAN REQD: HCPCS | Performed by: INTERNAL MEDICINE

## 2022-09-28 PROCEDURE — 1101F PT FALLS ASSESS-DOCD LE1/YR: CPT | Performed by: INTERNAL MEDICINE

## 2022-09-28 PROCEDURE — G8420 CALC BMI NORM PARAMETERS: HCPCS | Performed by: INTERNAL MEDICINE

## 2022-09-28 PROCEDURE — 81003 URINALYSIS AUTO W/O SCOPE: CPT | Performed by: INTERNAL MEDICINE

## 2022-09-28 RX ORDER — DULOXETIN HYDROCHLORIDE 30 MG/1
30 CAPSULE, DELAYED RELEASE ORAL DAILY
Qty: 30 CAPSULE | Refills: 1 | Status: SHIPPED | OUTPATIENT
Start: 2022-09-28

## 2022-10-01 LAB
BACTERIA SPEC CULT: ABNORMAL
CC UR VC: ABNORMAL
SERVICE CMNT-IMP: ABNORMAL

## 2022-10-01 NOTE — PROGRESS NOTES
HISTORY OF PRESENT ILLNESS    Chief Complaint   Patient presents with    Lung Cancer    Neck Pain     Right side - up and down neck/head. Presents for follow-up  Presents by himself today. He continues to live in [facility], but states that his daughter [place] has offered to have him move in with her and his twin granddaughters. He says if he is going to move, he would rather do it sooner not spending more money where he is currently living. He has 2 sons who live in [place] and [place]. He is a  since 2013. Plan today discussed results of recent CT scan which unfortunately showed probable metastatic lung cancer. Patient was admitted August 2, 2022 for COVID-19 pneumonia, rhabdomyolysis and urinary tract infection. At that time, chest x-ray showed left midlung interstitial and patchy airspace disease. He was recommended to follow-up with pulmonary regarding the abnormal x-ray and has been seeing Dr. Irving Mandujano. Repeat chest x-ray on September 2 showed persistent abnormality in the lingula with some volume loss. Chest CT ordered and performed on September 15 revealed a 5 cm left hilar infiltrative mass that significantly narrows the left anterior bronchus and occludes the posterior branch of the left pulmonary artery. Also noted lytic spinal lesions in the thoracic spine, likely metastatic. There is a 2.6 cm lesion in the T12 vertebrae. Also noted to have a possible subcentimeter hepatic metastases. Postobstructive lingular pneumonia or atelectasis noted. There is also an additional left lower lobe pleural-based 13 mm spiculated mass that could represent a metastatic lesion or second primary. reports that he quit smoking about 60 years ago. His smoking use included cigarettes. He has never used smokeless tobacco.  He is a  that is served in the [address] [place] [address] [employer]. No known history of lung cancer.     Review of records, he did have an x-ray in August 2020 that showed parenchymal opacity left perihilar region which was concerning for focal infiltrate. He was treated for possible pneumonia at that time. He does have a history of prostate cancer and is status post prostatectomy in 2000. He has a low but stable PSA since that time. He also history of bladder cancer in 2014 and was treated with BCG. He has been followed for both of these issues by urology Dr. Moises Garcia. Today, he states he has no significant cough, shortness of breath, wheezing or hemoptysis. He denies any significant weight loss. Appetite is fair. He states he was told he could be referred to oncology or radiology for a diagnostic biopsy of his lytic lesion of his thoracic spine and then could consider additional therapies for cancer from there. He states today that he is fairly opposed to additional therapy due to risk of complications, decreasing quality of life and limited probability that it will greatly extend his life. He would like my opinion as well. Wt Readings from Last 3 Encounters:   09/28/22 145 lb 3.2 oz (65.9 kg)   08/02/22 142 lb 3.2 oz (64.5 kg)   07/19/22 132 lb (59.9 kg)       Also reports ongoing right-sided trigeminal neuralgia of the face. States this has been going on for a while and is sometimes very frustrating. He states that discomfort is relatively significant at times. He is frustrated. He has been taking gabapentin 300 mg 3 times a day with some improvement, but states it makes him a bit tired and dizzy and he would like to try other options if possible.       Review of Systems   All other systems reviewed and are negative, except as noted in HPI    Past Medical and Surgical History   has a past medical history of Abnormal x-ray of thoracic spine (09/2022), Benign essential HTN, Bladder cancer ([facility] [address] (2013?), CAD (coronary artery disease) (09/2021), Closed left arm fracture, GERD (gastroesophageal reflux disease), Glaucoma, Hemorrhoids, Hilar mass (09/28/2022), HTN (hypertension), Hypercholesterolemia, Microalbuminuria due to type 2 diabetes mellitus ([facility] [address] Prostate cancer ([facility] [address] (2000), Rosacea, Trigeminal neuralgia of right side of face, Type 2 diabetes mellitus with microalbuminuria ([address] and Vertigo. has a past surgical history that includes hx prostatectomy (01/2000); hx other surgical (01/2020); hx colonoscopy (2005?); hx heart catheterization (09/24/2020); and hx heart catheterization (09/08/2020). reports that he quit smoking about 60 years ago. His smoking use included cigarettes. He has never used smokeless tobacco. He reports current alcohol use of about 5.0 standard drinks per week. He reports that he does not use drugs. family history includes Cancer in his brother, father, mother, and sister. Physical Exam   Nursing note and vitals reviewed. Blood pressure 136/65, pulse 79, temperature 99 °F (37.2 °C), temperature source Oral, resp. rate 15, height 5' 9\" (1.753 m), weight 145 lb 3.2 oz (65.9 kg), SpO2 98 %. Constitutional:  No distress. Eyes: Conjunctivae are normal.   Ears:  Hearing grossly intact  Cardiovascular: Normal rate. regular rhythm, no murmurs or gallops  No edema  Pulmonary/Chest: Effort normal.   CTAB  Musculoskeletal: moves all 4 extremities   Neurological: Alert and oriented to person, place, and time. Skin: No visible rash noted. Psychiatric: Normal mood and affect. Behavior is normal.     Assessment and Plan    Diagnoses and all orders for this visit:    1. Hilar mass  2. Abnormal x-ray of thoracic spine  3. Lytic bone lesions on xray  Reviewed and discussed in detail his CT findings. Unfortunately this does definitely appear to be a significant malignancy with probable bone and possible liver metastatic disease. Lung cancer is favored, but cannot rule out another type of metastatic cancer. Bronchi and pulmonary artery are affected. Currently is asymptomatic and appetite is fair.   Given that he is 80-years old with relatively advanced disease on diagnosis, I do think that aggressive therapy will be challenging for him to tolerate. His preference is also not to have to tolerate any unnecessary pain, discomfort or reduced quality of life. Discussed how biopsies may cause complications and treatment such as radiation and chemotherapy also may be challenging, but can reduce the size of the tumor possibly. I am concerned about the location of the tumor in relation to the artery and not sure if radiation could be done in that area. Surgical resection would seem to be not indicated. Could certainly consult with medical oncology if he wants. He does have follow-up with pulmonary as well. I have currently left it that we will not pursue any additional work-up due to his wishes and I think very reasonable decision-making. He is clear mind and making this decision at this time. He is aware that delaying care will only risk progression of disease and limit any possibility of further treatment which I do think is going to be limited anyway. I do agree strongly that he should move in with his daughter and enjoy life. Unclear prognosis at this point, but I do think he could have several months to a year, assuming that he does not have any significant pneumonia or bleeding. 4. History of bladder cancer  5. History of prostate cancer  Distant history of both with no clear evidence of prior recurrence. Cannot completely rule out possibility of metastatic urologic cancer above, but I think less likely. 6. Trigeminal neuralgia of right side of face  Borderline symptoms with some side effects of gabapentin. Recommend trial of duloxetine. Side effects discussed. Goal will be to decrease gabapentin by 1 pill daily per week if pain becomes more controlled with duloxetine. Increase duloxetine to target of 40 to 60 mg as tolerable. Also can help with any depressive symptoms that develop. -     DULoxetine (CYMBALTA) 30 mg capsule;  Take 1 Capsule by mouth daily. Indications: neuropathic pain    7. Urinary frequency  He does have some  urinary frequency. Urinalysis today shows 3+ leukocytes and 1+ protein with trace blood. He does have a history of Klebsiella UTI recently. We will reculture and treat based on results. He took Rocephin and Levaquin on discharge. If he does have recurrent UTIs, should also follow-up with urology. -     AMB POC URINALYSIS DIP STICK AUTO W/O MICRO  -     CULTURE, URINE; Future      lab results and schedule of future lab studies reviewed with patient  reviewed medications and side effects in detail    Return to clinic for further evaluation if new symptoms develop        Current Outpatient Medications   Medication Sig    DULoxetine (CYMBALTA) 30 mg capsule Take 1 Capsule by mouth daily. Indications: neuropathic pain    metFORMIN ER (GLUCOPHAGE XR) 500 mg tablet TAKE 2 TABLETS BY MOUTH EVERY DAY WITH DINNER    gabapentin (NEURONTIN) 300 mg capsule Take 1 Capsule by mouth three (3) times daily. Max Daily Amount: 900 mg.    atorvastatin (LIPITOR) 40 mg tablet TAKE 1 TABLET BY MOUTH EVERY DAY    glipiZIDE SR (GLUCOTROL XL) 2.5 mg CR tablet Take 1 Tablet by mouth in the morning. Indications: type 2 diabetes mellitus    olmesartan (BENICAR) 20 mg tablet TAKE 1/2 TABLET BY MOUTH EVERY DAY    omeprazole (PRILOSEC) 40 mg capsule TAKE 1 CAPSULE BY MOUTH DAILY. INCREASED DOSE 11/24/21 INDICATIONS: HEARTBURN    dorzolamide-timolol (COSOPT) 22.3-6.8 mg/mL ophthalmic solution APPLY 1 DROP(S) IN BOTH EYES 2 TIMES A DAY    latanoprost (XALATAN) 0.005 % ophthalmic solution APPLY 1 DROP(S) IN LEFT EYE AT BEDTIME    cholecalciferol (VITAMIN D3) (1000 Units /25 mcg) tablet Take  by mouth daily. cyanocobalamin (VITAMIN B12) 500 mcg tablet Take 500 mcg by mouth daily. aspirin delayed-release (ASPIR-81) 81 mg tablet Take 1 Tab by mouth daily. No current facility-administered medications for this visit.

## 2022-10-03 ENCOUNTER — OFFICE VISIT (OUTPATIENT)
Dept: CARDIOLOGY CLINIC | Age: 87
End: 2022-10-03
Payer: MEDICARE

## 2022-10-03 VITALS
BODY MASS INDEX: 21.62 KG/M2 | HEART RATE: 88 BPM | HEIGHT: 69 IN | OXYGEN SATURATION: 97 % | SYSTOLIC BLOOD PRESSURE: 148 MMHG | WEIGHT: 146 LBS | DIASTOLIC BLOOD PRESSURE: 70 MMHG

## 2022-10-03 DIAGNOSIS — E78.5 DYSLIPIDEMIA: ICD-10-CM

## 2022-10-03 DIAGNOSIS — I10 HYPERTENSION, UNSPECIFIED TYPE: ICD-10-CM

## 2022-10-03 DIAGNOSIS — I25.10 CORONARY ARTERY DISEASE INVOLVING NATIVE HEART WITHOUT ANGINA PECTORIS, UNSPECIFIED VESSEL OR LESION TYPE: Primary | ICD-10-CM

## 2022-10-03 PROCEDURE — G8427 DOCREV CUR MEDS BY ELIG CLIN: HCPCS | Performed by: SPECIALIST

## 2022-10-03 PROCEDURE — 1101F PT FALLS ASSESS-DOCD LE1/YR: CPT | Performed by: SPECIALIST

## 2022-10-03 PROCEDURE — G8432 DEP SCR NOT DOC, RNG: HCPCS | Performed by: SPECIALIST

## 2022-10-03 PROCEDURE — G8420 CALC BMI NORM PARAMETERS: HCPCS | Performed by: SPECIALIST

## 2022-10-03 PROCEDURE — 1123F ACP DISCUSS/DSCN MKR DOCD: CPT | Performed by: SPECIALIST

## 2022-10-03 PROCEDURE — G8536 NO DOC ELDER MAL SCRN: HCPCS | Performed by: SPECIALIST

## 2022-10-03 PROCEDURE — 99214 OFFICE O/P EST MOD 30 MIN: CPT | Performed by: SPECIALIST

## 2022-10-03 NOTE — PROGRESS NOTES
Dean Ayoub MD. [facility]              Patient: Alannah De Dios  : 1929      Today's Date: 10/3/2022            HISTORY OF PRESENT ILLNESS:     History of Present Illness: Had COVID in July - hospitalized - doing better now. He is doing well overall. No CP or sig SOB. Feels great overall. Not as active as he used to be. 's/60's at home. PAST MEDICAL HISTORY:     Past Medical History:   Diagnosis Date    Abnormal x-ray of thoracic spine 2022    sclerotic lytic 2.6 cm lesion involving the inferior T12 vertebra    Benign essential HTN     Bladder cancer ([facility] [address] ?    s/p BCGx2, resection. low grade. in [facility]. now Dr. Yuniel Otero. cystoscopy 2020 clear    CAD (coronary artery disease) 2021    Dr Evan Rojo. Cath 21 Successful bifurcation stenting of OM1 and AV groove Lcx with 2 stent couloette technique    Closed left arm fracture     GERD (gastroesophageal reflux disease)     with hiatal hernia    Glaucoma     Dr. Raimundo Wilde    Hemorrhoids     Hilar mass 2022    Dr. Juan Antonio Fagan pulmonary. Left hilar 5 cm infiltrative mass    HTN (hypertension)     Hypercholesterolemia     Microalbuminuria due to type 2 diabetes mellitus (HCC)     Prostate cancer (HCC)     Dr. Yuniel Otero. s/p prostatectomy 2000 in [facility]. PSA \"0.4\" since then    Rosacea     Trigeminal neuralgia of right side of face     Type 2 diabetes mellitus with microalbuminuria ([facility] [address]     Vertigo          Past Surgical History:   Procedure Laterality Date    HX COLONOSCOPY  ? reports 2 colonoscopies    HX HEART CATHETERIZATION  2020    Successful bifurcation stenting of OM1 and AV groove Lcx with 2 stent couloette technique    HX HEART CATHETERIZATION  2020    Multivessel CAD. LCX/OM1 lesions, severe with significant calcification. Normal LVEDP. Unsuccessful PCI attempt with POBA due to calcification.      HX OTHER SURGICAL  2020    bladder biopsy    HX PROSTATECTOMY 2000           MEDICATIONS:     Current Outpatient Medications   Medication Sig Dispense Refill    DULoxetine (CYMBALTA) 30 mg capsule Take 1 Capsule by mouth daily. Indications: neuropathic pain 30 Capsule 1    metFORMIN ER (GLUCOPHAGE XR) 500 mg tablet TAKE 2 TABLETS BY MOUTH EVERY DAY WITH DINNER 180 Tablet 1    gabapentin (NEURONTIN) 300 mg capsule Take 1 Capsule by mouth three (3) times daily. Max Daily Amount: 900 mg. 90 Capsule 0    atorvastatin (LIPITOR) 40 mg tablet TAKE 1 TABLET BY MOUTH EVERY DAY 90 Tablet 1    glipiZIDE SR (GLUCOTROL XL) 2.5 mg CR tablet Take 1 Tablet by mouth in the morning. Indications: type 2 diabetes mellitus 90 Tablet 0    olmesartan (BENICAR) 20 mg tablet TAKE 1/2 TABLET BY MOUTH EVERY DAY 45 Tablet 1    omeprazole (PRILOSEC) 40 mg capsule TAKE 1 CAPSULE BY MOUTH DAILY. INCREASED DOSE 21 INDICATIONS: HEARTBURN 90 Capsule 1    dorzolamide-timolol (COSOPT) 22.3-6.8 mg/mL ophthalmic solution APPLY 1 DROP(S) IN BOTH EYES 2 TIMES A DAY  2    latanoprost (XALATAN) 0.005 % ophthalmic solution APPLY 1 DROP(S) IN LEFT EYE AT BEDTIME  1    cholecalciferol (VITAMIN D3) (1000 Units /25 mcg) tablet Take  by mouth daily. cyanocobalamin (VITAMIN B12) 500 mcg tablet Take 500 mcg by mouth daily. aspirin delayed-release (ASPIR-81) 81 mg tablet Take 1 Tab by mouth daily. 30 Tab 11       Allergies   Allergen Reactions    Lisinopril Cough             SOCIAL HISTORY:     Social History     Tobacco Use    Smoking status: Former     Types: Cigarettes     Quit date: 1962     Years since quittin.7    Smokeless tobacco: Never   Substance Use Topics    Alcohol use:  Yes     Alcohol/week: 5.0 standard drinks     Types: 5 Glasses of wine per week    Drug use: Never         FAMILY HISTORY:     Family History   Problem Relation Age of Onset    Cancer Mother         breast    Cancer Father         prostate    Cancer Sister         breast    Cancer Brother         prostate REVIEW OF SYMPTOMS:     Review of Symptoms:  Constitutional: Negative for fever, chills  HEENT: Negative for nosebleeds, tinnitus, and vision changes. Respiratory: Negative for cough, wheezing  Cardiovascular: Negative for orthopnea, claudication, syncope, and PND. Gastrointestinal: Negative for abdominal pain, diarrhea, melena. Genitourinary: Negative for dysuria  Musculoskeletal: Negative for myalgias. Skin: Negative for rash  Heme: No problems bleeding. Neurological: Negative for speech change and focal weakness. + some balance problems       PHYSICAL EXAM:     Physical Exam:  Visit Vitals  BP (!) 148/70 (BP 1 Location: Right upper arm, BP Patient Position: Sitting)   Pulse 88   Ht 5' 9\" (1.753 m)   Wt 146 lb (66.2 kg)   SpO2 97%   BMI 21.56 kg/m²     Patient appears generally well, mood and affect are appropriate and pleasant. HEENT:  Hearing intact, non-icteric, normocephalic, atraumatic. Neck Exam: Supple, No JVD or carotid bruits. Lung Exam: Clear to auscultation, even breath sounds. Cardiac Exam: Regular rate and rhythm with no murmur or rub  Abdomen: Soft, non-tender, normal bowel sounds. No bruits or masses. Extremities: Moves all ext well. No lower extremity edema. MSKTL: Overall good ROM ext  Skin: No significant rashes  Psych: Appropriate affect  Neuro - Grossly intact      LABS / OTHER STUDIES reviewed:     Lab Results   Component Value Date/Time    Sodium 142 08/02/2022 12:02 PM    Potassium 4.4 08/02/2022 12:02 PM    Chloride 108 08/02/2022 12:02 PM    CO2 27 08/02/2022 12:02 PM    Anion gap 7 08/02/2022 12:02 PM    Glucose 212 (H) 08/02/2022 12:02 PM    BUN 27 (H) 08/02/2022 12:02 PM    Creatinine 1.06 08/02/2022 12:02 PM    BUN/Creatinine ratio 25 (H) 08/02/2022 12:02 PM    GFR est AA >60 08/02/2022 12:02 PM    GFR est non-AA >60 08/02/2022 12:02 PM    Calcium 9.6 08/02/2022 12:02 PM    Bilirubin, total 0.4 08/02/2022 12:02 PM    Alk.  phosphatase 89 08/02/2022 12:02 PM Protein, total 6.2 (L) 08/02/2022 12:02 PM    Albumin 3.4 (L) 08/02/2022 12:02 PM    Globulin 2.8 08/02/2022 12:02 PM    A-G Ratio 1.2 08/02/2022 12:02 PM    ALT (SGPT) 66 08/02/2022 12:02 PM    AST (SGOT) 19 08/02/2022 12:02 PM     Lab Results   Component Value Date/Time    WBC 13.0 (H) 07/22/2022 01:14 AM    HGB 10.0 (L) 07/22/2022 01:14 AM    HCT 29.4 (L) 07/22/2022 01:14 AM    PLATELET 264 73/19/6619 01:14 AM    MCV 91.3 07/22/2022 01:14 AM       Lab Results   Component Value Date/Time    Cholesterol, total 125 11/24/2021 10:55 AM    HDL Cholesterol 68 11/24/2021 10:55 AM    LDL, calculated 42.2 11/24/2021 10:55 AM    VLDL, calculated 14.8 11/24/2021 10:55 AM    Triglyceride 74 11/24/2021 10:55 AM    CHOL/HDL Ratio 1.8 11/24/2021 10:55 AM             CARDIAC DIAGNOSTICS:     Cardiac Evaluation Includes:  I reviewed the results below. EKG 9/17/21 - sinus damon, PRWP, IVCD   EKG 7/23/22 - sinus damon, IVCD      Echo 9/1/20 - EF 49%. Normal LV size and wall thickness. G1DD. Mod LAE. Mild MR. Mild TR. PASP 34 mmHg. Cath 9/8/20 -   Multivessel CAD. Culprit vessel = LCX/OM1 lesions, severe with significant calcification. Normal LVEDP. Unsuccessful PCI attempt with POBA due to calcification. Cath 9/24/20 ---> Elective rotational atherectomy LCX/OM1    Event Monitor 1/11/21-2/9/21 - sinus, HR , Avg 62 bpm.  10% PVC's. Most severe heart block was 2nd degree Type 1 (slowest 39 bpm). No symptoms. No Afib          ASSESSMENT AND PLAN:     Assessment and Plan:    1) CAD   - w/ severe 1 vessel disease involving LCX/OM1 bifurcation s/p PCI 9/24/20.   - He has had no recurrent angina with some GONZALEZ w/ moderate activity (class 2 GONZALEZ)   - Cont ASA, statin   - He is exercising at home / walking     2) Question of exercise induced Afib   - He had some tachycardia on 1/4/21 on an exercise bike during cardiac rehab --> It was transient and on my review of the data, could not definitively say it was Afib. - We proceeded with an event monitor 1/11/21-2/9/21 which was normal and did not show any Afib   - Will reassess as needed (he follows vitals closely at home)   - Will plan to switch to [address] if he demonstrates Afib      3) HTN  - BP looks great at home - 's/60's at home. - cont meds     4) Dyslipidemia   - cont statin (being tolerated)   - prior lipids look good      5) Lung cancer and Low-grade bladder cancer   - Seeing Oncology     6) See me in 12 months. Of note, his daughter lives close to him and likes to be included in his care. He lives in [facility]. 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 Did some work in submarines. Dean Ayoub MD, [place] [address]  [address], [address]  [place] [address]  [address]  [place] [address]  Christa Benjamin, [address] [place] [address]  Ph: [phone]   Ph [phone]

## 2022-10-03 NOTE — PROGRESS NOTES
Chief Complaint   Patient presents with    Follow-up     Annual     Coronary Artery Disease    Hypertension    Cholesterol Problem     Visit Vitals  BP (!) 148/70 (BP 1 Location: Right upper arm, BP Patient Position: Sitting)   Pulse 88   Ht 5' 9\" (1.753 m)   Wt 146 lb (66.2 kg)   SpO2 97%   BMI 21.56 kg/m²     Vitals:    10/03/22 0837 10/03/22 0843   BP: (!) 160/78 (!) 148/70   BP 1 Location: Right upper arm Right upper arm   BP Patient Position: Sitting Sitting   Pulse: 88    Height: 5' 9\" (1.753 m)    Weight: 146 lb (66.2 kg)    SpO2: 97%        Chest pain denied   SOB denied   Palpitations denied   Swelling in hands/feet denied   Dizziness denied   Recent hospital stays denied   Refills denied   Per pt- This morning BP at home 133/71.

## 2022-10-11 ENCOUNTER — TELEPHONE (OUTPATIENT)
Dept: INTERNAL MEDICINE CLINIC | Age: 87
End: 2022-10-11

## 2022-10-11 NOTE — TELEPHONE ENCOUNTER
Patient's daughter called in via [facility] to state she would like a call back to discuss her father's condition. Patient's daughter would like a call either before 8:30 or after 4:30 as she is a teach and will not be available during the day. Please call patient's daughter's mobile phone.

## 2022-10-12 NOTE — TELEPHONE ENCOUNTER
Nurse have attempted without success to contact this patient by phone nurse  left a voice mail for a call back to discuss concens of call. Advised to send a message via [employer] due to hours patient daughter is available to communicate.

## 2022-10-21 DIAGNOSIS — E11.21 TYPE 2 DIABETES WITH NEPHROPATHY (HCC): ICD-10-CM

## 2022-10-21 RX ORDER — GLIPIZIDE 2.5 MG/1
2.5 TABLET, EXTENDED RELEASE ORAL DAILY
Qty: 90 TABLET | Refills: 0 | Status: SHIPPED | OUTPATIENT
Start: 2022-10-21

## 2022-10-21 RX ORDER — OMEPRAZOLE 40 MG/1
40 CAPSULE, DELAYED RELEASE ORAL DAILY
Qty: 90 CAPSULE | Refills: 1 | Status: SHIPPED | OUTPATIENT
Start: 2022-10-21

## 2022-11-28 ENCOUNTER — OFFICE VISIT (OUTPATIENT)
Dept: INTERNAL MEDICINE CLINIC | Age: 87
End: 2022-11-28
Payer: MEDICARE

## 2022-11-28 VITALS
HEIGHT: 69 IN | DIASTOLIC BLOOD PRESSURE: 76 MMHG | WEIGHT: 144.6 LBS | RESPIRATION RATE: 15 BRPM | HEART RATE: 65 BPM | OXYGEN SATURATION: 99 % | BODY MASS INDEX: 21.42 KG/M2 | SYSTOLIC BLOOD PRESSURE: 172 MMHG | TEMPERATURE: 98.2 F

## 2022-11-28 DIAGNOSIS — C67.9 MALIGNANT NEOPLASM OF URINARY BLADDER, UNSPECIFIED SITE (HCC): ICD-10-CM

## 2022-11-28 DIAGNOSIS — E11.29 TYPE 2 DIABETES MELLITUS WITH MICROALBUMINURIA, WITHOUT LONG-TERM CURRENT USE OF INSULIN (HCC): ICD-10-CM

## 2022-11-28 DIAGNOSIS — R42 DIZZINESS: ICD-10-CM

## 2022-11-28 DIAGNOSIS — G50.0 TRIGEMINAL NEURALGIA OF RIGHT SIDE OF FACE: ICD-10-CM

## 2022-11-28 DIAGNOSIS — E11.21 TYPE 2 DIABETES WITH NEPHROPATHY (HCC): ICD-10-CM

## 2022-11-28 DIAGNOSIS — R35.0 URINARY FREQUENCY: ICD-10-CM

## 2022-11-28 DIAGNOSIS — C79.9 METASTATIC MALIGNANT NEOPLASM, UNSPECIFIED SITE (HCC): ICD-10-CM

## 2022-11-28 DIAGNOSIS — R20.2 PARESTHESIA OF SKIN: ICD-10-CM

## 2022-11-28 DIAGNOSIS — Z00.00 MEDICARE ANNUAL WELLNESS VISIT, SUBSEQUENT: Primary | ICD-10-CM

## 2022-11-28 DIAGNOSIS — R80.9 TYPE 2 DIABETES MELLITUS WITH MICROALBUMINURIA, WITHOUT LONG-TERM CURRENT USE OF INSULIN (HCC): ICD-10-CM

## 2022-11-28 DIAGNOSIS — R91.8 HILAR MASS: ICD-10-CM

## 2022-11-28 PROCEDURE — G8536 NO DOC ELDER MAL SCRN: HCPCS | Performed by: INTERNAL MEDICINE

## 2022-11-28 PROCEDURE — G8420 CALC BMI NORM PARAMETERS: HCPCS | Performed by: INTERNAL MEDICINE

## 2022-11-28 PROCEDURE — G8510 SCR DEP NEG, NO PLAN REQD: HCPCS | Performed by: INTERNAL MEDICINE

## 2022-11-28 PROCEDURE — G0439 PPPS, SUBSEQ VISIT: HCPCS | Performed by: INTERNAL MEDICINE

## 2022-11-28 PROCEDURE — 1101F PT FALLS ASSESS-DOCD LE1/YR: CPT | Performed by: INTERNAL MEDICINE

## 2022-11-28 PROCEDURE — 99214 OFFICE O/P EST MOD 30 MIN: CPT | Performed by: INTERNAL MEDICINE

## 2022-11-28 PROCEDURE — G8427 DOCREV CUR MEDS BY ELIG CLIN: HCPCS | Performed by: INTERNAL MEDICINE

## 2022-11-28 RX ORDER — GABAPENTIN 300 MG/1
CAPSULE ORAL
Qty: 180 CAPSULE | Refills: 1
Start: 2022-11-28

## 2022-11-28 RX ORDER — GABAPENTIN 300 MG/1
300 CAPSULE ORAL 2 TIMES DAILY
Qty: 180 CAPSULE | Refills: 1
Start: 2022-11-28 | End: 2022-11-28

## 2022-11-28 RX ORDER — DULOXETIN HYDROCHLORIDE 60 MG/1
60 CAPSULE, DELAYED RELEASE ORAL DAILY
Qty: 30 CAPSULE | Refills: 4 | Status: SHIPPED | OUTPATIENT
Start: 2022-11-28

## 2022-11-29 LAB
ALBUMIN SERPL-MCNC: 3.5 G/DL (ref 3.5–5)
ALBUMIN/GLOB SERPL: 1.1 {RATIO} (ref 1.1–2.2)
ALP SERPL-CCNC: 107 U/L (ref 45–117)
ALT SERPL-CCNC: 25 U/L (ref 12–78)
ANION GAP SERPL CALC-SCNC: 8 MMOL/L (ref 5–15)
APPEARANCE UR: ABNORMAL
AST SERPL-CCNC: 13 U/L (ref 15–37)
BACTERIA URNS QL MICRO: ABNORMAL /HPF
BASOPHILS # BLD: 0 K/UL (ref 0–0.1)
BASOPHILS NFR BLD: 0 % (ref 0–1)
BILIRUB SERPL-MCNC: 0.5 MG/DL (ref 0.2–1)
BILIRUB UR QL: NEGATIVE
BUN SERPL-MCNC: 24 MG/DL (ref 6–20)
BUN/CREAT SERPL: 21 (ref 12–20)
CALCIUM SERPL-MCNC: 9.6 MG/DL (ref 8.5–10.1)
CHLORIDE SERPL-SCNC: 106 MMOL/L (ref 97–108)
CHOLEST SERPL-MCNC: 131 MG/DL
CO2 SERPL-SCNC: 28 MMOL/L (ref 21–32)
COLOR UR: ABNORMAL
CREAT SERPL-MCNC: 1.17 MG/DL (ref 0.7–1.3)
CREAT UR-MCNC: 127 MG/DL
DIFFERENTIAL METHOD BLD: NORMAL
EOSINOPHIL # BLD: 0.4 K/UL (ref 0–0.4)
EOSINOPHIL NFR BLD: 5 % (ref 0–7)
EPITH CASTS URNS QL MICRO: ABNORMAL /LPF
ERYTHROCYTE [DISTWIDTH] IN BLOOD BY AUTOMATED COUNT: 11.7 % (ref 11.5–14.5)
EST. AVERAGE GLUCOSE BLD GHB EST-MCNC: 180 MG/DL
GLOBULIN SER CALC-MCNC: 3.1 G/DL (ref 2–4)
GLUCOSE SERPL-MCNC: 238 MG/DL (ref 65–100)
GLUCOSE UR STRIP.AUTO-MCNC: NEGATIVE MG/DL
HBA1C MFR BLD: 7.9 % (ref 4–5.6)
HCT VFR BLD AUTO: 39.1 % (ref 36.6–50.3)
HDLC SERPL-MCNC: 51 MG/DL
HDLC SERPL: 2.6 {RATIO} (ref 0–5)
HGB BLD-MCNC: 12.7 G/DL (ref 12.1–17)
HGB UR QL STRIP: ABNORMAL
HYALINE CASTS URNS QL MICRO: ABNORMAL /LPF (ref 0–5)
IMM GRANULOCYTES # BLD AUTO: 0 K/UL (ref 0–0.04)
IMM GRANULOCYTES NFR BLD AUTO: 0 % (ref 0–0.5)
KETONES UR QL STRIP.AUTO: NEGATIVE MG/DL
LDLC SERPL CALC-MCNC: 63.4 MG/DL (ref 0–100)
LEUKOCYTE ESTERASE UR QL STRIP.AUTO: ABNORMAL
LYMPHOCYTES # BLD: 1.7 K/UL (ref 0.8–3.5)
LYMPHOCYTES NFR BLD: 21 % (ref 12–49)
MCH RBC QN AUTO: 30.5 PG (ref 26–34)
MCHC RBC AUTO-ENTMCNC: 32.5 G/DL (ref 30–36.5)
MCV RBC AUTO: 94 FL (ref 80–99)
MICROALBUMIN UR-MCNC: 31.1 MG/DL
MICROALBUMIN/CREAT UR-RTO: 245 MG/G (ref 0–30)
MONOCYTES # BLD: 0.6 K/UL (ref 0–1)
MONOCYTES NFR BLD: 8 % (ref 5–13)
NEUTS SEG # BLD: 5.2 K/UL (ref 1.8–8)
NEUTS SEG NFR BLD: 66 % (ref 32–75)
NITRITE UR QL STRIP.AUTO: NEGATIVE
NRBC # BLD: 0 K/UL (ref 0–0.01)
NRBC BLD-RTO: 0 PER 100 WBC
PH UR STRIP: 6 [PH] (ref 5–8)
PLATELET # BLD AUTO: 214 K/UL (ref 150–400)
PMV BLD AUTO: 9.3 FL (ref 8.9–12.9)
POTASSIUM SERPL-SCNC: 4.4 MMOL/L (ref 3.5–5.1)
PROT SERPL-MCNC: 6.6 G/DL (ref 6.4–8.2)
PROT UR STRIP-MCNC: 100 MG/DL
RBC # BLD AUTO: 4.16 M/UL (ref 4.1–5.7)
RBC #/AREA URNS HPF: ABNORMAL /HPF (ref 0–5)
SODIUM SERPL-SCNC: 142 MMOL/L (ref 136–145)
SP GR UR REFRACTOMETRY: 1.02 (ref 1–1.03)
TRIGL SERPL-MCNC: 83 MG/DL (ref ?–150)
UROBILINOGEN UR QL STRIP.AUTO: 0.2 EU/DL (ref 0.2–1)
VIT B12 SERPL-MCNC: 1720 PG/ML (ref 193–986)
VLDLC SERPL CALC-MCNC: 16.6 MG/DL
WBC # BLD AUTO: 7.9 K/UL (ref 4.1–11.1)
WBC URNS QL MICRO: >100 /HPF (ref 0–4)

## 2022-12-01 LAB
BACTERIA SPEC CULT: ABNORMAL
CC UR VC: ABNORMAL
SERVICE CMNT-IMP: ABNORMAL

## 2022-12-01 RX ORDER — LEVOFLOXACIN 500 MG/1
500 TABLET, FILM COATED ORAL DAILY
Qty: 7 TABLET | Refills: 0 | Status: SHIPPED | OUTPATIENT
Start: 2022-12-01 | End: 2022-12-08

## 2022-12-02 ENCOUNTER — TELEPHONE (OUTPATIENT)
Dept: INTERNAL MEDICINE CLINIC | Age: 87
End: 2022-12-02

## 2022-12-02 NOTE — TELEPHONE ENCOUNTER
T/C to both patient and Ami/Dtr to update on Dr. Baker Covert comments, recommendations and scripts sent to his pharmacy for Levofloxacin 500 mg daily x 7 days. and  for a UTI. No answer and LVM.

## 2022-12-02 NOTE — TELEPHONE ENCOUNTER
----- Message from Kelsie Eubanks MD sent at 12/1/2022  6:49 PM EST -----  June Tompkins, please let him know that his urinalysis shows that he has a urinary tract infection. I have sent in 7 days of levofloxacin 500 mg daily. Lets repeat a urinalysis and urine culture in 2 weeks. Can come into the office to have that collected without an appointment. The other labs look okay. Liver enzymes, kidney function, blood cells all look normal.  Cholesterol looks excellent.   Diabetes is still a little bit uncontrolled, but stable and acceptable for age 80

## 2022-12-07 ENCOUNTER — TELEPHONE (OUTPATIENT)
Dept: INTERNAL MEDICINE CLINIC | Age: 87
End: 2022-12-07

## 2022-12-07 NOTE — TELEPHONE ENCOUNTER
Spoke with patient and he reports that he is just now getting around to returning my call. Updated on   Dr. Gume Montes comments, recommendations and script for antibiotic sen tot his [facility] pharmacy on 12/1/22 Levofloxacin for his UTI. Advised once completed he needs a repeat UA C&S. He states understanding. He reports that his pharmacy never notified him. Advised if he has any difficulty getting his medication that was ordered on 12/1/22 to call back. He states understanding and grateful or the call.

## 2022-12-12 DIAGNOSIS — C79.9 METASTATIC MALIGNANT NEOPLASM, UNSPECIFIED SITE (HCC): Primary | ICD-10-CM

## 2022-12-12 DIAGNOSIS — R91.8 HILAR MASS: ICD-10-CM

## 2022-12-12 DIAGNOSIS — M89.9 LYTIC BONE LESIONS ON XRAY: ICD-10-CM

## 2022-12-13 ENCOUNTER — HOSPICE ADMISSION (OUTPATIENT)
Dept: HOSPICE | Facility: HOSPICE | Age: 87
End: 2022-12-13
Payer: MEDICARE

## 2022-12-14 ENCOUNTER — HOME CARE VISIT (OUTPATIENT)
Dept: SCHEDULING | Facility: HOME HEALTH | Age: 87
End: 2022-12-14
Payer: MEDICARE

## 2022-12-14 VITALS
HEART RATE: 68 BPM | SYSTOLIC BLOOD PRESSURE: 120 MMHG | BODY MASS INDEX: 21.27 KG/M2 | DIASTOLIC BLOOD PRESSURE: 62 MMHG | OXYGEN SATURATION: 99 % | RESPIRATION RATE: 16 BRPM | WEIGHT: 144 LBS

## 2022-12-14 PROCEDURE — G0299 HHS/HOSPICE OF RN EA 15 MIN: HCPCS

## 2022-12-14 PROCEDURE — 0651 HSPC ROUTINE HOME CARE

## 2022-12-14 PROCEDURE — HOSPICE MEDICATION HC HH HOSPICE MEDICATION

## 2022-12-14 NOTE — HOSPICE
Akash Mcdaniel  [birth year]  89                                                       Principle Hospice Diagnosis: metastatic lung CA -mets to liver and bone   Diagnoses RELATED to the terminal prognosis: mild dyspnea   Other Diagnoses: history prostate and bladder cancer, HTN, GERD, trigeminal neuralgia, CAD with stents, diabetes type 2      Date of Hospice Admission: 12-14-22  Hospice Attending Elected by Patient:   Primary Care Physician: Dr. John Tamayo     Admitting RN: CARROL  Nurse CM: Dilia Gamboa   : Jazmin Toro:    declined      Durable DNR:  Yes   take signed copy to the home    Service: Yes        [address]:  did not discuss today     Direct Observation: Met by patient at the door. He is ambulatory, PPS 60, alert and oriented x4. He called for hospice care himself after conversation with his PCP. We reviewed hospice benefit, goals of care and scope of service. Mahi Zheng signed his own consents. Vitals WNL, mild SOB with exertion. He declined oxygen at this time. Saturation 99% on room air. Occasional mild cough. He is sleeping well. He feels fatigued easily and naps often. He is eating well, states his weight is stable, denies nausea or vomiting. Occasional bladder incontinence and he was recently treated for a UTI by Dr. John Tamayo. No skin concerns. Major concern today is neuropathic pain to right of his head- \"5\" on 1-10 scale, feels like pressure or ants walking on his head. This has been worsening lately though the neuralgia he has had for some time. Call to Dr. Jean Mota. Obtained CTI and discussed pain. Orders to increase dose of gabapentin to 600 mg BID and I instructed patient on this. Mahi Zheng declines a need for an opioid type pain medication right now. Reviewed that [facility] will be arriving for any distressing symptoms down the line. Mahi Zheng refused any DME or hospice aide services as he is \"managing ok\" right now. He is mostly self-care for ADLs.  Will updated daughter Kelly Nj by phone as pt consents to her being involved. ER Visits/ Hospitalizations in past year: none  Onset Date of Hospice Diagnosis:  12-14-22  Summary of Disease Progression Leading to Hospice Diagnosis: from office note of Dr. Malik Levi on 11-28-22:  Probable metastatic lung cancer. CT scan September 15 2022 showed 5 cm left hilar mass narrowing left anterior bronchus and occluding posterior branch of the left pulmonary artery. Also with lytic spinal lesions in thoracic spine. 2.6 cm lesion in T12 vertebrae. Possible small hepatic mets. 13 mm left lower lobe spiculated mass. He saw pulmonary and then saw me in September and elected to have no further follow-up. He states he would not want surgery, chemotherapy or radiation therapy if it were a lung cancer and is aware that metastatic cancer would unlikely to be curable. He does have a history of prostate cancer and is status post prostatectomy in 2000. He has a low but stable PSA since that time. He also history of bladder cancer in 2014 and was treated with BCG. He has been followed for both of these issues by urology Dr. Jonatan Herron. Trigeminal neuralgia. He has been taking duloxetine 30 mg daily which she says does help. Also taking gabapentin 3 to milligrams 3 times daily. Feels that gabapentin may be contributing to his imbalance and fatigue and sometimes dizziness. Hypertension  Hypertension ROS: taking medications as instructed, no medication side effects noted, no TIA's, no chest pain on exertion, no dyspnea on exertion, no swelling of ankles reports that he quit smoking about 60 years ago. His smoking use included cigarettes. He has never used smokeless tobacco. reports current alcohol use of about 5.0 standard drinks per week. Cancer Diagnoses     ________  A. Disease with distant metastases at presentation OR    ____x____  B.  Progression from an earlier stage of disease to metastatic disease with either:                          ____x____  1. continued decline in spite of therapy                          _____x___  2. patient declines further disease directed therapy      SPIRITUAL/Social/Emotional:              Jorge A's son in law is a  at [employer] and meets his spiritual needs. Psych/ Social/ Emotional Issues Identified:   Marea Homans moved into his daughters home in November for more support. He is a retired  (mechanical and electrical) and served in the [employer] during [id number]  [facility]. Dr. Harsha Zavala contacted, agrees to serve as attending provider for hospice and provided verbal certification of terminal illness with prognosis of 6 months or less life expectancy. Orders for hospice admission, medications and plan of treatment received. Medication reconciliation completed.       MEDS:  I have reviewed the patient's medication list with MD and identified the following:  Nonformulary medications:   Unrelated medications: eye drops, cardiac meds, aspirin, supplements, diabetes meds     IDT communication to include MD, SN, SW, [address] and support team.

## 2022-12-15 ENCOUNTER — HOME CARE VISIT (OUTPATIENT)
Dept: HOSPICE | Facility: HOSPICE | Age: 87
End: 2022-12-15
Payer: MEDICARE

## 2022-12-15 ENCOUNTER — HOME CARE VISIT (OUTPATIENT)
Dept: SCHEDULING | Facility: HOME HEALTH | Age: 87
End: 2022-12-15
Payer: MEDICARE

## 2022-12-15 VITALS
DIASTOLIC BLOOD PRESSURE: 83 MMHG | RESPIRATION RATE: 16 BRPM | HEART RATE: 72 BPM | OXYGEN SATURATION: 94 % | SYSTOLIC BLOOD PRESSURE: 158 MMHG

## 2022-12-15 DIAGNOSIS — N39.0 URINARY TRACT INFECTION WITHOUT HEMATURIA, SITE UNSPECIFIED: Primary | ICD-10-CM

## 2022-12-15 DIAGNOSIS — N39.0 URINARY TRACT INFECTION WITHOUT HEMATURIA, SITE UNSPECIFIED: ICD-10-CM

## 2022-12-15 PROCEDURE — G0155 HHCP-SVS OF CSW,EA 15 MIN: HCPCS

## 2022-12-15 PROCEDURE — 0651 HSPC ROUTINE HOME CARE

## 2022-12-15 PROCEDURE — G0299 HHS/HOSPICE OF RN EA 15 MIN: HCPCS

## 2022-12-15 NOTE — HOSPICE
Intial visit made after patient was admitted to [address] [facility] yesterday. Patient ambulates around his home without use of any assistive devices. Patient's gait is slow and guarded. He reports his daughter has ordered a cane which he may consider using. Patient denies c/o pain at this time but reports he usually has constant right sided head and neck pain. Med rec completed and patient states he is taking Gabapentin 600mg twice daily and Duloxetine 60mg daily for nerve pain. Patient handles his own meds and fills a weekly pill box. Patient states SN visits once weekly are fine for now. SRK meds have not yet arrived. This nurse encourages patient to place those meds to the side for review during the next SN visit. Patient verbalizes understanding. [employer] main number is located on patient's folder and he verbalizes understanding to call anytime as needed.     Will need Duloxetine refill next week

## 2022-12-16 LAB
APPEARANCE UR: CLEAR
BACTERIA URNS QL MICRO: NEGATIVE /HPF
BILIRUB UR QL: NEGATIVE
COLOR UR: ABNORMAL
EPITH CASTS URNS QL MICRO: ABNORMAL /LPF
GLUCOSE UR STRIP.AUTO-MCNC: NEGATIVE MG/DL
HGB UR QL STRIP: NEGATIVE
HYALINE CASTS URNS QL MICRO: ABNORMAL /LPF (ref 0–5)
KETONES UR QL STRIP.AUTO: ABNORMAL MG/DL
LEUKOCYTE ESTERASE UR QL STRIP.AUTO: NEGATIVE
NITRITE UR QL STRIP.AUTO: NEGATIVE
PH UR STRIP: 6 [PH] (ref 5–8)
PROT UR STRIP-MCNC: 30 MG/DL
RBC #/AREA URNS HPF: ABNORMAL /HPF (ref 0–5)
SP GR UR REFRACTOMETRY: 1.02 (ref 1–1.03)
UROBILINOGEN UR QL STRIP.AUTO: 0.2 EU/DL (ref 0.2–1)
WBC URNS QL MICRO: ABNORMAL /HPF (ref 0–4)

## 2022-12-16 PROCEDURE — 0651 HSPC ROUTINE HOME CARE

## 2022-12-17 LAB
BACTERIA SPEC CULT: NORMAL
SERVICE CMNT-IMP: NORMAL

## 2022-12-17 PROCEDURE — 0651 HSPC ROUTINE HOME CARE

## 2022-12-18 PROCEDURE — 0651 HSPC ROUTINE HOME CARE

## 2022-12-19 PROCEDURE — 0651 HSPC ROUTINE HOME CARE

## 2022-12-19 NOTE — PROGRESS NOTES
Called patient and verified name and , pt understands and Complies with provider instructions and directions. No further questions at this time.      Signed By: Booker Cullen    2022

## 2022-12-19 NOTE — PROGRESS NOTES
Called Patient and Left a detailed voice message for callback.  Time: 8:54 AM, 12/19/22      Signed By: Demetris Valle    December 19, 2022

## 2022-12-20 PROCEDURE — 0651 HSPC ROUTINE HOME CARE

## 2022-12-21 PROCEDURE — 0651 HSPC ROUTINE HOME CARE

## 2022-12-22 ENCOUNTER — HOME CARE VISIT (OUTPATIENT)
Dept: SCHEDULING | Facility: HOME HEALTH | Age: 87
End: 2022-12-22
Payer: MEDICARE

## 2022-12-22 ENCOUNTER — HOME CARE VISIT (OUTPATIENT)
Dept: HOSPICE | Facility: HOSPICE | Age: 87
End: 2022-12-22
Payer: MEDICARE

## 2022-12-22 PROCEDURE — 0651 HSPC ROUTINE HOME CARE

## 2022-12-22 PROCEDURE — G0299 HHS/HOSPICE OF RN EA 15 MIN: HCPCS

## 2022-12-23 VITALS
SYSTOLIC BLOOD PRESSURE: 108 MMHG | RESPIRATION RATE: 16 BRPM | HEART RATE: 68 BPM | TEMPERATURE: 97.9 F | OXYGEN SATURATION: 97 % | DIASTOLIC BLOOD PRESSURE: 51 MMHG

## 2022-12-23 PROCEDURE — 0651 HSPC ROUTINE HOME CARE

## 2022-12-23 NOTE — HOSPICE
Patient ambulates to the door and greets this nurse. Patient's gait is slow slightly unsteady. Patient states he has a walker but declines using at this time. Patient states he will use a cane while ambulating. Patient denies c/o SOB and reports mild head, neck, and shoulder pain on the right side. Patient states he has learned to tolerate the pain and declines further intervention at this time. Patient denies c/o N/V, diarrhea, or constipation. He reports his appetite remains fair and he is still able to drive. Patient states he fills his own weekly pill box and denies the need for refills at this time. We reviewed the instructions for [facility] meds with patient's daughter Christen Randhawa and granddaughter DINORAH&TIM Avina present. All verbalize understanding to contact [phone] [employer] main number prior to administering meds from the kit. This nurse encourages calling the main number as needed with any questions or concerns. Plan for a f/u routine SN visit next Thursday. RN Supervisor Tate Uribe to order a cane via [employer]. supplies in the home which patient states he is not using at this time.

## 2022-12-24 PROCEDURE — 0651 HSPC ROUTINE HOME CARE

## 2022-12-25 PROCEDURE — 0651 HSPC ROUTINE HOME CARE

## 2022-12-26 PROCEDURE — 0651 HSPC ROUTINE HOME CARE

## 2022-12-27 PROCEDURE — 0651 HSPC ROUTINE HOME CARE

## 2022-12-28 PROBLEM — Z51.5 HOSPICE CARE: Status: ACTIVE | Noted: 2022-01-01

## 2022-12-28 PROBLEM — Z51.5 HOSPICE CARE: Status: ACTIVE | Noted: 2022-12-28

## 2022-12-28 PROCEDURE — 0651 HSPC ROUTINE HOME CARE

## 2022-12-28 NOTE — HOSPICE
Initial SW Assessment completed with pt on 12/15/22. Pt alert and oriented x 4. Pt ambulates without assistance and is independent for ADLs. Pt very courteous and engaging; sharing that he recently moved in with his daughter, Reese Griggs; son in law and 5 grandchildren (4 minors, 1 in college) at his daughter's insistence very recently so that she can provide care support. Pt is a  and army . Pt is not connected with the [employer] and has no interest in being connected. [employer] recognition offered but also declined. Pt is a retired . He reports that he greatly enjoyed his career and feels fulfilled in his life. Pt is accepting of his prognosis and reports plan to \"bask in the warmth\" of his daughter's household this holiday season and focus on quality of this time remaining. Pt manages his own finances which he reports are adequate. Pt advises that his daughter, Reese Griggs will assume management of his finances when he is no longer able. NCD form delivered and reviewed with pt. Pt signed NCD form. Completed DNR form returned to pt's with physician signature. Plan for Next 2 Weeks:SW visit in January for socialization and assessment of needs. Problem: Incomplete plans for final arrangements. Community Resources: MSW shared information on respite, music therapy and hospice volunteer. All declined at this time. Advance Directive: Copy on file. DNR: Pt is a DNR  Final Arrangements: Not completed at this time. MSW to follow up. MSW did follow up with RNCM and Clinical Supervisor with information/request made by pt - Pt shared that Dr. Bobby Parada has increased his gabapentin due to the pins and needles sensations that he experiences on the back of his head (right side). Pt reports accepting this dosage increase at this time but may want  assistance following up about decreasing the dosage if he can tolerate this.  Pt reports goal of  trying to strike a balance between symptom management and maintaining his alertness. Pt states that his PCP worked with him well in this regards and is hopeful that Dr. Rody King will as well.

## 2022-12-29 ENCOUNTER — HOME CARE VISIT (OUTPATIENT)
Dept: HOSPICE | Facility: HOSPICE | Age: 87
End: 2022-12-29
Payer: MEDICARE

## 2022-12-29 PROCEDURE — 0651 HSPC ROUTINE HOME CARE

## 2022-12-29 NOTE — HOSPICE
This nurse received a message from triage stating patient called to cancel his routine SN visit this week. Patient states he will be out of town. RN Clinical Supervisor Tan Fritz to contact patient and refill medications as needed.

## 2022-12-30 PROCEDURE — 0651 HSPC ROUTINE HOME CARE

## 2022-12-31 PROCEDURE — 0651 HSPC ROUTINE HOME CARE

## 2023-01-01 ENCOUNTER — HOSPITAL ENCOUNTER (INPATIENT)
Age: 88
LOS: 7 days | End: 2023-01-01
Attending: FAMILY MEDICINE | Admitting: FAMILY MEDICINE

## 2023-01-01 DIAGNOSIS — R06.02 SOB (SHORTNESS OF BREATH): ICD-10-CM

## 2023-01-01 DIAGNOSIS — Z51.5 HOSPICE CARE: ICD-10-CM

## 2023-01-01 DIAGNOSIS — C34.90 MALIGNANT NEOPLASM OF LUNG, UNSPECIFIED LATERALITY, UNSPECIFIED PART OF LUNG (HCC): ICD-10-CM

## 2023-01-01 DIAGNOSIS — R45.1 RESTLESSNESS AND AGITATION: Primary | ICD-10-CM

## 2023-01-01 PROCEDURE — 99232 SBSQ HOSP IP/OBS MODERATE 35: CPT | Performed by: FAMILY MEDICINE

## 2023-01-01 PROCEDURE — 99222 1ST HOSP IP/OBS MODERATE 55: CPT | Performed by: FAMILY MEDICINE

## 2023-01-01 PROCEDURE — 74011250636 HC RX REV CODE- 250/636: Performed by: FAMILY MEDICINE

## 2023-01-01 PROCEDURE — G0299 HHS/HOSPICE OF RN EA 15 MIN: HCPCS

## 2023-01-01 PROCEDURE — 74011000250 HC RX REV CODE- 250: Performed by: FAMILY MEDICINE

## 2023-01-01 PROCEDURE — 0651 HSPC ROUTINE HOME CARE

## 2023-01-01 RX ORDER — KETOROLAC TROMETHAMINE 30 MG/ML
30 INJECTION, SOLUTION INTRAMUSCULAR; INTRAVENOUS
Status: DISCONTINUED
Start: 2023-01-01 | End: 2023-01-01 | Stop reason: HOSPADM

## 2023-01-01 RX ORDER — MORPHINE SULFATE 4 MG/ML
4 INJECTION INTRAVENOUS EVERY 4 HOURS
Status: DISCONTINUED
Start: 2023-01-01 | End: 2023-01-01 | Stop reason: HOSPADM

## 2023-01-01 RX ORDER — LORAZEPAM 2 MG/ML
1 INJECTION INTRAMUSCULAR
Status: DISCONTINUED | OUTPATIENT
Start: 2023-01-01 | End: 2023-01-01

## 2023-01-01 RX ORDER — MORPHINE SULFATE 2 MG/ML
2 INJECTION, SOLUTION INTRAMUSCULAR; INTRAVENOUS
Status: DISCONTINUED | OUTPATIENT
Start: 2023-01-01 | End: 2023-01-01

## 2023-01-01 RX ORDER — LORAZEPAM 2 MG/ML
2 INJECTION INTRAMUSCULAR EVERY 4 HOURS
Status: DISCONTINUED
Start: 2023-01-01 | End: 2023-01-01 | Stop reason: HOSPADM

## 2023-01-01 RX ORDER — FACIAL-BODY WIPES
10 EACH TOPICAL DAILY PRN
Status: DISCONTINUED
Start: 2023-01-01 | End: 2023-01-01 | Stop reason: HOSPADM

## 2023-01-01 RX ORDER — MORPHINE SULFATE 4 MG/ML
4 INJECTION INTRAVENOUS
Status: DISCONTINUED
Start: 2023-01-01 | End: 2023-01-01 | Stop reason: HOSPADM

## 2023-01-01 RX ORDER — LORAZEPAM 2 MG/ML
2 INJECTION INTRAMUSCULAR
Status: DISCONTINUED
Start: 2023-01-01 | End: 2023-01-01 | Stop reason: HOSPADM

## 2023-01-01 RX ORDER — KETOROLAC TROMETHAMINE 30 MG/ML
30 INJECTION, SOLUTION INTRAMUSCULAR; INTRAVENOUS
Status: DISCONTINUED | OUTPATIENT
Start: 2023-01-01 | End: 2023-01-01

## 2023-01-01 RX ORDER — AMOXICILLIN 250 MG
2 CAPSULE ORAL
Status: CANCELLED
Start: 2023-01-01

## 2023-01-01 RX ORDER — GLYCOPYRROLATE 0.2 MG/ML
0.2 INJECTION INTRAMUSCULAR; INTRAVENOUS
Status: DISCONTINUED
Start: 2023-01-01 | End: 2023-01-01 | Stop reason: HOSPADM

## 2023-01-01 RX ADMIN — LORAZEPAM 2 MG: 2 INJECTION INTRAMUSCULAR; INTRAVENOUS at 13:14

## 2023-01-01 RX ADMIN — MORPHINE SULFATE 4 MG: 4 INJECTION, SOLUTION INTRAMUSCULAR; INTRAVENOUS at 17:35

## 2023-01-01 RX ADMIN — KETOROLAC TROMETHAMINE 30 MG: 30 INJECTION, SOLUTION INTRAMUSCULAR at 06:02

## 2023-01-01 RX ADMIN — MORPHINE SULFATE 4 MG: 4 INJECTION, SOLUTION INTRAMUSCULAR; INTRAVENOUS at 10:01

## 2023-01-01 RX ADMIN — LORAZEPAM 2 MG: 2 INJECTION INTRAMUSCULAR; INTRAVENOUS at 21:48

## 2023-01-01 RX ADMIN — MORPHINE SULFATE 4 MG: 4 INJECTION, SOLUTION INTRAMUSCULAR; INTRAVENOUS at 13:14

## 2023-01-01 RX ADMIN — MORPHINE SULFATE 4 MG: 4 INJECTION, SOLUTION INTRAMUSCULAR; INTRAVENOUS at 21:49

## 2023-01-01 RX ADMIN — MORPHINE SULFATE 4 MG: 4 INJECTION, SOLUTION INTRAMUSCULAR; INTRAVENOUS at 01:51

## 2023-01-01 RX ADMIN — MORPHINE SULFATE 4 MG: 4 INJECTION, SOLUTION INTRAMUSCULAR; INTRAVENOUS at 09:38

## 2023-01-01 RX ADMIN — LORAZEPAM 2 MG: 2 INJECTION INTRAMUSCULAR; INTRAVENOUS at 09:41

## 2023-01-01 RX ADMIN — LORAZEPAM 2 MG: 2 INJECTION INTRAMUSCULAR; INTRAVENOUS at 21:49

## 2023-01-01 RX ADMIN — MORPHINE SULFATE 4 MG: 4 INJECTION, SOLUTION INTRAMUSCULAR; INTRAVENOUS at 05:53

## 2023-01-01 RX ADMIN — LORAZEPAM 2 MG: 2 INJECTION INTRAMUSCULAR; INTRAVENOUS at 18:08

## 2023-01-01 RX ADMIN — LORAZEPAM 2 MG: 2 INJECTION INTRAMUSCULAR; INTRAVENOUS at 05:03

## 2023-01-01 RX ADMIN — LORAZEPAM 2 MG: 2 INJECTION INTRAMUSCULAR; INTRAVENOUS at 05:54

## 2023-01-01 RX ADMIN — LORAZEPAM 2 MG: 2 INJECTION INTRAMUSCULAR; INTRAVENOUS at 22:11

## 2023-01-01 RX ADMIN — KETOROLAC TROMETHAMINE 30 MG: 30 INJECTION, SOLUTION INTRAMUSCULAR at 09:06

## 2023-01-01 RX ADMIN — GLYCOPYRROLATE 0.2 MG: 0.2 INJECTION INTRAMUSCULAR; INTRAVENOUS at 17:18

## 2023-01-01 RX ADMIN — MORPHINE SULFATE 4 MG: 4 INJECTION, SOLUTION INTRAMUSCULAR; INTRAVENOUS at 21:48

## 2023-01-01 RX ADMIN — MORPHINE SULFATE 2 MG: 2 INJECTION, SOLUTION INTRAMUSCULAR; INTRAVENOUS at 05:52

## 2023-01-01 RX ADMIN — LORAZEPAM 2 MG: 2 INJECTION INTRAMUSCULAR; INTRAVENOUS at 07:56

## 2023-01-01 RX ADMIN — MORPHINE SULFATE 4 MG: 4 INJECTION, SOLUTION INTRAMUSCULAR; INTRAVENOUS at 01:54

## 2023-01-01 RX ADMIN — LORAZEPAM 2 MG: 2 INJECTION INTRAMUSCULAR; INTRAVENOUS at 10:01

## 2023-01-01 RX ADMIN — MORPHINE SULFATE 4 MG: 4 INJECTION, SOLUTION INTRAMUSCULAR; INTRAVENOUS at 13:48

## 2023-01-01 RX ADMIN — LORAZEPAM 2 MG: 2 INJECTION INTRAMUSCULAR; INTRAVENOUS at 01:51

## 2023-01-01 RX ADMIN — GLYCOPYRROLATE 0.2 MG: 0.2 INJECTION INTRAMUSCULAR; INTRAVENOUS at 11:04

## 2023-01-01 RX ADMIN — LORAZEPAM 2 MG: 2 INJECTION INTRAMUSCULAR; INTRAVENOUS at 09:06

## 2023-01-01 RX ADMIN — LORAZEPAM 2 MG: 2 INJECTION INTRAMUSCULAR; INTRAVENOUS at 17:32

## 2023-01-01 RX ADMIN — MORPHINE SULFATE 4 MG: 4 INJECTION, SOLUTION INTRAMUSCULAR; INTRAVENOUS at 21:50

## 2023-01-01 RX ADMIN — KETOROLAC TROMETHAMINE 30 MG: 30 INJECTION, SOLUTION INTRAMUSCULAR at 06:35

## 2023-01-01 RX ADMIN — LORAZEPAM 1 MG: 2 INJECTION INTRAMUSCULAR; INTRAVENOUS at 21:51

## 2023-01-01 RX ADMIN — MORPHINE SULFATE 4 MG: 4 INJECTION, SOLUTION INTRAMUSCULAR; INTRAVENOUS at 05:03

## 2023-01-01 RX ADMIN — MORPHINE SULFATE 4 MG: 4 INJECTION, SOLUTION INTRAMUSCULAR; INTRAVENOUS at 06:02

## 2023-01-01 RX ADMIN — MORPHINE SULFATE 4 MG: 4 INJECTION, SOLUTION INTRAMUSCULAR; INTRAVENOUS at 13:50

## 2023-01-01 RX ADMIN — MORPHINE SULFATE 4 MG: 4 INJECTION, SOLUTION INTRAMUSCULAR; INTRAVENOUS at 13:12

## 2023-01-01 RX ADMIN — LORAZEPAM 2 MG: 2 INJECTION INTRAMUSCULAR; INTRAVENOUS at 01:49

## 2023-01-01 RX ADMIN — LORAZEPAM 1 MG: 2 INJECTION INTRAMUSCULAR; INTRAVENOUS at 01:03

## 2023-01-01 RX ADMIN — LORAZEPAM 2 MG: 2 INJECTION INTRAMUSCULAR; INTRAVENOUS at 02:06

## 2023-01-01 RX ADMIN — LORAZEPAM 1 MG: 2 INJECTION INTRAMUSCULAR; INTRAVENOUS at 16:36

## 2023-01-01 RX ADMIN — LORAZEPAM 1 MG: 2 INJECTION INTRAMUSCULAR; INTRAVENOUS at 22:08

## 2023-01-01 RX ADMIN — MORPHINE SULFATE 2 MG: 2 INJECTION, SOLUTION INTRAMUSCULAR; INTRAVENOUS at 21:51

## 2023-01-01 RX ADMIN — LORAZEPAM 2 MG: 2 INJECTION INTRAMUSCULAR; INTRAVENOUS at 09:38

## 2023-01-01 RX ADMIN — MORPHINE SULFATE 4 MG: 4 INJECTION, SOLUTION INTRAMUSCULAR; INTRAVENOUS at 10:32

## 2023-01-01 RX ADMIN — LORAZEPAM 2 MG: 2 INJECTION INTRAMUSCULAR; INTRAVENOUS at 13:51

## 2023-01-01 RX ADMIN — LORAZEPAM 2 MG: 2 INJECTION INTRAMUSCULAR; INTRAVENOUS at 05:53

## 2023-01-01 RX ADMIN — LORAZEPAM 2 MG: 2 INJECTION INTRAMUSCULAR; INTRAVENOUS at 06:02

## 2023-01-01 RX ADMIN — MORPHINE SULFATE 4 MG: 4 INJECTION, SOLUTION INTRAMUSCULAR; INTRAVENOUS at 01:55

## 2023-01-01 RX ADMIN — LORAZEPAM 2 MG: 2 INJECTION INTRAMUSCULAR; INTRAVENOUS at 17:18

## 2023-01-01 RX ADMIN — KETOROLAC TROMETHAMINE 30 MG: 30 INJECTION, SOLUTION INTRAMUSCULAR at 19:25

## 2023-01-01 RX ADMIN — LORAZEPAM 2 MG: 2 INJECTION INTRAMUSCULAR; INTRAVENOUS at 01:54

## 2023-01-01 RX ADMIN — MORPHINE SULFATE 4 MG: 4 INJECTION, SOLUTION INTRAMUSCULAR; INTRAVENOUS at 01:52

## 2023-01-01 RX ADMIN — MORPHINE SULFATE 4 MG: 4 INJECTION, SOLUTION INTRAMUSCULAR; INTRAVENOUS at 06:18

## 2023-01-01 RX ADMIN — LORAZEPAM 2 MG: 2 INJECTION INTRAMUSCULAR; INTRAVENOUS at 01:53

## 2023-01-01 RX ADMIN — GLYCOPYRROLATE 0.2 MG: 0.2 INJECTION INTRAMUSCULAR; INTRAVENOUS at 16:31

## 2023-01-01 RX ADMIN — MORPHINE SULFATE 4 MG: 4 INJECTION, SOLUTION INTRAMUSCULAR; INTRAVENOUS at 04:40

## 2023-01-01 RX ADMIN — MORPHINE SULFATE 4 MG: 4 INJECTION, SOLUTION INTRAMUSCULAR; INTRAVENOUS at 17:53

## 2023-01-01 RX ADMIN — LORAZEPAM 2 MG: 2 INJECTION INTRAMUSCULAR; INTRAVENOUS at 09:58

## 2023-01-01 RX ADMIN — LORAZEPAM 2 MG: 2 INJECTION INTRAMUSCULAR; INTRAVENOUS at 17:36

## 2023-01-01 RX ADMIN — KETOROLAC TROMETHAMINE 30 MG: 30 INJECTION, SOLUTION INTRAMUSCULAR at 06:25

## 2023-01-01 RX ADMIN — MORPHINE SULFATE 4 MG: 4 INJECTION, SOLUTION INTRAMUSCULAR; INTRAVENOUS at 14:06

## 2023-01-01 RX ADMIN — LORAZEPAM 2 MG: 2 INJECTION INTRAMUSCULAR; INTRAVENOUS at 13:48

## 2023-01-01 RX ADMIN — MORPHINE SULFATE 4 MG: 4 INJECTION, SOLUTION INTRAMUSCULAR; INTRAVENOUS at 22:09

## 2023-01-01 RX ADMIN — MORPHINE SULFATE 2 MG: 2 INJECTION, SOLUTION INTRAMUSCULAR; INTRAVENOUS at 01:03

## 2023-01-01 RX ADMIN — MORPHINE SULFATE 4 MG: 4 INJECTION, SOLUTION INTRAMUSCULAR; INTRAVENOUS at 14:03

## 2023-01-01 RX ADMIN — MORPHINE SULFATE 4 MG: 4 INJECTION, SOLUTION INTRAMUSCULAR; INTRAVENOUS at 02:06

## 2023-01-01 RX ADMIN — MORPHINE SULFATE 4 MG: 4 INJECTION, SOLUTION INTRAMUSCULAR; INTRAVENOUS at 09:06

## 2023-01-01 RX ADMIN — MORPHINE SULFATE 2 MG: 2 INJECTION, SOLUTION INTRAMUSCULAR; INTRAVENOUS at 16:36

## 2023-01-01 RX ADMIN — MORPHINE SULFATE 4 MG: 4 INJECTION, SOLUTION INTRAMUSCULAR; INTRAVENOUS at 22:11

## 2023-01-01 RX ADMIN — MORPHINE SULFATE 4 MG: 4 INJECTION, SOLUTION INTRAMUSCULAR; INTRAVENOUS at 07:56

## 2023-01-01 RX ADMIN — LORAZEPAM 2 MG: 2 INJECTION INTRAMUSCULAR; INTRAVENOUS at 10:33

## 2023-01-01 RX ADMIN — MORPHINE SULFATE 4 MG: 4 INJECTION, SOLUTION INTRAMUSCULAR; INTRAVENOUS at 18:08

## 2023-01-01 RX ADMIN — LORAZEPAM 2 MG: 2 INJECTION INTRAMUSCULAR; INTRAVENOUS at 17:54

## 2023-01-01 RX ADMIN — MORPHINE SULFATE 4 MG: 4 INJECTION, SOLUTION INTRAMUSCULAR; INTRAVENOUS at 17:32

## 2023-01-01 RX ADMIN — LORAZEPAM 1 MG: 2 INJECTION INTRAMUSCULAR; INTRAVENOUS at 03:41

## 2023-01-01 RX ADMIN — MORPHINE SULFATE 4 MG: 4 INJECTION, SOLUTION INTRAMUSCULAR; INTRAVENOUS at 09:58

## 2023-01-01 RX ADMIN — LORAZEPAM 2 MG: 2 INJECTION INTRAMUSCULAR; INTRAVENOUS at 06:18

## 2023-01-01 RX ADMIN — MORPHINE SULFATE 4 MG: 4 INJECTION, SOLUTION INTRAMUSCULAR; INTRAVENOUS at 22:01

## 2023-01-01 RX ADMIN — LORAZEPAM 2 MG: 2 INJECTION INTRAMUSCULAR; INTRAVENOUS at 14:04

## 2023-01-01 RX ADMIN — MORPHINE SULFATE 2 MG: 2 INJECTION, SOLUTION INTRAMUSCULAR; INTRAVENOUS at 03:41

## 2023-01-01 RX ADMIN — LORAZEPAM 2 MG: 2 INJECTION INTRAMUSCULAR; INTRAVENOUS at 22:01

## 2023-01-01 RX ADMIN — LORAZEPAM 2 MG: 2 INJECTION INTRAMUSCULAR; INTRAVENOUS at 01:55

## 2023-01-01 RX ADMIN — MORPHINE SULFATE 4 MG: 4 INJECTION, SOLUTION INTRAMUSCULAR; INTRAVENOUS at 03:30

## 2023-01-01 RX ADMIN — LORAZEPAM 2 MG: 2 INJECTION INTRAMUSCULAR; INTRAVENOUS at 14:06

## 2023-01-01 RX ADMIN — GLYCOPYRROLATE 0.2 MG: 0.2 INJECTION INTRAMUSCULAR; INTRAVENOUS at 11:13

## 2023-01-01 RX ADMIN — MORPHINE SULFATE 4 MG: 4 INJECTION, SOLUTION INTRAMUSCULAR; INTRAVENOUS at 17:18

## 2023-01-01 RX ADMIN — MORPHINE SULFATE 4 MG: 4 INJECTION, SOLUTION INTRAMUSCULAR; INTRAVENOUS at 01:49

## 2023-01-01 RX ADMIN — LORAZEPAM 1 MG: 2 INJECTION INTRAMUSCULAR; INTRAVENOUS at 05:52

## 2023-01-01 RX ADMIN — LORAZEPAM 2 MG: 2 INJECTION INTRAMUSCULAR; INTRAVENOUS at 22:06

## 2023-01-01 RX ADMIN — GLYCOPYRROLATE 0.2 MG: 0.2 INJECTION INTRAMUSCULAR; INTRAVENOUS at 03:30

## 2023-01-01 RX ADMIN — LORAZEPAM 2 MG: 2 INJECTION INTRAMUSCULAR; INTRAVENOUS at 17:35

## 2023-01-01 RX ADMIN — MORPHINE SULFATE 4 MG: 4 INJECTION, SOLUTION INTRAMUSCULAR; INTRAVENOUS at 09:41

## 2023-01-01 RX ADMIN — LORAZEPAM 2 MG: 2 INJECTION INTRAMUSCULAR; INTRAVENOUS at 13:12

## 2023-01-05 ENCOUNTER — HOME CARE VISIT (OUTPATIENT)
Dept: SCHEDULING | Facility: HOME HEALTH | Age: 88
End: 2023-01-05
Payer: MEDICARE

## 2023-01-05 VITALS
RESPIRATION RATE: 16 BRPM | HEART RATE: 86 BPM | TEMPERATURE: 98.1 F | SYSTOLIC BLOOD PRESSURE: 140 MMHG | OXYGEN SATURATION: 96 % | DIASTOLIC BLOOD PRESSURE: 65 MMHG

## 2023-01-05 PROCEDURE — G0299 HHS/HOSPICE OF RN EA 15 MIN: HCPCS

## 2023-01-05 NOTE — HOSPICE
Patient found ambulating in the home, denies shortness of breath currently but does report experiencing with some activity. Patient reports he has been feeling more fatigued lately and taking more naps during the day. He denies pain during visit but does report the usual neuropathic pain he has in his head is not better since increasing the dose of Gabapentin, he reports that sometimes the pain wakes him out of his sleep. Patient also reports hoarse voice in the last week, denies throat pain difficulty swallowing, and cough. Advised we will continue to monitor these symptoms. Patient also reports he has not used cane ordered at last visit due to heaviness of device. Discussed using rollator as needed which he and daughter Riki Hummel were agreeable to. Reviewed fall risk and safe mobility. Rollator ordered from [employer] and cane to be picked up. Spoke with patient's daughter Riki Hummel at length about patient's condition, encouraged to start morphine if pain becomes severe for patient. She was tearful during the conversation, encouraged to call hospice with any further questions or concerns.

## 2023-01-06 ENCOUNTER — HOME CARE VISIT (OUTPATIENT)
Dept: HOSPICE | Facility: HOSPICE | Age: 88
End: 2023-01-06
Payer: MEDICARE

## 2023-01-11 ENCOUNTER — HOME CARE VISIT (OUTPATIENT)
Dept: HOSPICE | Facility: HOSPICE | Age: 88
End: 2023-01-11
Payer: MEDICARE

## 2023-01-12 ENCOUNTER — HOME CARE VISIT (OUTPATIENT)
Dept: SCHEDULING | Facility: HOME HEALTH | Age: 88
End: 2023-01-12
Payer: MEDICARE

## 2023-01-12 VITALS
DIASTOLIC BLOOD PRESSURE: 84 MMHG | HEART RATE: 66 BPM | RESPIRATION RATE: 16 BRPM | SYSTOLIC BLOOD PRESSURE: 142 MMHG | OXYGEN SATURATION: 95 %

## 2023-01-12 PROCEDURE — G0299 HHS/HOSPICE OF RN EA 15 MIN: HCPCS

## 2023-01-13 ENCOUNTER — HOME CARE VISIT (OUTPATIENT)
Dept: HOSPICE | Facility: HOSPICE | Age: 88
End: 2023-01-13
Payer: MEDICARE

## 2023-01-13 NOTE — HOSPICE
Joint visit made with MALKA Chaudhary. Patient ambulating around his home without the use of an assistive device. Gait is slow, steady, and guarded. Patient c/o head pain 3/4 which he states is mostly on the right side. Medication reconciliation completed and patient appears confused regarding his Gabapentin order. Instructions provided to patient and his daughter Loretta Sidhu. New orders received by BARRY Cerna yesterday for Gabapentin 300mg- take two capsules every 8 hours. Both patient and Loretta Sidhu verbalize understanding. Loretta Sidhu states she will monitor her dad more closely and requests orders in written form. New orders received from MALKA Diaz to discontinue Atorvastatin and all vitamins and supplements. Patient and his daughter agree with this plan. This nurse offers to fill patient's weekly pill box and he declines. Patient states he enjoys filling the pill box weekly with the help of his granddaughters. Patient declines HHA or  services at this time. Patient states we will continue SN visits once weekly and they will contact [phone] [place] [facility] [employer] main number if his pain is unmanaged or with questions or concerns.

## 2023-01-16 DIAGNOSIS — I10 BENIGN ESSENTIAL HTN: ICD-10-CM

## 2023-01-16 RX ORDER — OLMESARTAN MEDOXOMIL 20 MG/1
TABLET ORAL
Qty: 45 TABLET | Refills: 1 | Status: SHIPPED | OUTPATIENT
Start: 2023-01-16

## 2023-01-19 ENCOUNTER — HOME CARE VISIT (OUTPATIENT)
Dept: SCHEDULING | Facility: HOME HEALTH | Age: 88
End: 2023-01-19
Payer: MEDICARE

## 2023-01-19 ENCOUNTER — HOME CARE VISIT (OUTPATIENT)
Dept: HOSPICE | Facility: HOSPICE | Age: 88
End: 2023-01-19
Payer: MEDICARE

## 2023-01-19 VITALS
RESPIRATION RATE: 16 BRPM | DIASTOLIC BLOOD PRESSURE: 81 MMHG | OXYGEN SATURATION: 97 % | SYSTOLIC BLOOD PRESSURE: 150 MMHG | HEART RATE: 61 BPM

## 2023-01-19 PROCEDURE — G0299 HHS/HOSPICE OF RN EA 15 MIN: HCPCS

## 2023-01-20 NOTE — HOSPICE
Patient found sitting up in his recliner with his daughter, son, and daughter in-law present. Patient c/o pain 4/10. Patient states the pain is throbbing and in the back of his head. Patient states the pain often increases to 9/10 around bedtime. Patient states he would be satisfied with a pain level 2/10. Patient reports he has increased SOB with activity such as making his beds. Patient agrees to have oxygen delivered to the home. New orders received from NP Cordelia Bernal to increase the nighttime dose of Gabapentin to 900 mg. Continue Gabapentin 600mg by mouth every 8 hours during the day. Oxygen @ 2 LPM via nasal canula as needed for SOB. Instructions provided to patient and his daughter Thea Velasco. Both verbalize understanding.   Gabapentin 300mg take two tabs (600mg) every 8 hours during the day  Gabapentin 300mg take three tabs (900mg) at bedtime  Cymbalta 60mg  [employer] next day delivery

## 2023-01-22 ENCOUNTER — HOME CARE VISIT (OUTPATIENT)
Dept: HOSPICE | Facility: HOSPICE | Age: 88
End: 2023-01-22
Payer: MEDICARE

## 2023-01-22 VITALS
RESPIRATION RATE: 18 BRPM | SYSTOLIC BLOOD PRESSURE: 123 MMHG | DIASTOLIC BLOOD PRESSURE: 69 MMHG | OXYGEN SATURATION: 95 % | HEART RATE: 64 BPM

## 2023-01-22 PROCEDURE — G0299 HHS/HOSPICE OF RN EA 15 MIN: HCPCS

## 2023-01-22 NOTE — HOSPICE
Vi Been   9/22/1929  [facility] visit- Patient's daughter called reporting father had fallen and would like a someone to take a look at him because he is bleeding. Arrived to house, greeted by daughter; patient received laying down in his bed awake, with a tissue on his the back of his head. Patient stated he walked into the bathroom and was going to shower, bent over to take off socks, and fell over. He grabbed the towel bar on the way down and pulled it off the wall. Patient did not lose consciousness. His daughter helped him up and he walked to his bed and laid down while waiting for nurse. Patients has an area on dorsal posterior of head that is scrap with a small hematoma. Cleaned area, left open to air and applied ice. Patient has PERRLA. His head is sore where he hit it, but no headache. Patient denies lightheadedness or dizziness. Patient sat on side of the bed, then walked across room with a steady gait. Instructed family on intermittent ice application, clean area daily, Tylenol for headache, and to monitor for increase headache, dizziness, nausea, or changes in behavior. Called patient and spoke with him this afternoon 1500. Patient denies any headache, dizziness, blurred vision. Daughter states patient is doing well. Nurse should follow up with patient tomorrow. [facility] is here 24/7 if there are any questions or concerns.

## 2023-01-23 ENCOUNTER — HOME CARE VISIT (OUTPATIENT)
Dept: HOSPICE | Facility: HOSPICE | Age: 88
End: 2023-01-23
Payer: MEDICARE

## 2023-01-24 ENCOUNTER — HOME CARE VISIT (OUTPATIENT)
Dept: HOSPICE | Facility: HOSPICE | Age: 88
End: 2023-01-24
Payer: MEDICARE

## 2023-01-25 ENCOUNTER — HOME CARE VISIT (OUTPATIENT)
Dept: SCHEDULING | Facility: HOME HEALTH | Age: 88
End: 2023-01-25
Payer: MEDICARE

## 2023-01-25 VITALS
HEART RATE: 57 BPM | OXYGEN SATURATION: 99 % | SYSTOLIC BLOOD PRESSURE: 141 MMHG | DIASTOLIC BLOOD PRESSURE: 76 MMHG | RESPIRATION RATE: 20 BRPM

## 2023-01-25 PROCEDURE — G0299 HHS/HOSPICE OF RN EA 15 MIN: HCPCS

## 2023-01-25 NOTE — HOSPICE
Patient found sitting up in his chair waiting to greet this nurse. Patient smiles and reports his pain level is 0/10. Patient states his current medication regimen seems effective and would not like any changes made at this time. Patient ambulates to his room with a slow unsteady gait. Patient attempts to maintain some independence. He declines HHA services at this time. This nurse provides education and demonstration on use of the oxygen concentrator and O2 setup. Oxygen set at 2 LPM and we confirmed it is flowing properly. Patient denies use of 02 at this time but states it will be nice to have at bedtime and during the day for dyspnea on exertion. Patient's daughter Jessie Louie is present for this visit as well. This nurse provides information regarding respite and the [facility]. Also, calling non-emergency if patient were to fall again and Jessie Louie requires help. Jessie Louie and her dad verbalize understanding of all instructions. Patient states he will continue with one SN visit weekly for now until he starts to decline. This nurse encourages calling [phone] [facility] main number as needed with questions or concerns.

## 2023-02-02 ENCOUNTER — HOME CARE VISIT (OUTPATIENT)
Dept: SCHEDULING | Facility: HOME HEALTH | Age: 88
End: 2023-02-02
Payer: MEDICARE

## 2023-02-02 ENCOUNTER — HOME CARE VISIT (OUTPATIENT)
Dept: HOSPICE | Facility: HOSPICE | Age: 88
End: 2023-02-02
Payer: MEDICARE

## 2023-02-02 VITALS
HEART RATE: 62 BPM | DIASTOLIC BLOOD PRESSURE: 76 MMHG | SYSTOLIC BLOOD PRESSURE: 148 MMHG | OXYGEN SATURATION: 95 % | RESPIRATION RATE: 16 BRPM

## 2023-02-02 PROCEDURE — G0299 HHS/HOSPICE OF RN EA 15 MIN: HCPCS

## 2023-02-02 NOTE — HOSPICE
Patient ambulated to the door and greeted this nurse. He does not use any assistive devices for ambulation. Patient denies c/o pain and reports mild SOB. Patient denies interventions at this time and states he is using the oxygen several hours daily prn. Patient reports his appetite is great, He denies c/o N/V, diarrhea, or constipation. Patient states he is sleeping well during the night. Patient's daughter Jacob Chaparro reports her father is still able to drive and go out with friends. We agree patient's gait is unsteady but he prefers to maintain some independence. This nurse encourages calling [phone] [email or web address] main number as needed with questions or concerns.    Refills and supplies ordered

## 2023-02-05 ENCOUNTER — HOME CARE VISIT (OUTPATIENT)
Dept: HOSPICE | Facility: HOSPICE | Age: 88
End: 2023-02-05
Payer: MEDICARE

## 2023-02-07 ENCOUNTER — OFFICE VISIT (OUTPATIENT)
Dept: INTERNAL MEDICINE CLINIC | Age: 88
End: 2023-02-07
Payer: MEDICARE

## 2023-02-07 VITALS
DIASTOLIC BLOOD PRESSURE: 75 MMHG | RESPIRATION RATE: 14 BRPM | WEIGHT: 139.6 LBS | BODY MASS INDEX: 20.68 KG/M2 | SYSTOLIC BLOOD PRESSURE: 123 MMHG | OXYGEN SATURATION: 95 % | TEMPERATURE: 98.2 F | HEART RATE: 84 BPM | HEIGHT: 69 IN

## 2023-02-07 DIAGNOSIS — E55.9 VITAMIN D DEFICIENCY: ICD-10-CM

## 2023-02-07 DIAGNOSIS — Z51.5 HOSPICE CARE: ICD-10-CM

## 2023-02-07 DIAGNOSIS — R91.8 HILAR MASS: Primary | ICD-10-CM

## 2023-02-07 DIAGNOSIS — N39.0 URINARY TRACT INFECTION WITHOUT HEMATURIA, SITE UNSPECIFIED: ICD-10-CM

## 2023-02-07 DIAGNOSIS — C79.9 METASTATIC MALIGNANT NEOPLASM, UNSPECIFIED SITE (HCC): ICD-10-CM

## 2023-02-07 DIAGNOSIS — G89.3 CANCER RELATED PAIN: ICD-10-CM

## 2023-02-07 DIAGNOSIS — E53.8 B12 DEFICIENCY: ICD-10-CM

## 2023-02-07 DIAGNOSIS — E11.21 TYPE 2 DIABETES WITH NEPHROPATHY (HCC): ICD-10-CM

## 2023-02-07 DIAGNOSIS — I25.118 CORONARY ARTERY DISEASE OF NATIVE ARTERY OF NATIVE HEART WITH STABLE ANGINA PECTORIS (HCC): ICD-10-CM

## 2023-02-07 DIAGNOSIS — R53.83 FATIGUE, UNSPECIFIED TYPE: ICD-10-CM

## 2023-02-07 PROCEDURE — G8427 DOCREV CUR MEDS BY ELIG CLIN: HCPCS | Performed by: INTERNAL MEDICINE

## 2023-02-07 PROCEDURE — G8510 SCR DEP NEG, NO PLAN REQD: HCPCS | Performed by: INTERNAL MEDICINE

## 2023-02-07 PROCEDURE — G8420 CALC BMI NORM PARAMETERS: HCPCS | Performed by: INTERNAL MEDICINE

## 2023-02-07 PROCEDURE — 99214 OFFICE O/P EST MOD 30 MIN: CPT | Performed by: INTERNAL MEDICINE

## 2023-02-07 PROCEDURE — G8536 NO DOC ELDER MAL SCRN: HCPCS | Performed by: INTERNAL MEDICINE

## 2023-02-07 RX ORDER — METFORMIN HYDROCHLORIDE 500 MG/1
1000 TABLET, EXTENDED RELEASE ORAL
Qty: 180 TABLET | Refills: 1
Start: 2023-02-07

## 2023-02-07 RX ORDER — GABAPENTIN 300 MG/1
CAPSULE ORAL
Qty: 120 CAPSULE | Refills: 4
Start: 2023-02-07

## 2023-02-07 NOTE — PATIENT INSTRUCTIONS
Current Outpatient Medications   Medication Sig    gabapentin (NEURONTIN) 300 mg capsule Take 2 tablets in AM and in 2 pills in afternoon during the day for neuropathic pain  Indications: neuropathic pain    OXYGEN-AIR DELIVERY SYSTEMS 2 L/min by Intra[employer] route as needed for PRN Reason (Other) (shortness of breath). gabapentin (NEURONTIN) 300 mg capsule Take 900 mg by mouth nightly. take 3 capsules (900mg) by mouth every evening around bedtime  Indications: neuropathic pain    olmesartan (BENICAR) 20 mg tablet TAKE 1/2 TABLET BY MOUTH EVERY DAY    metFORMIN (GLUCOPHAGE) 500 mg tablet Take 1,000 mg by mouth daily (with dinner). morphine (ROXANOL) 100 mg/5 mL (20 mg/mL) concentrated solution Take 5 mg by mouth every three (3) hours as needed for Pain or Shortness of Breath.    haloperidol (HALDOL) 2 mg/mL oral concentrate Take 1 mg by mouth every four (4) hours as needed for Agitation or Psychosis. LORazepam (ATIVAN) 0.5 mg tablet Take 0.5 mg by mouth every six (6) hours as needed for Anxiety. prochlorperazine (COMPAZINE) 10 mg tablet Take 10 mg by mouth every six (6) hours as needed for Nausea. hyoscyamine SL (LEVSIN/SL) 0.125 mg SL tablet Take 0.125 mg by mouth every four (4) hours as needed for Secretions. bisacodyL (DULCOLAX) 10 mg supp Insert 10 mg into rectum daily as needed for Constipation. aspirin 81 mg chewable tablet Take 81 mg by mouth daily. dorzolamide-timoloL (COSOPT) 22.3-6.8 mg/mL ophthalmic solution Administer 1 Drop to both eyes two (2) times a day. latanoprost (XALATAN) 0.005 % ophthalmic solution Administer 1 Drop to left eye nightly. DULoxetine (CYMBALTA) 60 mg capsule Take 1 Capsule by mouth daily. Increase 11/28/22  Indications: neuropathic pain    omeprazole (PRILOSEC) 40 mg capsule TAKE 1 CAPSULE BY MOUTH DAILY. INCREASED DOSE 11/24/21 INDICATIONS: HEARTBURN     No current facility-administered medications for this visit.

## 2023-02-07 NOTE — PROGRESS NOTES
HISTORY OF PRESENT ILLNESS    Chief Complaint   Patient presents with    Diabetes    Hypertension    Labs     Nonfasting. Cancer     Stage 4       Presents for follow-up    Zach Charlton presents with his daughter, Kathleen Lundberg. He is living with her and 4 of her 5 children who are still at home. He has been under the care of hospice with [facility]. Notes reviewed. He ambulated without assistance from the parking lot to the office. Daughter states he is unsteady at times and has to hold onto the wall. He is reluctant to ask for any additional help. He has not fallen recently. Hospice has given him a cane and a rollator to use at home which he does use sometimes. He has metastatic lung cancer based on CT scan September 2022. Infiltrative left hilar mass narrowing bronchi with lytic spinal mets static disease and possible hepatic metastatic disease. Possible left lower lobe metastatic disease as well. Patient elected for hospice care rather than aggressive therapy. Today, states that pain is overall reasonably well controlled although he states he could not tolerate much higher levels of pain. Pain is largely in his back. He is taking gabapentin 600 mg twice daily during the day and 900 mg at night. Also taking duloxetine 60 mg daily. He has acute pain medications including morphine available from hospice but he has not used it yet. He asks about whether or not he should go back on multiple supplements that he was taking before, including vitamin B12, vitamin C. Many of these were stopped once he started hospice. States that overall he is feeling more fatigued. He is wondering if lack of supplements could be contributing. Denies any nausea or vomiting. Bowels are moving fairly well. Has lost 5 pounds in the past 2 months.   Wt Readings from Last 3 Encounters:   02/07/23 139 lb 9.6 oz (63.3 kg)   12/14/22 144 lb (65.3 kg)   11/28/22 144 lb 9.6 oz (65.6 kg)     Diabetes Mellitus follow-up  Last hemoglobin a1c   Lab Results   Component Value Date/Time    Hemoglobin A1c 7.9 (H) 11/28/2022 10:06 AM    Hemoglobin A1c (POC) 6.9 08/06/2019 08:20 AM   medication compliance: compliant all of the time. He is taking metformin  mg x2 daily and glipizide 2.5 mg daily in morning. Diabetic diet compliance: compliant most of the time. Home glucose monitoring: fasting values range none. Hypoglycemic episodes:  None. Further diabetic ROS: no polyuria or polydipsia, no chest pain, dyspnea or TIA's, no numbness, tingling or pain in extremities. Hypertension  Hypertension ROS: taking medications as instructed, no medication side effects noted, no TIA's, no chest pain on exertion, no dyspnea on exertion, no swelling of ankles     reports that he quit smoking about 61 years ago. His smoking use included cigarettes. He has never used smokeless tobacco.    reports current alcohol use of about 5.0 standard drinks per week. BP Readings from Last 2 Encounters:   02/07/23 123/75   02/02/23 (!) 148/76       Review of Systems   All other systems reviewed and are negative, except as noted in HPI    Past Medical and Surgical History   has a past medical history of Abnormal x-ray of thoracic spine (09/2022), Benign essential HTN, Bladder cancer ([address] (2013?), CAD (coronary artery disease) (09/2021), Closed left arm fracture, GERD (gastroesophageal reflux disease), Glaucoma, Hemorrhoids, Hilar mass (09/28/2022), HTN (hypertension), Hypercholesterolemia, Microalbuminuria due to type 2 diabetes mellitus ([address] Prostate cancer ([address] (2000), Rosacea, Trigeminal neuralgia of right side of face, Type 2 diabetes mellitus with microalbuminuria ([address] and Vertigo. has a past surgical history that includes hx prostatectomy (01/2000); hx other surgical (01/2020); hx colonoscopy (2005?); hx heart catheterization (09/24/2020); and hx heart catheterization (09/08/2020).      reports that he quit smoking about 61 years ago. His smoking use included cigarettes. He has never used smokeless tobacco. He reports current alcohol use of about 5.0 standard drinks per week. He reports that he does not use drugs. family history includes Cancer in his brother, father, mother, and sister. Physical Exam   Nursing note and vitals reviewed. Blood pressure 123/75, pulse 84, temperature 98.2 °F (36.8 °C), temperature source Oral, resp. rate 14, height 5' 9\" (1.753 m), weight 139 lb 9.6 oz (63.3 kg), SpO2 95 %. Constitutional:  No distress. Eyes: Conjunctivae are normal.   Ears:  Hearing grossly intact  Cardiovascular: Normal rate. regular rhythm, no murmurs or gallops  No edema  Pulmonary/Chest: Effort normal.   CTAB  Musculoskeletal: moves all 4 extremities   Neurological: Alert and oriented to person, place, and time. Skin: No visible rash noted. Psychiatric: Normal mood and affect. Behavior is normal.     Assessment and Plan    Diagnoses and all orders for this visit:    1. Hilar mass  2. Metastatic malignant neoplasm, unspecified site ([facility]  Overall is fairly stable, but does have progressing fatigue, mild weight loss and cancer related pain. Greatly appreciate input of hospice. He has a very supportive family as well. Discussed how progressive debility is likely and I do want him to be cautious, avoiding falls. Should use cane and/or rollator at home and definitely use rollator when going out so that he can rest if he feels weak, dizzy or has a pain spasm. Supportive care. Discussed how decreasing and stopping medications is very reasonable at this time. He does have some concern about whether or not supplements will help him feel better. Discussed how I cannot state for sure that supplements will help him in any way, but are unlikely to be harmful, unless he cannot swallow them properly. We will stop atorvastatin as a prevention for long-term cardiovascular disease.   We will stop glipizide due to risk of hypoglycemia. See updated medication list below  -     CBC W/O DIFF; Future  -     METABOLIC PANEL, COMPREHENSIVE; Future    3. Cancer related pain  Reasonly well controlled with gabapentin and duloxetine. He has opioids as needed for breakthrough pain. Appreciate input of hospice regarding this. 4. Hospice care  He has made it abundantly clear that he does not want any aggressive therapies. 5. Fatigue, unspecified type  Very likely secondary to malignancy. Check urinalysis and labs, but I would not pursue aggressive work-up of lab abnormalities found. 6. Urinary tract infection without hematuria, site unspecified  Urinary odor. Will check urinalysis and culture. He has not had a history of Klebsiella UTI. May contribute to fatigue.  -     URINALYSIS W/ RFLX MICROSCOPIC; Future  -     CULTURE, URINE; Future    7. Type 2 diabetes with nephropathy (HCC)  Has remained borderline control. He is losing weight with decreased appetite. Diabetic complications are unlikely in this 80year-old in hospice with metastatic cancer. Stop glipizide because risk of hypoglycemia could be serious. Continue metformin for now. No need to check glucoses  -     HEMOGLOBIN A1C WITH EAG; Future  -     metFORMIN ER (GLUCOPHAGE XR) 500 mg tablet; Take 2 Tablets by mouth daily (with dinner). 8. Coronary artery disease of native artery of native heart with stable angina pectoris ([facility])  Remains asymptomatic. Received a stent in September 2021. Statin therapy is in general indicated, but acutely stopped in this 80year-old in hospice with metastatic cancer. 9. B12 deficiency  Past history. Check B12 levels. Not currently taking a supplement. -     VITAMIN B12; Future    10. Vitamin D deficiency  Past history. Check vitamin D levels per his request.  Not currently taking supplement but certainly could resume when if needed.   -     VITAMIN D, 25 HYDROXY; Future        Patient Instructions     Current Outpatient Medications   Medication Sig    gabapentin (NEURONTIN) 300 mg capsule Take 2 tablets in AM and in 2 pills in afternoon during the day for neuropathic pain  Indications: neuropathic pain    OXYGEN-AIR DELIVERY SYSTEMS 2 L/min by IntraNASal route as needed for PRN Reason (Other) (shortness of breath). gabapentin (NEURONTIN) 300 mg capsule Take 900 mg by mouth nightly. take 3 capsules (900mg) by mouth every evening around bedtime  Indications: neuropathic pain    olmesartan (BENICAR) 20 mg tablet TAKE 1/2 TABLET BY MOUTH EVERY DAY    metFORMIN (GLUCOPHAGE) 500 mg tablet Take 1,000 mg by mouth daily (with dinner). morphine (ROXANOL) 100 mg/5 mL (20 mg/mL) concentrated solution Take 5 mg by mouth every three (3) hours as needed for Pain or Shortness of Breath.    haloperidol (HALDOL) 2 mg/mL oral concentrate Take 1 mg by mouth every four (4) hours as needed for Agitation or Psychosis. LORazepam (ATIVAN) 0.5 mg tablet Take 0.5 mg by mouth every six (6) hours as needed for Anxiety. prochlorperazine (COMPAZINE) 10 mg tablet Take 10 mg by mouth every six (6) hours as needed for Nausea. hyoscyamine SL (LEVSIN/SL) 0.125 mg SL tablet Take 0.125 mg by mouth every four (4) hours as needed for Secretions. bisacodyL (DULCOLAX) 10 mg supp Insert 10 mg into rectum daily as needed for Constipation. aspirin 81 mg chewable tablet Take 81 mg by mouth daily. dorzolamide-timoloL (COSOPT) 22.3-6.8 mg/mL ophthalmic solution Administer 1 Drop to both eyes two (2) times a day. latanoprost (XALATAN) 0.005 % ophthalmic solution Administer 1 Drop to left eye nightly. DULoxetine (CYMBALTA) 60 mg capsule Take 1 Capsule by mouth daily. Increase 11/28/22  Indications: neuropathic pain    omeprazole (PRILOSEC) 40 mg capsule TAKE 1 CAPSULE BY MOUTH DAILY.  INCREASED DOSE 11/24/21 INDICATIONS: HEARTBURN     No current facility-administered medications for this visit.       lab results and schedule of future lab studies reviewed with patient  reviewed medications and side effects in detail    Return to clinic for further evaluation if new symptoms develop        Current Outpatient Medications   Medication Sig    gabapentin (NEURONTIN) 300 mg capsule Take 2 tablets in AM and in 2 pills in afternoon during the day for neuropathic pain  Indications: neuropathic pain    OXYGEN-AIR DELIVERY SYSTEMS 2 L/min by IntraNASal route as needed for PRN Reason (Other) (shortness of breath). gabapentin (NEURONTIN) 300 mg capsule Take 900 mg by mouth nightly. take 3 capsules (900mg) by mouth every evening around bedtime  Indications: neuropathic pain    olmesartan (BENICAR) 20 mg tablet TAKE 1/2 TABLET BY MOUTH EVERY DAY    metFORMIN (GLUCOPHAGE) 500 mg tablet Take 1,000 mg by mouth daily (with dinner). morphine (ROXANOL) 100 mg/5 mL (20 mg/mL) concentrated solution Take 5 mg by mouth every three (3) hours as needed for Pain or Shortness of Breath.    haloperidol (HALDOL) 2 mg/mL oral concentrate Take 1 mg by mouth every four (4) hours as needed for Agitation or Psychosis. LORazepam (ATIVAN) 0.5 mg tablet Take 0.5 mg by mouth every six (6) hours as needed for Anxiety. prochlorperazine (COMPAZINE) 10 mg tablet Take 10 mg by mouth every six (6) hours as needed for Nausea. hyoscyamine SL (LEVSIN/SL) 0.125 mg SL tablet Take 0.125 mg by mouth every four (4) hours as needed for Secretions. bisacodyL (DULCOLAX) 10 mg supp Insert 10 mg into rectum daily as needed for Constipation. aspirin 81 mg chewable tablet Take 81 mg by mouth daily. dorzolamide-timoloL (COSOPT) 22.3-6.8 mg/mL ophthalmic solution Administer 1 Drop to both eyes two (2) times a day. latanoprost (XALATAN) 0.005 % ophthalmic solution Administer 1 Drop to left eye nightly. DULoxetine (CYMBALTA) 60 mg capsule Take 1 Capsule by mouth daily. Increase 11/28/22  Indications: neuropathic pain    omeprazole (PRILOSEC) 40 mg capsule TAKE 1 CAPSULE BY MOUTH DAILY. INCREASED DOSE 11/24/21 INDICATIONS: HEARTBURN     No current facility-administered medications for this visit.

## 2023-02-08 LAB
25(OH)D3 SERPL-MCNC: 29.5 NG/ML (ref 30–100)
ALBUMIN SERPL-MCNC: 3.4 G/DL (ref 3.5–5)
ALBUMIN/GLOB SERPL: 1.1 (ref 1.1–2.2)
ALP SERPL-CCNC: 113 U/L (ref 45–117)
ALT SERPL-CCNC: 21 U/L (ref 12–78)
ANION GAP SERPL CALC-SCNC: 5 MMOL/L (ref 5–15)
APPEARANCE UR: ABNORMAL
AST SERPL-CCNC: 11 U/L (ref 15–37)
BACTERIA URNS QL MICRO: ABNORMAL /HPF
BILIRUB SERPL-MCNC: 0.3 MG/DL (ref 0.2–1)
BILIRUB UR QL: NEGATIVE
BUN SERPL-MCNC: 24 MG/DL (ref 6–20)
BUN/CREAT SERPL: 20 (ref 12–20)
CALCIUM SERPL-MCNC: 9.6 MG/DL (ref 8.5–10.1)
CHLORIDE SERPL-SCNC: 106 MMOL/L (ref 97–108)
CO2 SERPL-SCNC: 27 MMOL/L (ref 21–32)
COLOR UR: ABNORMAL
CREAT SERPL-MCNC: 1.23 MG/DL (ref 0.7–1.3)
EPITH CASTS URNS QL MICRO: ABNORMAL /LPF
ERYTHROCYTE [DISTWIDTH] IN BLOOD BY AUTOMATED COUNT: 12.8 % (ref 11.5–14.5)
EST. AVERAGE GLUCOSE BLD GHB EST-MCNC: 226 MG/DL
GLOBULIN SER CALC-MCNC: 3.1 G/DL (ref 2–4)
GLUCOSE SERPL-MCNC: 248 MG/DL (ref 65–100)
GLUCOSE UR STRIP.AUTO-MCNC: 100 MG/DL
HBA1C MFR BLD: 9.5 % (ref 4–5.6)
HCT VFR BLD AUTO: 38.8 % (ref 36.6–50.3)
HGB BLD-MCNC: 12.4 G/DL (ref 12.1–17)
HGB UR QL STRIP: ABNORMAL
KETONES UR QL STRIP.AUTO: NEGATIVE MG/DL
LEUKOCYTE ESTERASE UR QL STRIP.AUTO: ABNORMAL
MCH RBC QN AUTO: 30.6 PG (ref 26–34)
MCHC RBC AUTO-ENTMCNC: 32 G/DL (ref 30–36.5)
MCV RBC AUTO: 95.8 FL (ref 80–99)
NITRITE UR QL STRIP.AUTO: NEGATIVE
NRBC # BLD: 0 K/UL (ref 0–0.01)
NRBC BLD-RTO: 0 PER 100 WBC
PH UR STRIP: 6 (ref 5–8)
PLATELET # BLD AUTO: 227 K/UL (ref 150–400)
PMV BLD AUTO: 9.6 FL (ref 8.9–12.9)
POTASSIUM SERPL-SCNC: 4.6 MMOL/L (ref 3.5–5.1)
PROT SERPL-MCNC: 6.5 G/DL (ref 6.4–8.2)
PROT UR STRIP-MCNC: ABNORMAL MG/DL
RBC # BLD AUTO: 4.05 M/UL (ref 4.1–5.7)
RBC #/AREA URNS HPF: ABNORMAL /HPF (ref 0–5)
SODIUM SERPL-SCNC: 138 MMOL/L (ref 136–145)
SP GR UR REFRACTOMETRY: 1.01 (ref 1–1.03)
UROBILINOGEN UR QL STRIP.AUTO: 0.2 EU/DL (ref 0.2–1)
VIT B12 SERPL-MCNC: 1006 PG/ML (ref 193–986)
WBC # BLD AUTO: 9 K/UL (ref 4.1–11.1)
WBC URNS QL MICRO: ABNORMAL /HPF (ref 0–4)

## 2023-02-09 ENCOUNTER — HOME CARE VISIT (OUTPATIENT)
Dept: SCHEDULING | Facility: HOME HEALTH | Age: 88
End: 2023-02-09
Payer: MEDICARE

## 2023-02-09 LAB
BACTERIA SPEC CULT: ABNORMAL
CC UR VC: ABNORMAL
SERVICE CMNT-IMP: ABNORMAL

## 2023-02-09 PROCEDURE — G0299 HHS/HOSPICE OF RN EA 15 MIN: HCPCS

## 2023-02-10 ENCOUNTER — HOME CARE VISIT (OUTPATIENT)
Dept: HOSPICE | Facility: HOSPICE | Age: 88
End: 2023-02-10
Payer: MEDICARE

## 2023-02-10 VITALS
DIASTOLIC BLOOD PRESSURE: 79 MMHG | RESPIRATION RATE: 20 BRPM | HEART RATE: 64 BPM | SYSTOLIC BLOOD PRESSURE: 149 MMHG | OXYGEN SATURATION: 95 %

## 2023-02-10 NOTE — HOSPICE
This nurse is greeted at the front door by Mr. Leonard. Patient states he has been resting all day and is dressed in pajamas. Patient reports having good and bad days. Patient states he was able to go out last night and enjoy dinner with friends. Patient denies c/o pain or SOB. He reports having a non-productive cough and using 02 a few hours during the day. Patient states he often wakes up with a headache and SOB in the morning. This nurse encourages patient to try using his oxygen overnight. Patient agrees with this plan. Patient states he was seen by Dr. Mayi Tomlinson two days ago. Patient states labs were drawn and urine collected. Patient states Dr. Mayi Tomlinson suggests he stop Glipizide and continue Metformin 500mg 2 tabs at dinner. He states Dr. Mayi Tomlinson explained that patient did not need to keep or make additional f/u office visits. This nurse offers to assist patient with lunch. Mr. Alondra Segura states he can usually make a sandwich and warm soup. He declines assistance at this time. Patient states he usually naps after lunch. He denies having further questions or concerns. This nurse encourages calling [phone] [address] main number as needed.   Team notified

## 2023-02-13 ENCOUNTER — HOME CARE VISIT (OUTPATIENT)
Dept: HOSPICE | Facility: HOSPICE | Age: 88
End: 2023-02-13
Payer: MEDICARE

## 2023-02-16 ENCOUNTER — HOME CARE VISIT (OUTPATIENT)
Dept: SCHEDULING | Facility: HOME HEALTH | Age: 88
End: 2023-02-16
Payer: MEDICARE

## 2023-02-16 VITALS
RESPIRATION RATE: 20 BRPM | DIASTOLIC BLOOD PRESSURE: 57 MMHG | SYSTOLIC BLOOD PRESSURE: 162 MMHG | HEART RATE: 64 BPM | OXYGEN SATURATION: 97 %

## 2023-02-16 PROCEDURE — G0299 HHS/HOSPICE OF RN EA 15 MIN: HCPCS

## 2023-02-16 NOTE — HOSPICE
Patient ambulates to the door and greets this nurse. Mr. Ferdinand Archer reports he is having a great day and feels stronger. He denies c/o pain or SOB. Mr. Ferdinand Archer states he is utilizing the oxygen several hours during the day and at night. He reports experiencing fewer headaches with use of the oxygen. Mr. Ferdinand Archre states he is still taking Glipizide 2.5mg and wonders about stopping this med as his A1C increased to 9.5. Telephone call to NP Aleks Reyes who recommends discontinuing Glipizide as it may cause hypoglycemic episodes. Follow up call to Mr. Ferdinand Archer and advised him of Emily's order. Mr. Dilia Gilliser understanding and state he will stop taking Glipizide.   Gabapentin and Cymbalta refilled

## 2023-02-19 ENCOUNTER — TELEPHONE (OUTPATIENT)
Dept: INTERNAL MEDICINE CLINIC | Age: 88
End: 2023-02-19

## 2023-02-19 DIAGNOSIS — E11.21 TYPE 2 DIABETES WITH NEPHROPATHY (HCC): ICD-10-CM

## 2023-02-19 RX ORDER — GLIPIZIDE 2.5 MG/1
2.5 TABLET, EXTENDED RELEASE ORAL DAILY
Qty: 90 TABLET | Refills: 1 | Status: SHIPPED | OUTPATIENT
Start: 2023-02-19

## 2023-02-19 NOTE — TELEPHONE ENCOUNTER
Monika,   I appreciate your great care of Mr. Marlon Cassidy. I was a little slow in getting to his lab results and I see that you all already treated his urinary tract infection with amoxicillin. Could you check another urinalysis and culture this week to make sure the infection is gone? Sometimes he may require a longer treatment. It can be debated on whether or not glipizide is in his best interest or not for diabetes because of decreasing appetite and hospice care. I asked him to stop glipizide at the visit since I am concerned about risk of hypoglycemia, but given his A1c is high and he is having a propensity for infection, I think continuing glipizide is best for now. If his appetite continues to wean or if his fasting glucoses, checked perhaps twice per week, are below 100, then absolutely stop glipizide at that time. I refilled medication.   My best, Dr. Shiva Denton

## 2023-02-23 ENCOUNTER — HOME CARE VISIT (OUTPATIENT)
Dept: SCHEDULING | Facility: HOME HEALTH | Age: 88
End: 2023-02-23
Payer: MEDICARE

## 2023-02-23 PROCEDURE — G0299 HHS/HOSPICE OF RN EA 15 MIN: HCPCS

## 2023-02-24 VITALS
OXYGEN SATURATION: 97 % | SYSTOLIC BLOOD PRESSURE: 148 MMHG | DIASTOLIC BLOOD PRESSURE: 78 MMHG | RESPIRATION RATE: 20 BRPM | HEART RATE: 60 BPM

## 2023-02-24 NOTE — HOSPICE
Patient ambulates to the door and greets this nurse. Patient's gait is slow and unsteady, he declines use of assistive devices at this time. Patient c/o pain 3/10. Patient states the pain is usually in the back of his head. He describes it as a sharp shooting pain. Patient reports he has intermittent headaches which are usually relieved with use of his oxygen. Patient states he is having increased episodes of SOB with occasional chest tightness. Patient states the symptoms occur with increased activity. This nurse encourages patient to utilize his 02 more often. Patient denies c/o SOB at present, breathing is even and non labored, Sp 02 92% on room air. This nurse also advises patient Morphine can be administered 0.25ml po/sl every 3 hours as needed. Suggest patient receive his first dose when his daughter Elvira Meek is home. We reviewed side effects of the Morphine. Patient verbalizes understanding. Patient reports his appetite is good and he will prepare lunch after this visit. This nurse encourages calling [phone] [employer] main number anytime as needed or if symptoms become worsen. Follow up call to patient's daughter Elvira Meek to discuss the above. Elvira Meek states she does believe her dad experiences dyspnea on exertion. Elvira Meek agrees with the recommendation to administer Morphine as ordered if needed. Plan is to provide 1ml syringes during the next SN visit. If patient tolerates well, Elvira Meek can leave 2 prefilled Morphine syringes for patient to self-administer. This nurse encourages Elvira Meek to contact [phone] [employer] main number as needed with questions or concerns.      Duloxetine, Gabapentin daytime and evening dose, Prilosec 20mg ordered via [employer]  supplies: none needed

## 2023-02-27 ENCOUNTER — HOME CARE VISIT (OUTPATIENT)
Dept: HOSPICE | Facility: HOSPICE | Age: 88
End: 2023-02-27
Payer: MEDICARE

## 2023-03-02 ENCOUNTER — HOME CARE VISIT (OUTPATIENT)
Dept: SCHEDULING | Facility: HOME HEALTH | Age: 88
End: 2023-03-02
Payer: MEDICARE

## 2023-03-02 VITALS
RESPIRATION RATE: 15 BRPM | DIASTOLIC BLOOD PRESSURE: 55 MMHG | HEART RATE: 62 BPM | SYSTOLIC BLOOD PRESSURE: 110 MMHG | TEMPERATURE: 97.9 F

## 2023-03-02 PROCEDURE — G0299 HHS/HOSPICE OF RN EA 15 MIN: HCPCS

## 2023-03-02 NOTE — HOSPICE
Recert visit completed. Greeted at door by patient. Patient's daughter, Cathy Boyer, present for visit. Patient denied any current pain or shortness of breath. He stated he uses his oxygen at night and PRN throughout the day. He stated he has been taking morphine at night per bed with good effect for pain and to help him sleep. He stated he has been sleeping much better. Patient denied any N/V/D. Stated that he has BMs daily, no changes in appetite. He stated he is a little unsteady on his feet. Encouraged patient to use assistive device. Upon assessment, patient smiling, alert and oriented x4; respirations even and unlabored, abdomen soft, nondistended, no edema to lower extremities, skin CDI. No medication or supply refills needed. Reminded patient and daughter to call hospice number with any needs or concerns. Per chart review completed by Clinical Supervisor, Jimbo Pandey, on 3/1/23- Andree Garcia is a 66-year-old male entering his second benefit period on 3/14/23 with a diagnosis of malignant neoplasm of the lung. He is a DNR. Since admission to hospice services on 12/14/22, Mr. Keiko Flaherty has experienced evidence of decline including shortness of breath on exertion, pain, fall with injury, and UTI. Attending MD: Dr. Keshia Briones    PPS: 94%    Mobility: He ambulates independently with an unsteady gait and has a rollator available in the home. He suffered a fall on 1/22/23 while bending down to take off his socks prior to showering. He sustained a small hematoma to his head as a result. Intake: He eats 3 meals per day of a regular diet. Output: He is continent of bowel and has occasional urinary incontinence. He was treated for a UTI with Amoxicillin 500mg twice daily from 2/10/23-2/15/23. Skin/Wounds: He sustained a skin tear to the posterior top of his head after a fall on 1/22/23.     Pain (medication regimen): He has experienced intermittent headaches that he reports improve with the use of PRN oxygen. He also has a history of neuropathic head pain and has had multiple medication adjustments during this benefit period for management. His current regimen for neuropathic pain is Cymbalta 60mg daily, gabapentin 600mg in the morning, gabapentin 600mg in the afternoon, and gabapentin 900mg at bedtime. Respiratory: Supplemental oxygen (2 L O2 PRN) was ordered on 1/19/23 for shortness of breath. He reports using the oxygen for several hours during the day and at night. On 2/23/23, pt reported having increased episodes of shortness of breath with chest tightness. Pt was educated on utilizing his PRN oxygen more often as well as option to initiate morphine to aide in symptom relief. He has an occasional non-productive cough. Cognition: He is alert and oriented with periods of forgetfulness and some short-term memory deficits noted. Additional medication changes: Atorvastatin, Vitamin D3, Vitamin B12, and Vitamin C were discontinued on 1/12/23 to reduce pill burden. Psychosocial: He lives with his daughter, Kelli Suarez, and her family who provide pt with support. He enjoys his independence and additional hospice support such as respite care, volunteers, and music therapy have been declined. Problem: shortness of breath    Plan: Continue to educate pt and daughter on symptom relief medications and oxygen use for comfort. SN visit frequency/ disciplines following (HHA, , LCSW, MT, RD)- He receives SNV 1x weekly and MSW support. He enjoys his independence and additional hospice support such as HHA, , respite care, volunteers, and music therapy have been declined. Cancer Diagnoses     ________  A. Disease with distant metastases at presentation OR    ____x____  B.  Progression from an earlier stage of disease to metastatic disease with either:                          ____x____  1. continued decline in spite of therapy                          _____x___  2. patient declines further disease directed therapy

## 2023-03-08 ENCOUNTER — HOME CARE VISIT (OUTPATIENT)
Dept: HOSPICE | Facility: HOSPICE | Age: 88
End: 2023-03-08
Payer: MEDICARE

## 2023-03-09 ENCOUNTER — HOME CARE VISIT (OUTPATIENT)
Dept: SCHEDULING | Facility: HOME HEALTH | Age: 88
End: 2023-03-09
Payer: MEDICARE

## 2023-03-09 VITALS
TEMPERATURE: 98.1 F | DIASTOLIC BLOOD PRESSURE: 71 MMHG | HEART RATE: 73 BPM | SYSTOLIC BLOOD PRESSURE: 147 MMHG | OXYGEN SATURATION: 93 % | RESPIRATION RATE: 16 BRPM

## 2023-03-09 PROCEDURE — G0299 HHS/HOSPICE OF RN EA 15 MIN: HCPCS

## 2023-03-10 NOTE — HOSPICE
Patient fell while opening the door and sustained a small abrasion to his left ear. He is A&Ox4, denies pain. Reports a good appetite and intake. He has difficulty initiating sleep at night due to pain. Morphine increased to 10mg by Odalis Cedeno NP. No other changes nor needs. Patient's daughter reports he still drives a couple times per week and she feels it is unsafe, but would like for a provider to discuss with patient. This concern is shared with Cristian Blackwell. Encouraged patient and daughter to call with any changes or needs.

## 2023-03-13 ENCOUNTER — HOME CARE VISIT (OUTPATIENT)
Dept: SCHEDULING | Facility: HOME HEALTH | Age: 88
End: 2023-03-13
Payer: MEDICARE

## 2023-03-13 ENCOUNTER — HOME CARE VISIT (OUTPATIENT)
Dept: HOSPICE | Facility: HOSPICE | Age: 88
End: 2023-03-13
Payer: MEDICARE

## 2023-03-13 VITALS
HEART RATE: 83 BPM | RESPIRATION RATE: 20 BRPM | TEMPERATURE: 98.5 F | DIASTOLIC BLOOD PRESSURE: 80 MMHG | OXYGEN SATURATION: 94 % | SYSTOLIC BLOOD PRESSURE: 142 MMHG

## 2023-03-13 PROCEDURE — G0299 HHS/HOSPICE OF RN EA 15 MIN: HCPCS

## 2023-03-14 ENCOUNTER — HOME CARE VISIT (OUTPATIENT)
Dept: HOSPICE | Facility: HOSPICE | Age: 88
End: 2023-03-14
Payer: MEDICARE

## 2023-03-14 VITALS
OXYGEN SATURATION: 92 % | TEMPERATURE: 99.5 F | HEART RATE: 72 BPM | SYSTOLIC BLOOD PRESSURE: 136 MMHG | DIASTOLIC BLOOD PRESSURE: 63 MMHG | RESPIRATION RATE: 16 BRPM

## 2023-03-14 PROCEDURE — G0299 HHS/HOSPICE OF RN EA 15 MIN: HCPCS

## 2023-03-14 NOTE — HOSPICE
Joint visit made with MALKA Wood. Patient's daughter Brielle Jarrell contacted the office this morning requesting a SN visit. Brielle Jarrell reports her dad appears weaker and c/o SOB with chest pain. Upon arrival patient received in his room, lying supine in bed. This is different as patient usually ambulates to the front door or living room. Patient c/o chest pain and SOB 4/10 at rest. He reports this pain increases to 10/10 with activity. Brielle Jarrell states patient is receiving Morphine 10mg once daily at bedtime. Education provided on use of Morphine 10mg every three hours prn for pain or SOB. Education also provided on use of the Rolator as patient had a fall last week with injury to his left ear and several near misses. Patient ambulates about 10 feet to demonstrate correct use of the Rolator, his gait is slow and unsteady. Patient also using 02 @ 2 LPM almost continuously. Patient requires assistance with lifting his legs once he returned to bed. Patient accepts Morphine 10 mg at this time as his breathing appears labored. Brielle Jarrell correctly administers the dose. Brielle Jarrell is encouraged to provide increased supervision as her father is showing signs of weakness, pain, and SOB. Patient agrees to not drive while taking Morphine. Brielle Jarrell works as a  and states her  can work from home if needed. Plan is for [facility] to reach out to Brielle Jarrell to further assist. Increase SN visits to 2x weekly. Will continue to offer HHA visits. Patient declines volunteer services and respite.

## 2023-03-15 ENCOUNTER — HOME CARE VISIT (OUTPATIENT)
Dept: HOSPICE | Facility: HOSPICE | Age: 88
End: 2023-03-15
Payer: MEDICARE

## 2023-03-15 ENCOUNTER — HOME CARE VISIT (OUTPATIENT)
Dept: SCHEDULING | Facility: HOME HEALTH | Age: 88
End: 2023-03-15
Payer: MEDICARE

## 2023-03-15 VITALS
OXYGEN SATURATION: 93 % | SYSTOLIC BLOOD PRESSURE: 129 MMHG | RESPIRATION RATE: 20 BRPM | HEART RATE: 60 BPM | DIASTOLIC BLOOD PRESSURE: 68 MMHG

## 2023-03-15 PROCEDURE — G0299 HHS/HOSPICE OF RN EA 15 MIN: HCPCS

## 2023-03-15 NOTE — HOSPICE
PRN visit made due to patient showing signs of decline. Upon arrival patient found lying in his bed with his eyes closed. Patient opens his eyes and speaks softly. He denies c/o pain or SOB with 02 @ 2 LPM. Patient's daughter Andre Guerrero reports her father was able to sleep through the night and has been sleeping until now. Andre Guerrero states patient has not required prn Morphine. Andre Guerrero is tearful this visit and would appreciate respite. This nurse advises patient he will be transported to the [facility] once a bed becomes available. Patient agrees with this plan. Follow up call received from Andre Guerrero stating her dad ambulated to the bathroom and tolerated brunch. Andre Guerrero states her father also requested a snack and received his routine medications. Plan for routine SN visit tomorrow as scheduled.

## 2023-03-15 NOTE — HOSPICE
PRN visit made r/t change in pt status. Upon arrival, this RN greeted by pt's daughter Carmen Martinez. Pt resting in bed, A&Ox4. Pt states he believes he is declining, is weaker and more fatigued the past two weeks, and has had two episodes of memory lapse, including when dttr found him in downstairs bathroom this morning with the door locked and the lights off. Pt did not know why he was in bathroom and without oxygen or rollater. Pt and dtr also believe he may have had an unwitnessed fall today, but pt cannot recall. VSS. O2 sats 92% of RA. Lungs diminished in bases. Pt states he is taking morphine 1-2x/daily prn for dyspnea, with dose increased to 10mg q3h prn this week. . Dtr asked if morphine could have caused memory lapse this morning. Explained that morphine may have contributed to morning confusion, but it is likely that disease progression is also a significant factor. Discussed pt safety at home. Reviewed HHA benefit and [facility] respite care option with pt and dtr. Pt declined HHA offer, though dtr encouraged pt to reconsider. C/o pain to right mid-back and trigeminal neuralgic pain of head. Dtr administered gabapentin and morphine. Reminded pt and dtr that hospice is available 24/7 for questions/concerns. They verbalized understanding.

## 2023-03-16 ENCOUNTER — HOME CARE VISIT (OUTPATIENT)
Dept: HOSPICE | Facility: HOSPICE | Age: 88
End: 2023-03-16
Payer: MEDICARE

## 2023-03-16 ENCOUNTER — HOME CARE VISIT (OUTPATIENT)
Dept: SCHEDULING | Facility: HOME HEALTH | Age: 88
End: 2023-03-16
Payer: MEDICARE

## 2023-03-16 VITALS
SYSTOLIC BLOOD PRESSURE: 112 MMHG | DIASTOLIC BLOOD PRESSURE: 67 MMHG | HEART RATE: 72 BPM | TEMPERATURE: 100 F | OXYGEN SATURATION: 91 % | RESPIRATION RATE: 20 BRPM

## 2023-03-16 PROCEDURE — G0299 HHS/HOSPICE OF RN EA 15 MIN: HCPCS

## 2023-03-16 NOTE — HOSPICE
Patient found lying back in his recliner with 02 @ 2 LPM. Patient is more alert this visit but appears more fatigued. Patient denies c/o pain or SOB. He reports SOB and chest pain occurs with activity. Patient's daughter Shukri Cuevas reports her father is receiving Morphine 10mg at bedtime. She states patient received a half dose 0.25ml yesterday afternoon which was effective. Shukri Cuevas reports patient's appetite is improving with 3 meals a day. Patient states he is voiding without difficulty and incontinent at times. He reports his last BM was a few days ago. Patient c/o constipation reporting his usual pattern was to have daily bowel movements. Bisacodyl suppositories are not present in the home as patient states he self administered one about a month ago. Patient also has a temp of 100. Telephone call to NP Kumar Harris and new orders received for SennaS 8.6/50mg one tab by mouth twice daily scheduled. Tylenol 500 mg- take two tabs (1,000 mg) by mouth every eight hours prn for mild pain or fever. Shukri Cuevas administers Tylenol 1,000 mg at this time. Clinical Supervisor BARRY Reed to coordinate delivery of Bisacodyl suppositories and Senna S today. Patient states he can insert one Bisacodyl once they arrive. This nurse encourages holding Senna if patient has loose stools. Patient allows this nurse to assist him with a shower and shampoo. He has a shower chair available. Patient is weak and unsteady. He demonstrates holding himself up in the shower by propping his head on the shower wall. This nurse advises patient this is not safe practice and to utilize the grab bars during showers. Patient states he feels much better after his shower. He ambulates back to his recliner and 02 applied. [facility] Rapid Covid test administered by this nurse and result is negative.

## 2023-03-17 ENCOUNTER — HOSPITAL ENCOUNTER (INPATIENT)
Age: 88
LOS: 5 days | Discharge: HOSPICE | End: 2023-03-22
Attending: FAMILY MEDICINE | Admitting: FAMILY MEDICINE

## 2023-03-17 ENCOUNTER — HOME CARE VISIT (OUTPATIENT)
Dept: SCHEDULING | Facility: HOME HEALTH | Age: 88
End: 2023-03-17
Payer: MEDICARE

## 2023-03-17 DIAGNOSIS — C34.90 PRIMARY MALIGNANT NEOPLASM OF LUNG METASTATIC TO OTHER SITE, UNSPECIFIED LATERALITY (HCC): Primary | ICD-10-CM

## 2023-03-17 DIAGNOSIS — R06.02 SHORTNESS OF BREATH: ICD-10-CM

## 2023-03-17 DIAGNOSIS — Z51.5 HOSPICE CARE: ICD-10-CM

## 2023-03-17 PROCEDURE — 99221 1ST HOSP IP/OBS SF/LOW 40: CPT | Performed by: FAMILY MEDICINE

## 2023-03-17 PROCEDURE — G0155 HHCP-SVS OF CSW,EA 15 MIN: HCPCS

## 2023-03-17 PROCEDURE — 74011250637 HC RX REV CODE- 250/637: Performed by: FAMILY MEDICINE

## 2023-03-17 RX ORDER — MORPHINE SULFATE 20 MG/ML
10 SOLUTION ORAL
Status: DISCONTINUED | OUTPATIENT
Start: 2023-03-17 | End: 2023-03-22 | Stop reason: HOSPADM

## 2023-03-17 RX ORDER — OMEPRAZOLE 40 MG/1
40 CAPSULE, DELAYED RELEASE ORAL DAILY
Status: DISCONTINUED | OUTPATIENT
Start: 2023-03-18 | End: 2023-03-22 | Stop reason: HOSPADM

## 2023-03-17 RX ORDER — GABAPENTIN 300 MG/1
600 CAPSULE ORAL 2 TIMES DAILY
Status: DISCONTINUED | OUTPATIENT
Start: 2023-03-17 | End: 2023-03-22 | Stop reason: HOSPADM

## 2023-03-17 RX ORDER — PROCHLORPERAZINE MALEATE 5 MG
10 TABLET ORAL
Status: DISCONTINUED | OUTPATIENT
Start: 2023-03-17 | End: 2023-03-22 | Stop reason: HOSPADM

## 2023-03-17 RX ORDER — DULOXETIN HYDROCHLORIDE 30 MG/1
60 CAPSULE, DELAYED RELEASE ORAL DAILY
Status: DISCONTINUED | OUTPATIENT
Start: 2023-03-18 | End: 2023-03-17

## 2023-03-17 RX ORDER — OMEPRAZOLE 20 MG/1
20 CAPSULE, DELAYED RELEASE ORAL DAILY
Status: DISCONTINUED | OUTPATIENT
Start: 2023-03-18 | End: 2023-03-17

## 2023-03-17 RX ORDER — GABAPENTIN 300 MG/1
900 CAPSULE ORAL
Status: DISCONTINUED | OUTPATIENT
Start: 2023-03-17 | End: 2023-03-22 | Stop reason: HOSPADM

## 2023-03-17 RX ORDER — FACIAL-BODY WIPES
10 EACH TOPICAL DAILY PRN
Status: DISCONTINUED | OUTPATIENT
Start: 2023-03-17 | End: 2023-03-22 | Stop reason: HOSPADM

## 2023-03-17 RX ORDER — GABAPENTIN 300 MG/1
600 CAPSULE ORAL
Status: DISCONTINUED | OUTPATIENT
Start: 2023-03-18 | End: 2023-03-17

## 2023-03-17 RX ORDER — HYOSCYAMINE SULFATE 0.12 MG/1
0.12 TABLET SUBLINGUAL
Status: DISCONTINUED | OUTPATIENT
Start: 2023-03-17 | End: 2023-03-22 | Stop reason: HOSPADM

## 2023-03-17 RX ORDER — LORAZEPAM 2 MG/ML
0.5 CONCENTRATE ORAL
Status: DISCONTINUED | OUTPATIENT
Start: 2023-03-17 | End: 2023-03-22 | Stop reason: HOSPADM

## 2023-03-17 RX ADMIN — MORPHINE SULFATE 10 MG: 10 SOLUTION ORAL at 21:55

## 2023-03-17 RX ADMIN — GABAPENTIN 900 MG: 300 CAPSULE ORAL at 20:17

## 2023-03-17 RX ADMIN — MORPHINE SULFATE 10 MG: 10 SOLUTION ORAL at 13:24

## 2023-03-17 RX ADMIN — GABAPENTIN 600 MG: 300 CAPSULE ORAL at 17:08

## 2023-03-17 NOTE — HOSPICE
1145 Patient arrived via hospital to home transport, patient transferred from stretcher to bed. Patient tolerated activity well. Patient denies pain/discomfort at this time. Daughter called and notified of safe arrival.     [id number] Patient states he isn't hungry for lunch, fresh water brought to patient. Dinner ordered for later, patient repositioned in bed with LINDA Alejandro. 1325 Patient assisted to bathroom, patient voided and assisted back to bed. Bed alarm on. Patient complaining of pain and shortness of breath after activity. Patient given PRN SL morphine, see MAR.    3639 Dr. Amilcar Roman at the bedside. 1425 Patient resting in bed quietly, eyes are closed, patient appears to be sleeping at this time. PRN medication effective. 1550 Patient assisted to bathroom, patient voided and assisted back to bed. Bed alarm on.    1650 Daughter at the bedside, update given on patient's afternoon. 1710 Patient's dinner tray set up, patient given scheduled gabapentin, see MAR.

## 2023-03-17 NOTE — PROGRESS NOTES
Problem: Pressure Injury - Risk of  Goal: *Prevention of pressure injury  Description: Document Max Scale and appropriate interventions in the flowsheet. Outcome: Progressing Towards Goal  Note: Pressure Injury Interventions:  Sensory Interventions: Float heels, Minimize linen layers, Keep linens dry and wrinkle-free, Monitor skin under medical devices, Maintain/enhance activity level    Moisture Interventions: Absorbent underpads, Minimize layers, Limit adult briefs, Moisture barrier, Maintain skin hydration (lotion/cream)    Activity Interventions: Assess need for specialty bed    Mobility Interventions: Assess need for specialty bed, Float heels, Turn and reposition approx.  every two hours(pillow and wedges)    Nutrition Interventions: Document food/fluid/supplement intake    Friction and Shear Interventions: Apply protective barrier, creams and emollients, Minimize layers, Lift sheet                Problem: Patient Education: Go to Patient Education Activity  Goal: Patient/Family Education  Outcome: Progressing Towards Goal

## 2023-03-17 NOTE — H&P
[facility] Help to Those in Need  [phone]    Patient Name: Lionel Baxter  YOB: 1929    Date of Provider Hospice Visit: 03/17/23    Level of Care:   [] General Inpatient (GIP)    [] Routine   [x] Respite    Current Location of Care:  [] [facility] [] [facility] [] [address] [] [facility] [x] [facility]      Principle Hospice Diagnosis: Metastatic lung cancer       HOSPICE SUMMARY     Chart Reviewed for patient's medical history and hospice care plan. Hospice Physician Certification/Recertification Narrative per Trevor Rizvi / brittaney CREWS:     Patient is a 80-year-old male admitted to hospice secondary to metastatic lung cancer. Patient with metastatic disease to the liver and lung. He does have a history of trigeminal neuralgia and has been on chronic gabapentin. Patient sent to the [facility] hospice house for respite level care secondary to caregiver exhaustion. Patient has been cared for by his daughter. In reviewing the chart and discussed with the home team, patient apparently is been having significant decline with increasing weakness, poor p.o. intake, dyspnea/chest pain with any type of exertion. He has been requiring as needed doses of his symptom relief kit to include morphine. Imaging that I can see from September 2022 shows left hilar infiltrative mass that significant narrows the left anterior bronchus and includes the posterior branch of the left pulmonary artery. 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 At the time of admission, patient still had a PPS score 60, alert and oriented. Patient elected at the time not to proceed with any type of surgery, chemotherapy or radiation. Patient also apparently has been showing some intermittent confusion.     General-tired appearing male but appears comfortable, alert to person, difficult to get his significant history  HEENT-unremarkable  Lungs-prolonged expiratory phase but no significant wheezing/rhonchi/rales  Cardiovascular-regular rate and rhythm  Abdomen-soft, nontender  Neuro-- nonfocal  Psych-appears a little nervous but normal affect    Hospice diagnosis  Metastatic lung cancer  Trigeminal neuralgia  Diabetes  Dyspnea    Patient being admitted for Respite care x 5 days for   [x]  Caregiver exhaustion and needing break  []  Caregiver unavailable       PLAN   Patient's home medications were reviewed and reconciled. Continue with current home medications and plan of care as outlined in chart. Trigeminal neuralgia-continue Cymbalta and gabapentin  Diabetes-hold his diabetic medications at this time as it appears his diet has been diminishing and would prefer him not to be hypoglycemic  Metastatic lung cancer-continue comfort meds especially morphine for his dyspnea and continue to monitor effect. Cardiovascular-for now we will hold his blood pressure medication as his blood pressure appears to have been declining in the home. Suspect progression of his disease is what is causing his overall decline but we will continue to monitor.  and SW to support family needs. Disposition: Home with hospice once respite stay is completed.

## 2023-03-17 NOTE — HOSPICE
SW visit not completed due to inability to make contact with daughter/caregiver and pt. MSW requested assistance of RNCM to make contact and/or assess for needs.

## 2023-03-18 PROCEDURE — 74011250637 HC RX REV CODE- 250/637: Performed by: FAMILY MEDICINE

## 2023-03-18 PROCEDURE — 74011000250 HC RX REV CODE- 250: Performed by: FAMILY MEDICINE

## 2023-03-18 PROCEDURE — G0299 HHS/HOSPICE OF RN EA 15 MIN: HCPCS

## 2023-03-18 PROCEDURE — G0156 HHCP-SVS OF AIDE,EA 15 MIN: HCPCS

## 2023-03-18 RX ADMIN — OMEPRAZOLE 40 MG: 40 CAPSULE, DELAYED RELEASE PELLETS ORAL at 09:00

## 2023-03-18 RX ADMIN — Medication 0.5 MG: at 19:48

## 2023-03-18 RX ADMIN — GABAPENTIN 600 MG: 300 CAPSULE ORAL at 18:30

## 2023-03-18 RX ADMIN — Medication 0.5 MG: at 12:54

## 2023-03-18 RX ADMIN — GABAPENTIN 600 MG: 300 CAPSULE ORAL at 11:11

## 2023-03-18 RX ADMIN — GABAPENTIN 900 MG: 300 CAPSULE ORAL at 21:12

## 2023-03-18 RX ADMIN — DULOXETINE 60 MG: 60 CAPSULE, DELAYED RELEASE ORAL at 11:12

## 2023-03-18 NOTE — PROGRESS NOTES
0700  Report received from Jame, [facility]  Pt resting quietly in bed watching tv, pleasantly confused, denies needs. 0900  Assisted pt to toilet to void. Pt weak with unsteady gait. Confusion noted. 1100  Assessment complete. Pt resting quietly in bed watching tv. Denies pain, pleasantly confused. Ate 100% of breakfast.  Scheduled medications administered. 36  Pt's daughter Kathleen Krugerer called for update. Status   update provided. Kathleen Lundberg plans to visit [facility] later today. 1245  Pt out of bed, disoriented, confused. Ambulated to toilet with assistance. Pt very weak and unsteady, distracted and restless. PRN Lorazepam administered. 1300  Medication effective. Pt resting quietly in bed with eyes closed. No indication of agitation noted. 1445  Responded to bed alarm, pt spilled drink on tray table. Reoriented and repositioned pt in bed. 1  Pt's daughter and grandchildren arrived to visit. Update and support provided. 1700  Pt resting quietly with eyes closed and neutral facial expression. Family previously left [facility]. 1730  Responded to bed alarm. Found pt between bed side rails. Pt disoriented, confused. Reoriented and repositioned pt back into bed. Bed alarm reset. 1830  Responded to bed alarm. Pt attempting to climb out of bed. Pt confused and disoriented. Repositioned pt back in bed. Scheduled Neurontin administered. 1900  Report given to oncoming shift.       NAME OF PATIENT:  Vi Meneses    LEVEL OF CARE:  Respite    REASON FOR GIP:   NA    *PATIENT REMAINS ELIGIBLE FOR [facility] LEVEL OF CARE AS EVIDENCED BY: (MUST BE ADDRESSED OF PATIENT GIP)  NA    REASON FOR RESPITE:  Caregiver Exhaustion    O2 SAFETY:  Concentrator positioning (6\" from furniture/drapes), Tanks stored in butler , No petroleum based products on face while oxygen in use, and Oxygen sign on the door    FALL INTERVENTIONS PROVIDED:   Implemented/recommended use of non-skid footwear, Implemented/recommended use of fall risk identification flag to all team members, Implemented/recommended assistive devices and encouraged their use, Implemented/recommended resources for alarm system (personal alarm, bed alarm, call bell, etc.) , Implemented/recommended environmental changes (remove hazards, lower bed, improve lighting, etc.), and Implemented/recommended increased supervision/assistance    INTERDISPLINARY COMMUNICATION/COLLABORATION:  Physician, MAUREEN, Debra Chapman, and RN, CNA    NEW MEDICATION INITIATION DOCUMENTATION:  NA    Reason medication is being initiated:  NA    MD / Provider name consulted re: change in status / initiation of new medication:  NA    New Symptom(s):  NA    New Order(s):  NA    Name of the person notified of the changes:  NA    Name of person being taught:  NA    Instructions given:  NA    Side Effects taught:  NA    Response to teaching:  NA      COMFORTABLE DYING MEASURE:  Is Patient/family satisfied with symptom level?  yes    DISCHARGE PLAN:  Return home with hospice following Respite stay.

## 2023-03-18 NOTE — PROGRESS NOTES
Problem: Pressure Injury - Risk of  Goal: *Prevention of pressure injury  Description: Document Max Scale and appropriate interventions in the flowsheet. Outcome: Progressing Towards Goal  Note: Pressure Injury Interventions:  Sensory Interventions: Float heels, Minimize linen layers, Keep linens dry and wrinkle-free, Monitor skin under medical devices, Maintain/enhance activity level    Moisture Interventions: Absorbent underpads, Minimize layers, Limit adult briefs, Moisture barrier, Maintain skin hydration (lotion/cream)    Activity Interventions: Assess need for specialty bed    Mobility Interventions: Assess need for specialty bed, Float heels, Turn and reposition approx. every two hours(pillow and wedges)    Nutrition Interventions: Document food/fluid/supplement intake    Friction and Shear Interventions: Apply protective barrier, creams and emollients, Minimize layers, Lift sheet                Problem: Patient Education: Go to Patient Education Activity  Goal: Patient/Family Education  Outcome: Progressing Towards Goal     Problem: Falls - Risk of  Goal: *Absence of Falls  Description: Document Dennie Rous Fall Risk and appropriate interventions in the flowsheet. Outcome: Progressing Towards Goal  Note: Fall Risk Interventions:                                Problem: Pain  Goal: *Control of Pain  Outcome: Progressing Towards Goal     Problem: Hospice Orientation  Goal: Demonstrate understanding of hospice philosophy, plan of care, and home hospice program  Description: The patient/family/caregiver will demonstrate understanding of hospice philosophy, plan of care and the home hospice program as evidenced by participation in meeting the patient's psychosocial, spiritual, medical, and physical needs inclusive of medical supplies/equipment focusing on symptoms.   Outcome: Progressing Towards Goal     Problem: Potential for Alteration in Skin Integrity  Goal: Monitor skin for areas of alteration in skin integrity  Description: Patient/family/caregiver will demonstrate ability to care for patient's skin, monitor for areas of breakdown, and demonstrate methods to prevent breakdown during hospice care. Outcome: Progressing Towards Goal     Problem: Alteration in Mobility  Goal: Remain as independent as possible and remain safe in environment  Description: Patient will remain as independent as possible and remain safe in their environment.   Outcome: Progressing Towards Goal

## 2023-03-18 NOTE — PROGRESS NOTES
1903-report received from [facility].    2015-pt sleeping with unlabored respirations. Pt easily aroused with verbal stimuli. Pt pleasant and alert. A&Ox3 with periods of confusion. Pt lungs clear/diminished. Pt expressed in the back of his neck 2/10. Pt denies need for medication at this time. Administered scheduled PO neurontin. Pt swallowed med whole with water. Tolerated well. 56-pt daughter Thea Velasco called for an update. Update given. Thae Velasco stated that pt usually takes morphine at bedtime and may not ask.     2155-pt triggered bed alarm stating he needed to use restroom. Pt ambulated to bathroom. Gait fairly steady, weakness noted. Pt voided and expressed needing to have a bowel movement, but only passed gas. Pt groaning and dyspneic upon return to bed. Administered PRN oral morphine. Pt swallowed well. 2350-pt sleeping with unlabored respirations and neutral facial expression. 0150-pt triggered bed alarm. Pt ambulated to bathroom with 1 assist. Gait steady. Pt shuffles feet when walking. Pt voided. Pt denies pain at this time. 0350-pt sleeping with unlabored respirations. Neutral facial expression. 0515-Pt sleeping with even unlabored respirations. Pt with periodic involuntary arm movements above head. Eyes remain closed    0645-pt sleeping with even unlabored respirations. Muscles relaxed. 0700-report given to oncoming nurse.         NAME OF PATIENT:  Anoop lAexis    LEVEL OF CARE:  respite    REASON FOR GIP:   N/A    *PATIENT REMAINS ELIGIBLE FOR [facility] LEVEL OF CARE AS EVIDENCED BY: (MUST BE ADDRESSED OF PATIENT GIP)  N/A    REASON FOR RESPITE:  Caregiver cannot care for patient due to:  exhaustion    O2 SAFETY:  No petroleum based products on face while oxygen in use    FALL INTERVENTIONS PROVIDED:   Implemented/recommended use of non-skid footwear, Implemented/recommended use of fall risk identification flag to all team members, Implemented/recommended assistive devices and encouraged their use, Implemented/recommended resources for alarm system (personal alarm, bed alarm, call bell, etc.) , and Implemented/recommended environmental changes (remove hazards, lower bed, improve lighting, etc.)    INTERDISPLINARY COMMUNICATION/COLLABORATION:  Physician, MAUREEN, Devi Arteaga, and RNTAMMY    NEW MEDICATION INITIATION DOCUMENTATION:  N/A    Reason medication is being initiated:   N/A    MD / Provider name consulted re: change in status / initiation of new medication:  N/A    New Symptom(s):  N/A    New Order(s):  N/A    Name of the person notified of the changes:  N/A    Name of person being taught:  N/A    Instructions given:  N/A    Side Effects taught:  N/A    Response to teaching:  N/A      COMFORTABLE DYING MEASURE:  Is Patient/family satisfied with symptom level?  no    DISCHARGE PLAN:  pt to return home with hospice at the end of stay.

## 2023-03-18 NOTE — PROGRESS NOTES
Problem: Pressure Injury - Risk of  Goal: *Prevention of pressure injury  Description: Document Max Scale and appropriate interventions in the flowsheet. Outcome: Progressing Towards Goal  Note: Pressure Injury Interventions:  Sensory Interventions: Assess changes in LOC, Check visual cues for pain, Minimize linen layers, Float heels    Moisture Interventions: Absorbent underpads, Maintain skin hydration (lotion/cream), Apply protective barrier, creams and emollients    Activity Interventions: Pressure redistribution bed/mattress(bed type)    Mobility Interventions: Float heels, Pressure redistribution bed/mattress (bed type)    Nutrition Interventions: Offer support with meals,snacks and hydration    Friction and Shear Interventions: Apply protective barrier, creams and emollients, Lift sheet, Minimize layers                Problem: Falls - Risk of  Goal: *Absence of Falls  Description: Document Tracey Fall Risk and appropriate interventions in the flowsheet.   Outcome: Progressing Towards Goal  Note: Fall Risk Interventions:                                Problem: Pain  Goal: *Control of Pain  Outcome: Progressing Towards Goal

## 2023-03-19 PROCEDURE — 74011000250 HC RX REV CODE- 250: Performed by: FAMILY MEDICINE

## 2023-03-19 PROCEDURE — 74011250637 HC RX REV CODE- 250/637: Performed by: FAMILY MEDICINE

## 2023-03-19 PROCEDURE — G0299 HHS/HOSPICE OF RN EA 15 MIN: HCPCS

## 2023-03-19 PROCEDURE — G0156 HHCP-SVS OF AIDE,EA 15 MIN: HCPCS

## 2023-03-19 RX ADMIN — Medication 0.5 MG: at 02:37

## 2023-03-19 RX ADMIN — GABAPENTIN 600 MG: 300 CAPSULE ORAL at 10:51

## 2023-03-19 RX ADMIN — Medication 0.5 MG: at 01:28

## 2023-03-19 RX ADMIN — GABAPENTIN 900 MG: 300 CAPSULE ORAL at 21:32

## 2023-03-19 RX ADMIN — MORPHINE SULFATE 10 MG: 10 SOLUTION ORAL at 02:37

## 2023-03-19 RX ADMIN — DULOXETINE 60 MG: 60 CAPSULE, DELAYED RELEASE ORAL at 10:48

## 2023-03-19 RX ADMIN — GABAPENTIN 600 MG: 300 CAPSULE ORAL at 19:03

## 2023-03-19 RX ADMIN — MORPHINE SULFATE 10 MG: 10 SOLUTION ORAL at 15:46

## 2023-03-19 RX ADMIN — OMEPRAZOLE 40 MG: 40 CAPSULE, DELAYED RELEASE PELLETS ORAL at 10:50

## 2023-03-19 RX ADMIN — Medication 0.5 MG: at 15:46

## 2023-03-19 NOTE — PROGRESS NOTES
0700  Report received from [address]  Pt resting quietly with eyes closed, neutral facial expression, unlabored respirations. Appears to be sleeping comfortably. 0730  Pt's daughter Alissa Lamb arrived at [facility]. Update and support provided. Ami tearful, expressed appreciation. Pt sleeping. No indication of discomfort noted. 0845  Attempted to wake pt for medication and breakfast.  Pt aroused to voice but remained drowsy with eyes partially closed. Incontinent of urine. Brief changed, pt attempted urinal per his request with minimal success. Delayed swallowing noted with sip of water and bites of oatmeal.  Speech garbled, daughter at bedside. Oral medication administration unsafe at this time. 1030  Responded to bed alarm. Pt attempting to climb  out of bed. Attempted to void in urinal with no success. Brief changed. Pt alert, confused, able to swallow water and applesauce with no difficulty. Scheduled medications administered whole in applesauce. 1230  Pt sitting up in bed eating lunch. Denies discomfort. 1300  Pt attempting to climb out of bed. Restless, confused. Incontinent of urine, brief changed. 1400  Pt again attempting to climb out of bed. Remains restless and confused. Incontinent of urine, brief changed. 1545  Pt restless in bed, c/o back pain. PRN Morphine and Lorazepam administered. 36  Pt's daughter and granddaughters arrived to visit. Pt sleeping. Family will return when pt is awake. 1645  Pt removed gown and attempted to climb out of bed. Repositioned in bed, gown and brief changed. Message left for Alissa Lamb that pt is awake for a visit. 1700 Scheduled Neurontin administered. Report given to oncoming nurse.       NAME OF PATIENT:  Jama Duverney    LEVEL OF CARE:  Respite    REASON FOR GIP:   NA    *PATIENT REMAINS ELIGIBLE FOR GIP LEVEL OF CARE AS EVIDENCED BY: (MUST BE ADDRESSED OF PATIENT GIP)  NA    REASON FOR RESPITE:  Caregiver Exhaustion    O2 SAFETY:  Concentrator positioning (6\" from furniture/drapes), Tanks stored in butler , No petroleum based products on face while oxygen in use, and Oxygen sign on the door    FALL INTERVENTIONS PROVIDED:   Implemented/recommended use of non-skid footwear, Implemented/recommended use of fall risk identification flag to all team members, Implemented/recommended assistive devices and encouraged their use, Implemented/recommended resources for alarm system (personal alarm, bed alarm, call bell, etc.) , Implemented/recommended environmental changes (remove hazards, lower bed, improve lighting, etc.), and Implemented/recommended increased supervision/assistance    INTERDISPLINARY COMMUNICATION/COLLABORATION:  Physician, MAUREEN, Corbin Christensen, and RN, CNA    NEW MEDICATION INITIATION DOCUMENTATION:  NA    Reason medication is being initiated:  NA    MD / Provider name consulted re: change in status / initiation of new medication:  NA    New Symptom(s):  NA    New Order(s):  NA    Name of the person notified of the changes:  NA    Name of person being taught:  NA    Instructions given:  NA    Side Effects taught:  NA    Response to teaching:  NA    COMFORTABLE DYING MEASURE:  Is Patient/family satisfied with symptom level?  yes    DISCHARGE PLAN:  Return home following hospice stay

## 2023-03-19 NOTE — PROGRESS NOTES
Problem: Pressure Injury - Risk of  Goal: *Prevention of pressure injury  Description: Document Max Scale and appropriate interventions in the flowsheet. Outcome: Progressing Towards Goal  Note: Pressure Injury Interventions:  Sensory Interventions: Assess changes in LOC, Check visual cues for pain, Float heels, Minimize linen layers    Moisture Interventions: Absorbent underpads, Maintain skin hydration (lotion/cream)    Activity Interventions: Pressure redistribution bed/mattress(bed type)    Mobility Interventions: Float heels, Pressure redistribution bed/mattress (bed type), HOB 30 degrees or less    Nutrition Interventions: Document food/fluid/supplement intake, Offer support with meals,snacks and hydration    Friction and Shear Interventions: Apply protective barrier, creams and emollients, Lift sheet, Minimize layers                Problem: Falls - Risk of  Goal: *Absence of Falls  Description: Document Tracey Fall Risk and appropriate interventions in the flowsheet.   Outcome: Progressing Towards Goal  Note: Fall Risk Interventions:                                Problem: Pain  Goal: *Control of Pain  Outcome: Progressing Towards Goal

## 2023-03-19 NOTE — HOSPICE
1855:  Pt setting off bed alarm. Helped back into bed by CNA  1900:  Report received from [facility], [facility]:  Set off  bed alarm. Trying to get up and use the bathroom. Stood, but leaning heavily to right side. Sat down and tried to void in the urinal.  Had to hold pt up. Leaning to right side. Unable to void. Helped back into bed. 1948:  PRN  dose of of lorazepam given for restlessness. 2000:  Set off bed alarm again. Wants to void. Was able to void 125 ml of yellow urine in urinal.      2112:  Resting quietly with eyes closed. Easily aroused. Scheduled gabapentin given. Took with a half of container of applesauce. 2300  Resting with eyes closed. Had kicked linens off of the bed. Reapplied. 0031:  Blankets off again. Checked diaper. Dry. Reapplied blankets. 0128:  Legs over side rail. Mumbling to self. Diaper dry. Straightened out in bed. PRN lorazepam 0.5 mg given SL.    0237:  Trying to get OOB. Turned sideways with legs over side rails. Stated he had to urinate. Tried to use the urinal.  Was unable, but had already voided a large amount of urine. Diaper changed. Straightened up in bed. C/O left shoulder pain. PRN Morphine and lorazepan 0.5 mg given. 0300:  Lying in bed and resting quietly with eyes closed. Snoring. Relaxed brow. 0500:  Continues to sleep. 1359: Inc of urine. Diaper changed. Went back to sleep. 8999:  Report given to oncoming nurse.       NAME OF PATIENT:  Diana Davidson    LEVEL OF CARE:  Respite    REASON FOR GIP:   N/A    REASON FOR RESPITE:  Caregiver cannot care for patient due to:  exhaustion    O2 SAFETY:  N/A    FALL INTERVENTIONS PROVIDED:   Implemented/recommended resources for alarm system (personal alarm, bed alarm, call bell, etc.)     INTERDISPLINARY COMMUNICATION/COLLABORATION:  Physician, MAUREEN, Simona Arevalo, and RN, CNA    NEW MEDICATION INITIATION DOCUMENTATION:  N/A    Reason medication is being initiated:  N/A    MD / Provider name consulted re: change in status / initiation of new medication:  N/A    New Symptom(s):  N/A    New Order(s):  N/A    Name of the person notified of the changes:  N/A    Name of person being taught:  N/A    Instructions given:  N/A    Side Effects taught:  N/A    Response to teaching:  N/A      COMFORTABLE DYING MEASURE:  Is Patient/family satisfied with symptom level?  yes    DISCHARGE PLAN:  home after respite stay.

## 2023-03-20 PROCEDURE — G0299 HHS/HOSPICE OF RN EA 15 MIN: HCPCS

## 2023-03-20 PROCEDURE — 74011250637 HC RX REV CODE- 250/637: Performed by: FAMILY MEDICINE

## 2023-03-20 PROCEDURE — 74011000250 HC RX REV CODE- 250: Performed by: FAMILY MEDICINE

## 2023-03-20 PROCEDURE — 99231 SBSQ HOSP IP/OBS SF/LOW 25: CPT | Performed by: FAMILY MEDICINE

## 2023-03-20 RX ADMIN — GABAPENTIN 900 MG: 300 CAPSULE ORAL at 21:21

## 2023-03-20 RX ADMIN — GABAPENTIN 600 MG: 300 CAPSULE ORAL at 13:20

## 2023-03-20 RX ADMIN — DULOXETINE 60 MG: 60 CAPSULE, DELAYED RELEASE ORAL at 13:20

## 2023-03-20 RX ADMIN — MORPHINE SULFATE 10 MG: 10 SOLUTION ORAL at 18:42

## 2023-03-20 RX ADMIN — Medication 0.5 MG: at 05:38

## 2023-03-20 RX ADMIN — GABAPENTIN 600 MG: 300 CAPSULE ORAL at 18:41

## 2023-03-20 RX ADMIN — OMEPRAZOLE 40 MG: 40 CAPSULE, DELAYED RELEASE PELLETS ORAL at 13:20

## 2023-03-20 NOTE — PROGRESS NOTES
Problem: Pressure Injury - Risk of  Goal: *Prevention of pressure injury  Description: Document Max Scale and appropriate interventions in the flowsheet. Outcome: Progressing Towards Goal  Note: Pressure Injury Interventions:  Sensory Interventions: Assess changes in LOC, Check visual cues for pain, Pressure redistribution bed/mattress (bed type), Minimize linen layers, Float heels, Keep linens dry and wrinkle-free, Avoid rigorous massage over bony prominences    Moisture Interventions: Absorbent underpads, Maintain skin hydration (lotion/cream), Apply protective barrier, creams and emollients    Activity Interventions: Pressure redistribution bed/mattress(bed type)    Mobility Interventions: Float heels, HOB 30 degrees or less, Pressure redistribution bed/mattress (bed type)    Nutrition Interventions: Document food/fluid/supplement intake, Offer support with meals,snacks and hydration    Friction and Shear Interventions: Apply protective barrier, creams and emollients, Minimize layers, Feet elevated on foot rest                Problem: Falls - Risk of  Goal: *Absence of Falls  Description: Document Tracey Fall Risk and appropriate interventions in the flowsheet. Outcome: Progressing Towards Goal  Note: Fall Risk Interventions:                                Problem: Pain  Goal: *Control of Pain  Outcome: Progressing Towards Goal     Problem: Hospice Orientation  Goal: Demonstrate understanding of hospice philosophy, plan of care, and home hospice program  Description: The patient/family/caregiver will demonstrate understanding of hospice philosophy, plan of care and the home hospice program as evidenced by participation in meeting the patient's psychosocial, spiritual, medical, and physical needs inclusive of medical supplies/equipment focusing on symptoms.   Outcome: Resolved/Met     Problem: Potential for Alteration in Skin Integrity  Goal: Monitor skin for areas of alteration in skin integrity  Description: Patient/family/caregiver will demonstrate ability to care for patient's skin, monitor for areas of breakdown, and demonstrate methods to prevent breakdown during hospice care. Outcome: Progressing Towards Goal     Problem: Alteration in Mobility  Goal: Remain as independent as possible and remain safe in environment  Description: Patient will remain as independent as possible and remain safe in their environment.   Outcome: Progressing Towards Goal

## 2023-03-20 NOTE — PROGRESS NOTES
0700-Received report from [employer] and assumed care of pt. Pt is Respite LOC with primary diagnosis of Metastatic Lung Cancer. 0800-Pt sleeping soundly. No distress or discomfort noted. 0900-RN assessment completed. Pt did not awaken with care. No distress or discomfort noted. 1030-Pt set off bed alarm, stating he needed to use bathroom. Pt was incontinent in brief, but able to void a little into urinal.  Pt is a 2 person assist with standing as he cannot bear weight and his feet slide forward. Pt positioned in bed for comfort and supported with pillows. Pt offered breakfast but declined anything to eat or drink at this time, stating he wanted to sleep. 1130-Pt daughter at bedside. Updated on current status and POC. Daughter a little tearful, but appropriately. EOL discussed with daughter. Pt continues to sleep at this time. 7279-LT. Lizbet examined pt and spoke with daughter at bedside. Pt lethargic at this time. 1315-Pt awoken by RN repositioning pt and checking brief. Pt asked if he would like lunch and he stated yes. Lunch fed to pt by [facility] CNA, pt ate about 20% of meal.  Pt took PO medications whole with water. 1415-Pt sleeping, no distress or discomfort noted. 1600-Pt sleeping. No distress or discomfort noted. 1700-Pt set off bed alarm stating he was wet. Pt assisted back into bed and brief changed, luz marina care done. Pt positioned in bed for dinner. CNA Claudia fed pt dinner. 1730-Pt ate about 50-75% of dinner per CNA. No difficulties noted. 1830-Pt stated he had some pain on right side, rib area. Pt unable to rate pain at this time. PRN morphine given for pt pain. 1900-Pt resting in bed with eyes closed. Report given to oncoming RN.     NAME OF PATIENT:  Marrian Castleman    LEVEL OF CARE:  Respite    REASON FOR GIP:   NA    *PATIENT REMAINS ELIGIBLE FOR [facility] LEVEL OF CARE AS EVIDENCED BY: NA    REASON FOR RESPITE:  Caregiver exhaustion    O2 SAFETY:  Concentrator positioning (6\" from furniture/drapes), Tanks stored in butler , No petroleum based products on face while oxygen in use, and Oxygen sign on the door    FALL INTERVENTIONS PROVIDED:   Implemented/recommended resources for alarm system (personal alarm, bed alarm, call bell, etc.) , Implemented/recommended environmental changes (remove hazards, lower bed, improve lighting, etc.), and Implemented/recommended increased supervision/assistance    INTERDISPLINARY COMMUNICATION/COLLABORATION:  Physician, MSW, and RN, CNA    NEW MEDICATION INITIATION DOCUMENTATION:  Consulted AT MD to report change in pt status    Reason medication is being initiated:  NA    MD / Provider name consulted re: change in status / initiation of new medication:  Dr. Meliza Donaldson examined pt at bedside for decline in physical and mental status    New Symptom(s):  confusion; withdrawal; lethargy    New Order(s):  NA    Name of the person notified of the changes:  cholé Galloway    Name of person being taught:  chloé Galloway    Instructions given:  EOL  management    Side Effects taught:  RASHEL    Response to teaching:  understanding      [address]:  Is Patient/family satisfied with symptom level?  yes    DISCHARGE PLAN:  Pt is scheduled to discharge back home on Wednesday 3/22/23 and be followed by home hospice team.

## 2023-03-20 NOTE — H&P
[facility]  [phone]    Patient Name: Al Davis  YOB: 1929    Date of Provider Hospice Visit: 03/20/23    Level of Care:   [] General Inpatient (GIP)    [] Routine   [x] Respite    Current Location of Care:  [] [facility] [] [facility] [] [facility] [] [address] [x] Hospice House [facility]      Principle Hospice Diagnosis: Metastatic lung cancer       HOSPICE SUMMARY     Chart Reviewed for patient's medical history and hospice care plan. Hospice Physician Certification/Recertification Narrative per Manju Moreno / brittaney CREWS:     Patient is a 45-year-old male admitted to hospice secondary to metastatic lung cancer. Patient with metastatic disease to the liver and lung. He does have a history of trigeminal neuralgia and has been on chronic gabapentin. Patient sent to the [facility] hospice house for respite level care secondary to caregiver exhaustion. Patient has been cared for by his daughter. In reviewing the chart and discussed with the home team, patient apparently is been having significant decline with increasing weakness, poor p.o. intake, dyspnea/chest pain with any type of exertion. He has been requiring as needed doses of his symptom relief kit to include morphine. Imaging that I can see from September 2022 shows left hilar infiltrative mass that significant narrows the left anterior bronchus and includes the posterior branch of the left pulmonary artery. 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 At the time of admission, patient still had a PPS score 60, alert and oriented. Patient elected at the time not to proceed with any type of surgery, chemotherapy or radiation. Patient also apparently has been showing some intermittent confusion. 3/20-chart reviewed from the weekend. Patient definitely sleeping more and really cannot get out of the bed at this point without being at harm for injury. He did arouse for me and denied any pain currently. Continues to have some dyspnea with any type of exertion. He is only had 2 as needed doses of Ativan and 1 as needed dose of Roxanol in the last 24 hours. His daughter is at the bedside and we talked through what we likely are seen. She states he definitely was declining at home and becoming weaker. He had gone from independently ambulating, to needing assistance totally with her son to then attempting to use a rollator. General-patient appears much more fatigued, he does arouse and speak with me a little and smiled. Denied any pain right now. HEENT-unremarkable  Lungs-prolonged expiratory phase but no significant wheezing/rhonchi/rales, no significant crackles, no secretions, no work of breathing  Cardiovascular-regular rate and rhythm  Abdomen-soft, nontender  Neuro-- nonfocal  Psych-fatigued but calm    Hospice diagnosis  Metastatic lung cancer  Trigeminal neuralgia  Diabetes  Dyspnea    Patient being admitted for Respite care x 5 days for   [x]  Caregiver exhaustion and needing break  []  Caregiver unavailable       PLAN   Patient's home medications were reviewed and reconciled. Continue with current home medications and plan of care as outlined in chart. Trigeminal neuralgia-continue Cymbalta and gabapentin for now  Diabetes-hold his diabetic medications at this time as it appears his diet has been diminishing and would prefer him not to be hypoglycemic  Metastatic lung cancer-continue comfort meds especially morphine for his dyspnea and continue to monitor effect. Cardiovascular-for now we will hold his blood pressure medication as his blood pressure appears to have been declining in the home. Spent time talking with patient's daughter at the bedside. Reviewed what we are seeing and I really think this is more progression of his disease as he has had very little medication in the last 48 hours. Given the extensive metastatic nature of his cancer, I would anticipate further decline.   The patient's preference would be not to die at his daughter's home given the kids that are in the home. We will see how the next couple days ago and then make some recommendations as far as options whether that means remaining at the hospice house versus transition to a facility versus the possibility of transitioning home. She just wanted to make sure if he wanted to get up that he could and I stated if it was safe, we certainly would assist but he struggles significantly yesterday even with the bedside commode. We will continue to monitor closely but also provide significant support to the patient's daughter. Discussed with bedside nursing team as well as -Tracy.  and SW to support family needs. Disposition: Home with hospice once respite stay is completed.

## 2023-03-20 NOTE — PROGRESS NOTES
NAME OF PATIENT:  Pennie Brady    LEVEL OF CARE:  Respite    REASON FOR RESPITE:  Caregiver Exhaustion    O2 SAFETY:  Concentrator positioning (6\" from furniture/drapes)    FALL INTERVENTIONS PROVIDED:   Implemented/recommended use of non-skid footwear, Implemented/recommended resources for alarm system (personal alarm, bed alarm, call bell, etc.) , and Implemented/recommended increased supervision/assistance    INTERDISPLINARY COMMUNICATION/COLLABORATION:  Physician, MAUREEN, Corin Giraldo, and RN, CNA    NEW MEDICATION INITIATION DOCUMENTATION:  N/A    Reason medication is being initiated:  N/A    MD / Provider name consulted re: change in status / initiation of new medication:  N/A    New Symptom(s):  N/A    New Order(s):  N/A    Name of the person notified of the changes:  N/A    Name of person being taught:  N/A    Instructions given:  N/A    Side Effects taught:  N/A    Response to teaching:  N/A      COMFORTABLE DYING MEASURE:  Is Patient/family satisfied with symptom level?  yes    DISCHARGE PLAN:  Discharge on [address] call arenas to advise of need to urinate. Assistance x2 to stand and utilize urinal with 250 leonila urine produced. Denies c/os. Visitors- DTR and G DTR arrived. Will continue to monitor  2126  PM medication provided with requested chocolate ice cream.  Will continue to monitor  2200  Client getting out of bed as needs to urinate. Bed alarm going off. Reminded he can't get out of bed without assistance. When I noted bruise on R hip he advises he got that when he fell. Assisted to use urinal for 100 cc out. Resting quietly in bed s/p. Will continue to monitor  2344  Resting on L side with eyes closed and unlabored respirations. Will continue to monitor  0127  Resting quietly with unlabored respirations. Will continue to monitor  0330  Resting quietly with eyes closed, relaxed facial expression and unlabored respirations  0549 Client incontinent lrg amount of urine.   Meme care provided. Client denies all c/os. Agrees he slept well.   Will continue to monitor  0700  Report to oncoming shift

## 2023-03-20 NOTE — HOSPICE
1900:  Report received from [facility]:  Pt assessed. Lethargic. Denies pain. Garbled speech. Met daughter Desiree Vela outside room. Updated on pt's condition. Accepting of information and a little tearful about pt's decline. 2132:  Lethargic. Able to awaken enough to take scheduled gabapentin. Placed open capsules in applesauce. Ate a couple of additional mouthfuls of applesauce. 2300:  Set off bed alarm. Trying to get OOB to use bathroom. Sat up on side of bed. Weak. Leans heavily to right side and can not hold self up. Had already been inc of urine. Diaper changed and pt repositioned. Denies pain. Went back to sleep. 0020:  Lying in bed and resting quietly with eyes closed. Resp even and unlabored. Neutral facial expression. 0210:  Set off bed alarm. Turned sideways in bed. Wants to get up to void. Diaper already wet. Tried to stand with the help of 2. Unable. Has difficulty and unable to hold self upright in bed. Cleansed. Barrier cream applied. Straightened out in bed.  0400:  Calling out. Stated he was ready to get up and get dressed. Reoriented that it was 0400 in the morning. Had to urinate. Voided approximately 250 ml of cloudy yellow urine. 0677:  Set off bed alarm. Was just adjusting self in bed.   0538: Adamant about getting up and \"going for a walk. \"  Mildly agitated. PRN lorazepam 0.5 mg given. 3854:  Lying quietly in bed. Eyes open. Calm. Meds effective.    0700:  Report given to oncoming nurse.     NAME OF PATIENT:  Jaden Jones    LEVEL OF CARE:  Respite    REASON FOR GIP:   N/A    REASON FOR RESPITE:  Caregiver cannot care for patient due to:  exhaustion    O2 SAFETY:  N/A    FALL INTERVENTIONS PROVIDED:   Implemented/recommended resources for alarm system (personal alarm, bed alarm, call bell, etc.)     INTERDISPLINARY COMMUNICATION/COLLABORATION:  Physician, MAUREEN, Destiney Campbell, and RN, CNA    NEW MEDICATION INITIATION DOCUMENTATION:  N/A    Reason medication is being initiated:  N/A    MD / Provider name consulted re: change in status / initiation of new medication:  N/A    New Symptom(s):  N/A    New Order(s):  N/A    Name of the person notified of the changes:  N/A    Name of person being taught:  N/A    Instructions given:  N/A    Side Effects taught:  N/A    Response to teaching:  N/A      COMFORTABLE DYING MEASURE:  Is Patient/family satisfied with symptom level?  yes    DISCHARGE PLAN:  Home after respite stay.

## 2023-03-20 NOTE — PROGRESS NOTES
Problem: Pressure Injury - Risk of  Goal: *Prevention of pressure injury  Description: Document Max Scale and appropriate interventions in the flowsheet. Outcome: Progressing Towards Goal  Note: Pressure Injury Interventions:  Sensory Interventions: Assess changes in LOC, Check visual cues for pain, Float heels, Minimize linen layers    Moisture Interventions: Absorbent underpads, Maintain skin hydration (lotion/cream)    Activity Interventions: Pressure redistribution bed/mattress(bed type)    Mobility Interventions: Float heels, Pressure redistribution bed/mattress (bed type), HOB 30 degrees or less    Nutrition Interventions: Document food/fluid/supplement intake, Offer support with meals,snacks and hydration    Friction and Shear Interventions: Apply protective barrier, creams and emollients, Lift sheet, Minimize layers                Problem: Falls - Risk of  Goal: *Absence of Falls  Description: Document Tracey Fall Risk and appropriate interventions in the flowsheet. Outcome: Progressing Towards Goal  Note: Fall Risk Interventions:                                Problem: Pain  Goal: *Control of Pain  Outcome: Progressing Towards Goal     Problem: Potential for Alteration in Skin Integrity  Goal: Monitor skin for areas of alteration in skin integrity  Description: Patient/family/caregiver will demonstrate ability to care for patient's skin, monitor for areas of breakdown, and demonstrate methods to prevent breakdown during hospice care. Outcome: Progressing Towards Goal     Problem: Alteration in Mobility  Goal: Remain as independent as possible and remain safe in environment  Description: Patient will remain as independent as possible and remain safe in their environment.   Outcome: Progressing Towards Goal

## 2023-03-21 PROCEDURE — 74011000250 HC RX REV CODE- 250: Performed by: FAMILY MEDICINE

## 2023-03-21 PROCEDURE — 74011250637 HC RX REV CODE- 250/637: Performed by: FAMILY MEDICINE

## 2023-03-21 PROCEDURE — G0299 HHS/HOSPICE OF RN EA 15 MIN: HCPCS

## 2023-03-21 RX ADMIN — OMEPRAZOLE 40 MG: 40 CAPSULE, DELAYED RELEASE PELLETS ORAL at 08:30

## 2023-03-21 RX ADMIN — GABAPENTIN 600 MG: 300 CAPSULE ORAL at 17:44

## 2023-03-21 RX ADMIN — MORPHINE SULFATE 10 MG: 10 SOLUTION ORAL at 08:30

## 2023-03-21 RX ADMIN — GABAPENTIN 900 MG: 300 CAPSULE ORAL at 21:07

## 2023-03-21 RX ADMIN — MORPHINE SULFATE 10 MG: 10 SOLUTION ORAL at 17:45

## 2023-03-21 RX ADMIN — DULOXETINE 60 MG: 60 CAPSULE, DELAYED RELEASE ORAL at 08:30

## 2023-03-21 RX ADMIN — GABAPENTIN 600 MG: 300 CAPSULE ORAL at 08:30

## 2023-03-21 NOTE — PROGRESS NOTES
Problem: Pressure Injury - Risk of  Goal: *Prevention of pressure injury  Description: Document Max Scale and appropriate interventions in the flowsheet. Outcome: Progressing Towards Goal  Note: Pressure Injury Interventions:  Sensory Interventions: Check visual cues for pain, Float heels, Keep linens dry and wrinkle-free, Maintain/enhance activity level, Minimize linen layers, Pressure redistribution bed/mattress (bed type), Turn and reposition approx. every two hours (pillows and wedges if needed)    Moisture Interventions: Absorbent underpads, Check for incontinence Q2 hours and as needed, Maintain skin hydration (lotion/cream), Minimize layers    Activity Interventions: Increase time out of bed, Pressure redistribution bed/mattress(bed type)    Mobility Interventions: Float heels, HOB 30 degrees or less, Pressure redistribution bed/mattress (bed type), Turn and reposition approx. every two hours(pillow and wedges)    Nutrition Interventions: Document food/fluid/supplement intake, Offer support with meals,snacks and hydration    Friction and Shear Interventions: HOB 30 degrees or less, Lift sheet, Minimize layers                Problem: Patient Education: Go to Patient Education Activity  Goal: Patient/Family Education  Outcome: Progressing Towards Goal     Problem: Falls - Risk of  Goal: *Absence of Falls  Description: Document Tracey Fall Risk and appropriate interventions in the flowsheet. Outcome: Progressing Towards Goal  Note: Fall Risk Interventions:                                Problem: Pain  Goal: *Control of Pain  Outcome: Progressing Towards Goal     Problem: Potential for Alteration in Skin Integrity  Goal: Monitor skin for areas of alteration in skin integrity  Description: Patient/family/caregiver will demonstrate ability to care for patient's skin, monitor for areas of breakdown, and demonstrate methods to prevent breakdown during hospice care.   Outcome: Progressing Towards Goal Problem: Alteration in Mobility  Goal: Remain as independent as possible and remain safe in environment  Description: Patient will remain as independent as possible and remain safe in their environment.   Outcome: Progressing Towards Goal

## 2023-03-21 NOTE — PROGRESS NOTES
Problem: Pressure Injury - Risk of  Goal: *Prevention of pressure injury  Description: Document Max Scale and appropriate interventions in the flowsheet. Outcome: Progressing Towards Goal  Note: Pressure Injury Interventions:  Sensory Interventions: Assess changes in LOC, Float heels, Keep linens dry and wrinkle-free    Moisture Interventions: Absorbent underpads    Activity Interventions: Pressure redistribution bed/mattress(bed type)    Mobility Interventions: Float heels, Pressure redistribution bed/mattress (bed type)    Nutrition Interventions: Document food/fluid/supplement intake    Friction and Shear Interventions: Apply protective barrier, creams and emollients, Minimize layers, Feet elevated on foot rest                Problem: Falls - Risk of  Goal: *Absence of Falls  Description: Document Tracey Fall Risk and appropriate interventions in the flowsheet.   Outcome: Progressing Towards Goal  Note: Fall Risk Interventions:                                Problem: Pain  Goal: *Control of Pain  Outcome: Progressing Towards Goal

## 2023-03-21 NOTE — PROGRESS NOTES
0700: report received from Sangita Barron RN.  0730: pt resting quietly with eyes closed, respirations even and unlabored. Bed alarm on.   0800: CNA assisting patient with breakfast.   0830: scheduled meds given per [facility]. Pt c/o pain 8/10 in R ribcage. PRN morphine given per MAR. Full assessment done see flow sheets. 1000: pt resting quietly, reports his pain is better. 1200: CNA assisting patient with lunch. Daughter at bedside. 1400: pt resting quietly, no signs of pain or distress. Bed alarm on.   1530: CNA gave pt full bath, shave, and linen change. 1700: dinner tray set up for patient. 1744: pt requesting something for chest pain. PRN morphine given and scheduled gabapentin per MAR.   1800: pt assisted up to [facility] to attempt to have BM. Unable to at this time. Pt assisted back into bed.   1900: report given to oncoming nurse.      NAME OF PATIENT:  Laura Pang    LEVEL OF CARE:  respite    REASON FOR GIP:   N/a    *PATIENT REMAINS ELIGIBLE FOR GIP LEVEL OF CARE AS EVIDENCED BY: (MUST BE ADDRESSED OF PATIENT GIP)      REASON FOR RESPITE:  Caregiver cannot care for patient due to:  exhaustion    O2 SAFETY:  Concentrator positioning (6\" from furniture/drapes), Tanks stored in butler , No petroleum based products on face while oxygen in use, and Oxygen sign on the door    FALL INTERVENTIONS PROVIDED:   Implemented/recommended use of non-skid footwear, Implemented/recommended use of fall risk identification flag to all team members, Implemented/recommended assistive devices and encouraged their use, Implemented/recommended resources for alarm system (personal alarm, bed alarm, call bell, etc.) , Implemented/recommended environmental changes (remove hazards, lower bed, improve lighting, etc.), and Implemented/recommended increased supervision/assistance    INTERDISPLINARY COMMUNICATION/COLLABORATION:  Physician, MSW, Thierno Lopez, and RN, CNA    NEW MEDICATION INITIATION DOCUMENTATION:  Continue current medications    Reason medication is being initiated:  n/a    MD / Provider name consulted re: change in status / initiation of new medication:  n/a    New Symptom(s):  n/a    New Order(s):  n/a    Name of the person notified of the changes:  n/a    Name of person being taught:  n/a    Instructions given:  n/a    Side Effects taught:  n/a    Response to teaching:  n/a      COMFORTABLE DYING MEASURE:  Is Patient/family satisfied with symptom level?  yes    DISCHARGE PLAN:  return home 3/22 at 11am and continue to be followed by home hospice.

## 2023-03-21 NOTE — HOSPICE
DISCHARGE PLANNING - SW visit completed with pt and daughter, Sharona Moctezuma who was at bedside. Pt having lunch; requiring some assistance from the aide. Pt reportedly had some confusion upon respite admission on 3/17 and over the weekend; but daughter advises that pt appears oriented and more like himself today. Pt and daughter agreeable to pt returning home at end of respite stay on Wednesday (3/22). Sharona Moctezuma admits some anxiety with caregiving for pt and concern about pt's increasing care needs. MSW reviewed resources for personal care agencies which were provided last week. Sharona Moctezuma plans to follow up on hiring caregivers for a few hours each day while pt is home and she and the family are at work and school. [facility] pt up at 11am on Wednesday (3/22) for transport home. Flaco in visit requested to provide education to Sharona Moctezuma on pt's increasing care needs. Sharona Moctezuma also interested in increasing nursing visits to 2 x per week and initiating hospice aide services. SW to continue to follow.

## 2023-03-22 ENCOUNTER — HOME CARE VISIT (OUTPATIENT)
Dept: SCHEDULING | Facility: HOME HEALTH | Age: 88
End: 2023-03-22
Payer: MEDICARE

## 2023-03-22 VITALS — OXYGEN SATURATION: 85 % | RESPIRATION RATE: 22 BRPM | HEART RATE: 78 BPM

## 2023-03-22 VITALS
OXYGEN SATURATION: 97 % | HEART RATE: 71 BPM | RESPIRATION RATE: 22 BRPM | SYSTOLIC BLOOD PRESSURE: 128 MMHG | TEMPERATURE: 98.3 F | DIASTOLIC BLOOD PRESSURE: 60 MMHG

## 2023-03-22 PROCEDURE — 74011250637 HC RX REV CODE- 250/637: Performed by: FAMILY MEDICINE

## 2023-03-22 PROCEDURE — G0299 HHS/HOSPICE OF RN EA 15 MIN: HCPCS

## 2023-03-22 PROCEDURE — 74011000250 HC RX REV CODE- 250: Performed by: FAMILY MEDICINE

## 2023-03-22 RX ADMIN — GABAPENTIN 600 MG: 300 CAPSULE ORAL at 09:44

## 2023-03-22 RX ADMIN — OMEPRAZOLE 40 MG: 40 CAPSULE, DELAYED RELEASE PELLETS ORAL at 09:45

## 2023-03-22 RX ADMIN — BISACODYL 10 MG RECTAL SUPPOSITORY 10 MG: at 09:45

## 2023-03-22 RX ADMIN — DULOXETINE 60 MG: 60 CAPSULE, DELAYED RELEASE ORAL at 09:44

## 2023-03-22 NOTE — PROGRESS NOTES
0700-Received report from [facility] and assumed care of pt. 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 0745-Pt sleeping, no distress or discomfort noted at this time. 0830-Pt awake, CNA setup and assisted pt with breakfast.  0930-RN assessment completed. Pt without pain or discomfort at this time. Pt awake, calm and pleasant. 0945-pt took medications whole, with water, no difficulties swallowing noted. Pt given PRN suppository. Pt given bed bath and clothed by Reagan De Paz. Pt tolerated care well. 1030-pt assisted to [facility]. No BM at this time, pt voided. 1100-Pt assisted back to [facility] for feeling of BM. Pt with very small amount of loose stool. Pt cleaned and dressed. Pt assisted to stretcher with [facility]. Report given to [facility]. Daughter Denisse Ervin called and given update and notification of pt pickup for [employer]. Daughter made aware that pt is unsteady and weak on feet, unable to walk or take steps. Pt is a max assist at this time. Pt left [facility] with suitcase of clothes and personal toiletries, medications with medication list and DNR. [employer] Rollator sent with pt. Pt alert and calm when leaving [facility].     NAME OF PATIENT:  Girard Hatchet    LEVEL OF CARE:  Respite    REASON FOR GIP:   NA    *PATIENT REMAINS ELIGIBLE FOR [facility] LEVEL OF CARE AS EVIDENCED BY: NA    REASON FOR RESPITE:  Caregiver Exhaustion    O2 SAFETY:  Concentrator positioning (6\" from furniture/drapes), Tanks stored in butler , No petroleum based products on face while oxygen in use, and Oxygen sign on the door    FALL INTERVENTIONS PROVIDED:   Implemented/recommended use of non-skid footwear, Implemented/recommended resources for alarm system (personal alarm, bed alarm, call bell, etc.) , Implemented/recommended environmental changes (remove hazards, lower bed, improve lighting, etc.), and Implemented/recommended increased supervision/assistance    INTERDISPLINARY COMMUNICATION/COLLABORATION:  Physician, MSW, and RN, CNA    NEW MEDICATION INITIATION DOCUMENTATION:  NA    Reason medication is being initiated:  NA    MD / Provider name consulted re: change in status / initiation of new medication:  NA    New Symptom(s):  NA    New Order(s):  NA    Name of the person notified of the changes:  NA    Name of person being taught:  NA    Instructions given:  NA    Side Effects taught:  NA    Response to teaching:  NA      COMFORTABLE DYING MEASURE:  Is Patient/family satisfied with symptom level?  yes    DISCHARGE PLAN:  Pt discharged today from [facility] and returned home under care of his daughter and will be followed by home hospice team.

## 2023-03-22 NOTE — PROGRESS NOTES
1900 Report received from [address] Patient resting quietly with no needs expressed at this time. 2030 Patient assessment performed as documented. Respite LOC with hospice diagnosis metastatic lung cancer. 2107 Scheduled Gabapentin administered. Recommended Dulcolax suppository to patient but he declined stating that he feels like he is going to have to have a bowel movement \"very soon\". 2300 Patient sleeping with relaxed facial expression. 0100 Sleeping with no signs of pain or discomfort noted. 0300 Resting quietly with eyes closed and even and unlabored respirations. 0500 Sleeping on left side with relaxed facial expression. 0700 Report given to Nilay Serrano RN.    NAME OF PATIENT:  Allison Gomez    LEVEL OF CARE:  Respite    REASON FOR GIP:   N/A    *PATIENT REMAINS ELIGIBLE FOR GIP LEVEL OF CARE AS EVIDENCED BY: (MUST BE ADDRESSED OF PATIENT GIP)  N/A    REASON FOR RESPITE:  Caregiver breakdown    O2 SAFETY:  N/A patient on room air. FALL INTERVENTIONS PROVIDED:   Implemented/recommended resources for alarm system (personal alarm, bed alarm, call bell, etc.) , Implemented/recommended environmental changes (remove hazards, lower bed, improve lighting, etc.), and Implemented/recommended increased supervision/assistance    INTERDISPLINARY COMMUNICATION/COLLABORATION:  Physician, MAUREEN, Alene Gilford, and RN, CNA    NEW MEDICATION INITIATION DOCUMENTATION:  N/A    Reason medication is being initiated:  N/A    MD / Provider name consulted re: change in status / initiation of new medication:  N/A    New Symptom(s):  N/A    New Order(s):  N/A    Name of the person notified of the changes:  N/A    Name of person being taught:  N/A    Instructions given:  N/A    Side Effects taught:  N/A    Response to teaching:  N/A      COMFORTABLE DYING MEASURE:  Is Patient/family satisfied with symptom level?  yes    DISCHARGE PLAN:  Home 3/22 @ 1100.

## 2023-03-22 NOTE — PROGRESS NOTES
Problem: Pressure Injury - Risk of  Goal: *Prevention of pressure injury  Description: Document Max Scale and appropriate interventions in the flowsheet. Outcome: Progressing Towards Goal  Note: Pressure Injury Interventions:  Sensory Interventions: Check visual cues for pain, Assess changes in LOC, Float heels, Keep linens dry and wrinkle-free, Minimize linen layers, Pressure redistribution bed/mattress (bed type)    Moisture Interventions: Absorbent underpads, Minimize layers    Activity Interventions: Increase time out of bed    Mobility Interventions: Float heels, HOB 30 degrees or less    Nutrition Interventions: Document food/fluid/supplement intake, Offer support with meals,snacks and hydration    Friction and Shear Interventions: HOB 30 degrees or less, Minimize layers                Problem: Falls - Risk of  Goal: *Absence of Falls  Description: Document Tracey Fall Risk and appropriate interventions in the flowsheet. Outcome: Progressing Towards Goal  Note: Fall Risk Interventions:                                Problem: Potential for Alteration in Skin Integrity  Goal: Monitor skin for areas of alteration in skin integrity  Description: Patient/family/caregiver will demonstrate ability to care for patient's skin, monitor for areas of breakdown, and demonstrate methods to prevent breakdown during hospice care. Outcome: Progressing Towards Goal     Problem: Alteration in Mobility  Goal: Remain as independent as possible and remain safe in environment  Description: Patient will remain as independent as possible and remain safe in their environment.   Outcome: Progressing Towards Goal

## 2023-03-23 ENCOUNTER — HOME CARE VISIT (OUTPATIENT)
Dept: SCHEDULING | Facility: HOME HEALTH | Age: 88
End: 2023-03-23
Payer: MEDICARE

## 2023-03-23 DIAGNOSIS — E11.21 TYPE 2 DIABETES WITH NEPHROPATHY (HCC): ICD-10-CM

## 2023-03-23 PROCEDURE — G0156 HHCP-SVS OF AIDE,EA 15 MIN: HCPCS

## 2023-03-23 RX ORDER — METFORMIN HYDROCHLORIDE 500 MG/1
TABLET, EXTENDED RELEASE ORAL
Qty: 180 TABLET | Refills: 1 | Status: SHIPPED | OUTPATIENT
Start: 2023-03-23

## 2023-03-23 NOTE — HOSPICE
Patient has just arrived home from respite at the [facility]. Patient found sitting on his toilet having a BM. Patient does strain at times. He produces a medium soft formed bowel movement and has traces of blood when he wipes. Patient is unsteady, weak, and c/o SOB. He is not wearing 02 at this time. This nurse assists patient back to bed. He ambulates over six feet with his walker. o2 applied at 4 LPM which patient was receiving at the [facility]. SpO2 80 % on room air and increased to 85% with 02. Patient states he would like to sleep. He requests a cup of water and coughs after receiving a few sips. This nurse provided education to patient's daughter St. perez on EOL care and use of the [facility] meds. Also suggested placing a baby monitor in patient's room as he does not always call for assistance and is showing increased signs of confusion. Will order a bedside commode and encourage patient to use his urinal to void. St. perez verbalizes understanding of these instructions. St. perez states she would prefer to utilize respite again once her father starts the actively dying phase. Patient has small grandchildren in the home. Plan for a f/u SN visit on Friday. St. perez is encouraged to contact the main office as needed. Patient is lying in bed without signs or symptoms of discomfort.

## 2023-03-24 ENCOUNTER — HOME CARE VISIT (OUTPATIENT)
Dept: HOSPICE | Facility: HOSPICE | Age: 88
End: 2023-03-24
Payer: MEDICARE

## 2023-03-24 ENCOUNTER — HOME CARE VISIT (OUTPATIENT)
Dept: SCHEDULING | Facility: HOME HEALTH | Age: 88
End: 2023-03-24
Payer: MEDICARE

## 2023-03-24 VITALS
SYSTOLIC BLOOD PRESSURE: 115 MMHG | HEART RATE: 57 BPM | OXYGEN SATURATION: 99 % | DIASTOLIC BLOOD PRESSURE: 70 MMHG | RESPIRATION RATE: 16 BRPM

## 2023-03-24 PROCEDURE — G0299 HHS/HOSPICE OF RN EA 15 MIN: HCPCS

## 2023-03-24 PROCEDURE — G0156 HHCP-SVS OF AIDE,EA 15 MIN: HCPCS

## 2023-03-25 NOTE — HOSPICE
Divya Macias  (9/22/1929)    Arrived to home and son-in-law was leaving but there was a friend of the family staying with the patient. Elia Ovens arrived at the same time. Patient received in hospital bed alert and oriented x 3. Patient denies being SOB at this time. Patient stated he took morphine for pain earlier today and he is not in pain at this time. He states when he tries to get up he gets dizzy and has increase SOB. /70, HR 57, RR 16, SpO2 99% on 3.5L Oxygen via nasal cannula. Patient was saturated in urine. Helped him up to [facility] assist x 2. Patient had a large soft BM. Blood on the toilet paper when he wipes. No evidence of bleeding hemorrhoids. Patient has a new rash below his waist and up his back. It is also the front lower abdomen. Patient stated the only thing new is the pads on the bed. Will order different Chux pads with no plastic polymer. Blanche Ambrocio wiped patient down and applied purple top lotion (silicone moisturizer). Patient stated that helped. Medications were revied with the patient. There is no need for medication refills at this time. No need for additional supplies (other than chux). Patient's lungs are diminished in bilateral fields. Pulses palpable x 4, no edema. Abdomen BS x 4, flat and nontender. Patient stated he does get constipated. Questions if he is taking his stool softener every day. Encouraged taking stool softer to help prevent constipation. Introduced Stoner to patient. He stated he was not sure if that is something he wanted at this time. He would talk with his daughter about it. Spoke to the friend who was staying with the patient. Patient should not be walking around on his own. Recommended using the bedside commode or changing the patient more frequently. [facility] Returned call to patient daughter and gave an update on visit.

## 2023-03-27 ENCOUNTER — HOME CARE VISIT (OUTPATIENT)
Dept: SCHEDULING | Facility: HOME HEALTH | Age: 88
End: 2023-03-27
Payer: MEDICARE

## 2023-03-27 VITALS
DIASTOLIC BLOOD PRESSURE: 65 MMHG | TEMPERATURE: 97.9 F | HEART RATE: 74 BPM | SYSTOLIC BLOOD PRESSURE: 130 MMHG | RESPIRATION RATE: 16 BRPM

## 2023-03-27 PROCEDURE — G0299 HHS/HOSPICE OF RN EA 15 MIN: HCPCS

## 2023-03-27 PROCEDURE — G0156 HHCP-SVS OF AIDE,EA 15 MIN: HCPCS

## 2023-03-27 NOTE — HOSPICE
Routine SNV. Arrived to home with Abdiel Abebe. Daughter, Javier Mccartney, present for visit. Javier Mccartney stated that patient had better weekend, was able to get out of bed safely with assistance to the bathroom. She stated that she feels patient is more restless than normal.  Discussed with Javier Mccartney about administering lorazepam if patient seems restless. Patient still stratching intermittently, but irritation on back improving. Patient resting comfortably for most of visit, able to answer questions appropriately. Neutral facial expression. Respirations even and unlabored, oxygen in use, no edema of lower extremities. Medications reconciled, no refills needed. Calazime, pullups and body wash ordered #[id number]  Reminded daughter to call hospice number with any needs or concerns.

## 2023-03-28 ENCOUNTER — HOME CARE VISIT (OUTPATIENT)
Dept: SCHEDULING | Facility: HOME HEALTH | Age: 88
End: 2023-03-28
Payer: MEDICARE

## 2023-03-28 ENCOUNTER — HOME CARE VISIT (OUTPATIENT)
Dept: HOSPICE | Facility: HOSPICE | Age: 88
End: 2023-03-28
Payer: MEDICARE

## 2023-03-28 VITALS
SYSTOLIC BLOOD PRESSURE: 145 MMHG | OXYGEN SATURATION: 16 % | HEART RATE: 57 BPM | DIASTOLIC BLOOD PRESSURE: 65 MMHG | RESPIRATION RATE: 20 BRPM

## 2023-03-28 PROCEDURE — G0299 HHS/HOSPICE OF RN EA 15 MIN: HCPCS

## 2023-03-29 ENCOUNTER — HOME CARE VISIT (OUTPATIENT)
Dept: SCHEDULING | Facility: HOME HEALTH | Age: 88
End: 2023-03-29
Payer: MEDICARE

## 2023-03-29 ENCOUNTER — HOME CARE VISIT (OUTPATIENT)
Dept: HOSPICE | Facility: HOSPICE | Age: 88
End: 2023-03-29
Payer: MEDICARE

## 2023-03-29 VITALS
RESPIRATION RATE: 18 BRPM | HEART RATE: 64 BPM | DIASTOLIC BLOOD PRESSURE: 73 MMHG | OXYGEN SATURATION: 97 % | TEMPERATURE: 97.3 F | SYSTOLIC BLOOD PRESSURE: 159 MMHG

## 2023-03-29 PROCEDURE — G0300 HHS/HOSPICE OF LPN EA 15 MIN: HCPCS

## 2023-03-29 PROCEDURE — G0156 HHCP-SVS OF AIDE,EA 15 MIN: HCPCS

## 2023-03-29 RX ADMIN — MORPHINE SULFATE 10 MG: 20 SOLUTION ORAL at 10:45

## 2023-03-29 RX ADMIN — LORAZEPAM 1 MG: 2 CONCENTRATE ORAL at 10:45

## 2023-03-29 NOTE — PROGRESS NOTES
1215: Patient arrived to [facility] via medical transport. Patient was medicated at home prior to admission, is lethargic, not interactive, but does react to pain with facial grimacing. #24 PIV placed in left forearm and vaca catheter placed by BARRY Guerra. Skin is intact. 1230: Left message for patient's daughter to make aware of patient's arrival   1330: Patient's daughter returned call. Patient is resting in bed with eyes closed, appears sleeping. Patient's daughter is aware that patient's appears to be transitioning. 1400: Patient is resting quietly with eyes closed, appears sleeping. Neutral facial position observed. Respirations are regular depth and rhythm, unlabored. Patient not responsive to voice or touch. 1500: Patient's daughter at the bedside. Patient remains resting with eyes closed. Patient not responsive to voice or light touch at this time . Dr. Francisco Hernandez at the bedside with patient. 1600: Patient is resting quietly with eyes closed, appears sleeping. Neutral facial position observed. Respirations are regular depth and rhythm. 1630: Patient awake, restless,pulling at bed linens, kicking legs over siderail. Facial grimace observed with repositioning patient. PRN lorazepam 1mg and morphine 2mg  1700: Patient resting in bed with eyes closed, appears sleeping. Respirations are regular depth and rhythm. Repositioned patient's legs in bed for comfort. Patient remained sleeping. 1800: Patient is resting quietly with eyes closed, appears sleeping. Neutral facial position observed. Respirations are regular depth and rhythm. 1840: Patient's daughter at the bedside with her children and spouse. Patient is resting quietly with eyes closed, appears sleeping.     1900: Report given to [place], [place]

## 2023-03-29 NOTE — HOSPICE
Patient found lying supine in his bed with his eyes closed and snoring. Patient's daughter Christopher Edwards reports her dad was alert over the weekend and ambulated to the bathroom. Christopher Edwards states she is not sure if her dad had a bowel movement as he declined assistance. Christopher Edwards states patient became restless last night as she could view on the cameras. She reports patient was flailing his arms and at times had his knees on the floor and head on his bed. Christopher Edwards states she has been administering Morphine 10mg about 3xs daily and Lorazepam 0.5mg twice daily. She reports patient last received both around 7:30am. Patient appears lethargic, he attempts to sit on the side of the bed but is unable to support himself. This nurse assists patient with sitting up and he smiles. After two minutes patient agrees to lay back in bed. He is incontinent of urine. This nurse provides care and draw sheet applied. Education provided to Christopher Edwards on care of a bed bound patient. Patient tolerates care well and is lying without signs of symptoms of pain or SOB with 02 @ 4 LPM. Christopher Edwards reports her dad has started pocketing food and has trouble swallowing pills. She reports patient is not receiving all scheduled meds. Telephone call to NP Denise Law and new orders received for Morphine 10mg and Lorazepam 0.5ml every six hours. Change Levsin tabs to Levsin drops. Hold routine meds if patient unable to swallow. Marielos Kcet understanding of all instructions and requests respite when available. Because of the small grandchildren in the home, Christopher Edwards would prefer her dad pass at the [facility]. Team notified. This nurse encourages Christopher Edwards to contact [phone] main number anytime as needed. Plan for f/u SN visit tomorrow to reassess for 0-7 days of EOL.

## 2023-03-29 NOTE — H&P
[facility] Help to Those in Need  [phone]    Patient Name: Alannah Loving  YOB: 1929    Date of Provider Hospice Visit: 03/29/23    Level of Care:   [x] General Inpatient (GIP)    [] Routine   [] Respite    Current Location of Care:  [] [facility] [] [facility] [] [facility] [] [facility] [x] Hospice House [facility], patient referred from:  [] [facility] [] [facility] [] [facility] [] [facility] [x] Home [] Other:         Principle Hospice Diagnosis: Metastatic lung cancer  Diagnoses RELATED to the terminal prognosis:   Other Diagnoses:      Vilma Navarro is a 80y.o. year old who was admitted to [facility]. Patient is a 80-year-old male admitted to hospice secondary to metastatic lung cancer. Patient with metastatic disease to the liver and lung. He does have a history of trigeminal neuralgia and has been on chronic gabapentin. Patient sent to the community hospice house for respite level care secondary to caregiver exhaustion. Patient has been cared for by his daughter. In reviewing the chart and discussed with the home team, patient apparently is been having significant decline with increasing weakness, poor p.o. intake, dyspnea/chest pain with any type of exertion. He has been requiring as needed doses of his symptom relief kit to include morphine. Imaging that I can see from September 2022 shows left hilar infiltrative mass that significant narrows the left anterior bronchus and includes the posterior branch of the left pulmonary artery. 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 At the time of admission, patient still had a PPS score 60, alert and oriented. Patient elected at the time not to proceed with any type of surgery, chemotherapy or radiation. Patient was here last week for respite and was showing some decline but stable to transition home. Now patient having increased restlessness and SOB.  Attempted to manage in the home with SL medications but not effective so patient transferred to [facility] for [facility] care. In addition, there are young grandchildren in the home and daughter/patient did not want to die in the home with the grandchildren present    The patient's principle diagnosis has resulted in sob, restlessness, agitation   Refer to LCD     Functionally, the patient's Karnofsky and/or Palliative Performance Scale has declined over a period of days-weeks and is estimated at 10. The patient is dependent on the following ADLs:all    Objective information that support this patients limited prognosis includes: see above. The patient/family chose comfort measures with the support of Hospice. HOSPICE DIAGNOSES   Active Symptoms:  1. Restlessness with agitation  2. SOB  3. Met lung cancer  4. Hospice care     PLAN   Patient admitted GIP LOC as he needs frequent nursing assessments, medication adjustments as meds in the home not effective to manage SOB and restlessness. IV started  Comfort meds prn via IV for now but anticipate scheduled meds by the end of the day  Plan reviewed with bedside nursing team  Called patient's daughter and left her a message     and SW to support family needs  Disposition: death at [address] of care was reviewed in detail and agree with current plan of care    Prognosis estimated based on 03/29/23 clinical assessment is:   [x] Hours to Days    [] Days to Weeks    [] Other:    Communicated plan of care with: Hospice Case Manager; Hospice IDT; Care Team     GOALS OF CARE     Patient/Medical POA stated Goal of Care: hospice care    [x] I have reviewed and/or updated ACP information in the Advance Care Planning Navigator. This information is available in the [facility] [address] link in the patient's chart header.     Primary Decision Maker [facility] Agent):   Primary Decision Maker: Rona Oropeza - Daughter - [phone]    Resuscitation Status: DNR  If DNR is there a Durable DNR on file? : [x] Yes [] No (If no, complete Durable DNR)    HISTORY     History obtained from: chart, home team    CHIEF COMPLAINT: restless  The patient is:   [] Verbal  [x] Nonverbal  [] Unresponsive    HPI/SUBJECTIVE:  Patient is nonverbal but restless earlier. REVIEW OF SYSTEMS     The following systems were: [] reviewed  [x] unable to be reviewed    Positive ROS include:  Constitutional: fatigue, weakness, in pain, short of breath  Ears/nose/mouth/throat: increased airway secretions  Respiratory:shortness of breath, wheezing  Gastrointestinal:poor appetite, nausea, vomiting, abdominal pain, constipation, diarrhea  Musculoskeletal:pain, deformities, swelling legs  Neurologic:confusion, hallucinations, weakness  Psychiatric:anxiety, feeling depressed, poor sleep  Endocrine:     Adult Non-Verbal Pain Assessment Score: 4    Face  [] 0   No particular expression or smile  [x] 1   Occasional grimace, tearing, frowning, wrinkled forehead  [] 2   Frequent grimace, tearing, frowning, wrinkled forehead    Activity (movement)  [] 0   Lying quietly, normal position  [] 1   Seeking attention through movement or slow, cautious movement  [x] 2   Restless, excessive activity and/or withdrawal reflexes    Guarding  [x] 0   Lying quietly, no positioning of hands over areas of body  [] 1   Splinting areas of the body, tense  [] 2   Rigid, stiff    Physiology (vital signs)  [x] 0   Stable vital signs  [] 1   Change in any of the following: SBP > 20mm Hg; HR > 20/minute  [] 2   Change in any of the following: SBP > 30mm Hg; HR > 25/minute    Respiratory  [] 0   Baseline RR/SpO2, compliant with ventilator  [x] 1   RR > 10 above baseline, or 5% drop SpO2, mild asynchrony with ventilator  [] 2   RR > 20 above baseline, or 10% drop SpO2, asynchrony with ventilator     FUNCTIONAL ASSESSMENT     Palliative Performance Scale (PPS):10       PSYCHOSOCIAL/SPIRITUAL ASSESSMENT     Active Problems:    * No active hospital problems.  *    Past Medical History:   Diagnosis Date Abnormal x-ray of thoracic spine 2022    sclerotic lytic 2.6 cm lesion involving the inferior T12 vertebra    Benign essential HTN     Bladder cancer ([facility] [address] ?    s/p BCGx2, resection. low grade. in [facility]. now Dr. Claudia Rocha. cystoscopy 2020 clear    CAD (coronary artery disease) 2021    Dr Chemo Cabrera. Cath 21 Successful bifurcation stenting of OM1 and AV groove Lcx with 2 stent couloette technique    Closed left arm fracture     GERD (gastroesophageal reflux disease)     with hiatal hernia    Glaucoma     Dr. Abhijeet Carpenter    Hemorrhoids     Hilar mass 2022    Dr. Venus Harris pulmonary. Left hilar 5 cm infiltrative mass    HTN (hypertension)     Hypercholesterolemia     Microalbuminuria due to type 2 diabetes mellitus (HCC)     Prostate cancer (HCC)     Dr. Claudia Rocha. s/p prostatectomy 2000 in [facility]. PSA \"0.4\" since then    Rosacea     Trigeminal neuralgia of right side of face     Type 2 diabetes mellitus with microalbuminuria ([facility] [address]     Vertigo       Past Surgical History:   Procedure Laterality Date    HX COLONOSCOPY  ? reports 2 colonoscopies    HX HEART CATHETERIZATION  2020    Successful bifurcation stenting of OM1 and AV groove Lcx with 2 stent couloette technique    HX HEART CATHETERIZATION  2020    Multivessel CAD. LCX/OM1 lesions, severe with significant calcification. Normal LVEDP. Unsuccessful PCI attempt with POBA due to calcification. HX OTHER SURGICAL  2020    bladder biopsy    HX PROSTATECTOMY  2000      Social History     Tobacco Use    Smoking status: Former     Types: Cigarettes     Quit date: 1962     Years since quittin.2    Smokeless tobacco: Never   Substance Use Topics    Alcohol use:  Yes     Alcohol/week: 5.0 standard drinks     Types: 5 Glasses of wine per week     Family History   Problem Relation Age of Onset    Cancer Mother         breast    Cancer Father         prostate    Cancer Sister         breast    Cancer Brother         prostate      Allergies   Allergen Reactions    Lisinopril Cough      Current Facility-Administered Medications   Medication Dose Route Frequency    bisacodyL (DULCOLAX) suppository 10 mg  10 mg Rectal DAILY PRN    LORazepam (ATIVAN) injection 1 mg  1 mg IntraVENous Q15MIN PRN    morphine injection 2 mg  2 mg IntraVENous Q15MIN PRN    ketorolac (TORADOL) injection 30 mg  30 mg IntraVENous Q6H PRN    glycopyrrolate (ROBINUL) injection 0.2 mg  0.2 mg IntraVENous Q4H PRN        PHYSICAL EXAM     Wt Readings from Last 3 Encounters:   02/07/23 63.3 kg (139 lb 9.6 oz)   12/14/22 65.3 kg (144 lb)   11/28/22 65.6 kg (144 lb 9.6 oz)       Visit Vitals  Pulse 70   Temp 97 °F (36.1 °C)   Resp 17   SpO2 99%       Supplemental O2  [x] Yes  [] NO  Last bowel movement:     Currently this patient has:  [x] Peripheral IV [] PICC  [] PORT [] ICD    [] Stoner Catheter [] NG Tube   [] PEG Tube    [] Rectal Tube [] Drain  [] Other:     Constitutional:ill appearing, ashen, minimally responsive but restless  Eyes: closed  ENMT: slightly dry oral mucosa  Cardiovascular: slightly tachycardic  Respiratory: decreased on the left, slight increased WOB  Gastrointestinal: thin, soft  Musculoskeletal:muscle wasting  Skin:cool extremities with mottling  Neurologic:minimally responsive  Psychiatric: restless  Other:       Pertinent Lab and or Imaging Tests:  Lab Results   Component Value Date/Time    Sodium 138 02/07/2023 12:32 PM    Potassium 4.6 02/07/2023 12:32 PM    Chloride 106 02/07/2023 12:32 PM    CO2 27 02/07/2023 12:32 PM    Anion gap 5 02/07/2023 12:32 PM    Glucose 248 (H) 02/07/2023 12:32 PM    BUN 24 (H) 02/07/2023 12:32 PM    Creatinine 1.23 02/07/2023 12:32 PM    BUN/Creatinine ratio 20 02/07/2023 12:32 PM    GFR est AA >60 08/02/2022 12:02 PM    GFR est non-AA >60 08/02/2022 12:02 PM    Calcium 9.6 02/07/2023 12:32 PM     Lab Results   Component Value Date/Time    Protein, total 6.5 02/07/2023 12:32 PM    Albumin 3.4 (L) 02/07/2023 12:32 PM           Total time:   Counseling / coordination time:   > 50% counseling / coordination?:

## 2023-03-29 NOTE — HOSPICE
routine visit/covid test.  On arrival patient laying in bed with noted agitation and  generalized restlessness. Caregiver Blaire Ramirez and VICENTE Emily present during visit. As ordered by Beola Pod, NP additional dose of lorazepam 1mg and morphine 10 mg administered (1045). Beola Pod also ordered to discontinue all daily scheduled medications as patient is unable to effectively swallow medications. covid test complete and negative  for patient to be admitted to [facility]. Confirmation of covid test emailed. Transport to arrive at [facility]. DNR in place for transport. 1055 report called to BARRY Guerra at [facility]. Mari requested to leave medications at home. Discussed [facility] and EOL with caregiver.  home undecided at this time. advised caregiver if patient is to become restless during transport , give another dose of lorazepam and morphine then call [phone] to advise; verbalized uderstanding.

## 2023-03-29 NOTE — PROGRESS NOTES
Problem: Falls - Risk of  Goal: *Absence of Falls  Description: Document Michele Pereira Fall Risk and appropriate interventions in the flowsheet. Outcome: Progressing Towards Goal  Note: Fall Risk Interventions:                                Problem: Patient Education: Go to Patient Education Activity  Goal: Patient/Family Education  Outcome: Progressing Towards Goal     Problem: Pressure Injury - Risk of  Goal: *Prevention of pressure injury  Description: Document Max Scale and appropriate interventions in the flowsheet.   Outcome: Progressing Towards Goal  Note: Pressure Injury Interventions:  Sensory Interventions: Assess changes in LOC, Avoid rigorous massage over bony prominences, Check visual cues for pain, Float heels, Pressure redistribution bed/mattress (bed type), Monitor skin under medical devices, Keep linens dry and wrinkle-free, Minimize linen layers    Moisture Interventions: Internal/External urinary devices, Maintain skin hydration (lotion/cream), Absorbent underpads, Apply protective barrier, creams and emollients, Limit adult briefs, Minimize layers    Activity Interventions: Pressure redistribution bed/mattress(bed type)    Mobility Interventions: Float heels, Pressure redistribution bed/mattress (bed type)         Friction and Shear Interventions: Apply protective barrier, creams and emollients, Feet elevated on foot rest, Lift sheet, Minimize layers                Problem: Patient Education: Go to Patient Education Activity  Goal: Patient/Family Education  Outcome: Progressing Towards Goal     Problem: Hospice Orientation  Goal: Demonstrate understanding of hospice philosophy, plan of care, and home hospice program  Description: The patient/family/caregiver will demonstrate understanding of hospice philosophy, plan of care and the home hospice program as evidenced by participation in meeting the patient's psychosocial, spiritual, medical, and physical needs inclusive of medical supplies/equipment focusing on symptoms. Outcome: Progressing Towards Goal     Problem: Potential for Alteration in Skin Integrity  Goal: Monitor skin for areas of alteration in skin integrity  Description: Patient/family/caregiver will demonstrate ability to care for patient's skin, monitor for areas of breakdown, and demonstrate methods to prevent breakdown during hospice care. Outcome: Progressing Towards Goal     Problem: Risk for Falls  Goal: Free of falls during inpatient stay  Description: Patient will be free of falls during inpatient stay. Outcome: Progressing Towards Goal     Problem: Alteration in Mobility  Goal: Remain as independent as possible and remain safe in environment  Description: Patient will remain as independent as possible and remain safe in their environment. Outcome: Progressing Towards Goal     Problem: Pain  Goal: Assess satisfaction of level of comfort and symptom control  Outcome: Progressing Towards Goal  Goal: *Control of acute pain  Outcome: Progressing Towards Goal     Problem: Anxiety/Agitation  Goal: Verbalize or staff assess the ability to manage anxiety  Description: The patient/family/caregiver will verbalize and demonstrate ability to manage the patient's anxiety throughout hospice care. Outcome: Progressing Towards Goal     Problem: Communication Deficit  Goal: Effectively communicate symptoms, needs, and concerns  Description: Patient/family/caregiver will effectively communicate symptoms, needs and concerns.   Outcome: Progressing Towards Goal     Problem: Breathing Pattern - Ineffective  Goal: *Use of effective breathing techniques  Outcome: Progressing Towards Goal     Problem: Pressure Injury - Risk of  Goal: *Prevention of pressure injury  Outcome: Progressing Towards Goal  Note: Pressure Injury Interventions:  Sensory Interventions: Assess changes in LOC, Avoid rigorous massage over bony prominences, Check visual cues for pain, Float heels, Pressure redistribution bed/mattress (bed type), Monitor skin under medical devices, Keep linens dry and wrinkle-free, Minimize linen layers    Moisture Interventions: Internal/External urinary devices, Maintain skin hydration (lotion/cream), Absorbent underpads, Apply protective barrier, creams and emollients, Limit adult briefs, Minimize layers    Activity Interventions: Pressure redistribution bed/mattress(bed type)    Mobility Interventions: Float heels, Pressure redistribution bed/mattress (bed type)         Friction and Shear Interventions: Apply protective barrier, creams and emollients, Feet elevated on foot rest, Lift sheet, Minimize layers                Problem: Infection - Risk of, Urinary Catheter-Associated Urinary Tract Infection  Goal: *Absence of infection signs and symptoms  Outcome: Progressing Towards Goal     Problem: End of Life Process  Goal: Demonstrate understanding of end of life processes  Description: Patient/caregiver will understand end of life processes.   Outcome: Progressing Towards Goal

## 2023-03-29 NOTE — HOSPICE
SW visit upon pt's respite admission to [facility]. Pt awake but appeared fatigued and presented with confusion which is atypical for pt. Emotional support provided as pt appeared anxious in new environment. MSW to follow up with daughter, Singh Montenegro to provide support and share caregiver resources.

## 2023-03-30 NOTE — PROGRESS NOTES
[address] [facility] Those in Need  [phone]    Patient Name: Macario Miles  YOB: 1929    Date of Provider Hospice Visit: 03/30/23    Level of Care:   [x] General Inpatient (GIP)    [] Routine   [] Respite    Current Location of Care:  [] [facility] [] [facility] [] [facility] [] [facility] [x] Hospice House [facility], patient referred from:  [] [facility] [] [facility] [] [facility] [] [facility] [x] Home [] Other:         Principle Hospice Diagnosis: Metastatic lung cancer  Diagnoses RELATED to the terminal prognosis:   Other Diagnoses:      Marco Antonio Sneed is a 80y.o. year old who was admitted to [facility]. Patient is a 29-year-old male admitted to hospice secondary to metastatic lung cancer. Patient with metastatic disease to the liver and lung. He does have a history of trigeminal neuralgia and has been on chronic gabapentin. Patient sent to the [facility] hospice house for respite level care secondary to caregiver exhaustion. Patient has been cared for by his daughter. In reviewing the chart and discussed with the home team, patient apparently is been having significant decline with increasing weakness, poor p.o. intake, dyspnea/chest pain with any type of exertion. He has been requiring as needed doses of his symptom relief kit to include morphine. Imaging that I can see from September 2022 shows left hilar infiltrative mass that significant narrows the left anterior bronchus and includes the posterior branch of the left pulmonary artery. 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 At the time of admission, patient still had a PPS score 60, alert and oriented. Patient elected at the time not to proceed with any type of surgery, chemotherapy or radiation. Patient was here last week for respite and was showing some decline but stable to transition home. Now patient having increased restlessness and SOB.  Attempted to manage in the home with SL medications but not effective so patient transferred to [facility] for [facility] care. In addition, there are young grandchildren in the home and daughter/patient did not want to die in the home with the grandchildren present    The patient's principle diagnosis has resulted in sob, restlessness, agitation   Refer to LCD     Functionally, the patient's Karnofsky and/or Palliative Performance Scale has declined over a period of days-weeks and is estimated at 10. The patient is dependent on the following ADLs:all    Objective information that support this patients limited prognosis includes: see above. The patient/family chose comfort measures with the support of Hospice. HOSPICE DIAGNOSES   Active Symptoms:  1. Restlessness with agitation  2. SOB  3. Met lung cancer  4. Hospice care     PLAN   Patient will continue [facility] level care as he needs frequent nursing assessments, IV medication management as sublingual medication not effective in the home setting, and patient clearly transitioning towards end-of-life. In reviewing the chart, patient had 6 as needed doses of Ativan and 5 as needed doses of morphine overnight. Continues to show evidence of restlessness as he has been trying to crawl out of bed, shortness of breath so we will make adjustments and schedule medication. .   Morphine 4 mg IV every 4 hours scheduled every 15 minutes as needed for pain and shortness of breath  Ativan 2 mg IV every 4 hours scheduled every 15 minutes as needed for restlessness  Comfort meds for all other symptoms  Plan reviewed with bedside nursing team  Was able to spend time with patient's daughter at the bedside. Appreciate Tracy- talking with patient's daughter as well. Reviewed current plan of care, medications, prognosis. Patient's grandchildren were able to visit some last night and she states it went better than expected.   Continue to provide support in a difficult situation but patient's daughter is very thankful for the hospice team and certainly the hospice house at the end of life.  and SW to support family needs  Disposition: death at [address] of care was reviewed in detail and agree with current plan of care    Prognosis estimated based on 03/30/23 clinical assessment is:   [x] Hours to Days    [] Days to Weeks    [] Other:    Communicated plan of care with: Hospice Case Manager; Hospice IDT; Care Team     GOALS OF CARE     Patient/Medical POA stated Goal of Care: hospice care    [x] I have reviewed and/or updated ACP information in the Advance Care Planning Navigator. This information is available in the [address] link in the patient's chart header. Primary Decision Maker [facility] Agent):   Primary Decision Maker: Lyndsey Stevenson - Daughter - [phone]    Resuscitation Status: DNR  If DNR is there a Durable DNR on file? : [x] Yes [] No (If no, complete Durable DNR)    HISTORY     History obtained from: chart, home team    CHIEF COMPLAINT: restless  The patient is:   [] Verbal  [x] Nonverbal  [] Unresponsive    HPI/SUBJECTIVE:  Patient is nonverbal but restless earlier. 3/30-patient appears more comfortable now. He did receive medication that we talked about prior to my visit. He was showing evidence of restlessness, getting out of the bed, shortness of breath last night in reviewing the nurses notes.        REVIEW OF SYSTEMS     The following systems were: [] reviewed  [x] unable to be reviewed    Positive ROS include:  Constitutional: fatigue, weakness, in pain, short of breath  Ears/nose/mouth/throat: increased airway secretions  Respiratory:shortness of breath, wheezing  Gastrointestinal:poor appetite, nausea, vomiting, abdominal pain, constipation, diarrhea  Musculoskeletal:pain, deformities, swelling legs  Neurologic:confusion, hallucinations, weakness  Psychiatric:anxiety, feeling depressed, poor sleep  Endocrine:     Adult Non-Verbal Pain Assessment Score: 4-prior medication    Face  [] 0   No particular expression or smile  [x] 1   Occasional grimace, tearing, frowning, wrinkled forehead  [] 2   Frequent grimace, tearing, frowning, wrinkled forehead    Activity (movement)  [] 0   Lying quietly, normal position  [] 1   Seeking attention through movement or slow, cautious movement  [x] 2   Restless, excessive activity and/or withdrawal reflexes    Guarding  [x] 0   Lying quietly, no positioning of hands over areas of body  [] 1   Splinting areas of the body, tense  [] 2   Rigid, stiff    Physiology (vital signs)  [x] 0   Stable vital signs  [] 1   Change in any of the following: SBP > 20mm Hg; HR > 20/minute  [] 2   Change in any of the following: SBP > 30mm Hg; HR > 25/minute    Respiratory  [] 0   Baseline RR/SpO2, compliant with ventilator  [x] 1   RR > 10 above baseline, or 5% drop SpO2, mild asynchrony with ventilator  [] 2   RR > 20 above baseline, or 10% drop SpO2, asynchrony with ventilator     FUNCTIONAL ASSESSMENT     Palliative Performance Scale (PPS):10       PSYCHOSOCIAL/SPIRITUAL ASSESSMENT     Active Problems:    * No active hospital problems. *    Past Medical History:   Diagnosis Date    Abnormal x-ray of thoracic spine 09/2022    sclerotic lytic 2.6 cm lesion involving the inferior T12 vertebra    Benign essential HTN     Bladder cancer ([address] 2013?    s/p BCGx2, resection. low grade. in [facility]. now Dr. Antoine Ch. cystoscopy 6/2020 clear    CAD (coronary artery disease) 09/2021    Dr Jeri Anand. Cath 9/24/21 Successful bifurcation stenting of OM1 and AV groove Lcx with 2 stent couloette technique    Closed left arm fracture     GERD (gastroesophageal reflux disease)     with hiatal hernia    Glaucoma     Dr. Henry Padilla    Hemorrhoids     Hilar mass 09/28/2022    Dr. Barney Schwab pulmonary. Left hilar 5 cm infiltrative mass    HTN (hypertension)     Hypercholesterolemia     Microalbuminuria due to type 2 diabetes mellitus (HCC)     Prostate cancer (HCC) 2000    Dr. Antoine Ch.   s/p prostatectomy 2000 in [facility]. PSA \"0.4\" since then    Rosacea     Trigeminal neuralgia of right side of face     Type 2 diabetes mellitus with microalbuminuria ([address]     Vertigo       Past Surgical History:   Procedure Laterality Date    HX COLONOSCOPY  ? reports 2 colonoscopies    HX HEART CATHETERIZATION  2020    Successful bifurcation stenting of OM1 and AV groove Lcx with 2 stent couloette technique    HX HEART CATHETERIZATION  2020    Multivessel CAD. LCX/OM1 lesions, severe with significant calcification. Normal LVEDP. Unsuccessful PCI attempt with POBA due to calcification. HX OTHER SURGICAL  2020    bladder biopsy    HX PROSTATECTOMY  2000      Social History     Tobacco Use    Smoking status: Former     Types: Cigarettes     Quit date: 1962     Years since quittin.2    Smokeless tobacco: Never   Substance Use Topics    Alcohol use:  Yes     Alcohol/week: 5.0 standard drinks     Types: 5 Glasses of wine per week     Family History   Problem Relation Age of Onset    Cancer Mother         breast    Cancer Father         prostate    Cancer Sister         breast    Cancer Brother         prostate      Allergies   Allergen Reactions    Lisinopril Cough      Current Facility-Administered Medications   Medication Dose Route Frequency    LORazepam (ATIVAN) injection 2 mg  2 mg IntraVENous Q15MIN PRN    morphine injection 4 mg  4 mg IntraVENous Q15MIN PRN    LORazepam (ATIVAN) injection 2 mg  2 mg IntraVENous Q4H    morphine injection 4 mg  4 mg IntraVENous Q4H    bisacodyL (DULCOLAX) suppository 10 mg  10 mg Rectal DAILY PRN    ketorolac (TORADOL) injection 30 mg  30 mg IntraVENous Q6H PRN    glycopyrrolate (ROBINUL) injection 0.2 mg  0.2 mg IntraVENous Q4H PRN        PHYSICAL EXAM     Wt Readings from Last 3 Encounters:   23 63.3 kg (139 lb 9.6 oz)   22 65.3 kg (144 lb)   22 65.6 kg (144 lb 9.6 oz)       Visit Vitals  BP (!) 151/80   Pulse 76   Temp 97.3 °F (36.3 °C)   Resp 16   SpO2 98%       Supplemental O2  [] Yes  [x] NO  Last bowel movement:     Currently this patient has:  [x] Peripheral IV [] PICC  [] PORT [] ICD    [] Stoner Catheter [] NG Tube   [] PEG Tube    [] Rectal Tube [] Drain  [] Other:     Constitutional:ill appearing, ashen, calm right now-received medication  Eyes: closed  ENMT: slightly dry oral mucosa  Cardiovascular: slightly tachycardic  Respiratory: decreased on the left, no significant work of breathing this morning, no accessory muscle use after medication, minimal secretions  Gastrointestinal: thin, soft  Musculoskeletal:muscle wasting  Skin:cool extremities with mottling  Neurologic:minimally responsive  Psychiatric: restless prior to medication  Other:       Pertinent Lab and or Imaging Tests:  Lab Results   Component Value Date/Time    Sodium 138 02/07/2023 12:32 PM    Potassium 4.6 02/07/2023 12:32 PM    Chloride 106 02/07/2023 12:32 PM    CO2 27 02/07/2023 12:32 PM    Anion gap 5 02/07/2023 12:32 PM    Glucose 248 (H) 02/07/2023 12:32 PM    BUN 24 (H) 02/07/2023 12:32 PM    Creatinine 1.23 02/07/2023 12:32 PM    BUN/Creatinine ratio 20 02/07/2023 12:32 PM    GFR est AA >60 08/02/2022 12:02 PM    GFR est non-AA >60 08/02/2022 12:02 PM    Calcium 9.6 02/07/2023 12:32 PM     Lab Results   Component Value Date/Time    Protein, total 6.5 02/07/2023 12:32 PM    Albumin 3.4 (L) 02/07/2023 12:32 PM           Total time:   Counseling / coordination time:   > 50% counseling / coordination?:

## 2023-03-30 NOTE — HOSPICE
1900 Report received from [facility] Patient resting with eyes closed, RN assessment completed. Respirations unlabored. Right extremities cooler than left. 2030 Patient resting with eyes closed, respiration even and unlabored. 2130 Patient resting with eyes closed and neutral facial expression. 2150 Patient restless, trying to get up, pulling at his vaca and blankets. Administered prn IV morphine and lorazepam.  2210 Patient continues to try to get up and pulling at his vaca and blankets, administered prn IV lorazepam.  2230 Patient no longer restless, resting quietly with eyes closed. 2300 Patient resting quietly with eyes closed and neutral facial expression. 0000 Patient resting quietly with eyes closed, respirations even and unlabored. 0100 Patient grimacing, shaking on the railing, has pulled off his covers and stat lock. Administered prn IV lorazepam and dilaudid. 0120 Patient rest quietly with eyes closed, no longer restless. 0200 Patient resting quietly with eyes closed and neutral facial expression. 4281 Patient resting with eyes closed, has pulled off his blanket but otherwise calm. 0340 Patient grimacing, restless, pulling at his vaca, pulling off his oxygen. Administered prn IV lorazepam and morphine. 0405 Patient no longer restless, facial expression relaxed. 0430 Patient resting quietly on his right side, respirations shallow. 0530 Patient resting with eyes closed and neutral facial expression. 0592 Patient received bath by CNA, restless and grimacing. Administered prn lorazepam and morphine. 7128 Patient resting quietly on his left side with eyes closed and neutral facial expression.   0700 Report given to BARRY Suarez.      NAME OF PATIENT:  Belkys Arnold    LEVEL OF CARE:  GIP    REASON FOR GIP:   Pain, despite numerous changes in medications, Terminal agitation, despite changes to medications, and Medication adjustment that must be monitored 24/7    *PATIENT REMAINS ELIGIBLE FOR GIP LEVEL OF CARE AS EVIDENCED BY: (MUST BE ADDRESSED OF PATIENT GIP)      REASON FOR RESPITE:  N/a    O2 SAFETY:  N/a    FALL INTERVENTIONS PROVIDED:   Implemented/recommended resources for alarm system (personal alarm, bed alarm, call bell, etc.)  and Implemented/recommended environmental changes (remove hazards, lower bed, improve lighting, etc.)    INTERDISPLINARY COMMUNICATION/COLLABORATION:  Physician, MAUREEN, Miguelito Potter, and RN, CNA    NEW MEDICATION INITIATION DOCUMENTATION:  N/a    Reason medication is being initiated:  n/a    MD / Provider name consulted re: change in status / initiation of new medication:  n/a    New Symptom(s):  n/a    New Order(s):  n/a    Name of the person notified of the changes:  n/a    Name of person being taught:  n/a    Instructions given:  n/a    Side Effects taught:  n/a    Response to teaching:  n/a      COMFORTABLE DYING MEASURE:  Is Patient/family satisfied with symptom level?  yes    DISCHARGE PLAN:  Patient nearing end of life and likely to pass at [facility]. Should he stabilize will return home.

## 2023-03-30 NOTE — PROGRESS NOTES
0700: Report received from [facility], [facility]. Patient resting in bed with eyes closed, appears sleeping  0730: Patient assessment complete. Patient becomes agitated with tactile stimulation, turning away,but returned to sleep when care complete. Patient resting quietly upon completion of assessment. 0800: Patient is resting quietly in bed with eyes closed, appears sleeping. 46: Rounds with Dr. Amelie Resendiz, patient remains GIP level of care for management of agitation. Orders obtained to schedule lorazepam 2mg q 4 hours, increase prn lorazepam to 2mg q 15 min, scheduled morphine 4mg q 4 hours and increase PRN to 4mg q 15 min. 0935: Patient restless in bed, throwing off bedding, rolling around in bed. PRN lorazepam 2mg and morphine 4mg IV administered. 1010: Patient is resting quietly in bed,  Patient's daughter at the bedside, she is in agreement with leaving supplemental oxygen off if it is causing irritation. Discussed her children coming last night and she said that it helped them to see that he is comfortable and not in distress here vs agitated and restless when he was at home. 1100: Patient remains resting in bed, has moved himself and bedding around, but resting quietly at this time   1200: Patient remains resting with eyes closed, daughter at the bedside, no needs at his time   1300: Patient resting in bed, has moved his upper body around in bed, but per daughter, did not appear restless when doing so, just repositioning  1330: Patient's daughter leaving for the afternoon. She expressed her appreciation for the care that we are giving, and seeing her father resting. Patient is sleeping at this time   1400:Patient resting in bed with eyes closed, appears sleeping. Neutral facial expression observed. 1500: Patient is resting quietly with eyes closed, appears sleeping. Neutral facial position observed.  Respirations are unlabored  1600: Patient is resting quietly with eyes closed,neutral facial position observed. 1645: Patient is resting quietly with eyes closed, appears sleeping. Neutral facial position observed. Respirations are regular depth and rhythm. 1730: Patient repositioned with pillows for offloading, patient woke up and was agitated with movement, but returned to sleep when care complete. Stoner catheter bag emptied 300ml output. 1805:  Patient restless in bed, pulling at bedding,moving around in bed. Scheduled morphine 4mg and lorazepam 4mg IV administered.  Patient repositioned to a position of comfort   1900: Report given to [place], [address]

## 2023-03-30 NOTE — PROGRESS NOTES
LCSW met with pt and daughter at bedside. Daughter  had been pt's caregiver in the home, but pt declined this past weekend and came in for GIP LOC. Daughter has 5 children who are close with pt and did all visit last night. She reports that they did well and seemed to be comforted by the fact that pt is peaceful and well cared for. She shared some life review and that her mother/pt's wife passed 10 years ago. LCSW offered legacy project and took pt's [facility]. Also confirmed FH will be [employer] [facility].     MAUREEN Hernandez, [facility]   [phone]

## 2023-03-30 NOTE — PROGRESS NOTES
Problem: Falls - Risk of  Goal: *Absence of Falls  Description: Document Isaias Ratliff Fall Risk and appropriate interventions in the flowsheet. Outcome: Progressing Towards Goal  Note: Fall Risk Interventions:                                Problem: Pressure Injury - Risk of  Goal: *Prevention of pressure injury  Description: Document Max Scale and appropriate interventions in the flowsheet. Outcome: Progressing Towards Goal  Note: Pressure Injury Interventions:  Sensory Interventions: Assess changes in LOC, Check visual cues for pain, Float heels, Keep linens dry and wrinkle-free, Minimize linen layers    Moisture Interventions: Absorbent underpads, Apply protective barrier, creams and emollients, Internal/External urinary devices, Maintain skin hydration (lotion/cream)    Activity Interventions: Pressure redistribution bed/mattress(bed type)    Mobility Interventions: Float heels, HOB 30 degrees or less, Pressure redistribution bed/mattress (bed type)    Nutrition Interventions:  (npo)    Friction and Shear Interventions: Apply protective barrier, creams and emollients, HOB 30 degrees or less, Lift sheet, Minimize layers                Problem: Hospice Orientation  Goal: Demonstrate understanding of hospice philosophy, plan of care, and home hospice program  Description: The patient/family/caregiver will demonstrate understanding of hospice philosophy, plan of care and the home hospice program as evidenced by participation in meeting the patient's psychosocial, spiritual, medical, and physical needs inclusive of medical supplies/equipment focusing on symptoms. Outcome: Progressing Towards Goal     Problem: Potential for Alteration in Skin Integrity  Goal: Monitor skin for areas of alteration in skin integrity  Description: Patient/family/caregiver will demonstrate ability to care for patient's skin, monitor for areas of breakdown, and demonstrate methods to prevent breakdown during hospice care.   Outcome: Progressing Towards Goal     Problem: Risk for Falls  Goal: Free of falls during inpatient stay  Description: Patient will be free of falls during inpatient stay. Outcome: Progressing Towards Goal     Problem: Pain  Goal: Assess satisfaction of level of comfort and symptom control  Outcome: Progressing Towards Goal     Problem: Anxiety/Agitation  Goal: Verbalize or staff assess the ability to manage anxiety  Description: The patient/family/caregiver will verbalize and demonstrate ability to manage the patient's anxiety throughout hospice care.   Outcome: Progressing Towards Goal     Problem: Breathing Pattern - Ineffective  Goal: *Use of effective breathing techniques  Outcome: Progressing Towards Goal     Problem: Pressure Injury - Risk of  Goal: *Prevention of pressure injury  Outcome: Progressing Towards Goal  Note: Pressure Injury Interventions:  Sensory Interventions: Assess changes in LOC, Check visual cues for pain, Float heels, Keep linens dry and wrinkle-free, Minimize linen layers    Moisture Interventions: Absorbent underpads, Apply protective barrier, creams and emollients, Internal/External urinary devices, Maintain skin hydration (lotion/cream)    Activity Interventions: Pressure redistribution bed/mattress(bed type)    Mobility Interventions: Float heels, HOB 30 degrees or less, Pressure redistribution bed/mattress (bed type)    Nutrition Interventions:  (npo)    Friction and Shear Interventions: Apply protective barrier, creams and emollients, HOB 30 degrees or less, Lift sheet, Minimize layers                Problem: Infection - Risk of, Urinary Catheter-Associated Urinary Tract Infection  Goal: *Absence of infection signs and symptoms  Outcome: Progressing Towards Goal

## 2023-03-31 NOTE — PROGRESS NOTES
Problem: Falls - Risk of  Goal: *Absence of Falls  Description: Document Saima Rodrigez Fall Risk and appropriate interventions in the flowsheet. Outcome: Progressing Towards Goal  Note: Fall Risk Interventions:                                Problem: Pressure Injury - Risk of  Goal: *Prevention of pressure injury  Description: Document Max Scale and appropriate interventions in the flowsheet. Outcome: Progressing Towards Goal  Note: Pressure Injury Interventions:  Sensory Interventions: Assess changes in LOC, Check visual cues for pain, Float heels, Keep linens dry and wrinkle-free, Minimize linen layers    Moisture Interventions: Absorbent underpads, Internal/External urinary devices, Maintain skin hydration (lotion/cream)    Activity Interventions: Pressure redistribution bed/mattress(bed type)    Mobility Interventions: Float heels, HOB 30 degrees or less, Pressure redistribution bed/mattress (bed type)    Nutrition Interventions:  (npo)    Friction and Shear Interventions: Apply protective barrier, creams and emollients, Foam dressings/transparent film/skin sealants, HOB 30 degrees or less, Lift sheet, Minimize layers                Problem: Pain  Goal: *Control of acute pain  Outcome: Progressing Towards Goal     Problem: Anxiety/Agitation  Goal: Verbalize or staff assess the ability to manage anxiety  Description: The patient/family/caregiver will verbalize and demonstrate ability to manage the patient's anxiety throughout hospice care.   Outcome: Progressing Towards Goal

## 2023-03-31 NOTE — HOSPICE
1900 Report received from Alvin, [address] Patient resting quietly with eyes closed and unlabored respirations. 2015 Patient resting with eyes closed, RN assessment completed. Respirations even and unlabored, neutral facial expression observed. 2150 New PIV inserted in patients right lower arm. Administered scheduled IV lorazepam and morphine. 2300 Patient resting with eyes closed, skin is pale and cool. 0000 Patient resting quietly with eyes closed, respirations even and unlabored. 0100 Patient resting with eyes closed and neutral facial expression. Respirations unlabored. 0200 Patient starting to become restless. Administered scheduled IV lorazepam and dilaudid.  0300 Patient resting with eyes closed, respirations shallow. 0400 Patient resting with eyes closed and neutral facial expression. 0500 Patient resting with eyes closed, respirations unlabored, snoring at times. 0569 Administered scheduled IV morphine and lorazepam. Patient started grabbing then quickly fell asleep.   0650 Patient resting quietly with eyes closed and unlabored respirations. 0700 Report given to oncoming shift.     NAME OF PATIENT:  Belkys Arnold    LEVEL OF CARE:  GIP    REASON FOR GIP:   Pain, despite numerous changes in medications, Terminal agitation, despite changes to medications, and Medication adjustment that must be monitored 24/7    *PATIENT REMAINS ELIGIBLE FOR GIP LEVEL OF CARE AS EVIDENCED BY: (MUST BE ADDRESSED OF PATIENT GIP)      REASON FOR RESPITE:  N/a    O2 SAFETY:  N/a    FALL INTERVENTIONS PROVIDED:   Implemented/recommended resources for alarm system (personal alarm, bed alarm, call bell, etc.)  and Implemented/recommended environmental changes (remove hazards, lower bed, improve lighting, etc.)    INTERDISPLINARY COMMUNICATION/COLLABORATION:  Physician, MAUREEN, Khai Fan, and RN, CNA    NEW MEDICATION INITIATION DOCUMENTATION:  N/a    Reason medication is being initiated:  n/a    MD / Provider name consulted re: change in status / initiation of new medication:  n/a    New Symptom(s):  n/a    New Order(s):  n/a    Name of the person notified of the changes:  n/a    Name of person being taught:  n/a    Instructions given:  n/a    Side Effects taught:  n/a    Response to teaching:  Yan Deb:  Is Patient/family satisfied with symptom level?  yes    DISCHARGE PLAN:  Patient nearing end of life and likely to pass at [facility].

## 2023-03-31 NOTE — PROGRESS NOTES
0700  Report received from Wilber P.OAletha [address]  Pt resting quietly with eyes closed, neutral facial expression, unlabored respirations. Assessment complete. Pt unresponsive. 0820  Unlabored respirations, neutral facial expression. [facility] with Chente Cleary NP. No new orders. 1000  Scheduled medications administered. 1100  Pt's daughter Andre Guerrero at bedside. Update and support provided. Ami tearful but appropriate, and appreciative of pt's care while at [facility]. Pt minimally responsive to Ami's voice. Congested cough/secretions noted. PRN Robinul administered. 1200  Neutral facial expression, unlabored respirations. 1300  Pt resting quietly. No indication of discomfort noted. 1400  Scheduled medications administered. Andre Guerrero returned to [facility]. Pt opened eyes and reached for her. 1500  Pt repositioned to left side. Minimally responsive, tolerated well. Andre Guerrero at bedside. 1600  Pt resting quietly with neutral facial expression and shallow, unlabored respirations. 1700  No indication of discomfort noted. Andre Guerrero previously left [facility], to return this evening or tomorrow. 1800 Scheduled medications administered. 1900  Report given to oncoming nurse. NAME OF PATIENT:  Darnell Hopkins    LEVEL OF CARE:  GIP    REASON FOR GIP:   Pain, despite numerous changes in medications, Terminal agitation, despite changes to medications, Medication adjustment that must be monitored 24/7, and Stabilizing treatment that cannot take place at home    *PATIENT REMAINS ELIGIBLE FOR GIP LEVEL OF CARE AS EVIDENCED BY: (MUST BE ADDRESSED OF PATIENT GIP)  Patient requires frequent nursing assessments and IV medications to manage symptoms.     REASON FOR RESPITE:  NA    O2 SAFETY:  NA    FALL INTERVENTIONS PROVIDED:   Implemented/recommended use of non-skid footwear, Implemented/recommended use of fall risk identification flag to all team members, Implemented/recommended assistive devices and encouraged their use, Implemented/recommended resources for alarm system (personal alarm, bed alarm, call bell, etc.) , Implemented/recommended environmental changes (remove hazards, lower bed, improve lighting, etc.), and Implemented/recommended increased supervision/assistance    INTERDISPLINARY COMMUNICATION/COLLABORATION:  Physician, MAUREEN, Joni Howard, and RN, TAMMY    NEW MEDICATION INITIATION DOCUMENTATION:  NA    Reason medication is being initiated:  NA    MD / Provider name consulted re: change in status / initiation of new medication:  NA    New Symptom(s):  NA    New Order(s):  NA    Name of the person notified of the changes:  NA    Name of person being taught:  NA    Instructions given:  NA    Side Effects taught:  NA    Response to teaching:  NA    COMFORTABLE DYING MEASURE:  Is Patient/family satisfied with symptom level?  yes    DISCHARGE PLAN:  End of life

## 2023-03-31 NOTE — PROGRESS NOTES
Problem: Falls - Risk of  Goal: *Absence of Falls  Description: Document Jose Carlos Juniorty Fall Risk and appropriate interventions in the flowsheet. Outcome: Progressing Towards Goal  Note: Fall Risk Interventions:                                Problem: Pressure Injury - Risk of  Goal: *Prevention of pressure injury  Description: Document Max Scale and appropriate interventions in the flowsheet. Outcome: Progressing Towards Goal  Note: Pressure Injury Interventions:  Sensory Interventions: Assess changes in LOC, Check visual cues for pain, Float heels, Keep linens dry and wrinkle-free, Minimize linen layers    Moisture Interventions: Absorbent underpads, Internal/External urinary devices, Maintain skin hydration (lotion/cream)    Activity Interventions: Pressure redistribution bed/mattress(bed type)    Mobility Interventions: Float heels, HOB 30 degrees or less, Pressure redistribution bed/mattress (bed type)    Nutrition Interventions:  (npo)    Friction and Shear Interventions: Apply protective barrier, creams and emollients, Foam dressings/transparent film/skin sealants, HOB 30 degrees or less, Lift sheet, Minimize layers                Problem: Hospice Orientation  Goal: Demonstrate understanding of hospice philosophy, plan of care, and home hospice program  Description: The patient/family/caregiver will demonstrate understanding of hospice philosophy, plan of care and the home hospice program as evidenced by participation in meeting the patient's psychosocial, spiritual, medical, and physical needs inclusive of medical supplies/equipment focusing on symptoms. Outcome: Progressing Towards Goal     Problem: Risk for Falls  Goal: Free of falls during inpatient stay  Description: Patient will be free of falls during inpatient stay.   Outcome: Progressing Towards Goal     Problem: Alteration in Mobility  Goal: Remain as independent as possible and remain safe in environment  Description: Patient will remain as independent as possible and remain safe in their environment. Outcome: Progressing Towards Goal     Problem: Pain  Goal: Assess satisfaction of level of comfort and symptom control  Outcome: Progressing Towards Goal     Problem: Anxiety/Agitation  Goal: Verbalize or staff assess the ability to manage anxiety  Description: The patient/family/caregiver will verbalize and demonstrate ability to manage the patient's anxiety throughout hospice care. Outcome: Progressing Towards Goal     Problem: Communication Deficit  Goal: Effectively communicate symptoms, needs, and concerns  Description: Patient/family/caregiver will effectively communicate symptoms, needs and concerns.   Outcome: Progressing Towards Goal     Problem: Breathing Pattern - Ineffective  Goal: *Use of effective breathing techniques  Outcome: Progressing Towards Goal     Problem: Pressure Injury - Risk of  Goal: *Prevention of pressure injury  Outcome: Progressing Towards Goal  Note: Pressure Injury Interventions:  Sensory Interventions: Assess changes in LOC, Check visual cues for pain, Float heels, Keep linens dry and wrinkle-free, Minimize linen layers    Moisture Interventions: Absorbent underpads, Internal/External urinary devices, Maintain skin hydration (lotion/cream)    Activity Interventions: Pressure redistribution bed/mattress(bed type)    Mobility Interventions: Float heels, HOB 30 degrees or less, Pressure redistribution bed/mattress (bed type)    Nutrition Interventions:  (npo)    Friction and Shear Interventions: Apply protective barrier, creams and emollients, Foam dressings/transparent film/skin sealants, HOB 30 degrees or less, Lift sheet, Minimize layers                Problem: Infection - Risk of, Urinary Catheter-Associated Urinary Tract Infection  Goal: *Absence of infection signs and symptoms  Outcome: Progressing Towards Goal

## 2023-03-31 NOTE — PROGRESS NOTES
[facility]  [phone]    Patient Name: Antonio Rivera  YOB: 1929    Date of Provider Hospice Visit: 03/31/23    Level of Care:   [x] General Inpatient (GIP)    [] Routine   [] Respite    Current Location of Care:  [] [facility] [] [facility] [] [address] [] [facility] [x] Hospice [facility], [place], patient referred from:  [] [facility] [] [facility] [] [address] [] [facility] [x] Home [] Other:         Principle Hospice Diagnosis: Metastatic lung cancer  Diagnoses RELATED to the terminal prognosis:   Other Diagnoses:      Landy Schlatter is a 80y.o. year old who was admitted to [facility]. Patient is a 77-year-old male admitted to hospice secondary to metastatic lung cancer. Patient with metastatic disease to the liver and lung. He does have a history of trigeminal neuralgia and has been on chronic gabapentin. Patient sent to the community hospice house for respite level care secondary to caregiver exhaustion. Patient has been cared for by his daughter. In reviewing the chart and discussed with the home team, patient apparently is been having significant decline with increasing weakness, poor p.o. intake, dyspnea/chest pain with any type of exertion. He has been requiring as needed doses of his symptom relief kit to include morphine. Imaging that I can see from September 2022 shows left hilar infiltrative mass that significant narrows the left anterior bronchus and includes the posterior branch of the left pulmonary artery. 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 At the time of admission, patient still had a PPS score 60, alert and oriented. Patient elected at the time not to proceed with any type of surgery, chemotherapy or radiation. Patient was here last week for respite and was showing some decline but stable to transition home. Now patient having increased restlessness and SOB.  Attempted to manage in the home with SL medications but not effective so patient transferred to [facility] for [facility] care. In addition, there are young grandchildren in the home and daughter/patient did not want to die in the home with the grandchildren present    The patient's principle diagnosis has resulted in sob, restlessness, agitation. Functionally, the patient's Karnofsky and/or Palliative Performance Scale has declined over a period of days-weeks and is estimated at 10. The patient is dependent on the following ADLs:all    Objective information that support this patients limited prognosis includes: see above. The patient/family chose comfort measures with the support of Hospice. HOSPICE DIAGNOSES   Active Symptoms:  1. Restlessness with agitation  2. SOB  3. Met lung cancer  4. Hospice care     PLAN   Continue GIP level of care as pt requires frequent nursing assessment and administration and titration of parenteral medications to manage symptom burden  Continue Morphine 4 mg IV every 4 hours scheduled every 15 minutes as needed for pain and shortness of breath  Continue Ativan 2 mg IV every 4 hours scheduled every 15 minutes as needed for restlessness  Comfort meds for all other symptoms  Plan reviewed with bedside nursing team  No family at bedside during provider rounds; will attempt to reach out to daughter Manav Valencia with update   and SW to support family needs  Disposition: death at [address] of care was reviewed in detail and agree with current plan of care    Prognosis estimated based on 03/31/23 clinical assessment is:   [x] Hours to Days    [] Days to Weeks    [] Other:    Communicated plan of care with: Hospice Case Manager; Hospice IDT; Care Team     GOALS OF CARE     Patient/Medical POA stated Goal of Care: hospice care    [x] I have reviewed and/or updated ACP information in the Advance Care Planning Navigator. This information is available in the [address] link in the patient's chart header.     Primary Decision [facility] Agent):   Primary Decision Maker: Ami Paul - Daughter - [phone]    Resuscitation Status: DNR  If DNR is there a Durable DNR on file? : [x] Yes [] No (If no, complete Durable DNR)    HISTORY     History obtained from: chart, home team    CHIEF COMPLAINT: unable to obtain  The patient is:   [] Verbal  [] Nonverbal  [x] Unresponsive    HPI/SUBJECTIVE:  Patient is nonverbal but restless earlier. 3/30-patient appears more comfortable now. He did receive medication that we talked about prior to my visit. He was showing evidence of restlessness, getting out of the bed, shortness of breath last night in reviewing the nurses notes. 3/31- No response to verbal greeting or gentle touch. Generalized pallor/muscle wasting. Respirations shallow.         REVIEW OF SYSTEMS     The following systems were: [] reviewed  [x] unable to be reviewed    Positive ROS include:  Constitutional: fatigue, weakness, in pain, short of breath  Ears/nose/mouth/throat: increased airway secretions  Respiratory:shortness of breath, wheezing  Gastrointestinal:poor appetite, nausea, vomiting, abdominal pain, constipation, diarrhea  Musculoskeletal:pain, deformities, swelling legs  Neurologic:confusion, hallucinations, weakness  Psychiatric:anxiety, feeling depressed, poor sleep  Endocrine:     Adult Non-Verbal Pain Assessment Score: 1    Face  [x] 0   No particular expression or smile  [] 1   Occasional grimace, tearing, frowning, wrinkled forehead  [] 2   Frequent grimace, tearing, frowning, wrinkled forehead    Activity (movement)  [x] 0   Lying quietly, normal position  [] 1   Seeking attention through movement or slow, cautious movement  [] 2   Restless, excessive activity and/or withdrawal reflexes    Guarding  [x] 0   Lying quietly, no positioning of hands over areas of body  [] 1   Splinting areas of the body, tense  [] 2   Rigid, stiff    Physiology (vital signs)  [x] 0   Stable vital signs  [] 1   Change in any of the following: SBP > 20mm Hg; HR > 20/minute  [] 2   Change in any of the following: SBP > 30mm Hg; HR > 25/minute    Respiratory  [] 0   Baseline RR/SpO2, compliant with ventilator  [x] 1   RR > 10 above baseline, or 5% drop SpO2, mild asynchrony with ventilator  [] 2   RR > 20 above baseline, or 10% drop SpO2, asynchrony with ventilator     FUNCTIONAL ASSESSMENT     Palliative Performance Scale (PPS):10       PSYCHOSOCIAL/SPIRITUAL ASSESSMENT     Active Problems:    * No active hospital problems. *    Past Medical History:   Diagnosis Date    Abnormal x-ray of thoracic spine 09/2022    sclerotic lytic 2.6 cm lesion involving the inferior T12 vertebra    Benign essential HTN     Bladder cancer ([facility] [address] 2013?    s/p BCGx2, resection. low grade. in [facility]. now Dr. Ayanna Gomez. cystoscopy 6/2020 clear    CAD (coronary artery disease) 09/2021    Dr Lory Rdz. Cath 9/24/21 Successful bifurcation stenting of OM1 and AV groove Lcx with 2 stent couloette technique    Closed left arm fracture     GERD (gastroesophageal reflux disease)     with hiatal hernia    Glaucoma     Dr. Ophelia Wolf    Hemorrhoids     Hilar mass 09/28/2022    Dr. Jojo Sullivan pulmonary. Left hilar 5 cm infiltrative mass    HTN (hypertension)     Hypercholesterolemia     Microalbuminuria due to type 2 diabetes mellitus (HCC)     Prostate cancer (HCC) 2000    Dr. Ayanna Gomez. s/p prostatectomy 1/2000 in [facility]. PSA \"0.4\" since then    Rosacea     Trigeminal neuralgia of right side of face     Type 2 diabetes mellitus with microalbuminuria ([facility] [address]     Vertigo       Past Surgical History:   Procedure Laterality Date    HX COLONOSCOPY  2005? reports 2 colonoscopies    HX HEART CATHETERIZATION  09/24/2020    Successful bifurcation stenting of OM1 and AV groove Lcx with 2 stent couloette technique    HX HEART CATHETERIZATION  09/08/2020    Multivessel CAD. LCX/OM1 lesions, severe with significant calcification. Normal LVEDP.   Unsuccessful PCI attempt with POBA due to calcification. HX OTHER SURGICAL  2020    bladder biopsy    HX PROSTATECTOMY  2000      Social History     Tobacco Use    Smoking status: Former     Types: Cigarettes     Quit date: 1962     Years since quittin.2    Smokeless tobacco: Never   Substance Use Topics    Alcohol use:  Yes     Alcohol/week: 5.0 standard drinks     Types: 5 Glasses of wine per week     Family History   Problem Relation Age of Onset    Cancer Mother         breast    Cancer Father         prostate    Cancer Sister         breast    Cancer Brother         prostate      Allergies   Allergen Reactions    Lisinopril Cough      Current Facility-Administered Medications   Medication Dose Route Frequency    LORazepam (ATIVAN) injection 2 mg  2 mg IntraVENous Q15MIN PRN    morphine injection 4 mg  4 mg IntraVENous Q15MIN PRN    LORazepam (ATIVAN) injection 2 mg  2 mg IntraVENous Q4H    morphine injection 4 mg  4 mg IntraVENous Q4H    bisacodyL (DULCOLAX) suppository 10 mg  10 mg Rectal DAILY PRN    ketorolac (TORADOL) injection 30 mg  30 mg IntraVENous Q6H PRN    glycopyrrolate (ROBINUL) injection 0.2 mg  0.2 mg IntraVENous Q4H PRN        PHYSICAL EXAM     Wt Readings from Last 3 Encounters:   23 63.3 kg (139 lb 9.6 oz)   22 65.3 kg (144 lb)   22 65.6 kg (144 lb 9.6 oz)       Visit Vitals  /60 (BP 1 Location: Right arm)   Pulse 68   Temp 97.1 °F (36.2 °C)   Resp 16   SpO2 92%       Supplemental O2  [] Yes  [x] NO  Last bowel movement:     Currently this patient has:  [x] Peripheral IV [] PICC  [] PORT [] ICD    [x] Stoner Catheter [] NG Tube   [] PEG Tube    [] Rectal Tube [] Drain  [] Other:     Constitutional:ill appearing, ashen  Eyes: closed  ENMT: slightly dry oral mucosa  Cardiovascular: regular; distal pulses weak  Respiratory: respirations shallow  Gastrointestinal: thin, soft  Musculoskeletal:muscle wasting  Skin:cool extremities with mottling  Neurologic: unresponsive  Psychiatric: appears calm  Other:       Pertinent Lab and or Imaging Tests:  Lab Results   Component Value Date/Time    Sodium 138 02/07/2023 12:32 PM    Potassium 4.6 02/07/2023 12:32 PM    Chloride 106 02/07/2023 12:32 PM    CO2 27 02/07/2023 12:32 PM    Anion gap 5 02/07/2023 12:32 PM    Glucose 248 (H) 02/07/2023 12:32 PM    BUN 24 (H) 02/07/2023 12:32 PM    Creatinine 1.23 02/07/2023 12:32 PM    BUN/Creatinine ratio 20 02/07/2023 12:32 PM    GFR est AA >60 08/02/2022 12:02 PM    GFR est non-AA >60 08/02/2022 12:02 PM    Calcium 9.6 02/07/2023 12:32 PM     Lab Results   Component Value Date/Time    Protein, total 6.5 02/07/2023 12:32 PM    Albumin 3.4 (L) 02/07/2023 12:32 PM           Lawrence THOMASON

## 2023-04-01 NOTE — HOSPICE
1900:  Report received from Partha Kimbrough 83:  Pt assessed. Unresponsive. Pale. Nonverbal. Mouth breathing. Coarse breath sounds. Tachycardic. Neutral facial expression. 2045:  Continues to rest quietly with eyes closed. Resp even and unlabored. Neutral facial expression. 2215:  Scheduled IV meds given for symptom management. Did grimace and move arm when IV flushed. 2300:  Appears to be sleeping. Mouth breathing. Neutral facial expression. 0021:  Lying in bed and resting quietly with eyes closed. Resp even and unlabored. Neutral facial expression. 0100:  Resp even and unlabored. Neutral facial expression. 0148:  Pt repositioned. Brow furrowed slightly. 0206:  Grimaced and pulled back arm when R IV site flushed. Discontinued. Scheduled IV meds given in LFA IV site. 0300:  Resting quietly with eyes closed. Resp shallow and unlabored. Neutral facial expression. 0400:  Appears to be asleep. Neutral facial expression. 0440:  CNA giving pt bath. Grimacing and moaning. PRN Morphine 4 mg given IV.  0525:  Pt lying quietly and resting with eyes closed. Resp even and shallow. Neutral facial expression. Med effective. 2nd IV site started. #20 in [place]:  Scheduled IV meds for symptom management. 5489:  Report given to oncoming nurse. NAME OF PATIENT:  Macario Miles    LEVEL OF CARE:  [facility]    REASON FOR GIP:   Pain, despite numerous changes in medications, Terminal agitation, despite changes to medications, Medication adjustment that must be monitored 24/7, and Stabilizing treatment that cannot take place at home    *PATIENT REMAINS ELIGIBLE FOR [facility] LEVEL OF CARE AS EVIDENCED BY: Is unable to safely take po medications. Requires IV medication for symptom management. Requires frequent  SN assessment and treatment of symptoms.        REASON FOR RESPITE:  N/A    O2 SAFETY:  N/A    FALL INTERVENTIONS PROVIDED:   Implemented/recommended resources for alarm system (personal alarm, bed alarm, call bell, etc.)     INTERDISPLINARY COMMUNICATION/COLLABORATION:  Physician, MAUREEN, Vik Guidry, and RN, CNA    NEW MEDICATION INITIATION DOCUMENTATION:  N/A    Reason medication is being initiated:  N/A    MD / Provider name consulted re: change in status / initiation of new medication:  N/A    New Symptom(s):  N/A    New Order(s):  N//A    Name of the person notified of the changes:  N/A    Name of person being taught:  N/A    Instructions given:  N/A    Side Effects taught:  N/A    Response to teaching:  N/A      COMFORTABLE DYING MEASURE:  Is Patient/family satisfied with symptom level?  yes    DISCHARGE PLAN:  probable EOL at [facility]. If symptoms managed will return home.

## 2023-04-01 NOTE — PROGRESS NOTES
Problem: Falls - Risk of  Goal: *Absence of Falls  Description: Document Randell Wood Fall Risk and appropriate interventions in the flowsheet. Outcome: Progressing Towards Goal  Note: Fall Risk Interventions:                                Problem: Pressure Injury - Risk of  Goal: *Prevention of pressure injury  Description: Document Max Scale and appropriate interventions in the flowsheet. Outcome: Progressing Towards Goal  Note: Pressure Injury Interventions:  Sensory Interventions: Check visual cues for pain, Assess changes in LOC, Minimize linen layers, Float heels    Moisture Interventions: Absorbent underpads, Apply protective barrier, creams and emollients, Internal/External urinary devices    Activity Interventions: Pressure redistribution bed/mattress(bed type)    Mobility Interventions: Float heels, HOB 30 degrees or less    Nutrition Interventions:  (npo)    Friction and Shear Interventions: Lift sheet, HOB 30 degrees or less, Apply protective barrier, creams and emollients, Minimize layers                Problem: Pain  Goal: *Control of acute pain  Outcome: Progressing Towards Goal     Problem: Anxiety/Agitation  Goal: Verbalize or staff assess the ability to manage anxiety  Description: The patient/family/caregiver will verbalize and demonstrate ability to manage the patient's anxiety throughout hospice care.   Outcome: Progressing Towards Goal

## 2023-04-01 NOTE — PROGRESS NOTES
Problem: Falls - Risk of  Goal: *Absence of Falls  Description: Document Anjelica Alejandro Fall Risk and appropriate interventions in the flowsheet. 4/1/2023 1947 by Niko Maier  Outcome: Progressing Towards Goal  Note: Fall Risk Interventions:                             4/1/2023 1946 by Niko Maier  Outcome: Progressing Towards Goal  Note: Fall Risk Interventions:                                Problem: Pressure Injury - Risk of  Goal: *Prevention of pressure injury  Description: Document Max Scale and appropriate interventions in the flowsheet. Outcome: Progressing Towards Goal  Note: Pressure Injury Interventions:  Sensory Interventions: Check visual cues for pain, Assess changes in LOC, Minimize linen layers, Float heels    Moisture Interventions: Absorbent underpads, Apply protective barrier, creams and emollients, Internal/External urinary devices    Activity Interventions: Pressure redistribution bed/mattress(bed type)    Mobility Interventions: Float heels, HOB 30 degrees or less    Nutrition Interventions:  (npo)    Friction and Shear Interventions: Lift sheet, HOB 30 degrees or less, Apply protective barrier, creams and emollients, Minimize layers                Problem: Potential for Alteration in Skin Integrity  Goal: Monitor skin for areas of alteration in skin integrity  Description: Patient/family/caregiver will demonstrate ability to care for patient's skin, monitor for areas of breakdown, and demonstrate methods to prevent breakdown during hospice care. Outcome: Progressing Towards Goal     Problem: Risk for Falls  Goal: Free of falls during inpatient stay  Description: Patient will be free of falls during inpatient stay. Outcome: Progressing Towards Goal     Problem: Alteration in Mobility  Goal: Remain as independent as possible and remain safe in environment  Description: Patient will remain as independent as possible and remain safe in their environment.   Outcome: Progressing Towards Goal     Problem: Pain  Goal: Assess satisfaction of level of comfort and symptom control  Outcome: Progressing Towards Goal  Goal: *Control of acute pain  Outcome: Progressing Towards Goal     Problem: Anxiety/Agitation  Goal: Verbalize or staff assess the ability to manage anxiety  Description: The patient/family/caregiver will verbalize and demonstrate ability to manage the patient's anxiety throughout hospice care.   Outcome: Progressing Towards Goal     Problem: Pressure Injury - Risk of  Goal: *Prevention of pressure injury  Outcome: Progressing Towards Goal  Note: Pressure Injury Interventions:  Sensory Interventions: Check visual cues for pain, Assess changes in LOC, Minimize linen layers, Float heels    Moisture Interventions: Absorbent underpads, Apply protective barrier, creams and emollients, Internal/External urinary devices    Activity Interventions: Pressure redistribution bed/mattress(bed type)    Mobility Interventions: Float heels, HOB 30 degrees or less    Nutrition Interventions:  (npo)    Friction and Shear Interventions: Lift sheet, HOB 30 degrees or less, Apply protective barrier, creams and emollients, Minimize layers

## 2023-04-01 NOTE — PROGRESS NOTES
0700  Report received from [place], [address]  Pt resting quietly with eyes closed, neutral facial expression, unlabored respirations. Unresponsive. 0830  Assessment complete. See flowsheets. 0930  Pt repositioned to right side. Tolerated well. Gown moist from perspiration, changed. Mouth care complete. 7527  Scheduled medications administered. 46  Pt's daughter Tay Jones arrived with pt's grandchildren. Update and support provided. 1015  Increased work of breathing noted. Pt repositioned back to left side. 200  Family left [facility]. Pt work of breathing improved. Audible pulmonary secretions noted. PRN Robinul administered. 1215  Pt temperature rechecked, 97.8. Resting quietly with eyes closed, neutral facial expression, unlabored respirations. Remains unresponsive. 1315  No indication of discomfort noted. 1350  Scheduled medications administered. [address] and family returned to visit. 1515  Neutral facial expression, unlabored respirations. 1615  Audible secretions noted. Respirations remain unlabored. 1630  PRN Robinul administered and pt repositioned to right side. 1730  Increased work of breathing noted, irregular respirations. Scheduled medications administered. 1830  Pt repositioned to left side. Tolerated well. 1900  Report given to oncoming nurse.       NAME OF PATIENT:  Pierce Cary    LEVEL OF CARE:  GIP    REASON FOR GIP:   Pain, despite numerous changes in medications, Terminal agitation, despite changes to medications, Medication adjustment that must be monitored 24/7, and Stabilizing treatment that cannot take place at home    *PATIENT REMAINS ELIGIBLE FOR GIP LEVEL OF CARE AS EVIDENCED BY: (MUST BE ADDRESSED OF PATIENT GIP)      REASON FOR RESPITE:  NA    O2 SAFETY:  NA    FALL INTERVENTIONS PROVIDED:   Implemented/recommended use of non-skid footwear, Implemented/recommended use of fall risk identification flag to all team members, Implemented/recommended assistive devices and encouraged their use, Implemented/recommended resources for alarm system (personal alarm, bed alarm, call bell, etc.) , Implemented/recommended environmental changes (remove hazards, lower bed, improve lighting, etc.), and Implemented/recommended increased supervision/assistance    INTERDISPLINARY COMMUNICATION/COLLABORATION:  Physician, MAUREEN, Dneia Zhu, and RN, CNA    NEW MEDICATION INITIATION DOCUMENTATION:  NA    Reason medication is being initiated:  NA    MD / Provider name consulted re: change in status / initiation of new medication:  NA    New Symptom(s):  NA    New Order(s):  NA    Name of the person notified of the changes:  NA    Name of person being taught:  NA    Instructions given:  NA    Side Effects taught:  NA    Response to teaching:  NA    COMFORTABLE DYING MEASURE:  Is Patient/family satisfied with symptom level?  yes    DISCHARGE PLAN:  End of life

## 2023-04-01 NOTE — PROGRESS NOTES
Problem: Falls - Risk of  Goal: *Absence of Falls  Description: Document Santiago Jp Fall Risk and appropriate interventions in the flowsheet. Outcome: Progressing Towards Goal  Note: Fall Risk Interventions:                                Problem: Pressure Injury - Risk of  Goal: *Prevention of pressure injury  Description: Document Max Scale and appropriate interventions in the flowsheet. Outcome: Progressing Towards Goal  Note: Pressure Injury Interventions:  Sensory Interventions: Assess changes in LOC, Check visual cues for pain, Minimize linen layers, Float heels    Moisture Interventions: Absorbent underpads, Internal/External urinary devices, Maintain skin hydration (lotion/cream)    Activity Interventions: Pressure redistribution bed/mattress(bed type)    Mobility Interventions: Float heels, HOB 30 degrees or less, Pressure redistribution bed/mattress (bed type)    Nutrition Interventions:  (npo)    Friction and Shear Interventions: Apply protective barrier, creams and emollients, Lift sheet, Minimize layers                Problem: Potential for Alteration in Skin Integrity  Goal: Monitor skin for areas of alteration in skin integrity  Description: Patient/family/caregiver will demonstrate ability to care for patient's skin, monitor for areas of breakdown, and demonstrate methods to prevent breakdown during hospice care. Outcome: Progressing Towards Goal     Problem: Risk for Falls  Goal: Free of falls during inpatient stay  Description: Patient will be free of falls during inpatient stay. Outcome: Progressing Towards Goal     Problem: Alteration in Mobility  Goal: Remain as independent as possible and remain safe in environment  Description: Patient will remain as independent as possible and remain safe in their environment.   Outcome: Progressing Towards Goal     Problem: Pain  Goal: Assess satisfaction of level of comfort and symptom control  Outcome: Progressing Towards Goal  Goal: *Control of acute pain  Outcome: Progressing Towards Goal     Problem: Communication Deficit  Goal: Effectively communicate symptoms, needs, and concerns  Description: Patient/family/caregiver will effectively communicate symptoms, needs and concerns.   Outcome: Progressing Towards Goal     Problem: Breathing Pattern - Ineffective  Goal: *Use of effective breathing techniques  Outcome: Progressing Towards Goal     Problem: Infection - Risk of, Urinary Catheter-Associated Urinary Tract Infection  Goal: *Absence of infection signs and symptoms  Outcome: Progressing Towards Goal

## 2023-04-02 NOTE — PROGRESS NOTES
Problem: Falls - Risk of  Goal: *Absence of Falls  Description: Document Kary Gardiner Fall Risk and appropriate interventions in the flowsheet. Outcome: Progressing Towards Goal  Note: Fall Risk Interventions:                                Problem: Pressure Injury - Risk of  Goal: *Prevention of pressure injury  Description: Document Max Scale and appropriate interventions in the flowsheet. Outcome: Progressing Towards Goal  Note: Pressure Injury Interventions:  Sensory Interventions: Assess changes in LOC, Check visual cues for pain, Float heels, Minimize linen layers    Moisture Interventions: Absorbent underpads, Internal/External urinary devices, Maintain skin hydration (lotion/cream)    Activity Interventions: Pressure redistribution bed/mattress(bed type)    Mobility Interventions: Float heels, HOB 30 degrees or less    Nutrition Interventions:  (npo)    Friction and Shear Interventions: Apply protective barrier, creams and emollients, Lift sheet                Problem: Pain  Goal: *Control of acute pain  Outcome: Progressing Towards Goal     Problem: Anxiety/Agitation  Goal: Verbalize or staff assess the ability to manage anxiety  Description: The patient/family/caregiver will verbalize and demonstrate ability to manage the patient's anxiety throughout hospice care.   Outcome: Progressing Towards Goal

## 2023-04-02 NOTE — PROGRESS NOTES
0700  Report received from [place], [address]  Pt resting quietly with eyes closed, neutral facial expression, unlabored respirations. Pt unresponsive. 0830  Assessment complete. Shallow, unlabored, irregular respirations. Diminished breath sounds. No indication of discomfort noted. 0930  Temp recheck, 99.5. Pt's son-in-law arrived to visit. Update and support provided. 1013  Scheduled medications administered. 1100  Resting quietly with neutral facial expression and shallow, unlabored respirations. Corey Mcdonald 177 with Daniel Cespedes NP. No new orders. 1300  Family visiting with pt. No indication of discomfort noted. 1400  Scheduled medications administered. Temp recheck, 98.7. Daughter Chin Murrieta at bedside. Update and support provided. 1500  Pt repositioned to left side. Opened his eyes briefly with movement. 1600  Shallow, unlabored respirations. Chin Murrieta at bedside. 1700  Neutral facial expression, no indication of discomfort. 1730  Scheduled medications administered. NAME OF PATIENT:  Charlotte Coma    LEVEL OF CARE:  GIP    REASON FOR GIP:   Pain, despite numerous changes in medications, Terminal agitation, despite changes to medications, Medication adjustment that must be monitored 24/7, and Stabilizing treatment that cannot take place at home    *PATIENT REMAINS ELIGIBLE FOR GIP LEVEL OF CARE AS EVIDENCED BY: (MUST BE ADDRESSED OF PATIENT GIP)  Pt requires frequent nursing assessments and IV medications to manage symptoms.       REASON FOR RESPITE:  NA    O2 SAFETY:  NA    FALL INTERVENTIONS PROVIDED:   Implemented/recommended use of non-skid footwear, Implemented/recommended use of fall risk identification flag to all team members, Implemented/recommended assistive devices and encouraged their use, Implemented/recommended resources for alarm system (personal alarm, bed alarm, call bell, etc.) , Implemented/recommended environmental changes (remove hazards, lower bed, improve lighting, etc.), and Implemented/recommended increased supervision/assistance    INTERDISPLINARY COMMUNICATION/COLLABORATION:  Physician, MAUREEN, Stephanie Siddiqi, and RN, CNA    NEW MEDICATION INITIATION DOCUMENTATION:  NA    Reason medication is being initiated:  NA    MD / Provider name consulted re: change in status / initiation of new medication:  NA    New Symptom(s):  NA    New Order(s):  NA    Name of the person notified of the changes:  NA    Name of person being taught:  NA    Instructions given:  NA    Side Effects taught:  NA    Response to teaching:  NA    COMFORTABLE DYING MEASURE:  Is Patient/family satisfied with symptom level?  yes    DISCHARGE PLAN:  End of life

## 2023-04-02 NOTE — PROGRESS NOTES
SW visited with pt at [facility] bedside. Pt appeared to be resting and was unresponsive during the visit. Daughter, Tay Jones, and her four children at bedside visiting with the pt. SW introduced self and gauged any family needs. Tay Jones and the kids had just come from [place]. No needs expressed at this time. SW will continue to provide emotional support.

## 2023-04-02 NOTE — PROGRESS NOTES
[facility] Help to Those in Need  [phone]    Patient Name: Kristina Zhang  YOB: 1929    Date of Provider Hospice Visit: 04/02/23    Level of Care:   [x] General Inpatient (GIP)    [] Routine   [] Respite    Current Location of Care:  [] [facility] [] [facility] [] [address] [] [facility] [x] Hospice House [facility], patient referred from:  [] [facility] [] [facility] [] [address] [] [facility] [x] Home [] Other:         Principle Hospice Diagnosis: Metastatic lung cancer  Diagnoses RELATED to the terminal prognosis:   Other Diagnoses:      Amrita Matias is a 80y.o. year old who was admitted to [facility]. Patient is a 59-year-old male admitted to hospice secondary to metastatic lung cancer. Patient with metastatic disease to the liver and lung. He does have a history of trigeminal neuralgia and has been on chronic gabapentin. Patient sent to the [facility] hospice house for respite level care secondary to caregiver exhaustion. Patient has been cared for by his daughter. In reviewing the chart and discussed with the home team, patient apparently is been having significant decline with increasing weakness, poor p.o. intake, dyspnea/chest pain with any type of exertion. He has been requiring as needed doses of his symptom relief kit to include morphine. Imaging that I can see from September 2022 shows left hilar infiltrative mass that significant narrows the left anterior bronchus and includes the posterior branch of the left pulmonary artery. 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 At the time of admission, patient still had a PPS score 60, alert and oriented. Patient elected at the time not to proceed with any type of surgery, chemotherapy or radiation. Patient was here last week for respite and was showing some decline but stable to transition home. Now patient having increased restlessness and SOB.  Attempted to manage in the home with SL medications but not effective so patient transferred to [facility] for [facility] care. In addition, there are young grandchildren in the home and daughter/patient did not want to die in the home with the grandchildren present    The patient's principle diagnosis has resulted in sob, restlessness, agitation. Functionally, the patient's Karnofsky and/or Palliative Performance Scale has declined over a period of days-weeks and is estimated at 10. The patient is dependent on the following ADLs:all    Objective information that support this patients limited prognosis includes: see above. The patient/family chose comfort measures with the support of Hospice. HOSPICE DIAGNOSES   Active Symptoms:  1. Restlessness with agitation  2. SOB  3. Met lung cancer  4. Hospice care     PLAN   Continue GIP level of care as pt requires frequent nursing assessment and administration and titration of parenteral medications to manage symptom burden  Continue Morphine 4 mg IV every 4 hours scheduled every 15 minutes as needed for pain and shortness of breath  Continue Ativan 2 mg IV every 4 hours scheduled every 15 minutes as needed for restlessness  Comfort meds for all other symptoms  Plan reviewed with bedside nursing team  No family at bedside during provider rounds; will attempt to reach out to daughter Mary Jovel with update   and SW to support family needs  Disposition: death at [address] of care was reviewed in detail and agree with current plan of care    Prognosis estimated based on 04/02/23 clinical assessment is:   [x] Hours to Days    [] Days to Weeks    [] Other:    Communicated plan of care with: Hospice Case Manager; Hospice IDT; Care Team     GOALS OF CARE     Patient/Medical POA stated Goal of Care: hospice care    [x] I have reviewed and/or updated ACP information in the Advance Care Planning Navigator. This information is available in the [address] link in the patient's chart header.     Primary Decision [facility] Agent):   Primary Decision Maker: Ami Paul - Daughter - [phone]    Resuscitation Status: DNR  If DNR is there a Durable DNR on file? : [x] Yes [] No (If no, complete Durable DNR)    HISTORY     History obtained from: chart, home team    CHIEF COMPLAINT: unable to obtain  The patient is:   [] Verbal  [] Nonverbal  [x] Unresponsive    HPI/SUBJECTIVE:  Patient is nonverbal but restless earlier. 3/30-patient appears more comfortable now. He did receive medication that we talked about prior to my visit. He was showing evidence of restlessness, getting out of the bed, shortness of breath last night in reviewing the nurses notes. 3/31- No response to verbal greeting or gentle touch. Generalized pallor/muscle wasting. Respirations shallow. 4/2: unresponsive.        REVIEW OF SYSTEMS     The following systems were: [] reviewed  [x] unable to be reviewed    Positive ROS include:  Constitutional: fatigue, weakness, in pain, short of breath  Ears/nose/mouth/throat: increased airway secretions  Respiratory:shortness of breath, wheezing  Gastrointestinal:poor appetite, nausea, vomiting, abdominal pain, constipation, diarrhea  Musculoskeletal:pain, deformities, swelling legs  Neurologic:confusion, hallucinations, weakness  Psychiatric:anxiety, feeling depressed, poor sleep  Endocrine:     Adult Non-Verbal Pain Assessment Score: 1    Face  [x] 0   No particular expression or smile  [] 1   Occasional grimace, tearing, frowning, wrinkled forehead  [] 2   Frequent grimace, tearing, frowning, wrinkled forehead    Activity (movement)  [x] 0   Lying quietly, normal position  [] 1   Seeking attention through movement or slow, cautious movement  [] 2   Restless, excessive activity and/or withdrawal reflexes    Guarding  [x] 0   Lying quietly, no positioning of hands over areas of body  [] 1   Splinting areas of the body, tense  [] 2   Rigid, stiff    Physiology (vital signs)  [] 0   Stable vital signs  [x] 1   Change in any of the following: SBP > 20mm Hg; HR > 20/minute  [] 2   Change in any of the following: SBP > 30mm Hg; HR > 25/minute    Respiratory  [x] 0   Baseline RR/SpO2, compliant with ventilator  [] 1   RR > 10 above baseline, or 5% drop SpO2, mild asynchrony with ventilator  [] 2   RR > 20 above baseline, or 10% drop SpO2, asynchrony with ventilator     FUNCTIONAL ASSESSMENT     Palliative Performance Scale (PPS):10       PSYCHOSOCIAL/SPIRITUAL ASSESSMENT     Active Problems:    * No active hospital problems. *    Past Medical History:   Diagnosis Date    Abnormal x-ray of thoracic spine 09/2022    sclerotic lytic 2.6 cm lesion involving the inferior T12 vertebra    Benign essential HTN     Bladder cancer ([facility] [address] 2013?    s/p BCGx2, resection. low grade. in [facility]. now Dr. Melia Chino. cystoscopy 6/2020 clear    CAD (coronary artery disease) 09/2021    Dr Rehan Aleman. Cath 9/24/21 Successful bifurcation stenting of OM1 and AV groove Lcx with 2 stent couloette technique    Closed left arm fracture     GERD (gastroesophageal reflux disease)     with hiatal hernia    Glaucoma     Dr. Sukhjinder Stinson    Hemorrhoids     Hilar mass 09/28/2022    Dr. Jaron Yeung pulmonary. Left hilar 5 cm infiltrative mass    HTN (hypertension)     Hypercholesterolemia     Microalbuminuria due to type 2 diabetes mellitus (HCC)     Prostate cancer (HCC) 2000    Dr. Melia Chino. s/p prostatectomy 1/2000 in [facility]. PSA \"0.4\" since then    Rosacea     Trigeminal neuralgia of right side of face     Type 2 diabetes mellitus with microalbuminuria ([facility] [address]     Vertigo       Past Surgical History:   Procedure Laterality Date    HX COLONOSCOPY  2005? reports 2 colonoscopies    HX HEART CATHETERIZATION  09/24/2020    Successful bifurcation stenting of OM1 and AV groove Lcx with 2 stent couloette technique    HX HEART CATHETERIZATION  09/08/2020    Multivessel CAD. LCX/OM1 lesions, severe with significant calcification. Normal LVEDP.   Unsuccessful PCI attempt with POBA due to calcification. HX OTHER SURGICAL  2020    bladder biopsy    HX PROSTATECTOMY  2000      Social History     Tobacco Use    Smoking status: Former     Types: Cigarettes     Quit date: 1962     Years since quittin.2    Smokeless tobacco: Never   Substance Use Topics    Alcohol use:  Yes     Alcohol/week: 5.0 standard drinks     Types: 5 Glasses of wine per week     Family History   Problem Relation Age of Onset    Cancer Mother         breast    Cancer Father         prostate    Cancer Sister         breast    Cancer Brother         prostate      Allergies   Allergen Reactions    Lisinopril Cough      Current Facility-Administered Medications   Medication Dose Route Frequency    LORazepam (ATIVAN) injection 2 mg  2 mg IntraVENous Q15MIN PRN    morphine injection 4 mg  4 mg IntraVENous Q15MIN PRN    LORazepam (ATIVAN) injection 2 mg  2 mg IntraVENous Q4H    morphine injection 4 mg  4 mg IntraVENous Q4H    bisacodyL (DULCOLAX) suppository 10 mg  10 mg Rectal DAILY PRN    ketorolac (TORADOL) injection 30 mg  30 mg IntraVENous Q6H PRN    glycopyrrolate (ROBINUL) injection 0.2 mg  0.2 mg IntraVENous Q4H PRN        PHYSICAL EXAM     Wt Readings from Last 3 Encounters:   23 63.3 kg (139 lb 9.6 oz)   22 65.3 kg (144 lb)   22 65.6 kg (144 lb 9.6 oz)       Visit Vitals  /68 (BP 1 Location: Right lower arm, BP Patient Position: Lying left side)   Pulse (!) 110   Temp (!) 100.8 °F (38.2 °C)   Resp 20   SpO2 (!) 88%       Supplemental O2  [] Yes  [x] NO  Last bowel movement:     Currently this patient has:  [x] Peripheral IV x 2 [] PICC  [] PORT [] ICD    [x] Stoner Catheter [] NG Tube   [] PEG Tube    [] Rectal Tube [] Drain  [] Other:     Constitutional:ill appearing, ashen  Eyes: closed  ENMT: dry oral mucosa  Cardiovascular: tachy, regular; distal pulses weak  Respiratory: respirations shallow  Gastrointestinal: thin, soft  Musculoskeletal:muscle wasting  Skin:cool extremities with mottling  Neurologic: unresponsive  Psychiatric:  unresponsive         Pertinent Lab and or Imaging Tests:  Lab Results   Component Value Date/Time    Sodium 138 02/07/2023 12:32 PM    Potassium 4.6 02/07/2023 12:32 PM    Chloride 106 02/07/2023 12:32 PM    CO2 27 02/07/2023 12:32 PM    Anion gap 5 02/07/2023 12:32 PM    Glucose 248 (H) 02/07/2023 12:32 PM    BUN 24 (H) 02/07/2023 12:32 PM    Creatinine 1.23 02/07/2023 12:32 PM    BUN/Creatinine ratio 20 02/07/2023 12:32 PM    GFR est AA >60 08/02/2022 12:02 PM    GFR est non-AA >60 08/02/2022 12:02 PM    Calcium 9.6 02/07/2023 12:32 PM     Lab Results   Component Value Date/Time    Protein, total 6.5 02/07/2023 12:32 PM    Albumin 3.4 (L) 02/07/2023 12:32 PM

## 2023-04-02 NOTE — HOSPICE
1900:  [facility]  0489:  Assessed pt. Unresponsive. Pale,  Cool to touch. Resp shallow with some apnea. Breath sounds diminished. Fingernail beds cyanotic. 2020:  Remains unresponsive. Resp shallow with period of apnea. Daughter Tereza Saldaña and several grandchildren up to floor to say good night. Updated on pt's condition. 2100:  Remains cool to touch. Resp shallow with occasional apnea. Remains cool to touch. 2201:  Scheduled IV meds given for symptom management. Pt ashen and cool to touch. Toes mottled. Pt turned. 2300:  Lying in bed and resting quietly with eyes closed. Resp even and unlabored. Mouth breathing. Neutral facial expression. 0002:  Left eye half open. Fixed stare. Mouth breathing. Lips dry. 0100:  Lying in bed and resting quietly with eyes closed. Resp even and unlabored. Neutral facial expression. 0154:  Scheduled IV meds given for symptom management. Mouth breathing. Neutral facial expression. 0215: Appears to be sleeping. Resp even and unlabored. Neutral facial expression. 0330:  Resp irregular and labored. .  Has audible secretions. Shivering. [place] applied. PRN morphine and Robinul given IV.   0411:  Resp even and unlabored. Shivering ceased. 0500:  Resp shallow and unlabored. Feet mottled. 0600:  Pt turned. Resp even and unlabored. Feet mottled and cold. 1666:  Temp 100.8 ax. Blankets removed and Toradol 30 mg given IV.  0283:  Report given to oncoming nurse. NAME OF PATIENT:  Antonio Rivera    LEVEL OF CARE:  GIP    REASON FOR GIP:   Pain, despite numerous changes in medications, Unmanageable respiratory distress, Terminal agitation, despite changes to medications, and Stabilizing treatment that cannot take place at home    *PATIENT REMAINS ELIGIBLE FOR GIP LEVEL OF CARE AS EVIDENCED BY:Unable to take po meds. Requires IV meds for symptom management. Requires frequent SN assessment and treatment of symptoms. REASON FOR RESPITE:  N/A    O2 SAFETY:  N/A    FALL INTERVENTIONS PROVIDED:   Implemented/recommended resources for alarm system (personal alarm, bed alarm, call bell, etc.)     INTERDISPLINARY COMMUNICATION/COLLABORATION:  Physician, MAUREEN, Mariel Thurman, and RN, CNA    NEW MEDICATION INITIATION DOCUMENTATION:  N/A    Reason medication is being initiated:  N/A    MD / Provider name consulted re: change in status / initiation of new medication:  N/A    New Symptom(s):  N/A    New Order(s):  N/A    Name of the person notified of the changes:  N/A    Name of person being taught:  N/A    Instructions given:  N/A    Side Effects taught:  N/A    Response to teaching:  N/A      COMFORTABLE DYING MEASURE:  Is Patient/family satisfied with symptom level?  yes    DISCHARGE PLAN:  EOL at [facility]. If symptoms managed to return home.

## 2023-04-03 NOTE — HOSPICE
0700 Report received from [facility].    8182 Patient resting in bed quietly, eyes are closed, neutral facial expression noted, respirations are even and unlabored. 3021 Oral care provided, lip balm applied to lips.    0906 Low grade fever noted, PRN IV Toradol given along with scheduled IV medications, see MAR.    6798 Daughter and granddaughters at the bedside. 9147 Dr. Luciana Esquivel at bedside with patient and family. [id number] Patient's daughter and grandchildren left the bedside, patient turned and repositioned in bed.     1100 Patient resting in bed quietly, eyes are closed, neutral facial expression noted, respirations are shallow and unlabored. 1145 Patient resting in bed quietly, eyes are closed, neutral facial expression noted. 46 Patient's daughter and grandson at the bedside. 1250 Patient resting in bed quietly, eyes are closed, respirations are shallow and unlabored. 1315 Patient given scheduled IV medications, see MAR. Daughter and grandson at the bedside. 1400 Patient turned and repositioned in bed with RN Crystal.    5 Daughter and grandson back at the bedside. 26 Daughter and grandson left the bedside. 1420 Oral care provided, lip balm applied to lips    1455 Daughter and grandson back at bedside. 65 Daughter and grandson left the bedside, patient resting in bed quietly, eyes are closed, neutral facial expression noted, respirations are unlabored. 1630 Patient resting in bed quietly, eyes are closed, neutral facial expression noted, respirations are even and unlabored. 1720 Patient given scheduled IV medications, see MAR.    1745 Oral care provided, lip balm applied to lips. 1810 Patient turned and repositioned in bed with TAMMY Taylor, patient tolerated activity well. Patient's vaca emptied, see flow sheet.

## 2023-04-03 NOTE — PROGRESS NOTES
[facility]  [phone]    Patient Name: Clarisa Dsouza  YOB: 1929    Date of Provider Hospice Visit: 04/03/23    Level of Care:   [x] General Inpatient (GIP)    [] Routine   [] Respite    Current Location of Care:  [] [facility] [] [facility] [] [address] [] [facility] [x] Hospice House [facility], patient referred from:  [] [facility] [] [facility] [] [address] [] [facility] [x] Home [] Other:         Principle Hospice Diagnosis: Metastatic lung cancer  Diagnoses RELATED to the terminal prognosis:   Other Diagnoses:      Katty Maciel is a 80y.o. year old who was admitted to [address] [place] [address]. Patient is a 80-year-old male admitted to hospice secondary to metastatic lung cancer. Patient with metastatic disease to the liver and lung. He does have a history of trigeminal neuralgia and has been on chronic gabapentin. Patient sent to the community hospice house for respite level care secondary to caregiver exhaustion. Patient has been cared for by his daughter. In reviewing the chart and discussed with the home team, patient apparently is been having significant decline with increasing weakness, poor p.o. intake, dyspnea/chest pain with any type of exertion. He has been requiring as needed doses of his symptom relief kit to include morphine. Imaging that I can see from September 2022 shows left hilar infiltrative mass that significant narrows the left anterior bronchus and includes the posterior branch of the left pulmonary artery. 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 At the time of admission, patient still had a PPS score 60, alert and oriented. Patient elected at the time not to proceed with any type of surgery, chemotherapy or radiation. Patient was here last week for respite and was showing some decline but stable to transition home. Now patient having increased restlessness and SOB.  Attempted to manage in the home with SL medications but not effective so patient transferred to [facility] care. In addition, there are young grandchildren in the home and daughter/patient did not want to die in the home with the grandchildren present    The patient's principle diagnosis has resulted in sob, restlessness, agitation. Functionally, the patient's Karnofsky and/or Palliative Performance Scale has declined over a period of days-weeks and is estimated at 10. The patient is dependent on the following ADLs:all    Objective information that support this patients limited prognosis includes: see above. The patient/family chose comfort measures with the support of Hospice. HOSPICE DIAGNOSES   Active Symptoms:  1. Restlessness with agitation  2. SOB  3. Met lung cancer  4. Hospice care     PLAN   Continue [facility] level of care as pt requires frequent nursing assessment and administration and titration of parenteral medications to manage symptom burden. Chart reviewed from the weekend and patient certainly not safe to attempt to transition back to sublingual medication. Patient showing periods of apnea but medications appear to be effective. No as needed doses needed overnight. Continue Morphine 4 mg IV every 4 hours scheduled every 15 minutes as needed for pain and shortness of breath  Continue Ativan 2 mg IV every 4 hours scheduled every 15 minutes as needed for restlessness  Comfort meds for all other symptoms  Plan reviewed with bedside nursing team  Talked with patient's daughter Demetrice Thompson at the bedside. Updated her on the plan of care, provided active listening and attempted to provide a prognosis although patient certainly has lived a little longer than we anticipated. She is hanging in there overall and reassured her it is okay to go home for periods of time. We certainly will update her if patient looks close but I do think he definitely looks different compared to when I saw him on Thursday.    and SW to support family needs  Disposition: death at [address] of care was reviewed in detail and agree with current plan of care    Prognosis estimated based on 04/03/23 clinical assessment is:   [x] Hours to Days    [] Days to Weeks    [] Other:    Communicated plan of care with: Hospice Case Manager; Hospice IDT; Care Team     GOALS OF CARE     Patient/Medical POA stated Goal of Care: hospice care    [x] I have reviewed and/or updated ACP information in the Advance Care Planning Navigator. This information is available in the [address] link in the patient's chart header. Primary Decision Maker [facility] Agent):   Primary Decision Maker: Pepe Gross - Daughter - [phone]    Resuscitation Status: DNR  If DNR is there a Durable DNR on file? : [x] Yes [] No (If no, complete Durable DNR)    HISTORY     History obtained from: chart, home team    CHIEF COMPLAINT: unable to obtain  The patient is:   [] Verbal  [] Nonverbal  [x] Unresponsive    HPI/SUBJECTIVE:  Patient is nonverbal but restless earlier. 3/30-patient appears more comfortable now. He did receive medication that we talked about prior to my visit. He was showing evidence of restlessness, getting out of the bed, shortness of breath last night in reviewing the nurses notes. 3/31- No response to verbal greeting or gentle touch. Generalized pallor/muscle wasting. Respirations shallow. 4/2: unresponsive. 4/3-patient remains unresponsive.   No evidence of increased work of breathing, accessory muscle use, restlessness       REVIEW OF SYSTEMS     The following systems were: [] reviewed  [x] unable to be reviewed    Positive ROS include:  Constitutional: fatigue, weakness, in pain, short of breath  Ears/nose/mouth/throat: increased airway secretions  Respiratory:shortness of breath, wheezing  Gastrointestinal:poor appetite, nausea, vomiting, abdominal pain, constipation, diarrhea  Musculoskeletal:pain, deformities, swelling legs  Neurologic:confusion, hallucinations, weakness  Psychiatric:anxiety, feeling depressed, poor sleep  Endocrine:     Adult Non-Verbal Pain Assessment Score: 1    Face  [x] 0   No particular expression or smile  [] 1   Occasional grimace, tearing, frowning, wrinkled forehead  [] 2   Frequent grimace, tearing, frowning, wrinkled forehead    Activity (movement)  [] 0   Lying quietly, normal position  [x] 1   Seeking attention through movement or slow, cautious movement  [] 2   Restless, excessive activity and/or withdrawal reflexes    Guarding  [x] 0   Lying quietly, no positioning of hands over areas of body  [] 1   Splinting areas of the body, tense  [] 2   Rigid, stiff    Physiology (vital signs)  [x] 0   Stable vital signs  [] 1   Change in any of the following: SBP > 20mm Hg; HR > 20/minute  [] 2   Change in any of the following: SBP > 30mm Hg; HR > 25/minute    Respiratory  [x] 0   Baseline RR/SpO2, compliant with ventilator  [] 1   RR > 10 above baseline, or 5% drop SpO2, mild asynchrony with ventilator  [] 2   RR > 20 above baseline, or 10% drop SpO2, asynchrony with ventilator     FUNCTIONAL ASSESSMENT     Palliative Performance Scale (PPS):10       PSYCHOSOCIAL/SPIRITUAL ASSESSMENT     Active Problems:    * No active hospital problems. *    Past Medical History:   Diagnosis Date    Abnormal x-ray of thoracic spine 09/2022    sclerotic lytic 2.6 cm lesion involving the inferior T12 vertebra    Benign essential HTN     Bladder cancer ([facility] [address] 2013?    s/p BCGx2, resection. low grade. in [facility]. now Dr. Steve Mcfarlane. cystoscopy 6/2020 clear    CAD (coronary artery disease) 09/2021    Dr Rachel Chandler. Cath 9/24/21 Successful bifurcation stenting of OM1 and AV groove Lcx with 2 stent couloette technique    Closed left arm fracture     GERD (gastroesophageal reflux disease)     with hiatal hernia    Glaucoma     Dr. Perla Pena    Hemorrhoids     Hilar mass 09/28/2022    Dr. Prashant Shannon pulmonary.   Left hilar 5 cm infiltrative mass    HTN (hypertension) Hypercholesterolemia     Microalbuminuria due to type 2 diabetes mellitus ([facility] [address]     Prostate cancer ([facility] [address]     Dr. Troy Howard. s/p prostatectomy 2000 in [facility]. PSA \"0.4\" since then    Rosacea     Trigeminal neuralgia of right side of face     Type 2 diabetes mellitus with microalbuminuria ([facility] [address]     Vertigo       Past Surgical History:   Procedure Laterality Date    HX COLONOSCOPY  ? reports 2 colonoscopies    HX HEART CATHETERIZATION  2020    Successful bifurcation stenting of OM1 and AV groove Lcx with 2 stent couloette technique    HX HEART CATHETERIZATION  2020    Multivessel CAD. LCX/OM1 lesions, severe with significant calcification. Normal LVEDP. Unsuccessful PCI attempt with POBA due to calcification. HX OTHER SURGICAL  2020    bladder biopsy    HX PROSTATECTOMY  2000      Social History     Tobacco Use    Smoking status: Former     Types: Cigarettes     Quit date: 1962     Years since quittin.2    Smokeless tobacco: Never   Substance Use Topics    Alcohol use:  Yes     Alcohol/week: 5.0 standard drinks     Types: 5 Glasses of wine per week     Family History   Problem Relation Age of Onset    Cancer Mother         breast    Cancer Father         prostate    Cancer Sister         breast    Cancer Brother         prostate      Allergies   Allergen Reactions    Lisinopril Cough      Current Facility-Administered Medications   Medication Dose Route Frequency    ketorolac (TORADOL) injection 30 mg  30 mg IntraVENous Q6H PRN    LORazepam (ATIVAN) injection 2 mg  2 mg IntraVENous Q15MIN PRN    morphine injection 4 mg  4 mg IntraVENous Q15MIN PRN    LORazepam (ATIVAN) injection 2 mg  2 mg IntraVENous Q4H    morphine injection 4 mg  4 mg IntraVENous Q4H    bisacodyL (DULCOLAX) suppository 10 mg  10 mg Rectal DAILY PRN    glycopyrrolate (ROBINUL) injection 0.2 mg  0.2 mg IntraVENous Q4H PRN        PHYSICAL EXAM     Wt Readings from Last 3 Encounters:   23 63.3 kg (139 lb 9.6 oz)   12/14/22 65.3 kg (144 lb)   11/28/22 65.6 kg (144 lb 9.6 oz)       Visit Vitals  BP (!) 102/58 (BP 1 Location: Left arm, BP Patient Position: At rest)   Pulse 90   Temp 99.2 °F (37.3 °C)   Resp 17   SpO2 (!) 89%       Supplemental O2  [] Yes  [x] NO  Last bowel movement:     Currently this patient has:  [x] Peripheral IV x 2 [] PICC  [] PORT [] ICD    [x] Stoner Catheter [] NG Tube   [] PEG Tube    [] Rectal Tube [] Drain  [] Other:     Constitutional:ill appearing, ashen, periods of apnea  Eyes: closed  ENMT: dry oral mucosa  Cardiovascular: tachy, regular; distal pulses weak  Respiratory: respirations shallow  Gastrointestinal: thin, soft  Musculoskeletal:muscle wasting  Skin:cool extremities with mottling  Neurologic: unresponsive  Psychiatric:  unresponsive         Pertinent Lab and or Imaging Tests:  Lab Results   Component Value Date/Time    Sodium 138 02/07/2023 12:32 PM    Potassium 4.6 02/07/2023 12:32 PM    Chloride 106 02/07/2023 12:32 PM    CO2 27 02/07/2023 12:32 PM    Anion gap 5 02/07/2023 12:32 PM    Glucose 248 (H) 02/07/2023 12:32 PM    BUN 24 (H) 02/07/2023 12:32 PM    Creatinine 1.23 02/07/2023 12:32 PM    BUN/Creatinine ratio 20 02/07/2023 12:32 PM    GFR est AA >60 08/02/2022 12:02 PM    GFR est non-AA >60 08/02/2022 12:02 PM    Calcium 9.6 02/07/2023 12:32 PM     Lab Results   Component Value Date/Time    Protein, total 6.5 02/07/2023 12:32 PM    Albumin 3.4 (L) 02/07/2023 12:32 PM

## 2023-04-03 NOTE — PROGRESS NOTES
Problem: Falls - Risk of  Goal: *Absence of Falls  Description: Document Gildardo Hays Fall Risk and appropriate interventions in the flowsheet. Outcome: Progressing Towards Goal  Note: Fall Risk Interventions:                                Problem: Potential for Alteration in Skin Integrity  Goal: Monitor skin for areas of alteration in skin integrity  Description: Patient/family/caregiver will demonstrate ability to care for patient's skin, monitor for areas of breakdown, and demonstrate methods to prevent breakdown during hospice care. Outcome: Progressing Towards Goal     Problem: Risk for Falls  Goal: Free of falls during inpatient stay  Description: Patient will be free of falls during inpatient stay. Outcome: Progressing Towards Goal     Problem: Alteration in Mobility  Goal: Remain as independent as possible and remain safe in environment  Description: Patient will remain as independent as possible and remain safe in their environment. Outcome: Progressing Towards Goal     Problem: Pain  Goal: Assess satisfaction of level of comfort and symptom control  Outcome: Progressing Towards Goal  Goal: *Control of acute pain  Outcome: Progressing Towards Goal     Problem: Anxiety/Agitation  Goal: Verbalize or staff assess the ability to manage anxiety  Description: The patient/family/caregiver will verbalize and demonstrate ability to manage the patient's anxiety throughout hospice care.   Outcome: Progressing Towards Goal     Problem: Breathing Pattern - Ineffective  Goal: *Use of effective breathing techniques  Outcome: Progressing Towards Goal     Problem: Pressure Injury - Risk of  Goal: *Prevention of pressure injury  Outcome: Progressing Towards Goal  Note: Pressure Injury Interventions:  Sensory Interventions: Assess changes in LOC, Float heels    Moisture Interventions: Absorbent underpads, Apply protective barrier, creams and emollients, Internal/External urinary devices    Activity Interventions: Pressure redistribution bed/mattress(bed type)    Mobility Interventions: HOB 30 degrees or less, Float heels    Nutrition Interventions:  (npo)    Friction and Shear Interventions: Apply protective barrier, creams and emollients, HOB 30 degrees or less, Lift sheet                Problem: Infection - Risk of, Urinary Catheter-Associated Urinary Tract Infection  Goal: *Absence of infection signs and symptoms  Outcome: Progressing Towards Goal     Problem: End of Life Process  Goal: Demonstrate understanding of end of life processes  Description: Patient/caregiver will understand end of life processes.   Outcome: Progressing Towards Goal     Problem: Imminent Death  Goal: Collaborate with patient/family/caregiver/interdisciplinary team to minimize and manage end of life symptoms  Outcome: Progressing Towards Goal

## 2023-04-03 NOTE — HOSPICE
1900:  Report received from BARRY Bird  1937:  Pt assessed. Unresponsive. Resp shallow and unlabored. Feet mottled. Nailbeds pale. Daughter and grandchildren at bedside. Updated on pt's condition. 2030:  Lying in bed and resting quietly with eyes closed. Resp shallow and unlabored. Neutral facial expression. 2130:  Appears to be sleeping. Neutral facial expression. 2206:  Scheduled meds for symptom management given. CNA finishing pt's bath. Unresponsive. Fingers now mottled. 2300:  Lying in bed and resting quietly with eyes closed. Resp even and unlabored. Mouth breathing. Skin cool to touch. Face darinel. 0015: Appears to be sleeping. Resp even and unlabored. Mouth breathing. 0100:  Continues to sleep. Resp even and unlabored. Mouth breathing. Neutral facial expression. 0151:  Scheduled IV meds given for symptom management. Neutral facial expression. 5554:  Lying in bed and resting quietly with eyes closed. Resp shallow and unlabored. Neutral facial expression. 0430:  Appears to be sleeping. Mouth breathing.  0503:  Scheduled IV meds given for symptom management. 1236:  Pt turned. Resp shallow and unlabored. Having some periods of apnea. Toes remain cool and mottled. 0700:  Report given to oncoming nurse. NAME OF PATIENT:   Sports    LEVEL OF CARE:  [facility]    REASON FOR GIP:   Pain, despite numerous changes in medications, Unmanageable respiratory distress, Terminal agitation, despite changes to medications, and Medication adjustment that must be monitored 24/7    *PATIENT REMAINS ELIGIBLE FOR [facility] LEVEL OF CARE AS EVIDENCED BY: Unable to take po/sl medications. Requires  frequent SN assessment and treatment of symptoms.       REASON FOR RESPITE:  N/A    O2 SAFETY:  N/A    FALL INTERVENTIONS PROVIDED:   Implemented/recommended resources for alarm system (personal alarm, bed alarm, call bell, etc.)     INTERDISPLINARY COMMUNICATION/COLLABORATION:  Physician, MSW, Carissa, and RN, CNA    NEW MEDICATION INITIATION DOCUMENTATION:N/A    Reason medication is being initiated:  N/A    MD / Provider name consulted re: change in status / initiation of new medication:  N/A    New Symptom(s):  N/A    New Order(s):  N/A    Name of the person notified of the changes:  N/A    Name of person being taught:  N/A    Instructions given:  N/A    Side Effects taught:  N/A    Response to teaching:  N/A      COMFORTABLE DYING MEASURE:  Is Patient/family satisfied with symptom level?  yes    DISCHARGE PLAN:  EOL at [facility]. If symptoms managed will return home.

## 2023-04-03 NOTE — PROGRESS NOTES
Problem: Falls - Risk of  Goal: *Absence of Falls  Description: Document Jagdeep Martin Fall Risk and appropriate interventions in the flowsheet. Outcome: Progressing Towards Goal  Note: Fall Risk Interventions:                                Problem: Patient Education: Go to Patient Education Activity  Goal: Patient/Family Education  Outcome: Progressing Towards Goal     Problem: Pressure Injury - Risk of  Goal: *Prevention of pressure injury  Description: Document Max Scale and appropriate interventions in the flowsheet. Outcome: Progressing Towards Goal  Note: Pressure Injury Interventions:  Sensory Interventions: Assess changes in LOC, Float heels    Moisture Interventions: Absorbent underpads, Apply protective barrier, creams and emollients, Internal/External urinary devices    Activity Interventions: Pressure redistribution bed/mattress(bed type)    Mobility Interventions: HOB 30 degrees or less, Float heels    Nutrition Interventions:  (npo)    Friction and Shear Interventions: Apply protective barrier, creams and emollients, HOB 30 degrees or less, Lift sheet                Problem: Patient Education: Go to Patient Education Activity  Goal: Patient/Family Education  Outcome: Progressing Towards Goal     Problem: Hospice Orientation  Goal: Demonstrate understanding of hospice philosophy, plan of care, and home hospice program  Description: The patient/family/caregiver will demonstrate understanding of hospice philosophy, plan of care and the home hospice program as evidenced by participation in meeting the patient's psychosocial, spiritual, medical, and physical needs inclusive of medical supplies/equipment focusing on symptoms.   Outcome: Progressing Towards Goal     Problem: Potential for Alteration in Skin Integrity  Goal: Monitor skin for areas of alteration in skin integrity  Description: Patient/family/caregiver will demonstrate ability to care for patient's skin, monitor for areas of breakdown, and demonstrate methods to prevent breakdown during hospice care. Outcome: Progressing Towards Goal     Problem: Risk for Falls  Goal: Free of falls during inpatient stay  Description: Patient will be free of falls during inpatient stay. Outcome: Progressing Towards Goal     Problem: Alteration in Mobility  Goal: Remain as independent as possible and remain safe in environment  Description: Patient will remain as independent as possible and remain safe in their environment. Outcome: Progressing Towards Goal     Problem: Pain  Goal: Assess satisfaction of level of comfort and symptom control  Outcome: Progressing Towards Goal  Goal: *Control of acute pain  Outcome: Progressing Towards Goal     Problem: Anxiety/Agitation  Goal: Verbalize or staff assess the ability to manage anxiety  Description: The patient/family/caregiver will verbalize and demonstrate ability to manage the patient's anxiety throughout hospice care. Outcome: Progressing Towards Goal     Problem: Communication Deficit  Goal: Effectively communicate symptoms, needs, and concerns  Description: Patient/family/caregiver will effectively communicate symptoms, needs and concerns. Outcome: Progressing Towards Goal     Problem: Breathing Pattern - Ineffective  Goal: *Use of effective breathing techniques  Outcome: Progressing Towards Goal     Problem: Pressure Injury - Risk of  Goal: *Prevention of pressure injury  Outcome: Progressing Towards Goal     Problem: Infection - Risk of, Urinary Catheter-Associated Urinary Tract Infection  Goal: *Absence of infection signs and symptoms  Outcome: Progressing Towards Goal     Problem: End of Life Process  Goal: Demonstrate understanding of end of life processes  Description: Patient/caregiver will understand end of life processes.   Outcome: Progressing Towards Goal     Problem: Emotional Support Needs  Goal: Patient/family is receiving emotional support  Description: Pt and pt's daughter, Sachi Chen, will receive emotional support within one week.    Outcome: Progressing Towards Goal     Problem: Imminent Death  Goal: Collaborate with patient/family/caregiver/interdisciplinary team to minimize and manage end of life symptoms  Outcome: Progressing Towards Goal

## 2023-04-04 NOTE — [facility] IDG CHAPLAIN NOTES
Patient: Ashanti Durbin    Date: 04/04/23  Time: 6:57 PM    [facility]  Notes  Zohreh Camargo, [address] and Palliative Care   Hospice      Signed   Date of Service:  04/04/23 1844                         Participated in IDG rounds with [facility] team. Pt was in bed and appeared to be sleeping comfortably. His daughter was at bedside. She was very supportive and noted that he has been living with them as the value this very special time in his life. Affirmed her care and concern. No specific needs verbalized at this time. Pastoral care will continue to follow for spiritual support. Signed by:  Maribell Ricci

## 2023-04-04 NOTE — HOSPICE
NAME OF PATIENT:  Deborah Warren    LEVEL OF CARE:  GIP    REASON FOR GIP:   Pain, despite numerous changes in medications, Terminal agitation, despite changes to medications, Medication adjustment that must be monitored 24/7, and Stabilizing treatment that cannot take place at home    *PATIENT REMAINS ELIGIBLE FOR GIP LEVEL OF CARE AS EVIDENCED BY: (MUST BE ADDRESSED OF PATIENT GIP)      O2 SAFETY:    N/a    FALL INTERVENTIONS PROVIDED:   Implemented/recommended use of fall risk identification flag to all team members and Implemented/recommended resources for alarm system (personal alarm, bed alarm, call bell, etc.)     INTERDISPLINARY COMMUNICATION/COLLABORATION:  Physician and RN, CNA    NEW MEDICATION INITIATION DOCUMENTATION:     N/a    Reason medication is being initiated:   n/a    MD / Provider name consulted re: change in status / initiation of new medication:   n/a    New Symptom(s):   n/a    New Order(s):   n/a    Name of the person notified of the changes:   n/a    Name of person being taught:   n/a    Instructions given:   n/a    Side Effects taught:   n/a    Response to teaching:   n/a      COMFORTABLE DYING MEASURE:  Is Patient/family satisfied with symptom level?  yes    DISCHARGE PLAN:  Patient may pass at [facility], should patient stabilize will return home with family. 0700  report received from Anna Jovel 106  Patient resting quietly with his eyes closed. Lungs clear/ diminished. Mouth dry and pale. Oral care provided. 0857  Patient appears to be resting comfortably. 12  Daughter and grandson at bedside. Update provided. [id number]  Bedside rounds with Dr. Óscar Galicia. Family remains at bedside. 1000  medicated with scheduled IV morphine and ativan. Turned onto his left side and positioned with pillows for comfort. Oral care provided. Family returned to bedside. 1040  Patient resting quietly with his eyes closed. Family remains at bedside. 1105  No changes, resting quietly.   1155  Oral care provided, family stepped out. Adjusted patient's position slightly for comfort. [id number]  Patient resting with eyes closed. Face in relaxed/ neutral position. 1315  Medicated with scheduled morphine and ativan. 1400  Patient resting quietly with his eyes closed. 1500  Family visiting bedside. Patient continues to rest quietly. 1600  Patient resting quietly, oral care provided. Patient appears comfortable. 1650  No changes continues to rest quietly. 1730  Medicated with scheduled morphine and ativan. 1745  Update provided to his granddaughter in [place]. Leandra 53 patient onto his right isidro and positioned with pillows for comfort. Oral care provided. 1900  report to be given to oncoming nurse.

## 2023-04-04 NOTE — PROGRESS NOTES
LCSW participated in [address] rounds with [facility] team. Pt received lying in hospital bed surrounded by family including his daughter and four of five grandchildren. Daughter shared no needs, but did express that she hasn't been getting much sleep. LCSW offered ongoing support, at this time no imminent needs.     MAUREEN Velez, [facility]   [phone]

## 2023-04-04 NOTE — PROGRESS NOTES
Problem: Falls - Risk of  Goal: *Absence of Falls  Description: Document Theshama Bairon Fall Risk and appropriate interventions in the flowsheet. Outcome: Progressing Towards Goal  Note: Fall Risk Interventions:                                Problem: Patient Education: Go to Patient Education Activity  Goal: Patient/Family Education  Outcome: Progressing Towards Goal     Problem: Pressure Injury - Risk of  Goal: *Prevention of pressure injury  Description: Document Max Scale and appropriate interventions in the flowsheet. Outcome: Progressing Towards Goal  Note: Pressure Injury Interventions:  Sensory Interventions: Assess changes in LOC, Check visual cues for pain, Float heels, Keep linens dry and wrinkle-free, Minimize linen layers    Moisture Interventions: Absorbent underpads, Apply protective barrier, creams and emollients, Internal/External urinary devices, Maintain skin hydration (lotion/cream)    Activity Interventions: Pressure redistribution bed/mattress(bed type)    Mobility Interventions: Float heels, HOB 30 degrees or less, Pressure redistribution bed/mattress (bed type)    Nutrition Interventions:  (npo)    Friction and Shear Interventions: Apply protective barrier, creams and emollients, HOB 30 degrees or less, Lift sheet, Minimize layers                Problem: Patient Education: Go to Patient Education Activity  Goal: Patient/Family Education  Outcome: Progressing Towards Goal     Problem: Hospice Orientation  Goal: Demonstrate understanding of hospice philosophy, plan of care, and home hospice program  Description: The patient/family/caregiver will demonstrate understanding of hospice philosophy, plan of care and the home hospice program as evidenced by participation in meeting the patient's psychosocial, spiritual, medical, and physical needs inclusive of medical supplies/equipment focusing on symptoms.   Outcome: Progressing Towards Goal     Problem: Potential for Alteration in Skin Integrity  Goal: Monitor skin for areas of alteration in skin integrity  Description: Patient/family/caregiver will demonstrate ability to care for patient's skin, monitor for areas of breakdown, and demonstrate methods to prevent breakdown during hospice care. Outcome: Progressing Towards Goal     Problem: Risk for Falls  Goal: Free of falls during inpatient stay  Description: Patient will be free of falls during inpatient stay. Outcome: Progressing Towards Goal     Problem: Alteration in Mobility  Goal: Remain as independent as possible and remain safe in environment  Description: Patient will remain as independent as possible and remain safe in their environment. Outcome: Progressing Towards Goal     Problem: Pain  Goal: Assess satisfaction of level of comfort and symptom control  Outcome: Progressing Towards Goal  Goal: *Control of acute pain  Outcome: Progressing Towards Goal     Problem: Anxiety/Agitation  Goal: Verbalize or staff assess the ability to manage anxiety  Description: The patient/family/caregiver will verbalize and demonstrate ability to manage the patient's anxiety throughout hospice care. Outcome: Progressing Towards Goal     Problem: Communication Deficit  Goal: Effectively communicate symptoms, needs, and concerns  Description: Patient/family/caregiver will effectively communicate symptoms, needs and concerns.   Outcome: Progressing Towards Goal     Problem: Breathing Pattern - Ineffective  Goal: *Use of effective breathing techniques  Outcome: Progressing Towards Goal     Problem: Pressure Injury - Risk of  Goal: *Prevention of pressure injury  Outcome: Progressing Towards Goal  Note: Pressure Injury Interventions:  Sensory Interventions: Assess changes in LOC, Check visual cues for pain, Float heels, Keep linens dry and wrinkle-free, Minimize linen layers    Moisture Interventions: Absorbent underpads, Apply protective barrier, creams and emollients, Internal/External urinary devices, Maintain skin hydration (lotion/cream)    Activity Interventions: Pressure redistribution bed/mattress(bed type)    Mobility Interventions: Float heels, HOB 30 degrees or less, Pressure redistribution bed/mattress (bed type)    Nutrition Interventions:  (npo)    Friction and Shear Interventions: Apply protective barrier, creams and emollients, HOB 30 degrees or less, Lift sheet, Minimize layers                Problem: Infection - Risk of, Urinary Catheter-Associated Urinary Tract Infection  Goal: *Absence of infection signs and symptoms  Outcome: Progressing Towards Goal     Problem: End of Life Process  Goal: Demonstrate understanding of end of life processes  Description: Patient/caregiver will understand end of life processes.   Outcome: Progressing Towards Goal

## 2023-04-04 NOTE — HOSPICE
[facility] team. Pt was in bed and appeared to be sleeping comfortably. His daughter was at bedside. She was very supportive and noted that he has been living with them as the value this very special time in his life. Affirmed her care and concern. No specific needs verbalized at this time. Pastoral care will continue to follow for spiritual support.

## 2023-04-04 NOTE — HOSPICE
1900 Report received from [address] Eric Patient resting quietly with eyes closed. Rn assessment and vitals completed. Respirations unlabored. Skin is warm, sheet pulled back and temperature lowered. 2015 Patient resting quietly with eyes closed, multiple family members at the bedside. 2115 Patient resting quietly with eyes closed, respirations even and unlabored. 2145 Inserted PIV in patients right lower arm. Administered scheduled IV morphine and lorazepam.  2220 Patient bathed by CNA and repositioned onto right side. 2320 Patient resting with eyes closed and neutral facial expression. 0020 Patient resting with eyes closed, respirations unlabored. 0115 Patient resting with eyes closed, respirations even and unlabored. 0150 Administered scheduled IV lorazepam and morphine. Repositioned onto left side. 5229 Patient resting quietly with eyes closed, no signs of pain or discomfort. 0345 Patient resting quietly with eyes closed and neutral facial expression. 0430 Patient resting quietly with eyes closed, respirations unlabored. 0530 Patient resting quietly with eyes closed, respirations even and unlabored. 1712 Administered scheduled IV morphine and lorazepam and prn IV toradol for fever. 3756 Repositioned onto right side. Mouth care provided.   0700 Report given to BARRY Guerra    NAME OF PATIENT:  [facility]    LEVEL OF CARE:  GIP    REASON FOR GIP:   Pain, despite numerous changes in medications, Terminal agitation, despite changes to medications, and Medication adjustment that must be monitored 24/7    *PATIENT REMAINS ELIGIBLE FOR GIP LEVEL OF CARE AS EVIDENCED BY: (MUST BE ADDRESSED OF PATIENT GIP)      REASON FOR RESPITE:  N/a    O2 SAFETY:  N/a    FALL INTERVENTIONS PROVIDED:   Implemented/recommended resources for alarm system (personal alarm, bed alarm, call bell, etc.)  and Implemented/recommended environmental changes (remove hazards, lower bed, improve lighting, etc.)    INTERDISPLINARY COMMUNICATION/COLLABORATION:  Physician, MSW, Hannah Osuan, and RN, CNA    NEW MEDICATION INITIATION DOCUMENTATION:  N/a    Reason medication is being initiated:  n/a    MD / Provider name consulted re: change in status / initiation of new medication:  n/a    New Symptom(s):  n/a    New Order(s):  n/a    Name of the person notified of the changes:  n/a    Name of person being taught:  n/a    Instructions given:  n/a    Side Effects taught:  n/a    Response to teaching:  n/a      COMFORTABLE DYING MEASURE:  Is Patient/family satisfied with symptom level?  yes    DISCHARGE PLAN:  Patient nearing end of life and likely to pass at [facility].

## 2023-04-04 NOTE — PROGRESS NOTES
[facility]  [phone]    Patient Name: Nissa Branham  YOB: 1929    Date of Provider Hospice Visit: 04/04/23    Level of Care:   [x] General Inpatient (GIP)    [] Routine   [] Respite    Current Location of Care:  [] [facility] [] [facility] [] [address] [] [facility] [x] Hospice House [facility], patient referred from:  [] [facility] [] [facility] [] [address] [] [facility] [x] Home [] Other:         Principle Hospice Diagnosis: Metastatic lung cancer  Diagnoses RELATED to the terminal prognosis:   Other Diagnoses:      Shaka Perea is a 80y.o. year old who was admitted to [facility]. Patient is a 80-year-old male admitted to hospice secondary to metastatic lung cancer. Patient with metastatic disease to the liver and lung. He does have a history of trigeminal neuralgia and has been on chronic gabapentin. Patient sent to the [facility] hospice house for respite level care secondary to caregiver exhaustion. Patient has been cared for by his daughter. In reviewing the chart and discussed with the home team, patient apparently is been having significant decline with increasing weakness, poor p.o. intake, dyspnea/chest pain with any type of exertion. He has been requiring as needed doses of his symptom relief kit to include morphine. Imaging that I can see from September 2022 shows left hilar infiltrative mass that significant narrows the left anterior bronchus and includes the posterior branch of the left pulmonary artery. 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 At the time of admission, patient still had a PPS score 60, alert and oriented. Patient elected at the time not to proceed with any type of surgery, chemotherapy or radiation. Patient was here last week for respite and was showing some decline but stable to transition home. Now patient having increased restlessness and SOB.  Attempted to manage in the home with SL medications but not effective so patient transferred to [facility] care. In addition, there are young grandchildren in the home and daughter/patient did not want to die in the home with the grandchildren present    The patient's principle diagnosis has resulted in sob, restlessness, agitation. Functionally, the patient's Karnofsky and/or Palliative Performance Scale has declined over a period of days-weeks and is estimated at 10. The patient is dependent on the following ADLs:all    Objective information that support this patients limited prognosis includes: see above. The patient/family chose comfort measures with the support of Hospice. HOSPICE DIAGNOSES   Active Symptoms:  1. Restlessness with agitation  2. SOB  3. Met lung cancer  4. Hospice care     PLAN   Continue GIP level of care as pt requires frequent nursing assessment and administration and titration of parenteral medications to manage symptom burden. Patient with low-grade fever last night and did require dose of Toradol. Appears comfortable this morning with no evidence of grimacing, restlessness. Daughter-Ami and grandson at the bedside. .  Continue Morphine 4 mg IV every 4 hours scheduled every 15 minutes as needed for pain and shortness of breath  Continue Ativan 2 mg IV every 4 hours scheduled every 15 minutes as needed for restlessness  Comfort meds for all other symptoms  Plan reviewed with bedside nursing team  Talked with patient's daughter Elvira Meek at the bedside. She is hanging in there. Difficult as patient has lived a little bit longer than anticipated. He does appear comfortable and she is very thankful for the hospice house.    and SW to support family needs  Disposition: death at [address] of care was reviewed in detail and agree with current plan of care    Prognosis estimated based on 04/04/23 clinical assessment is:   [x] Hours to Days    [] Days to Weeks    [] Other:    Communicated plan of care with: Hospice Case Manager; Hospice IDT; Care Team     GOALS OF CARE     Patient/Medical POA stated Goal of Care: hospice care    [x] I have reviewed and/or updated ACP information in the Advance Care Planning Navigator. This information is available in the [address] link in the patient's chart header. Primary Decision Maker [facility] Agent):   Primary Decision Maker: Loretta Vera - [phone]    Resuscitation Status: DNR  If DNR is there a Durable DNR on file? : [x] Yes [] No (If no, complete Durable DNR)    HISTORY     History obtained from: chart, home team    CHIEF COMPLAINT: unable to obtain  The patient is:   [] Verbal  [] Nonverbal  [x] Unresponsive    HPI/SUBJECTIVE:  Patient is nonverbal but restless earlier. 3/30-patient appears more comfortable now. He did receive medication that we talked about prior to my visit. He was showing evidence of restlessness, getting out of the bed, shortness of breath last night in reviewing the nurses notes. 3/31- No response to verbal greeting or gentle touch. Generalized pallor/muscle wasting. Respirations shallow. 4/2: unresponsive. 4/3-patient remains unresponsive.   No evidence of increased work of breathing, accessory muscle use, restlessness    4/4-patient is unresponsive, comfortable, no work of breathing, no accessory muscle use       REVIEW OF SYSTEMS     The following systems were: [] reviewed  [x] unable to be reviewed    Positive ROS include:  Constitutional: fatigue, weakness, in pain, short of breath  Ears/nose/mouth/throat: increased airway secretions  Respiratory:shortness of breath, wheezing  Gastrointestinal:poor appetite, nausea, vomiting, abdominal pain, constipation, diarrhea  Musculoskeletal:pain, deformities, swelling legs  Neurologic:confusion, hallucinations, weakness  Psychiatric:anxiety, feeling depressed, poor sleep  Endocrine:     Adult Non-Verbal Pain Assessment Score: 1    Face  [x] 0   No particular expression or smile  [] 1   Occasional grimace, tearing, frowning, wrinkled forehead  [] 2   Frequent grimace, tearing, frowning, wrinkled forehead    Activity (movement)  [] 0   Lying quietly, normal position  [x] 1   Seeking attention through movement or slow, cautious movement  [] 2   Restless, excessive activity and/or withdrawal reflexes    Guarding  [x] 0   Lying quietly, no positioning of hands over areas of body  [] 1   Splinting areas of the body, tense  [] 2   Rigid, stiff    Physiology (vital signs)  [x] 0   Stable vital signs  [] 1   Change in any of the following: SBP > 20mm Hg; HR > 20/minute  [] 2   Change in any of the following: SBP > 30mm Hg; HR > 25/minute    Respiratory  [x] 0   Baseline RR/SpO2, compliant with ventilator  [] 1   RR > 10 above baseline, or 5% drop SpO2, mild asynchrony with ventilator  [] 2   RR > 20 above baseline, or 10% drop SpO2, asynchrony with ventilator     FUNCTIONAL ASSESSMENT     Palliative Performance Scale (PPS):10       PSYCHOSOCIAL/SPIRITUAL ASSESSMENT     Active Problems:    * No active hospital problems. *    Past Medical History:   Diagnosis Date    Abnormal x-ray of thoracic spine 09/2022    sclerotic lytic 2.6 cm lesion involving the inferior T12 vertebra    Benign essential HTN     Bladder cancer ([facility] [address] 2013?    s/p BCGx2, resection. low grade. in [facility]. now Dr. Mellissa Villalpando. cystoscopy 6/2020 clear    CAD (coronary artery disease) 09/2021    Dr Shai Drake. Cath 9/24/21 Successful bifurcation stenting of OM1 and AV groove Lcx with 2 stent couloette technique    Closed left arm fracture     GERD (gastroesophageal reflux disease)     with hiatal hernia    Glaucoma     Dr. Mendez Innocent    Hemorrhoids     Hilar mass 09/28/2022    Dr. Max Pimentel pulmonary. Left hilar 5 cm infiltrative mass    HTN (hypertension)     Hypercholesterolemia     Microalbuminuria due to type 2 diabetes mellitus (HCC)     Prostate cancer (HCC) 2000    Dr. Mellissa Villalpando. s/p prostatectomy 1/2000 in [facility].   PSA \"0.4\" since then    Rosacea     Trigeminal neuralgia of right side of face     Type 2 diabetes mellitus with microalbuminuria ([facility] [address]     Vertigo       Past Surgical History:   Procedure Laterality Date    HX COLONOSCOPY  ? reports 2 colonoscopies    HX HEART CATHETERIZATION  2020    Successful bifurcation stenting of OM1 and AV groove Lcx with 2 stent couloette technique    HX HEART CATHETERIZATION  2020    Multivessel CAD. LCX/OM1 lesions, severe with significant calcification. Normal LVEDP. Unsuccessful PCI attempt with POBA due to calcification. HX OTHER SURGICAL  2020    bladder biopsy    HX PROSTATECTOMY  2000      Social History     Tobacco Use    Smoking status: Former     Types: Cigarettes     Quit date: 1962     Years since quittin.2    Smokeless tobacco: Never   Substance Use Topics    Alcohol use:  Yes     Alcohol/week: 5.0 standard drinks     Types: 5 Glasses of wine per week     Family History   Problem Relation Age of Onset    Cancer Mother         breast    Cancer Father         prostate    Cancer Sister         breast    Cancer Brother         prostate      Allergies   Allergen Reactions    Lisinopril Cough      Current Facility-Administered Medications   Medication Dose Route Frequency    ketorolac (TORADOL) injection 30 mg  30 mg IntraVENous Q6H PRN    LORazepam (ATIVAN) injection 2 mg  2 mg IntraVENous Q15MIN PRN    morphine injection 4 mg  4 mg IntraVENous Q15MIN PRN    LORazepam (ATIVAN) injection 2 mg  2 mg IntraVENous Q4H    morphine injection 4 mg  4 mg IntraVENous Q4H    bisacodyL (DULCOLAX) suppository 10 mg  10 mg Rectal DAILY PRN    glycopyrrolate (ROBINUL) injection 0.2 mg  0.2 mg IntraVENous Q4H PRN        PHYSICAL EXAM     Wt Readings from Last 3 Encounters:   23 63.3 kg (139 lb 9.6 oz)   22 65.3 kg (144 lb)   22 65.6 kg (144 lb 9.6 oz)       Visit Vitals  BP (!) 98/56   Pulse (!) 101   Temp 99.7 °F (37.6 °C)   Resp 13   SpO2 (!) 87%       Supplemental O2  [] Yes  [x] NO  Last bowel movement:     Currently this patient has:  [x] Peripheral IV x 2 [] PICC  [] PORT [] ICD    [x] Stoner Catheter [] NG Tube   [] PEG Tube    [] Rectal Tube [] Drain  [] Other:     Constitutional:ill appearing, ashen, periods of apnea  Eyes: closed  ENMT: dry oral mucosa  Cardiovascular: tachy, regular; distal pulses weak  Respiratory: respirations shallow  Gastrointestinal: thin, soft  Musculoskeletal:muscle wasting  Skin:cool extremities with mottling  Neurologic: unresponsive  Psychiatric:  unresponsive         Pertinent Lab and or Imaging Tests:  Lab Results   Component Value Date/Time    Sodium 138 02/07/2023 12:32 PM    Potassium 4.6 02/07/2023 12:32 PM    Chloride 106 02/07/2023 12:32 PM    CO2 27 02/07/2023 12:32 PM    Anion gap 5 02/07/2023 12:32 PM    Glucose 248 (H) 02/07/2023 12:32 PM    BUN 24 (H) 02/07/2023 12:32 PM    Creatinine 1.23 02/07/2023 12:32 PM    BUN/Creatinine ratio 20 02/07/2023 12:32 PM    GFR est AA >60 08/02/2022 12:02 PM    GFR est non-AA >60 08/02/2022 12:02 PM    Calcium 9.6 02/07/2023 12:32 PM     Lab Results   Component Value Date/Time    Protein, total 6.5 02/07/2023 12:32 PM    Albumin 3.4 (L) 02/07/2023 12:32 PM

## 2023-04-05 NOTE — DISCHARGE SUMMARY
[facility]  Provider Death Note    Called to examine patient who has . No response to verbal and tactile stimuli. No respiratory effort. Absent heart sounds and pulses. Pupils fixed and dilated.    Patient pronounced dead at [address] [facility]

## 2023-04-05 NOTE — HOSPICE
1900 Report received from Mari, [address] [facility] Patient resting in his right side with eyes closed. RN assessment and vitals complete. Skin is warm, axillary temp 102.4. Administered prn IV toradol. 2020 Patient resting with eyes closed, arms are cooler, respirations unlabored. 2100 Patient resting with eyes closed and neutral facial expression. 2150 Administered scheduled IV morphine and lorazepam. Repositioned onto left side. 2250 Patient resting quietly with eyes closed, respirations unlabored. 2350 Patient resting quietly with eyes closed, respirations shallow. 0100 Patient resting quietly with eyes closed and neutral facial expression. 0155 Administered scheduled IV lorazepam and morphine. 0300 Patient resting quietly with eyes closed, respirations unlabored. 0400 Patient resting quietly with eyes closed, respirations shallow. 0430 Patient repositioned onto right side. 0500 Patient resting quietly with eyes closed, skin is pale and cool. 0600 Administered scheduled IV lorazepam ad morphine. Mouth care provided. 0700 Report given to oncoming shift.       NAME OF PATIENT:  Rafaela Ramos    LEVEL OF CARE:  GIP    REASON FOR GIP:   Pain, despite numerous changes in medications, Terminal agitation, despite changes to medications, and Medication adjustment that must be monitored 24/7    *PATIENT REMAINS ELIGIBLE FOR GIP LEVEL OF CARE AS EVIDENCED BY: (MUST BE ADDRESSED OF PATIENT GIP)      REASON FOR RESPITE:  N/a    O2 SAFETY:  N/a    FALL INTERVENTIONS PROVIDED:   Implemented/recommended resources for alarm system (personal alarm, bed alarm, call bell, etc.)  and Implemented/recommended environmental changes (remove hazards, lower bed, improve lighting, etc.)    INTERDISPLINARY COMMUNICATION/COLLABORATION:  Physician, MAUREEN, Ray Romo, and RN, CNA    NEW MEDICATION INITIATION DOCUMENTATION:  N/a    Reason medication is being initiated:  n/a    MD / Provider name consulted re: change in status / initiation of new medication:  n/a    New Symptom(s):  n/a    New Order(s):  n/a    Name of the person notified of the changes:  n/a    Name of person being taught:  n/a    Instructions given:  n/a    Side Effects taught:  n/a    Response to teaching:  n/a      COMFORTABLE DYING MEASURE:  Is Patient/family satisfied with symptom level?  yes    DISCHARGE PLAN:  Patient nearing end of life and likely to pass at [facility].

## 2023-04-05 NOTE — PROGRESS NOTES
Problem: Falls - Risk of  Goal: *Absence of Falls  Description: Document Saima Rodrigez Fall Risk and appropriate interventions in the flowsheet. Outcome: Progressing Towards Goal  Note: Fall Risk Interventions:                                Problem: Pressure Injury - Risk of  Goal: *Prevention of pressure injury  Description: Document Max Scale and appropriate interventions in the flowsheet. Outcome: Progressing Towards Goal  Note: Pressure Injury Interventions:  Sensory Interventions: Assess changes in LOC, Check visual cues for pain, Float heels, Keep linens dry and wrinkle-free, Minimize linen layers    Moisture Interventions: Absorbent underpads, Apply protective barrier, creams and emollients, Internal/External urinary devices, Maintain skin hydration (lotion/cream)    Activity Interventions: Pressure redistribution bed/mattress(bed type)    Mobility Interventions: Float heels, HOB 30 degrees or less, Pressure redistribution bed/mattress (bed type)    Nutrition Interventions:  (npo)    Friction and Shear Interventions: Apply protective barrier, creams and emollients, HOB 30 degrees or less, Lift sheet, Minimize layers                Problem: Hospice Orientation  Goal: Demonstrate understanding of hospice philosophy, plan of care, and home hospice program  Description: The patient/family/caregiver will demonstrate understanding of hospice philosophy, plan of care and the home hospice program as evidenced by participation in meeting the patient's psychosocial, spiritual, medical, and physical needs inclusive of medical supplies/equipment focusing on symptoms. Outcome: Progressing Towards Goal     Problem: Risk for Falls  Goal: Free of falls during inpatient stay  Description: Patient will be free of falls during inpatient stay.   Outcome: Progressing Towards Goal     Problem: Alteration in Mobility  Goal: Remain as independent as possible and remain safe in environment  Description: Patient will remain as independent as possible and remain safe in their environment. Outcome: Progressing Towards Goal     Problem: Pain  Goal: Assess satisfaction of level of comfort and symptom control  Outcome: Progressing Towards Goal     Problem: Anxiety/Agitation  Goal: Verbalize or staff assess the ability to manage anxiety  Description: The patient/family/caregiver will verbalize and demonstrate ability to manage the patient's anxiety throughout hospice care.   Outcome: Progressing Towards Goal     Problem: Breathing Pattern - Ineffective  Goal: *Use of effective breathing techniques  Outcome: Progressing Towards Goal     Problem: Pressure Injury - Risk of  Goal: *Prevention of pressure injury  Outcome: Progressing Towards Goal  Note: Pressure Injury Interventions:  Sensory Interventions: Assess changes in LOC, Check visual cues for pain, Float heels, Keep linens dry and wrinkle-free, Minimize linen layers    Moisture Interventions: Absorbent underpads, Apply protective barrier, creams and emollients, Internal/External urinary devices, Maintain skin hydration (lotion/cream)    Activity Interventions: Pressure redistribution bed/mattress(bed type)    Mobility Interventions: Float heels, HOB 30 degrees or less, Pressure redistribution bed/mattress (bed type)    Nutrition Interventions:  (npo)    Friction and Shear Interventions: Apply protective barrier, creams and emollients, HOB 30 degrees or less, Lift sheet, Minimize layers                Problem: Infection - Risk of, Urinary Catheter-Associated Urinary Tract Infection  Goal: *Absence of infection signs and symptoms  Outcome: Progressing Towards Goal

## 2023-04-05 NOTE — PROGRESS NOTES
Problem: Falls - Risk of  Goal: *Absence of Falls  Description: Document Ladona Gwendolyn Fall Risk and appropriate interventions in the flowsheet. Outcome: Progressing Towards Goal  Note: Fall Risk Interventions:                                Problem: Patient Education: Go to Patient Education Activity  Goal: Patient/Family Education  Outcome: Progressing Towards Goal     Problem: Pressure Injury - Risk of  Goal: *Prevention of pressure injury  Description: Document Max Scale and appropriate interventions in the flowsheet. Outcome: Progressing Towards Goal  Note: Pressure Injury Interventions:  Sensory Interventions: Assess changes in LOC, Check visual cues for pain, Float heels, Keep linens dry and wrinkle-free, Minimize linen layers    Moisture Interventions: Absorbent underpads, Apply protective barrier, creams and emollients, Internal/External urinary devices, Maintain skin hydration (lotion/cream)    Activity Interventions: Pressure redistribution bed/mattress(bed type)    Mobility Interventions: Float heels, HOB 30 degrees or less, Pressure redistribution bed/mattress (bed type)    Nutrition Interventions:  (npo)    Friction and Shear Interventions: Apply protective barrier, creams and emollients, HOB 30 degrees or less, Lift sheet, Minimize layers                Problem: Pressure Injury - Risk of  Goal: *Prevention of pressure injury  Description: Document Max Scale and appropriate interventions in the flowsheet.   Outcome: Progressing Towards Goal  Note: Pressure Injury Interventions:  Sensory Interventions: Assess changes in LOC, Check visual cues for pain, Float heels, Keep linens dry and wrinkle-free, Minimize linen layers    Moisture Interventions: Absorbent underpads, Apply protective barrier, creams and emollients, Internal/External urinary devices, Maintain skin hydration (lotion/cream)    Activity Interventions: Pressure redistribution bed/mattress(bed type)    Mobility Interventions: Float heels, HOB 30 degrees or less, Pressure redistribution bed/mattress (bed type)    Nutrition Interventions:  (npo)    Friction and Shear Interventions: Apply protective barrier, creams and emollients, HOB 30 degrees or less, Lift sheet, Minimize layers                Problem: Patient Education: Go to Patient Education Activity  Goal: Patient/Family Education  Outcome: Progressing Towards Goal     Problem: Hospice Orientation  Goal: Demonstrate understanding of hospice philosophy, plan of care, and home hospice program  Description: The patient/family/caregiver will demonstrate understanding of hospice philosophy, plan of care and the home hospice program as evidenced by participation in meeting the patient's psychosocial, spiritual, medical, and physical needs inclusive of medical supplies/equipment focusing on symptoms. Outcome: Progressing Towards Goal     Problem: Risk for Falls  Goal: Free of falls during inpatient stay  Description: Patient will be free of falls during inpatient stay. Outcome: Progressing Towards Goal     Problem: Potential for Alteration in Skin Integrity  Goal: Monitor skin for areas of alteration in skin integrity  Description: Patient/family/caregiver will demonstrate ability to care for patient's skin, monitor for areas of breakdown, and demonstrate methods to prevent breakdown during hospice care. Outcome: Progressing Towards Goal     Problem: Hospice Orientation  Goal: Demonstrate understanding of hospice philosophy, plan of care, and home hospice program  Description: The patient/family/caregiver will demonstrate understanding of hospice philosophy, plan of care and the home hospice program as evidenced by participation in meeting the patient's psychosocial, spiritual, medical, and physical needs inclusive of medical supplies/equipment focusing on symptoms.   Outcome: Progressing Towards Goal     Problem: Alteration in Mobility  Goal: Remain as independent as possible and remain safe in environment  Description: Patient will remain as independent as possible and remain safe in their environment. Outcome: Progressing Towards Goal     Problem: Pain  Goal: Assess satisfaction of level of comfort and symptom control  Outcome: Progressing Towards Goal  Goal: *Control of acute pain  Outcome: Progressing Towards Goal     Problem: Anxiety/Agitation  Goal: Verbalize or staff assess the ability to manage anxiety  Description: The patient/family/caregiver will verbalize and demonstrate ability to manage the patient's anxiety throughout hospice care. Outcome: Progressing Towards Goal     Problem: Communication Deficit  Goal: Effectively communicate symptoms, needs, and concerns  Description: Patient/family/caregiver will effectively communicate symptoms, needs and concerns.   Outcome: Progressing Towards Goal     Problem: Breathing Pattern - Ineffective  Goal: *Use of effective breathing techniques  Outcome: Progressing Towards Goal     Problem: Pressure Injury - Risk of  Goal: *Prevention of pressure injury  Outcome: Progressing Towards Goal  Note: Pressure Injury Interventions:  Sensory Interventions: Assess changes in LOC, Check visual cues for pain, Float heels, Keep linens dry and wrinkle-free, Minimize linen layers    Moisture Interventions: Absorbent underpads, Apply protective barrier, creams and emollients, Internal/External urinary devices, Maintain skin hydration (lotion/cream)    Activity Interventions: Pressure redistribution bed/mattress(bed type)    Mobility Interventions: Float heels, HOB 30 degrees or less, Pressure redistribution bed/mattress (bed type)    Nutrition Interventions:  (npo)    Friction and Shear Interventions: Apply protective barrier, creams and emollients, HOB 30 degrees or less, Lift sheet, Minimize layers                Problem: Infection - Risk of, Urinary Catheter-Associated Urinary Tract Infection  Goal: *Absence of infection signs and symptoms  Outcome: Progressing Towards Goal     Problem: End of Life Process  Goal: Demonstrate understanding of end of life processes  Description: Patient/caregiver will understand end of life processes. Outcome: Progressing Towards Goal     Problem: End of Life Process  Goal: Demonstrate understanding of end of life processes  Description: Patient/caregiver will understand end of life processes.   Outcome: Progressing Towards Goal

## 2023-04-05 NOTE — PROGRESS NOTES
Problem: Falls - Risk of  Goal: *Absence of Falls  Description: Document Elyse Mend Fall Risk and appropriate interventions in the flowsheet. 4/5/2023 0915 by Abeba Ac RN  Outcome: Resolved/Met  4/5/2023 0732 by Abeba Ac RN  Outcome: Progressing Towards Goal  Note: Fall Risk Interventions:                                Problem: Patient Education: Go to Patient Education Activity  Goal: Patient/Family Education  4/5/2023 0915 by Abeba Ac RN  Outcome: Resolved/Met  4/5/2023 0732 by Abeba Ac RN  Outcome: Progressing Towards Goal     Problem: Pressure Injury - Risk of  Goal: *Prevention of pressure injury  Description: Document Max Scale and appropriate interventions in the flowsheet. 4/5/2023 0915 by Abeba Ac RN  Outcome: Resolved/Met  4/5/2023 0732 by Abeba Ac RN  Outcome: Progressing Towards Goal  Note: Pressure Injury Interventions:  Sensory Interventions: Assess changes in LOC, Check visual cues for pain, Float heels, Keep linens dry and wrinkle-free, Minimize linen layers    Moisture Interventions: Absorbent underpads, Apply protective barrier, creams and emollients, Internal/External urinary devices, Maintain skin hydration (lotion/cream)    Activity Interventions: Pressure redistribution bed/mattress(bed type)    Mobility Interventions: Float heels, HOB 30 degrees or less, Pressure redistribution bed/mattress (bed type)    Nutrition Interventions:  (npo)    Friction and Shear Interventions: Apply protective barrier, creams and emollients, HOB 30 degrees or less, Lift sheet, Minimize layers                Problem: Falls - Risk of  Goal: *Absence of Falls  Description: Document Tracey Fall Risk and appropriate interventions in the flowsheet.   4/5/2023 0915 by Abeba Ac RN  Outcome: Resolved/Met  4/5/2023 0732 by Abeba Ac RN  Outcome: Progressing Towards Goal  Note: Fall Risk Interventions:

## 2023-04-05 NOTE — HOSPICE
NAME OF PATIENT:  Elaine Madison    LEVEL OF CARE:  GIP    REASON FOR GIP:   Pain, despite numerous changes in medications, Unmanageable respiratory distress, Terminal agitation, despite changes to medications, Medication adjustment that must be monitored 24/7, and Stabilizing treatment that cannot take place at home    *PATIENT REMAINS ELIGIBLE FOR GIP LEVEL OF CARE AS EVIDENCED BY: (MUST BE ADDRESSED OF PATIENT GIP)      O2 SAFETY:    N/a    FALL INTERVENTIONS PROVIDED:   Implemented/recommended resources for alarm system (personal alarm, bed alarm, call bell, etc.)  and Implemented/recommended environmental changes (remove hazards, lower bed, improve lighting, etc.)    INTERDISPLINARY COMMUNICATION/COLLABORATION:  RNTAMMY    NEW MEDICATION INITIATION DOCUMENTATION:    N/a    Reason medication is being initiated:   n/a    MD / Provider name consulted re: change in status / initiation of new medication:   n/a    New Symptom(s):   n/a    New Order(s):   n/a    Name of the person notified of the changes:   n/a    Name of person being taught:   n/a    Instructions given:   n/a    Side Effects taught:   n/a    Response to teaching:   n/a      COMFORTABLE DYING MEASURE:  Is Patient/family satisfied with symptom level?  yes    DISCHARGE PLAN:  Patient may pass at [facility], should patient stabilize will return home with family. 0700  report received from [address] [place] [address]  Patient resting with his eyes closed. Respiration becoming slightly more shallow/ pulling. Heart sounds more bounding. Lower extremities pale with mottling noted on his knees and bilateral feet. Cool to touch. 0755  medicated with prn ativan and morphine. Oral care provided. 9604  Patient resting more comfortably. 0840  TOD pronounced by Dr. Kofi Nolasco after assessing 2 minutes of absent heart sounds and spontaneous respirations. 0095  Dr. Kofi Nolasco notified his daughter Fabio Grajeda via phone. She will be coming to [facility].   7436  Stoner catheter and 2 IV sites removed.

## 2023-04-07 ENCOUNTER — HOME CARE VISIT (OUTPATIENT)
Dept: HOSPICE | Facility: HOSPICE | Age: 88
End: 2023-04-07
Payer: MEDICARE

## 2023-10-15 NOTE — LETTER
9/14/20 Patient: Jacque Portillo YOB: 1929 Date of Visit: 9/14/2020 Dale Lopez MD 
[address] [facility] [id number] VIA In Basket Dear Dale Lopez MD, Thank you for referring Mr. Jacque Portillo to [facility] for evaluation. My notes for this consultation are attached. If you have questions, please do not hesitate to call me. I look forward to following your patient along with you. Sincerely, Herbie Nair MD 
 
 No
